# Patient Record
Sex: MALE | Race: BLACK OR AFRICAN AMERICAN | NOT HISPANIC OR LATINO | Employment: UNEMPLOYED | URBAN - METROPOLITAN AREA
[De-identification: names, ages, dates, MRNs, and addresses within clinical notes are randomized per-mention and may not be internally consistent; named-entity substitution may affect disease eponyms.]

---

## 2021-06-04 ENCOUNTER — APPOINTMENT (OUTPATIENT)
Dept: GENERAL RADIOLOGY | Facility: HOSPITAL | Age: 61
End: 2021-06-04

## 2021-06-04 ENCOUNTER — HOSPITAL ENCOUNTER (OUTPATIENT)
Facility: HOSPITAL | Age: 61
Setting detail: OBSERVATION
Discharge: HOME | End: 2021-06-07
Attending: EMERGENCY MEDICINE | Admitting: INTERNAL MEDICINE

## 2021-06-04 DIAGNOSIS — R06.02 SHORTNESS OF BREATH: ICD-10-CM

## 2021-06-04 DIAGNOSIS — I50.9 ACUTE ON CHRONIC CONGESTIVE HEART FAILURE, UNSPECIFIED HEART FAILURE TYPE (HCC): Primary | ICD-10-CM

## 2021-06-04 LAB
ALBUMIN SERPL-MCNC: 4.1 G/DL (ref 3.5–5.2)
ALBUMIN/GLOB SERPL: 1.4 G/DL
ALP SERPL-CCNC: 147 U/L (ref 39–117)
ALT SERPL W P-5'-P-CCNC: 24 U/L (ref 1–41)
ANION GAP SERPL CALCULATED.3IONS-SCNC: 9 MMOL/L (ref 5–15)
APTT PPP: 28 SECONDS (ref 24–31)
AST SERPL-CCNC: 26 U/L (ref 1–40)
BASOPHILS # BLD AUTO: 0 10*3/MM3 (ref 0–0.2)
BASOPHILS NFR BLD AUTO: 0.9 % (ref 0–1.5)
BILIRUB SERPL-MCNC: 2 MG/DL (ref 0–1.2)
BUN SERPL-MCNC: 15 MG/DL (ref 6–20)
BUN/CREAT SERPL: 11.6 (ref 7–25)
CALCIUM SPEC-SCNC: 9.5 MG/DL (ref 8.6–10.5)
CHLORIDE SERPL-SCNC: 105 MMOL/L (ref 98–107)
CO2 SERPL-SCNC: 25 MMOL/L (ref 22–29)
CREAT SERPL-MCNC: 1.29 MG/DL (ref 0.76–1.27)
D-LACTATE SERPL-SCNC: 0.6 MMOL/L (ref 0.5–2)
DEPRECATED RDW RBC AUTO: 52.9 FL (ref 37–54)
EOSINOPHIL # BLD AUTO: 0.1 10*3/MM3 (ref 0–0.4)
EOSINOPHIL NFR BLD AUTO: 1.2 % (ref 0.3–6.2)
ERYTHROCYTE [DISTWIDTH] IN BLOOD BY AUTOMATED COUNT: 16.6 % (ref 12.3–15.4)
GFR SERPL CREATININE-BSD FRML MDRD: 69 ML/MIN/1.73
GLOBULIN UR ELPH-MCNC: 3 GM/DL
GLUCOSE SERPL-MCNC: 86 MG/DL (ref 65–99)
HCT VFR BLD AUTO: 49 % (ref 37.5–51)
HGB BLD-MCNC: 16 G/DL (ref 13–17.7)
HOLD SPECIMEN: NORMAL
INR PPP: 1.15 (ref 0.93–1.1)
LYMPHOCYTES # BLD AUTO: 1.2 10*3/MM3 (ref 0.7–3.1)
LYMPHOCYTES NFR BLD AUTO: 25.1 % (ref 19.6–45.3)
MCH RBC QN AUTO: 30.3 PG (ref 26.6–33)
MCHC RBC AUTO-ENTMCNC: 32.7 G/DL (ref 31.5–35.7)
MCV RBC AUTO: 92.5 FL (ref 79–97)
MONOCYTES # BLD AUTO: 0.6 10*3/MM3 (ref 0.1–0.9)
MONOCYTES NFR BLD AUTO: 13.5 % (ref 5–12)
NEUTROPHILS NFR BLD AUTO: 2.8 10*3/MM3 (ref 1.7–7)
NEUTROPHILS NFR BLD AUTO: 59.3 % (ref 42.7–76)
NRBC BLD AUTO-RTO: 0.1 /100 WBC (ref 0–0.2)
NT-PROBNP SERPL-MCNC: 1722 PG/ML (ref 0–900)
PLATELET # BLD AUTO: 219 10*3/MM3 (ref 140–450)
PMV BLD AUTO: 8.8 FL (ref 6–12)
POTASSIUM SERPL-SCNC: 5.2 MMOL/L (ref 3.5–5.2)
PROT SERPL-MCNC: 7.1 G/DL (ref 6–8.5)
PROTHROMBIN TIME: 12.6 SECONDS (ref 9.6–11.7)
RBC # BLD AUTO: 5.3 10*6/MM3 (ref 4.14–5.8)
SARS-COV-2 RNA PNL SPEC NAA+PROBE: NOT DETECTED
SODIUM SERPL-SCNC: 139 MMOL/L (ref 136–145)
TROPONIN T SERPL-MCNC: <0.01 NG/ML (ref 0–0.03)
WBC # BLD AUTO: 4.7 10*3/MM3 (ref 3.4–10.8)

## 2021-06-04 PROCEDURE — 84484 ASSAY OF TROPONIN QUANT: CPT | Performed by: NURSE PRACTITIONER

## 2021-06-04 PROCEDURE — 83605 ASSAY OF LACTIC ACID: CPT

## 2021-06-04 PROCEDURE — 99284 EMERGENCY DEPT VISIT MOD MDM: CPT

## 2021-06-04 PROCEDURE — G0378 HOSPITAL OBSERVATION PER HR: HCPCS

## 2021-06-04 PROCEDURE — 80053 COMPREHEN METABOLIC PANEL: CPT | Performed by: NURSE PRACTITIONER

## 2021-06-04 PROCEDURE — 71045 X-RAY EXAM CHEST 1 VIEW: CPT

## 2021-06-04 PROCEDURE — 85730 THROMBOPLASTIN TIME PARTIAL: CPT | Performed by: NURSE PRACTITIONER

## 2021-06-04 PROCEDURE — 83880 ASSAY OF NATRIURETIC PEPTIDE: CPT | Performed by: NURSE PRACTITIONER

## 2021-06-04 PROCEDURE — 96376 TX/PRO/DX INJ SAME DRUG ADON: CPT

## 2021-06-04 PROCEDURE — 85025 COMPLETE CBC W/AUTO DIFF WBC: CPT | Performed by: NURSE PRACTITIONER

## 2021-06-04 PROCEDURE — 85610 PROTHROMBIN TIME: CPT | Performed by: NURSE PRACTITIONER

## 2021-06-04 PROCEDURE — 96374 THER/PROPH/DIAG INJ IV PUSH: CPT

## 2021-06-04 PROCEDURE — U0003 INFECTIOUS AGENT DETECTION BY NUCLEIC ACID (DNA OR RNA); SEVERE ACUTE RESPIRATORY SYNDROME CORONAVIRUS 2 (SARS-COV-2) (CORONAVIRUS DISEASE [COVID-19]), AMPLIFIED PROBE TECHNIQUE, MAKING USE OF HIGH THROUGHPUT TECHNOLOGIES AS DESCRIBED BY CMS-2020-01-R: HCPCS | Performed by: EMERGENCY MEDICINE

## 2021-06-04 PROCEDURE — 25010000002 FUROSEMIDE PER 20 MG: Performed by: NURSE PRACTITIONER

## 2021-06-04 RX ORDER — ASPIRIN 81 MG/1
81 TABLET, CHEWABLE ORAL DAILY
Status: DISCONTINUED | OUTPATIENT
Start: 2021-06-05 | End: 2021-06-07 | Stop reason: HOSPADM

## 2021-06-04 RX ORDER — FUROSEMIDE 20 MG/1
20 TABLET ORAL DAILY
Status: ON HOLD | COMMUNITY
End: 2021-06-07 | Stop reason: SDUPTHER

## 2021-06-04 RX ORDER — LOSARTAN POTASSIUM 25 MG/1
25 TABLET ORAL DAILY
COMMUNITY
Start: 2021-05-18 | End: 2021-09-20 | Stop reason: SDUPTHER

## 2021-06-04 RX ORDER — SODIUM CHLORIDE 0.9 % (FLUSH) 0.9 %
10 SYRINGE (ML) INJECTION EVERY 12 HOURS SCHEDULED
Status: DISCONTINUED | OUTPATIENT
Start: 2021-06-04 | End: 2021-06-07 | Stop reason: HOSPADM

## 2021-06-04 RX ORDER — FUROSEMIDE 10 MG/ML
40 INJECTION INTRAMUSCULAR; INTRAVENOUS
Status: DISCONTINUED | OUTPATIENT
Start: 2021-06-04 | End: 2021-06-07 | Stop reason: HOSPADM

## 2021-06-04 RX ORDER — ACETAMINOPHEN 325 MG/1
650 TABLET ORAL EVERY 4 HOURS PRN
Status: DISCONTINUED | OUTPATIENT
Start: 2021-06-04 | End: 2021-06-07 | Stop reason: HOSPADM

## 2021-06-04 RX ORDER — ASPIRIN 81 MG/1
81 TABLET ORAL DAILY
COMMUNITY
End: 2021-09-20

## 2021-06-04 RX ORDER — ONDANSETRON 2 MG/ML
4 INJECTION INTRAMUSCULAR; INTRAVENOUS EVERY 6 HOURS PRN
Status: DISCONTINUED | OUTPATIENT
Start: 2021-06-04 | End: 2021-06-07 | Stop reason: HOSPADM

## 2021-06-04 RX ORDER — CHOLECALCIFEROL (VITAMIN D3) 125 MCG
5 CAPSULE ORAL NIGHTLY PRN
Status: DISCONTINUED | OUTPATIENT
Start: 2021-06-04 | End: 2021-06-07 | Stop reason: HOSPADM

## 2021-06-04 RX ORDER — NITROGLYCERIN 0.4 MG/1
0.4 TABLET SUBLINGUAL
Status: DISCONTINUED | OUTPATIENT
Start: 2021-06-04 | End: 2021-06-07 | Stop reason: HOSPADM

## 2021-06-04 RX ORDER — GABAPENTIN 300 MG/1
300 CAPSULE ORAL 2 TIMES DAILY
Status: ON HOLD | COMMUNITY
Start: 2021-05-25 | End: 2021-06-15

## 2021-06-04 RX ORDER — GABAPENTIN 300 MG/1
300 CAPSULE ORAL 2 TIMES DAILY
Status: DISCONTINUED | OUTPATIENT
Start: 2021-06-04 | End: 2021-06-07 | Stop reason: HOSPADM

## 2021-06-04 RX ORDER — FUROSEMIDE 10 MG/ML
40 INJECTION INTRAMUSCULAR; INTRAVENOUS ONCE
Status: COMPLETED | OUTPATIENT
Start: 2021-06-04 | End: 2021-06-04

## 2021-06-04 RX ORDER — LOSARTAN POTASSIUM 25 MG/1
25 TABLET ORAL DAILY
Status: DISCONTINUED | OUTPATIENT
Start: 2021-06-05 | End: 2021-06-07 | Stop reason: HOSPADM

## 2021-06-04 RX ORDER — SODIUM CHLORIDE 0.9 % (FLUSH) 0.9 %
10 SYRINGE (ML) INJECTION AS NEEDED
Status: DISCONTINUED | OUTPATIENT
Start: 2021-06-04 | End: 2021-06-07 | Stop reason: HOSPADM

## 2021-06-04 RX ADMIN — Medication 5 MG: at 22:22

## 2021-06-04 RX ADMIN — Medication 10 ML: at 20:40

## 2021-06-04 RX ADMIN — GABAPENTIN 300 MG: 300 CAPSULE ORAL at 20:41

## 2021-06-04 RX ADMIN — FUROSEMIDE 40 MG: 10 INJECTION, SOLUTION INTRAMUSCULAR; INTRAVENOUS at 15:46

## 2021-06-04 RX ADMIN — ACETAMINOPHEN 650 MG: 325 TABLET, FILM COATED ORAL at 22:22

## 2021-06-04 RX ADMIN — FUROSEMIDE 40 MG: 10 INJECTION, SOLUTION INTRAMUSCULAR; INTRAVENOUS at 20:41

## 2021-06-04 RX ADMIN — NITROGLYCERIN 1 INCH: 20 OINTMENT TOPICAL at 15:46

## 2021-06-05 LAB
ANION GAP SERPL CALCULATED.3IONS-SCNC: 11 MMOL/L (ref 5–15)
BUN SERPL-MCNC: 16 MG/DL (ref 6–20)
BUN/CREAT SERPL: 12.8 (ref 7–25)
CALCIUM SPEC-SCNC: 8.8 MG/DL (ref 8.6–10.5)
CHLORIDE SERPL-SCNC: 105 MMOL/L (ref 98–107)
CO2 SERPL-SCNC: 25 MMOL/L (ref 22–29)
CREAT SERPL-MCNC: 1.25 MG/DL (ref 0.76–1.27)
DEPRECATED RDW RBC AUTO: 50.8 FL (ref 37–54)
ERYTHROCYTE [DISTWIDTH] IN BLOOD BY AUTOMATED COUNT: 16.1 % (ref 12.3–15.4)
GFR SERPL CREATININE-BSD FRML MDRD: 72 ML/MIN/1.73
GLUCOSE SERPL-MCNC: 113 MG/DL (ref 65–99)
HCT VFR BLD AUTO: 43.9 % (ref 37.5–51)
HGB BLD-MCNC: 14.7 G/DL (ref 13–17.7)
MAGNESIUM SERPL-MCNC: 1.8 MG/DL (ref 1.6–2.6)
MCH RBC QN AUTO: 30.5 PG (ref 26.6–33)
MCHC RBC AUTO-ENTMCNC: 33.3 G/DL (ref 31.5–35.7)
MCV RBC AUTO: 91.3 FL (ref 79–97)
PLATELET # BLD AUTO: 195 10*3/MM3 (ref 140–450)
PMV BLD AUTO: 8.5 FL (ref 6–12)
POTASSIUM SERPL-SCNC: 4.5 MMOL/L (ref 3.5–5.2)
RBC # BLD AUTO: 4.81 10*6/MM3 (ref 4.14–5.8)
SODIUM SERPL-SCNC: 141 MMOL/L (ref 136–145)
TROPONIN T SERPL-MCNC: 0.02 NG/ML (ref 0–0.03)
WBC # BLD AUTO: 5.2 10*3/MM3 (ref 3.4–10.8)

## 2021-06-05 PROCEDURE — 80048 BASIC METABOLIC PNL TOTAL CA: CPT | Performed by: NURSE PRACTITIONER

## 2021-06-05 PROCEDURE — 85027 COMPLETE CBC AUTOMATED: CPT | Performed by: NURSE PRACTITIONER

## 2021-06-05 PROCEDURE — 94618 PULMONARY STRESS TESTING: CPT

## 2021-06-05 PROCEDURE — 84484 ASSAY OF TROPONIN QUANT: CPT | Performed by: PHYSICIAN ASSISTANT

## 2021-06-05 PROCEDURE — 83735 ASSAY OF MAGNESIUM: CPT | Performed by: NURSE PRACTITIONER

## 2021-06-05 PROCEDURE — 93010 ELECTROCARDIOGRAM REPORT: CPT | Performed by: INTERNAL MEDICINE

## 2021-06-05 PROCEDURE — 25010000002 FUROSEMIDE PER 20 MG: Performed by: NURSE PRACTITIONER

## 2021-06-05 PROCEDURE — 96376 TX/PRO/DX INJ SAME DRUG ADON: CPT

## 2021-06-05 PROCEDURE — 93005 ELECTROCARDIOGRAM TRACING: CPT | Performed by: PHYSICIAN ASSISTANT

## 2021-06-05 PROCEDURE — G0378 HOSPITAL OBSERVATION PER HR: HCPCS

## 2021-06-05 PROCEDURE — 99222 1ST HOSP IP/OBS MODERATE 55: CPT | Performed by: INTERNAL MEDICINE

## 2021-06-05 RX ORDER — IPRATROPIUM BROMIDE AND ALBUTEROL SULFATE 2.5; .5 MG/3ML; MG/3ML
3 SOLUTION RESPIRATORY (INHALATION) EVERY 4 HOURS PRN
Status: DISCONTINUED | OUTPATIENT
Start: 2021-06-05 | End: 2021-06-07 | Stop reason: HOSPADM

## 2021-06-05 RX ADMIN — ASPIRIN 81 MG: 81 TABLET, CHEWABLE ORAL at 08:04

## 2021-06-05 RX ADMIN — FUROSEMIDE 40 MG: 10 INJECTION, SOLUTION INTRAMUSCULAR; INTRAVENOUS at 17:28

## 2021-06-05 RX ADMIN — GABAPENTIN 300 MG: 300 CAPSULE ORAL at 08:04

## 2021-06-05 RX ADMIN — Medication 10 ML: at 08:05

## 2021-06-05 RX ADMIN — METOPROLOL TARTRATE 25 MG: 25 TABLET, FILM COATED ORAL at 08:05

## 2021-06-05 RX ADMIN — RIVAROXABAN 20 MG: 20 TABLET, FILM COATED ORAL at 17:28

## 2021-06-05 RX ADMIN — LOSARTAN POTASSIUM 25 MG: 25 TABLET, FILM COATED ORAL at 08:04

## 2021-06-05 RX ADMIN — GABAPENTIN 300 MG: 300 CAPSULE ORAL at 20:51

## 2021-06-05 RX ADMIN — FUROSEMIDE 40 MG: 10 INJECTION, SOLUTION INTRAMUSCULAR; INTRAVENOUS at 08:05

## 2021-06-05 RX ADMIN — Medication 10 ML: at 20:51

## 2021-06-06 ENCOUNTER — APPOINTMENT (OUTPATIENT)
Dept: CARDIOLOGY | Facility: HOSPITAL | Age: 61
End: 2021-06-06

## 2021-06-06 LAB
ALBUMIN SERPL-MCNC: 3.6 G/DL (ref 3.5–5.2)
ALBUMIN/GLOB SERPL: 1.3 G/DL
ALP SERPL-CCNC: 148 U/L (ref 39–117)
ALT SERPL W P-5'-P-CCNC: 22 U/L (ref 1–41)
ANION GAP SERPL CALCULATED.3IONS-SCNC: 8 MMOL/L (ref 5–15)
AST SERPL-CCNC: 21 U/L (ref 1–40)
BASOPHILS # BLD AUTO: 0.1 10*3/MM3 (ref 0–0.2)
BASOPHILS NFR BLD AUTO: 1 % (ref 0–1.5)
BILIRUB SERPL-MCNC: 1.6 MG/DL (ref 0–1.2)
BUN SERPL-MCNC: 17 MG/DL (ref 6–20)
BUN/CREAT SERPL: 12.2 (ref 7–25)
CALCIUM SPEC-SCNC: 9.6 MG/DL (ref 8.6–10.5)
CHLORIDE SERPL-SCNC: 97 MMOL/L (ref 98–107)
CO2 SERPL-SCNC: 32 MMOL/L (ref 22–29)
CREAT SERPL-MCNC: 1.39 MG/DL (ref 0.76–1.27)
DEPRECATED RDW RBC AUTO: 50.8 FL (ref 37–54)
EOSINOPHIL # BLD AUTO: 0.1 10*3/MM3 (ref 0–0.4)
EOSINOPHIL NFR BLD AUTO: 1.9 % (ref 0.3–6.2)
ERYTHROCYTE [DISTWIDTH] IN BLOOD BY AUTOMATED COUNT: 16.1 % (ref 12.3–15.4)
GFR SERPL CREATININE-BSD FRML MDRD: 64 ML/MIN/1.73
GLOBULIN UR ELPH-MCNC: 2.8 GM/DL
GLUCOSE SERPL-MCNC: 88 MG/DL (ref 65–99)
HCT VFR BLD AUTO: 48.2 % (ref 37.5–51)
HGB BLD-MCNC: 15.9 G/DL (ref 13–17.7)
LYMPHOCYTES # BLD AUTO: 1.3 10*3/MM3 (ref 0.7–3.1)
LYMPHOCYTES NFR BLD AUTO: 25.7 % (ref 19.6–45.3)
MCH RBC QN AUTO: 30.3 PG (ref 26.6–33)
MCHC RBC AUTO-ENTMCNC: 32.9 G/DL (ref 31.5–35.7)
MCV RBC AUTO: 92 FL (ref 79–97)
MONOCYTES # BLD AUTO: 0.7 10*3/MM3 (ref 0.1–0.9)
MONOCYTES NFR BLD AUTO: 13.4 % (ref 5–12)
NEUTROPHILS NFR BLD AUTO: 3 10*3/MM3 (ref 1.7–7)
NEUTROPHILS NFR BLD AUTO: 58 % (ref 42.7–76)
NRBC BLD AUTO-RTO: 0.3 /100 WBC (ref 0–0.2)
PLATELET # BLD AUTO: 230 10*3/MM3 (ref 140–450)
PMV BLD AUTO: 8.6 FL (ref 6–12)
POTASSIUM SERPL-SCNC: 4.2 MMOL/L (ref 3.5–5.2)
PROT SERPL-MCNC: 6.4 G/DL (ref 6–8.5)
QT INTERVAL: 409 MS
RBC # BLD AUTO: 5.23 10*6/MM3 (ref 4.14–5.8)
SODIUM SERPL-SCNC: 137 MMOL/L (ref 136–145)
TROPONIN T SERPL-MCNC: <0.01 NG/ML (ref 0–0.03)
WBC # BLD AUTO: 5.2 10*3/MM3 (ref 3.4–10.8)

## 2021-06-06 PROCEDURE — 96376 TX/PRO/DX INJ SAME DRUG ADON: CPT

## 2021-06-06 PROCEDURE — 84484 ASSAY OF TROPONIN QUANT: CPT | Performed by: PHYSICIAN ASSISTANT

## 2021-06-06 PROCEDURE — 93306 TTE W/DOPPLER COMPLETE: CPT

## 2021-06-06 PROCEDURE — 85025 COMPLETE CBC W/AUTO DIFF WBC: CPT | Performed by: PHYSICIAN ASSISTANT

## 2021-06-06 PROCEDURE — G0378 HOSPITAL OBSERVATION PER HR: HCPCS

## 2021-06-06 PROCEDURE — 99233 SBSQ HOSP IP/OBS HIGH 50: CPT | Performed by: INTERNAL MEDICINE

## 2021-06-06 PROCEDURE — 93306 TTE W/DOPPLER COMPLETE: CPT | Performed by: INTERNAL MEDICINE

## 2021-06-06 PROCEDURE — 25010000002 FUROSEMIDE PER 20 MG: Performed by: NURSE PRACTITIONER

## 2021-06-06 PROCEDURE — 80053 COMPREHEN METABOLIC PANEL: CPT | Performed by: PHYSICIAN ASSISTANT

## 2021-06-06 RX ADMIN — Medication 10 ML: at 09:26

## 2021-06-06 RX ADMIN — Medication 10 ML: at 20:41

## 2021-06-06 RX ADMIN — FUROSEMIDE 40 MG: 10 INJECTION, SOLUTION INTRAMUSCULAR; INTRAVENOUS at 09:26

## 2021-06-06 RX ADMIN — FUROSEMIDE 40 MG: 10 INJECTION, SOLUTION INTRAMUSCULAR; INTRAVENOUS at 17:18

## 2021-06-06 RX ADMIN — GABAPENTIN 300 MG: 300 CAPSULE ORAL at 09:26

## 2021-06-06 RX ADMIN — RIVAROXABAN 20 MG: 20 TABLET, FILM COATED ORAL at 17:18

## 2021-06-06 RX ADMIN — METOPROLOL TARTRATE 25 MG: 25 TABLET, FILM COATED ORAL at 09:26

## 2021-06-06 RX ADMIN — GABAPENTIN 300 MG: 300 CAPSULE ORAL at 20:41

## 2021-06-06 RX ADMIN — LOSARTAN POTASSIUM 25 MG: 25 TABLET, FILM COATED ORAL at 09:26

## 2021-06-06 RX ADMIN — ASPIRIN 81 MG: 81 TABLET, CHEWABLE ORAL at 09:26

## 2021-06-07 ENCOUNTER — PREP FOR SURGERY (OUTPATIENT)
Dept: OTHER | Facility: HOSPITAL | Age: 61
End: 2021-06-07

## 2021-06-07 VITALS
BODY MASS INDEX: 25.96 KG/M2 | SYSTOLIC BLOOD PRESSURE: 120 MMHG | OXYGEN SATURATION: 99 % | WEIGHT: 208.78 LBS | DIASTOLIC BLOOD PRESSURE: 80 MMHG | TEMPERATURE: 98 F | HEART RATE: 98 BPM | RESPIRATION RATE: 16 BRPM | HEIGHT: 75 IN

## 2021-06-07 DIAGNOSIS — I42.8 NICM (NONISCHEMIC CARDIOMYOPATHY) (HCC): ICD-10-CM

## 2021-06-07 DIAGNOSIS — R06.02 SHORTNESS OF BREATH: ICD-10-CM

## 2021-06-07 DIAGNOSIS — I50.9 ACUTE ON CHRONIC CONGESTIVE HEART FAILURE, UNSPECIFIED HEART FAILURE TYPE (HCC): Primary | ICD-10-CM

## 2021-06-07 PROBLEM — F17.200 CURRENT SMOKER: Chronic | Status: ACTIVE | Noted: 2021-06-07

## 2021-06-07 PROBLEM — I50.23 ACUTE ON CHRONIC SYSTOLIC CONGESTIVE HEART FAILURE: Chronic | Status: ACTIVE | Noted: 2021-06-04

## 2021-06-07 LAB
ANION GAP SERPL CALCULATED.3IONS-SCNC: 9 MMOL/L (ref 5–15)
BASOPHILS # BLD AUTO: 0.1 10*3/MM3 (ref 0–0.2)
BASOPHILS NFR BLD AUTO: 1.2 % (ref 0–1.5)
BH CV ECHO MEAS - ACS: 2.3 CM
BH CV ECHO MEAS - AO MAX PG (FULL): 0.4 MMHG
BH CV ECHO MEAS - AO MAX PG: 3.4 MMHG
BH CV ECHO MEAS - AO MEAN PG (FULL): 0.96 MMHG
BH CV ECHO MEAS - AO MEAN PG: 2.6 MMHG
BH CV ECHO MEAS - AO ROOT AREA (BSA CORRECTED): 1.6
BH CV ECHO MEAS - AO ROOT AREA: 11.1 CM^2
BH CV ECHO MEAS - AO ROOT DIAM: 3.8 CM
BH CV ECHO MEAS - AO V2 MAX: 92.8 CM/SEC
BH CV ECHO MEAS - AO V2 MEAN: 80.1 CM/SEC
BH CV ECHO MEAS - AO V2 VTI: 18.3 CM
BH CV ECHO MEAS - AORTIC HR: 99.8 BPM
BH CV ECHO MEAS - AORTIC R-R: 0.6 SEC
BH CV ECHO MEAS - ASC AORTA: 3.6 CM
BH CV ECHO MEAS - AVA(I,A): 3.3 CM^2
BH CV ECHO MEAS - AVA(I,D): 3.3 CM^2
BH CV ECHO MEAS - AVA(V,A): 3.6 CM^2
BH CV ECHO MEAS - AVA(V,D): 3.6 CM^2
BH CV ECHO MEAS - BSA(HAYCOCK): 2.3 M^2
BH CV ECHO MEAS - BSA: 2.3 M^2
BH CV ECHO MEAS - BZI_BMI: 27.7 KILOGRAMS/M^2
BH CV ECHO MEAS - BZI_METRIC_HEIGHT: 190.5 CM
BH CV ECHO MEAS - BZI_METRIC_WEIGHT: 100.7 KG
BH CV ECHO MEAS - CI(AO): 8.8 L/MIN/M^2
BH CV ECHO MEAS - CI(LVOT): 2.6 L/MIN/M^2
BH CV ECHO MEAS - CO(AO): 20.2 L/MIN
BH CV ECHO MEAS - CO(LVOT): 6 L/MIN
BH CV ECHO MEAS - EDV(CUBED): 127 ML
BH CV ECHO MEAS - EDV(MOD-SP2): 151.2 ML
BH CV ECHO MEAS - EDV(MOD-SP4): 154.5 ML
BH CV ECHO MEAS - EDV(TEICH): 119.7 ML
BH CV ECHO MEAS - EF(CUBED): 12.9 %
BH CV ECHO MEAS - EF(MOD-BP): 17 %
BH CV ECHO MEAS - EF(MOD-SP2): 16.4 %
BH CV ECHO MEAS - EF(MOD-SP4): 23.2 %
BH CV ECHO MEAS - EF(TEICH): 10.1 %
BH CV ECHO MEAS - ESV(CUBED): 110.6 ML
BH CV ECHO MEAS - ESV(MOD-SP2): 126.4 ML
BH CV ECHO MEAS - ESV(MOD-SP4): 118.6 ML
BH CV ECHO MEAS - ESV(TEICH): 107.6 ML
BH CV ECHO MEAS - FS: 4.5 %
BH CV ECHO MEAS - IVS/LVPW: 1.4
BH CV ECHO MEAS - IVSD: 1.6 CM
BH CV ECHO MEAS - LA DIMENSION(2D): 4.7 CM
BH CV ECHO MEAS - LV DIASTOLIC VOL/BSA (35-75): 67.3 ML/M^2
BH CV ECHO MEAS - LV MASS(C)D: 297.1 GRAMS
BH CV ECHO MEAS - LV MASS(C)DI: 129.5 GRAMS/M^2
BH CV ECHO MEAS - LV MAX PG: 3.1 MMHG
BH CV ECHO MEAS - LV MEAN PG: 1.7 MMHG
BH CV ECHO MEAS - LV SYSTOLIC VOL/BSA (12-30): 51.7 ML/M^2
BH CV ECHO MEAS - LV V1 MAX: 87.3 CM/SEC
BH CV ECHO MEAS - LV V1 MEAN: 61.4 CM/SEC
BH CV ECHO MEAS - LV V1 VTI: 15.9 CM
BH CV ECHO MEAS - LVIDD: 5 CM
BH CV ECHO MEAS - LVIDS: 4.8 CM
BH CV ECHO MEAS - LVOT AREA: 3.8 CM^2
BH CV ECHO MEAS - LVOT DIAM: 2.2 CM
BH CV ECHO MEAS - LVPWD: 1.2 CM
BH CV ECHO MEAS - MR MAX PG: 97 MMHG
BH CV ECHO MEAS - MR MAX VEL: 492.6 CM/SEC
BH CV ECHO MEAS - MR MEAN PG: 72.2 MMHG
BH CV ECHO MEAS - MR MEAN VEL: 412.6 CM/SEC
BH CV ECHO MEAS - MR VTI: 152.3 CM
BH CV ECHO MEAS - MV A MAX VEL: 35.1 CM/SEC
BH CV ECHO MEAS - MV DEC SLOPE: 489.2 CM/SEC^2
BH CV ECHO MEAS - MV DEC TIME: 0.13 SEC
BH CV ECHO MEAS - MV E MAX VEL: 64 CM/SEC
BH CV ECHO MEAS - MV E/A: 1.8
BH CV ECHO MEAS - MV MAX PG: 4 MMHG
BH CV ECHO MEAS - MV MEAN PG: 1.7 MMHG
BH CV ECHO MEAS - MV V2 MAX: 99.7 CM/SEC
BH CV ECHO MEAS - MV V2 MEAN: 61.6 CM/SEC
BH CV ECHO MEAS - MV V2 VTI: 19.9 CM
BH CV ECHO MEAS - MVA(VTI): 3 CM^2
BH CV ECHO MEAS - PA ACC TIME: 0.08 SEC
BH CV ECHO MEAS - PA MAX PG (FULL): 2.2 MMHG
BH CV ECHO MEAS - PA MAX PG: 3.6 MMHG
BH CV ECHO MEAS - PA MEAN PG (FULL): 1.3 MMHG
BH CV ECHO MEAS - PA MEAN PG: 2 MMHG
BH CV ECHO MEAS - PA PR(ACCEL): 43 MMHG
BH CV ECHO MEAS - PA V2 MAX: 95.4 CM/SEC
BH CV ECHO MEAS - PA V2 MEAN: 67.4 CM/SEC
BH CV ECHO MEAS - PA V2 VTI: 16.9 CM
BH CV ECHO MEAS - PI END-D VEL: 80.8 CM/SEC
BH CV ECHO MEAS - PI MAX PG: 8.5 MMHG
BH CV ECHO MEAS - PI MAX VEL: 146 CM/SEC
BH CV ECHO MEAS - PULM A REVS DUR: 0.14 SEC
BH CV ECHO MEAS - PULM A REVS VEL: 31.1 CM/SEC
BH CV ECHO MEAS - PULM DIAS VEL: 55.7 CM/SEC
BH CV ECHO MEAS - PULM S/D: 0.56
BH CV ECHO MEAS - PULM SYS VEL: 31.4 CM/SEC
BH CV ECHO MEAS - PVA(I,A): 4.6 CM^2
BH CV ECHO MEAS - PVA(I,D): 4.6 CM^2
BH CV ECHO MEAS - PVA(V,A): 4.5 CM^2
BH CV ECHO MEAS - PVA(V,D): 4.5 CM^2
BH CV ECHO MEAS - QP/QS: 1.3
BH CV ECHO MEAS - RAP SYSTOLE: 3 MMHG
BH CV ECHO MEAS - RV MAX PG: 1.4 MMHG
BH CV ECHO MEAS - RV MEAN PG: 0.72 MMHG
BH CV ECHO MEAS - RV V1 MAX: 59.2 CM/SEC
BH CV ECHO MEAS - RV V1 MEAN: 39.6 CM/SEC
BH CV ECHO MEAS - RV V1 VTI: 10.7 CM
BH CV ECHO MEAS - RVDD: 4.3 CM
BH CV ECHO MEAS - RVOT AREA: 7.2 CM^2
BH CV ECHO MEAS - RVOT DIAM: 3 CM
BH CV ECHO MEAS - RVSP: 32.5 MMHG
BH CV ECHO MEAS - SI(AO): 88.4 ML/M^2
BH CV ECHO MEAS - SI(CUBED): 7.1 ML/M^2
BH CV ECHO MEAS - SI(LVOT): 26.1 ML/M^2
BH CV ECHO MEAS - SI(MOD-SP2): 10.8 ML/M^2
BH CV ECHO MEAS - SI(MOD-SP4): 15.7 ML/M^2
BH CV ECHO MEAS - SI(TEICH): 5.3 ML/M^2
BH CV ECHO MEAS - SV(AO): 202.8 ML
BH CV ECHO MEAS - SV(CUBED): 16.4 ML
BH CV ECHO MEAS - SV(LVOT): 59.9 ML
BH CV ECHO MEAS - SV(MOD-SP2): 24.8 ML
BH CV ECHO MEAS - SV(MOD-SP4): 35.9 ML
BH CV ECHO MEAS - SV(RVOT): 77.5 ML
BH CV ECHO MEAS - SV(TEICH): 12.1 ML
BH CV ECHO MEAS - TR MAX VEL: 271.2 CM/SEC
BUN SERPL-MCNC: 17 MG/DL (ref 6–20)
BUN/CREAT SERPL: 13.4 (ref 7–25)
CALCIUM SPEC-SCNC: 8.9 MG/DL (ref 8.6–10.5)
CHLORIDE SERPL-SCNC: 100 MMOL/L (ref 98–107)
CO2 SERPL-SCNC: 29 MMOL/L (ref 22–29)
CREAT SERPL-MCNC: 1.27 MG/DL (ref 0.76–1.27)
DEPRECATED RDW RBC AUTO: 49.9 FL (ref 37–54)
EOSINOPHIL # BLD AUTO: 0.2 10*3/MM3 (ref 0–0.4)
EOSINOPHIL NFR BLD AUTO: 4.1 % (ref 0.3–6.2)
ERYTHROCYTE [DISTWIDTH] IN BLOOD BY AUTOMATED COUNT: 16 % (ref 12.3–15.4)
GFR SERPL CREATININE-BSD FRML MDRD: 71 ML/MIN/1.73
GLUCOSE SERPL-MCNC: 95 MG/DL (ref 65–99)
HCT VFR BLD AUTO: 47.5 % (ref 37.5–51)
HGB BLD-MCNC: 15.8 G/DL (ref 13–17.7)
LV EF 2D ECHO EST: 25 %
LYMPHOCYTES # BLD AUTO: 1.7 10*3/MM3 (ref 0.7–3.1)
LYMPHOCYTES NFR BLD AUTO: 32.1 % (ref 19.6–45.3)
MCH RBC QN AUTO: 30.3 PG (ref 26.6–33)
MCHC RBC AUTO-ENTMCNC: 33.3 G/DL (ref 31.5–35.7)
MCV RBC AUTO: 91 FL (ref 79–97)
MONOCYTES # BLD AUTO: 0.8 10*3/MM3 (ref 0.1–0.9)
MONOCYTES NFR BLD AUTO: 14.5 % (ref 5–12)
NEUTROPHILS NFR BLD AUTO: 2.6 10*3/MM3 (ref 1.7–7)
NEUTROPHILS NFR BLD AUTO: 48.1 % (ref 42.7–76)
NRBC BLD AUTO-RTO: 0.2 /100 WBC (ref 0–0.2)
PLATELET # BLD AUTO: 214 10*3/MM3 (ref 140–450)
PMV BLD AUTO: 8.5 FL (ref 6–12)
POTASSIUM SERPL-SCNC: 3.5 MMOL/L (ref 3.5–5.2)
RBC # BLD AUTO: 5.22 10*6/MM3 (ref 4.14–5.8)
SODIUM SERPL-SCNC: 138 MMOL/L (ref 136–145)
WBC # BLD AUTO: 5.3 10*3/MM3 (ref 3.4–10.8)

## 2021-06-07 PROCEDURE — 99222 1ST HOSP IP/OBS MODERATE 55: CPT | Performed by: INTERNAL MEDICINE

## 2021-06-07 PROCEDURE — G0378 HOSPITAL OBSERVATION PER HR: HCPCS

## 2021-06-07 PROCEDURE — 96376 TX/PRO/DX INJ SAME DRUG ADON: CPT

## 2021-06-07 PROCEDURE — 99239 HOSP IP/OBS DSCHRG MGMT >30: CPT | Performed by: INTERNAL MEDICINE

## 2021-06-07 PROCEDURE — 80048 BASIC METABOLIC PNL TOTAL CA: CPT | Performed by: PHYSICIAN ASSISTANT

## 2021-06-07 PROCEDURE — 85025 COMPLETE CBC W/AUTO DIFF WBC: CPT | Performed by: PHYSICIAN ASSISTANT

## 2021-06-07 PROCEDURE — 25010000002 FUROSEMIDE PER 20 MG: Performed by: NURSE PRACTITIONER

## 2021-06-07 RX ORDER — SODIUM CHLORIDE 0.9 % (FLUSH) 0.9 %
3 SYRINGE (ML) INJECTION EVERY 12 HOURS SCHEDULED
Status: CANCELLED | OUTPATIENT
Start: 2021-06-07

## 2021-06-07 RX ORDER — SODIUM CHLORIDE 0.9 % (FLUSH) 0.9 %
3-10 SYRINGE (ML) INJECTION AS NEEDED
Status: CANCELLED | OUTPATIENT
Start: 2021-06-07

## 2021-06-07 RX ORDER — SPIRONOLACTONE 25 MG/1
25 TABLET ORAL DAILY
Qty: 30 TABLET | Refills: 0 | Status: SHIPPED | OUTPATIENT
Start: 2021-06-07 | End: 2021-08-09

## 2021-06-07 RX ORDER — FUROSEMIDE 40 MG/1
40 TABLET ORAL 2 TIMES DAILY
Qty: 60 TABLET | Refills: 0 | Status: ON HOLD | OUTPATIENT
Start: 2021-06-07 | End: 2021-08-12

## 2021-06-07 RX ORDER — SPIRONOLACTONE 25 MG/1
25 TABLET ORAL DAILY
Status: DISCONTINUED | OUTPATIENT
Start: 2021-06-07 | End: 2021-06-07 | Stop reason: HOSPADM

## 2021-06-07 RX ORDER — METOPROLOL SUCCINATE 25 MG/1
25 TABLET, EXTENDED RELEASE ORAL DAILY
Qty: 30 TABLET | Refills: 0 | Status: SHIPPED | OUTPATIENT
Start: 2021-06-07 | End: 2021-08-09

## 2021-06-07 RX ADMIN — GABAPENTIN 300 MG: 300 CAPSULE ORAL at 08:51

## 2021-06-07 RX ADMIN — RIVAROXABAN 20 MG: 20 TABLET, FILM COATED ORAL at 18:16

## 2021-06-07 RX ADMIN — Medication 10 ML: at 08:51

## 2021-06-07 RX ADMIN — FUROSEMIDE 40 MG: 10 INJECTION, SOLUTION INTRAMUSCULAR; INTRAVENOUS at 18:16

## 2021-06-07 RX ADMIN — FUROSEMIDE 40 MG: 10 INJECTION, SOLUTION INTRAMUSCULAR; INTRAVENOUS at 08:51

## 2021-06-07 RX ADMIN — METOPROLOL TARTRATE 25 MG: 25 TABLET, FILM COATED ORAL at 08:51

## 2021-06-07 RX ADMIN — LOSARTAN POTASSIUM 25 MG: 25 TABLET, FILM COATED ORAL at 08:51

## 2021-06-07 RX ADMIN — ASPIRIN 81 MG: 81 TABLET, CHEWABLE ORAL at 08:51

## 2021-06-07 NOTE — H&P (VIEW-ONLY)
Cardiology Consult-EP      REQUESTING PROVIDER  Nabil Landin MD    PATIENT IDENTIFICATION  Name: Thony Dumont  Age: 60 y.o.  Sex: male  :  1960  MRN: [id number]             REASON  FOR  CONSULTATION  60-year-old -American gentleman with known history of coronary occlusive disease and prior RCA stenting.  Patient has additional history of severe dilated cardiomyopathy, paroxysmal atrial fibrillation, coagulopathy on Xarelto, congestive heart failure with reduced ejection fraction, dyslipidemia, hypertension.    CC:  Shortness of breath  Dyspnea with exertion  Activity intolerance      HPI:  Patient presented to [facility] 2021 with complaints as described above.  He reports 8 pound weight gain over the past 3 weeks.  He reports significant activity intolerance and orthopnea with lower extremity edema extending proximally up to his thighs.  Patient was initially diagnosed with dilated cardiomyopathy in March of this year in [place].  Work-up here included echocardiogram with EF 20-25% and grade 2 DD, no evidence of LV apical thrombus but there was spontaneous contrast contrast noted suggesting sluggish flow in the LV apex.  Moderate biatrial enlargement with mild TR.  RVSP 50 mmHg.  Review of patient's home medications include beta-blocker, ARB, LD aspirin, Lasix and rivaroxaban.  Patient was started on diuretic here and reports he has diuresed well with significant improvement in his symptoms.  His edema has decreased significantly as well.  He denies any complaints of chest discomfort.  He has been up in bolanos with minimal shortness of breath.  Patient has not been evaluated by cardiology since his hospitalization in March.  EP was consulted for evaluation of LifeVest versus implanted ICD.    Upon my evaluation, he is sitting on side of bed eating breakfast.  Appears in no acute distress.  He is in normal sinus rhythm      REVIEW OF SYSTEMS:  Review of Systems  General:  "Positive for class III  fatigue and tiredness  Eyes: No redness  Cardiovascular: No chest pain,  palpitations  Respiratory:   Positive for class III dyspnea with PND and orthopnea   Gastrointestinal: No nausea or vomiting, bleeding  Genitourinary: no hematuria or dysuria  Musculoskeletal: No arthralgia or myalgia  Skin: No rash  Neurologic: No numbness, tingling, syncope  Hematologic/Lymphatic: No abnormal bleeding      OBJECTIVE       ASSESSMENT  Acute on chronic heart failure with reduced ejection fraction  Severe dilated/nonischemic cardiomyopathy  Paroxysmal atrial fibrillation  Coagulopathy on Xarelto  Essential hypertension  PVCs  Dyslipidemia  LBBB  Coronary artery disease with prior PCI/RCA    RECOMMENDATIONS  Patient is diuresed well, symptoms significantly improved  He is currently on BB, loop diuretic, Aldactone, ARB  We will ask inpatient CHF educator to see patient as well  Further recommendations per Dr. Rollins            Vital Signs  Visit Vitals  /77 (BP Location: Left arm, Patient Position: Sitting)   Pulse 89   Temp 98.3 °F (36.8 °C) (Oral)   Resp 16   Ht 190.5 cm (75\")   Wt 94.7 kg (208 lb 12.4 oz)   SpO2 98%   BMI 26.10 kg/m²     Oxygen Therapy  SpO2: 98 %  Pulse Oximetry Type: Intermittent  Device (Oxygen Therapy): room air  Flow (L/min): 2  Flowsheet Rows      First Filed Value   Admission Height  190.5 cm (75\") Documented at 06/04/2021 1510   Admission Weight  107 kg (235 lb) Documented at 06/04/2021 1510        Intake & Output (last 3 days)       06/04 0701 - 06/05 0700 06/05 0701 - 06/06 0700 06/06 0701 - 06/07 0700 06/07 0701 - 06/08 0700    P.O.      Total Intake(mL/kg) 480 (4.8) 118 (1.2) 1058 (11.2)     Urine (mL/kg/hr) 4200 5750 (2.4) 5850 (2.6) 625 (1.5)    Stool  0      Total Output 4200 5750 5850 625    Net -3720 -5632 -4792 -625            Stool Unmeasured Occurrence  1 x          Lines, Drains & Airways    Active LDAs     Name:   Placement date:   Placement " "time:   Site:   Days:    Peripheral IV 06/04/21 1544 Left Antecubital   06/04/21    1544    Antecubital   2                MEDICAL HISTORY    Past Medical History:   Diagnosis Date   • Afib (CMS/HCC)    • Hyperlipidemia    • Hypertension         SURGICAL HISTORY    History reviewed. No pertinent surgical history.     FAMILY HISTORY    Family History   Problem Relation Age of Onset   • Cancer Mother        SOCIAL HISTORY    Social History     Tobacco Use   • Smoking status: Former Smoker   Substance Use Topics   • Alcohol use: Yes     Comment: 1 beer a day        ALLERGIES    Allergies   Allergen Reactions   • Aspirin Swelling   • Penicillins Swelling              /77 (BP Location: Left arm, Patient Position: Sitting)   Pulse 89   Temp 98.3 °F (36.8 °C) (Oral)   Resp 16   Ht 190.5 cm (75\")   Wt 94.7 kg (208 lb 12.4 oz)   SpO2 98%   BMI 26.10 kg/m²   Intake/Output last 3 shifts:  I/O last 3 completed shifts:  In: 1058 [P.O.:1058]  Out: 7450 [Urine:7450]  Intake/Output this shift:  I/O this shift:  In: -   Out: 625 [Urine:625]    PHYSICAL EXAM:    General: Well-developed, well-nourished 60-year-old male who is alert, cooperative, no distress, appears stated age  Head:  Normocephalic, atraumatic, mucous membranes moist  Eyes:  Conjunctiva/corneas clear, EOM's intact     Neck:  Supple,  no adenopathy;      Lungs: Clear to auscultation bilaterally, no wheezes rhonchi rales are noted  Chest wall: No tenderness  Heart::  Regular rate and rhythm, S1 and S2 normal, no murmur, rub or gallop  Abdomen: Soft, non-tender, nondistended bowel sounds active  Extremities: No cyanosis, clubbing, trace-1+ edema to lower extremities  Pulses: 2+ and symmetric all extremities  Skin:  No rashes or lesions  Neuro/psych: A&O x3. CN II through XII are grossly intact with appropriate affect      Scheduled Meds:      aspirin, 81 mg, Oral, Daily  furosemide, 40 mg, Intravenous, BID  gabapentin, 300 mg, Oral, BID  losartan, 25 mg, " Oral, Daily  metoprolol tartrate, 25 mg, Oral, Daily  rivaroxaban, 20 mg, Oral, Q PM  sodium chloride, 10 mL, Intravenous, Q12H        Continuous Infusions:         PRN Meds:    •  acetaminophen  •  ipratropium-albuterol  •  melatonin  •  nitroglycerin  •  ondansetron  •  [COMPLETED] Insert peripheral IV **AND** sodium chloride  •  sodium chloride     Data Review:     Results Review:     I reviewed the patient's new clinical results.    CBC    Results from last 7 days   Lab Units 06/07/21 0446 06/06/21  1615 06/05/21 0237 06/04/21  1543   WBC 10*3/mm3 5.30 5.20 5.20 4.70   HEMOGLOBIN g/dL 15.8 15.9 14.7 16.0   PLATELETS 10*3/mm3 214 230 195 219     Cr Clearance Estimated Creatinine Clearance: 84.9 mL/min (by C-G formula based on SCr of 1.27 mg/dL).  Coag   Results from last 7 days   Lab Units 06/04/21  1543   INR  1.15*   APTT seconds 28.0     HbA1C No results found for: HGBA1C  Blood Glucose No results found for: POCGLU  Infection     CMP   Results from last 7 days   Lab Units 06/07/21 0446 06/06/21 1615 06/05/21 0237 06/04/21  1543   SODIUM mmol/L 138 137 141 139   POTASSIUM mmol/L 3.5 4.2 4.5 5.2   CHLORIDE mmol/L 100 97* 105 105   CO2 mmol/L 29.0 32.0* 25.0 25.0   BUN mg/dL 17 17 16 15   CREATININE mg/dL 1.27 1.39* 1.25 1.29*   GLUCOSE mg/dL 95 88 113* 86   ALBUMIN g/dL  --  3.60  --  4.10   BILIRUBIN mg/dL  --  1.6*  --  2.0*   ALK PHOS U/L  --  148*  --  147*   AST (SGOT) U/L  --  21  --  26   ALT (SGPT) U/L  --  22  --  24     ABG      UA      EDD  No results found for: POCMETH, POCAMPHET, POCBARBITUR, POCBENZO, POCCOCAINE, POCOPIATES, POCOXYCODO, POCPHENCYC, POCPROPOXY, POCTHC, POCTRICYC  Lysis Labs   Results from last 7 days   Lab Units 06/07/21  0446 06/06/21  1615 06/05/21  0237 06/04/21  1543   INR   --   --   --  1.15*   APTT seconds  --   --   --  28.0   HEMOGLOBIN g/dL 15.8 15.9 14.7 16.0   PLATELETS 10*3/mm3 214 230 195 219   CREATININE mg/dL 1.27 1.39* 1.25 1.29*     Radiology(recent) No  radiology results for the last day      Results from last 7 days   Lab Units 06/06/21  1615   TROPONIN T ng/mL <0.010       Xrays, labs reviewed personally by physician.    ECG/EMG Results (most recent)     Procedure Component Value Units Date/Time    ECG 12 Lead [[id number]] Collected: 06/05/21 1119     Updated: 06/06/21 0613     QT Interval 409 ms     Narrative:      HEART RATE= 85  bpm  RR Interval= 708  ms  MO Interval= 144  ms  P Horizontal Axis= -45  deg  P Front Axis= 73  deg  QRSD Interval= 97  ms  QT Interval= 409  ms  QRS Axis= 15  deg  T Wave Axis= 191  deg  - ABNORMAL ECG -  Sinus rhythm  Left atrial enlargement  Abnrm T, consider ischemia, anterolateral lds  When compared with ECG of  ,  Electronically Signed By: Fran Neal ([facility]) 06-Jun-2021 06:12:47  Date and Time of Study: 2021-06-05 11:19:18    Adult Transthoracic Echo Complete W/ Cont if Necessary Per Protocol [[id number]] Collected: 06/06/21 0710     Updated: 06/07/21 1105     BSA 2.3 m^2      RVIDd 4.3 cm      IVSd 1.6 cm      LVIDd 5.0 cm      LVIDs 4.8 cm      LVPWd 1.2 cm      IVS/LVPW 1.4     FS 4.5 %      EDV(Teich) 119.7 ml      ESV(Teich) 107.6 ml      EF(Teich) 10.1 %      EDV(cubed) 127.0 ml      ESV(cubed) 110.6 ml      EF(cubed) 12.9 %      LV mass(C)d 297.1 grams      LV mass(C)dI 129.5 grams/m^2      SV(Teich) 12.1 ml      SI(Teich) 5.3 ml/m^2      SV(cubed) 16.4 ml      SI(cubed) 7.1 ml/m^2      Ao root diam 3.8 cm      Ao root area 11.1 cm^2      ACS 2.3 cm      asc Aorta Diam 3.6 cm      LVOT diam 2.2 cm      LVOT area 3.8 cm^2      RVOT diam 3.0 cm      RVOT area 7.2 cm^2      EDV(MOD-sp4) 154.5 ml      ESV(MOD-sp4) 118.6 ml      EF(MOD-sp4) 23.2 %      EDV(MOD-sp2) 151.2 ml      ESV(MOD-sp2) 126.4 ml      EF(MOD-sp2) 16.4 %      SV(MOD-sp4) 35.9 ml      SI(MOD-sp4) 15.7 ml/m^2      SV(MOD-sp2) 24.8 ml      SI(MOD-sp2) 10.8 ml/m^2      Ao root area (BSA corrected) 1.6     LV Saldivar Vol (BSA corrected) 67.3 ml/m^2      LV Sys Vol  (BSA corrected) 51.7 ml/m^2      Aortic R-R 0.6 sec      Aortic HR 99.8 BPM      MV E max north 64.0 cm/sec      MV A max north 35.1 cm/sec      MV E/A 1.8     MV V2 max 99.7 cm/sec      MV max PG 4.0 mmHg      MV V2 mean 61.6 cm/sec      MV mean PG 1.7 mmHg      MV V2 VTI 19.9 cm      MVA(VTI) 3.0 cm^2      MV dec slope 489.2 cm/sec^2      MV dec time 0.13 sec      Ao pk north 92.8 cm/sec      Ao max PG 3.4 mmHg      Ao max PG (full) 0.4 mmHg      Ao V2 mean 80.1 cm/sec      Ao mean PG 2.6 mmHg      Ao mean PG (full) 0.96 mmHg      Ao V2 VTI 18.3 cm      VALDO(I,A) 3.3 cm^2      VALDO(I,D) 3.3 cm^2      VALDO(V,A) 3.6 cm^2      VALDO(V,D) 3.6 cm^2      LV V1 max PG 3.1 mmHg      LV V1 mean PG 1.7 mmHg      LV V1 max 87.3 cm/sec      LV V1 mean 61.4 cm/sec      LV V1 VTI 15.9 cm      MR max north 492.6 cm/sec      MR max PG 97.0 mmHg      MR mean north 412.6 cm/sec      MR mean PG 72.2 mmHg      MR .3 cm      CO(Ao) 20.2 l/min      CI(Ao) 8.8 l/min/m^2      SV(Ao) 202.8 ml      SI(Ao) 88.4 ml/m^2      CO(LVOT) 6.0 l/min      CI(LVOT) 2.6 l/min/m^2      SV(LVOT) 59.9 ml      SV(RVOT) 77.5 ml      SI(LVOT) 26.1 ml/m^2      PA V2 max 95.4 cm/sec      PA max PG 3.6 mmHg      PA max PG (full) 2.2 mmHg      PA V2 mean 67.4 cm/sec      PA mean PG 2.0 mmHg      PA mean PG (full) 1.3 mmHg      PA V2 VTI 16.9 cm      PVA(I,A) 4.6 cm^2      BH CV ECHO HIMA - PVA(I,D) 4.6 cm^2      BH CV ECHO HIMA - PVA(V,A) 4.5 cm^2      BH CV ECHO HIMA - PVA(V,D) 4.5 cm^2      PA acc time 0.08 sec      PI end-d north 80.8 cm/sec      PI max north 146.0 cm/sec      PI max PG 8.5 mmHg      RV V1 max PG 1.4 mmHg      RV V1 mean PG 0.72 mmHg      RV V1 max 59.2 cm/sec      RV V1 mean 39.6 cm/sec      RV V1 VTI 10.7 cm      TR max north 271.2 cm/sec      RVSP(TR) 32.5 mmHg      RAP systole 3.0 mmHg      PA pr(Accel) 43.0 mmHg      Pulm Sys North 31.4 cm/sec      Pulm Saldivar North 55.7 cm/sec      Pulm S/D 0.56     Qp/Qs 1.3     Pulm A Revs Dur 0.14 sec      Pulm A Revs  North 31.1 cm/sec       CV ECHO HIMA - BZI_BMI 27.7 kilograms/m^2       CV ECHO HIMA - BSA(HAYCOCK) 2.3 m^2       CV ECHO HIMA - BZI_METRIC_WEIGHT 100.7 kg       CV ECHO HIMA - BZI_METRIC_HEIGHT 190.5 cm      EF(MOD-bp) 17.0 %      LA dimension(2D) 4.7 cm      Echo EF Estimated 25 %     Narrative:      · Left atrial volume is severely increased.  · The left ventricular cavity is moderately dilated.  · Left ventricular wall thickness is consistent with mild to moderate   concentric hypertrophy.  · Mild dilation of the aortic root is present. Mild dilation of the   sinuses of Valsalva is present.  · Moderate tricuspid valve regurgitation is present.  · Estimated right ventricular systolic pressure from tricuspid   regurgitation is mildly elevated (35-45 mmHg).  · The right atrial cavity is severely dilated.  · The right ventricular cavity is moderately dilated.  · Moderately reduced right ventricular systolic function noted.  · Left ventricular diastolic function is consistent with (grade III w/high   LAP) reversible restrictive pattern.  · Estimated left ventricular EF = 25% Estimated left ventricular EF was in   disagreement with the calculated left ventricular EF. Left ventricular   ejection fraction appears to be 21 - 25%. Left ventricular systolic   function is moderately decreased.  · Abnormal mitral valve structure consistent with dilated annulus.  · Moderate mitral valve regurgitation is present with a centrally-directed   jet noted.  · Mild to moderate pulmonary hypertension is present.               Medication Review:   I have reviewed the patient's current medication list  Scheduled Meds:aspirin, 81 mg, Oral, Daily  furosemide, 40 mg, Intravenous, BID  gabapentin, 300 mg, Oral, BID  losartan, 25 mg, Oral, Daily  metoprolol tartrate, 25 mg, Oral, Daily  rivaroxaban, 20 mg, Oral, Q PM  sodium chloride, 10 mL, Intravenous, Q12H      Continuous Infusions:   PRN Meds:.•  acetaminophen  •   "ipratropium-albuterol  •  melatonin  •  nitroglycerin  •  ondansetron  •  [COMPLETED] Insert peripheral IV **AND** sodium chloride  •  sodium chloride    Imaging:  Imaging Results (Last 72 Hours)     Procedure Component Value Units Date/Time    XR Chest 1 View [[id number]] Collected: 06/04/21 1621     Updated: 06/04/21 1624    Narrative:      DATE OF EXAM:  6/4/2021 4:19 PM     PROCEDURE:  XR CHEST 1 VW-     INDICATIONS:  soa/peripheral edema       COMPARISON:  None     TECHNIQUE:   Single radiographic view of the chest was obtained.     FINDINGS:  Mild cardiaomegaly. No acute airspace disease. No pleural effusion or  pneumothorax. Pulmonary vascular distribution is normal. No acute  osseous abnormality.          Impression:      Mild coronary megaly. No acute chest findings.     Electronically Signed By-Teena Tovar MD On:6/4/2021 4:22 PM  This report was finalized on [id number] by  Teena Tovar MD.            CORTEZ Rodriguez  06/07/21  11:31 EDT      EMR Dragon/Transcription:   \"Dictated utilizing Dragon dictation\".              Electronically signed by CORTEZ Rodriguez, 06/07/21, 11:31 AM EDT.      60-year-old male patient with known diagnosis of severe nonischemic cardiomyopathy presented with class III to class IV acute on chronic systolic heart failure.  Patient was adequately treated with diuresis with significant improvement of symptoms  Patient's cardiac problems and data from 2008  Risk factors include tobacco abuse  No family history of cardiomyopathy  [facility] and prior to that patient was on prolonged intake of beta-blockers which include atenolol  More than 3 months ago patient has been started on diuretics and ARB and beta-blockers and presented with again recurrent heart failure  Had 2 episodes of stroke last year and was started on anticoagulation with Xarelto  He has history of paroxysmal atrial " fibrillation  Denies any alcohol abuse  On physical examination lungs are clear to auscultation bilateral 1+ edema noted around the ankles which is significantly reduced from admission according to the patient and cardiac exam showed S3 gallop with sinus rhythm no focal abnormalities on neurological exam  Labs, echo and x-rays reviewed  Clinical problems include severe nonischemic cardiomyopathy  Recurrent class III to class IV acute on chronic systolic heart failure  Patient educated about complete smoking cessation and low-salt diet  Patient will benefit from ICD implantation for primary prevention based on guidelines and this document also serves as a shared decision making document for ICD implantation for primary prevention  Risks and benefits educated and orders placed  Patient has a big social event plan tomorrow and wants to get it done next week which is reasonable  History of atrial fibrillation with prior history of stroke on anticoagulation  Orders discussed with the patient and the nurse  ICD implantation for next week planned and orders placed      Electronically signed by Michael Rollins MD, 06/07/21, 6:50 PM EDT.

## 2021-06-08 ENCOUNTER — READMISSION MANAGEMENT (OUTPATIENT)
Dept: CALL CENTER | Facility: HOSPITAL | Age: 61
End: 2021-06-08

## 2021-06-08 NOTE — OUTREACH NOTE
Prep Survey      Responses   [facility] discharged from?  [facility]   Is LACE score < 7 ?  Yes   Emergency Room discharge w/ pulse ox?  No   Eligibility  Readm Mgmt   Discharge diagnosis  Acute on chronic systolic congestive heart failure    Does the patient have one of the following disease processes/diagnoses(primary or secondary)?  CHF   Does the patient have Home health ordered?  No   Is there a DME ordered?  No   Prep survey completed?  Yes          Katty Garnett RN

## 2021-06-10 ENCOUNTER — READMISSION MANAGEMENT (OUTPATIENT)
Dept: CALL CENTER | Facility: HOSPITAL | Age: 61
End: 2021-06-10

## 2021-06-10 PROBLEM — I42.8 NICM (NONISCHEMIC CARDIOMYOPATHY) (HCC): Status: ACTIVE | Noted: 2021-06-10

## 2021-06-10 PROBLEM — R06.02 SHORTNESS OF BREATH: Status: ACTIVE | Noted: 2021-06-10

## 2021-06-10 NOTE — OUTREACH NOTE
CHF Week 1 Survey      Responses   [facility] patient discharged from?  [facility]   Does the patient have one of the following disease processes/diagnoses(primary or secondary)?  CHF   CHF Week 1 attempt successful?  No   Unsuccessful attempts  Attempt 1          Loni Fischer RN

## 2021-06-13 ENCOUNTER — LAB (OUTPATIENT)
Dept: LAB | Facility: HOSPITAL | Age: 61
End: 2021-06-13

## 2021-06-13 DIAGNOSIS — I42.8 NICM (NONISCHEMIC CARDIOMYOPATHY) (HCC): ICD-10-CM

## 2021-06-13 DIAGNOSIS — R06.02 SHORTNESS OF BREATH: ICD-10-CM

## 2021-06-13 DIAGNOSIS — I50.9 ACUTE ON CHRONIC CONGESTIVE HEART FAILURE, UNSPECIFIED HEART FAILURE TYPE (HCC): ICD-10-CM

## 2021-06-13 LAB
BASOPHILS # BLD AUTO: 0 10*3/MM3 (ref 0–0.2)
BASOPHILS NFR BLD AUTO: 0.2 % (ref 0–1.5)
DEPRECATED RDW RBC AUTO: 50.3 FL (ref 37–54)
EOSINOPHIL # BLD AUTO: 0 10*3/MM3 (ref 0–0.4)
EOSINOPHIL NFR BLD AUTO: 0.8 % (ref 0.3–6.2)
ERYTHROCYTE [DISTWIDTH] IN BLOOD BY AUTOMATED COUNT: 16 % (ref 12.3–15.4)
HCT VFR BLD AUTO: 44.9 % (ref 37.5–51)
HGB BLD-MCNC: 14.9 G/DL (ref 13–17.7)
LYMPHOCYTES # BLD AUTO: 1.1 10*3/MM3 (ref 0.7–3.1)
LYMPHOCYTES NFR BLD AUTO: 24.7 % (ref 19.6–45.3)
MCH RBC QN AUTO: 30.3 PG (ref 26.6–33)
MCHC RBC AUTO-ENTMCNC: 33.2 G/DL (ref 31.5–35.7)
MCV RBC AUTO: 91.3 FL (ref 79–97)
MONOCYTES # BLD AUTO: 0.6 10*3/MM3 (ref 0.1–0.9)
MONOCYTES NFR BLD AUTO: 13.3 % (ref 5–12)
NEUTROPHILS NFR BLD AUTO: 2.6 10*3/MM3 (ref 1.7–7)
NEUTROPHILS NFR BLD AUTO: 61 % (ref 42.7–76)
NRBC BLD AUTO-RTO: 0.2 /100 WBC (ref 0–0.2)
PLATELET # BLD AUTO: 207 10*3/MM3 (ref 140–450)
PMV BLD AUTO: 8.3 FL (ref 6–12)
RBC # BLD AUTO: 4.92 10*6/MM3 (ref 4.14–5.8)
WBC # BLD AUTO: 4.3 10*3/MM3 (ref 3.4–10.8)

## 2021-06-13 PROCEDURE — 83735 ASSAY OF MAGNESIUM: CPT

## 2021-06-13 PROCEDURE — 36415 COLL VENOUS BLD VENIPUNCTURE: CPT

## 2021-06-13 PROCEDURE — 80053 COMPREHEN METABOLIC PANEL: CPT

## 2021-06-13 PROCEDURE — 85025 COMPLETE CBC W/AUTO DIFF WBC: CPT

## 2021-06-14 LAB
ALBUMIN SERPL-MCNC: 4.1 G/DL (ref 3.5–5.2)
ALBUMIN/GLOB SERPL: 1.5 G/DL
ALP SERPL-CCNC: 107 U/L (ref 39–117)
ALT SERPL W P-5'-P-CCNC: 27 U/L (ref 1–41)
ANION GAP SERPL CALCULATED.3IONS-SCNC: 10.8 MMOL/L (ref 5–15)
AST SERPL-CCNC: 28 U/L (ref 1–40)
BILIRUB SERPL-MCNC: 1.9 MG/DL (ref 0–1.2)
BUN SERPL-MCNC: 21 MG/DL (ref 6–20)
BUN/CREAT SERPL: 13.9 (ref 7–25)
CALCIUM SPEC-SCNC: 9.3 MG/DL (ref 8.6–10.5)
CHLORIDE SERPL-SCNC: 105 MMOL/L (ref 98–107)
CO2 SERPL-SCNC: 24.2 MMOL/L (ref 22–29)
CREAT SERPL-MCNC: 1.51 MG/DL (ref 0.76–1.27)
GFR SERPL CREATININE-BSD FRML MDRD: 58 ML/MIN/1.73
GLOBULIN UR ELPH-MCNC: 2.8 GM/DL
GLUCOSE SERPL-MCNC: 95 MG/DL (ref 65–99)
MAGNESIUM SERPL-MCNC: 1.9 MG/DL (ref 1.6–2.6)
POTASSIUM SERPL-SCNC: 4.4 MMOL/L (ref 3.5–5.2)
PROT SERPL-MCNC: 6.9 G/DL (ref 6–8.5)
SODIUM SERPL-SCNC: 140 MMOL/L (ref 136–145)

## 2021-06-15 ENCOUNTER — READMISSION MANAGEMENT (OUTPATIENT)
Dept: CALL CENTER | Facility: HOSPITAL | Age: 61
End: 2021-06-15

## 2021-06-15 ENCOUNTER — HOSPITAL ENCOUNTER (OUTPATIENT)
Facility: HOSPITAL | Age: 61
Setting detail: HOSPITAL OUTPATIENT SURGERY
Discharge: HOME | End: 2021-06-15
Attending: INTERNAL MEDICINE | Admitting: INTERNAL MEDICINE

## 2021-06-15 ENCOUNTER — ANESTHESIA (OUTPATIENT)
Dept: CARDIOLOGY | Facility: HOSPITAL | Age: 61
End: 2021-06-15

## 2021-06-15 ENCOUNTER — ANESTHESIA EVENT (OUTPATIENT)
Dept: CARDIOLOGY | Facility: HOSPITAL | Age: 61
End: 2021-06-15

## 2021-06-15 VITALS
HEART RATE: 104 BPM | DIASTOLIC BLOOD PRESSURE: 103 MMHG | OXYGEN SATURATION: 100 % | TEMPERATURE: 97.3 F | SYSTOLIC BLOOD PRESSURE: 136 MMHG | HEIGHT: 73 IN | BODY MASS INDEX: 26.85 KG/M2 | WEIGHT: 202.6 LBS | RESPIRATION RATE: 25 BRPM

## 2021-06-15 VITALS — OXYGEN SATURATION: 93 % | DIASTOLIC BLOOD PRESSURE: 60 MMHG | HEART RATE: 83 BPM | SYSTOLIC BLOOD PRESSURE: 98 MMHG

## 2021-06-15 DIAGNOSIS — I50.22 CHRONIC SYSTOLIC CONGESTIVE HEART FAILURE (HCC): ICD-10-CM

## 2021-06-15 DIAGNOSIS — I50.9 ACUTE ON CHRONIC CONGESTIVE HEART FAILURE, UNSPECIFIED HEART FAILURE TYPE (HCC): ICD-10-CM

## 2021-06-15 DIAGNOSIS — I42.8 NICM (NONISCHEMIC CARDIOMYOPATHY) (HCC): ICD-10-CM

## 2021-06-15 DIAGNOSIS — R06.02 SHORTNESS OF BREATH: ICD-10-CM

## 2021-06-15 DIAGNOSIS — Z95.810 ICD (IMPLANTABLE CARDIOVERTER-DEFIBRILLATOR) IN PLACE: ICD-10-CM

## 2021-06-15 DIAGNOSIS — I48.0 PAROXYSMAL ATRIAL FIBRILLATION (HCC): Primary | ICD-10-CM

## 2021-06-15 LAB
MRSA DNA SPEC QL NAA+PROBE: NORMAL
SARS-COV-2 RNA PNL SPEC NAA+PROBE: NOT DETECTED

## 2021-06-15 PROCEDURE — 25010000002 MIDAZOLAM PER 1 MG: Performed by: NURSE ANESTHETIST, CERTIFIED REGISTERED

## 2021-06-15 PROCEDURE — 93641 EP EVL 1/2CHMB PAC CVDFB TST: CPT | Performed by: INTERNAL MEDICINE

## 2021-06-15 PROCEDURE — 0 IOPAMIDOL PER 1 ML: Performed by: INTERNAL MEDICINE

## 2021-06-15 PROCEDURE — C1894 INTRO/SHEATH, NON-LASER: HCPCS | Performed by: INTERNAL MEDICINE

## 2021-06-15 PROCEDURE — 25010000003 LIDOCAINE 1 % SOLUTION: Performed by: INTERNAL MEDICINE

## 2021-06-15 PROCEDURE — 33249 INSJ/RPLCMT DEFIB W/LEAD(S): CPT | Performed by: INTERNAL MEDICINE

## 2021-06-15 PROCEDURE — 25010000002 PROPOFOL 10 MG/ML EMULSION: Performed by: NURSE ANESTHETIST, CERTIFIED REGISTERED

## 2021-06-15 PROCEDURE — C1892 INTRO/SHEATH,FIXED,PEEL-AWAY: HCPCS | Performed by: INTERNAL MEDICINE

## 2021-06-15 PROCEDURE — 25010000002 VANCOMYCIN 1 G RECONSTITUTED SOLUTION 1 EACH VIAL: Performed by: INTERNAL MEDICINE

## 2021-06-15 PROCEDURE — C9803 HOPD COVID-19 SPEC COLLECT: HCPCS

## 2021-06-15 PROCEDURE — 25010000002 FENTANYL CITRATE (PF) 100 MCG/2ML SOLUTION: Performed by: NURSE ANESTHETIST, CERTIFIED REGISTERED

## 2021-06-15 PROCEDURE — U0003 INFECTIOUS AGENT DETECTION BY NUCLEIC ACID (DNA OR RNA); SEVERE ACUTE RESPIRATORY SYNDROME CORONAVIRUS 2 (SARS-COV-2) (CORONAVIRUS DISEASE [COVID-19]), AMPLIFIED PROBE TECHNIQUE, MAKING USE OF HIGH THROUGHPUT TECHNOLOGIES AS DESCRIBED BY CMS-2020-01-R: HCPCS | Performed by: INTERNAL MEDICINE

## 2021-06-15 PROCEDURE — C1777 LEAD, AICD, ENDO SINGLE COIL: HCPCS | Performed by: INTERNAL MEDICINE

## 2021-06-15 PROCEDURE — C1722 AICD, SINGLE CHAMBER: HCPCS | Performed by: INTERNAL MEDICINE

## 2021-06-15 PROCEDURE — 87641 MR-STAPH DNA AMP PROBE: CPT | Performed by: INTERNAL MEDICINE

## 2021-06-15 DEVICE — LD ICD PLEXA PROMRI SGL COIL S DX 65/15: Type: IMPLANTABLE DEVICE | Site: CHEST | Status: FUNCTIONAL

## 2021-06-15 DEVICE — IMPLANTABLE DEVICE
Type: IMPLANTABLE DEVICE | Site: CHEST | Status: FUNCTIONAL
Brand: ACTICOR 7 VR-T DX

## 2021-06-15 RX ORDER — ACETAMINOPHEN 650 MG/1
650 SUPPOSITORY RECTAL EVERY 4 HOURS PRN
Status: CANCELLED | OUTPATIENT
Start: 2021-06-15

## 2021-06-15 RX ORDER — SODIUM CHLORIDE 0.9 % (FLUSH) 0.9 %
3 SYRINGE (ML) INJECTION EVERY 12 HOURS SCHEDULED
Status: DISCONTINUED | OUTPATIENT
Start: 2021-06-15 | End: 2021-06-15 | Stop reason: HOSPADM

## 2021-06-15 RX ORDER — FENTANYL CITRATE 50 UG/ML
INJECTION, SOLUTION INTRAMUSCULAR; INTRAVENOUS AS NEEDED
Status: DISCONTINUED | OUTPATIENT
Start: 2021-06-15 | End: 2021-06-15 | Stop reason: SURG

## 2021-06-15 RX ORDER — HYDROCODONE BITARTRATE AND ACETAMINOPHEN 5; 325 MG/1; MG/1
1 TABLET ORAL EVERY 4 HOURS PRN
Status: CANCELLED | OUTPATIENT
Start: 2021-06-15 | End: 2021-06-22

## 2021-06-15 RX ORDER — SODIUM CHLORIDE 0.9 % (FLUSH) 0.9 %
3-10 SYRINGE (ML) INJECTION AS NEEDED
Status: DISCONTINUED | OUTPATIENT
Start: 2021-06-15 | End: 2021-06-15 | Stop reason: HOSPADM

## 2021-06-15 RX ORDER — MORPHINE SULFATE 4 MG/ML
1 INJECTION, SOLUTION INTRAMUSCULAR; INTRAVENOUS EVERY 4 HOURS PRN
Status: CANCELLED | OUTPATIENT
Start: 2021-06-15

## 2021-06-15 RX ORDER — ACETAMINOPHEN 325 MG/1
650 TABLET ORAL EVERY 4 HOURS PRN
Status: CANCELLED | OUTPATIENT
Start: 2021-06-15

## 2021-06-15 RX ORDER — SODIUM CHLORIDE 9 MG/ML
30 INJECTION, SOLUTION INTRAVENOUS CONTINUOUS
Status: DISCONTINUED | OUTPATIENT
Start: 2021-06-15 | End: 2021-06-15 | Stop reason: HOSPADM

## 2021-06-15 RX ORDER — MIDAZOLAM HYDROCHLORIDE 1 MG/ML
INJECTION INTRAMUSCULAR; INTRAVENOUS AS NEEDED
Status: DISCONTINUED | OUTPATIENT
Start: 2021-06-15 | End: 2021-06-15 | Stop reason: SURG

## 2021-06-15 RX ORDER — KETAMINE HYDROCHLORIDE 50 MG/ML
INJECTION, SOLUTION, CONCENTRATE INTRAMUSCULAR; INTRAVENOUS AS NEEDED
Status: DISCONTINUED | OUTPATIENT
Start: 2021-06-15 | End: 2021-06-15 | Stop reason: SURG

## 2021-06-15 RX ORDER — LIDOCAINE HYDROCHLORIDE 10 MG/ML
INJECTION, SOLUTION INFILTRATION; PERINEURAL AS NEEDED
Status: DISCONTINUED | OUTPATIENT
Start: 2021-06-15 | End: 2021-06-15 | Stop reason: HOSPADM

## 2021-06-15 RX ORDER — ONDANSETRON 2 MG/ML
4 INJECTION INTRAMUSCULAR; INTRAVENOUS EVERY 6 HOURS PRN
Status: CANCELLED | OUTPATIENT
Start: 2021-06-15

## 2021-06-15 RX ORDER — NALOXONE HCL 0.4 MG/ML
0.4 VIAL (ML) INJECTION
Status: CANCELLED | OUTPATIENT
Start: 2021-06-15

## 2021-06-15 RX ORDER — HYDROCODONE BITARTRATE AND ACETAMINOPHEN 7.5; 325 MG/1; MG/1
1 TABLET ORAL EVERY 8 HOURS PRN
Qty: 15 TABLET | Refills: 0 | OUTPATIENT
Start: 2021-06-15 | End: 2021-07-04

## 2021-06-15 RX ORDER — LEVOFLOXACIN 500 MG/1
500 TABLET, FILM COATED ORAL DAILY
Qty: 3 TABLET | Refills: 0 | Status: SHIPPED | OUTPATIENT
Start: 2021-06-16 | End: 2021-08-09

## 2021-06-15 RX ADMIN — MIDAZOLAM 2 MG: 1 INJECTION INTRAMUSCULAR; INTRAVENOUS at 09:21

## 2021-06-15 RX ADMIN — KETAMINE HYDROCHLORIDE 25 MG: 50 INJECTION, SOLUTION INTRAMUSCULAR; INTRAVENOUS at 09:11

## 2021-06-15 RX ADMIN — SODIUM CHLORIDE 30 ML/HR: 9 INJECTION, SOLUTION INTRAVENOUS at 08:02

## 2021-06-15 RX ADMIN — MIDAZOLAM 2 MG: 1 INJECTION INTRAMUSCULAR; INTRAVENOUS at 09:07

## 2021-06-15 RX ADMIN — PROPOFOL 125 MCG/KG/MIN: 10 INJECTION, EMULSION INTRAVENOUS at 09:10

## 2021-06-15 RX ADMIN — VANCOMYCIN HYDROCHLORIDE 1000 MG: 1 INJECTION, POWDER, LYOPHILIZED, FOR SOLUTION INTRAVENOUS at 09:01

## 2021-06-15 RX ADMIN — FENTANYL CITRATE 25 MCG: 50 INJECTION, SOLUTION INTRAMUSCULAR; INTRAVENOUS at 09:21

## 2021-06-15 RX ADMIN — FENTANYL CITRATE 25 MCG: 50 INJECTION, SOLUTION INTRAMUSCULAR; INTRAVENOUS at 09:07

## 2021-06-15 NOTE — ANESTHESIA POSTPROCEDURE EVALUATION
Patient: Thony Dumont    Procedure Summary     Date: 06/15/21 Room / Location: [place] CATH LAB 3 /  [facility] CATH INVASIVE LOCATION    Anesthesia Start: 0904 Anesthesia Stop: 1035    Procedure: Device Implant (Left ) Diagnosis:       Shortness of breath      NICM (nonischemic cardiomyopathy) (CMS/HCC)      Chronic systolic heart failure (CMS/HCC)      Paroxysmal atrial fibrillation (CMS/HCC)      (Post ICD implantation without complications)    Providers: Michael Rollins MD Provider: Neal Flynn MD    Anesthesia Type: MAC ASA Status: 4          Anesthesia Type: MAC    Vitals  Vitals Value Taken Time   /79 06/15/21 1101   Temp     Pulse 88 06/15/21 1109   Resp 25 06/15/21 1030   SpO2 96 % 06/15/21 1105   Vitals shown include unvalidated device data.        Post Anesthesia Care and Evaluation    Patient location during evaluation: PACU  Patient participation: complete - patient participated  Level of consciousness: awake  Pain score: 0  Pain management: adequate  Airway patency: patent  Anesthetic complications: No anesthetic complications  PONV Status: none  Cardiovascular status: acceptable  Respiratory status: acceptable  Hydration status: acceptable    Comments: Patient seen and examined postoperatively; vital signs stable; SpO2 greater than or equal to 90%; cardiopulmonary status stable; nausea/vomiting adequately controlled; pain adequately controlled; no apparent anesthesia complications; patient discharged from anesthesia care when discharge criteria were met

## 2021-06-15 NOTE — DISCHARGE INSTRUCTIONS
Use ice pack on the site for 2 days   Keep the incision dry and leave dressing on until F/U Friday  Do not lift the left arm above the head for 4 weeks   Do not lift more than 10 to 20 pounds on the left arm for 4 weeks

## 2021-06-15 NOTE — ANESTHESIA PREPROCEDURE EVALUATION
Anesthesia Evaluation     Patient summary reviewed and Nursing notes reviewed   no history of anesthetic complications:  NPO Solid Status: > 8 hours  NPO Liquid Status: > 2 hours           Airway   Mallampati: II  TM distance: >3 FB  Neck ROM: full  No difficulty expected  Dental - normal exam         Pulmonary - normal exam   (+) a smoker, shortness of breath,   Cardiovascular     ECG reviewed  Rhythm: irregular    (+) hypertension, past MI , CAD, dysrhythmias Atrial Fib, CHF Diastolic >=55% and Systolic <55%, hyperlipidemia,     ROS comment: Presented with acute CHF exacerbation and EF 20-25% Diuresed and now or AICD placement    Neuro/Psych- negative ROS  GI/Hepatic/Renal/Endo - negative ROS     Musculoskeletal     (+) arthralgias,   Abdominal  - normal exam   Substance History      OB/GYN negative ob/gyn ROS         Other   arthritis,      ROS/Med Hx Other: Smoked crack cocaine 3 days ago. Marijuana since.        Phys Exam Other: Severe decay #9/chipped              Anesthesia Plan    ASA 4     MAC   total IV anesthesia  intravenous induction     Anesthetic plan, all risks, benefits, and alternatives have been provided, discussed and informed consent has been obtained with: patient.  Use of blood products discussed with patient  Consented to blood products.   Plan discussed with CRNA.

## 2021-06-15 NOTE — OUTREACH NOTE
CHF Week 1 Survey      Responses   [facility] patient discharged from?  [facility]   Does the patient have one of the following disease processes/diagnoses(primary or secondary)?  CHF   CHF Week 1 attempt successful?  No   Revoke  Readmitted          Digna Fischer RN

## 2021-06-18 ENCOUNTER — CLINICAL SUPPORT NO REQUIREMENTS (OUTPATIENT)
Dept: CARDIOLOGY | Facility: CLINIC | Age: 61
End: 2021-06-18

## 2021-06-18 DIAGNOSIS — I42.8 NON-ISCHEMIC CARDIOMYOPATHY (HCC): Primary | ICD-10-CM

## 2021-06-18 DIAGNOSIS — Z95.810 ICD (IMPLANTABLE CARDIOVERTER-DEFIBRILLATOR), DUAL, IN SITU: ICD-10-CM

## 2021-06-18 DIAGNOSIS — Z51.89 VISIT FOR WOUND CHECK: ICD-10-CM

## 2021-06-18 PROCEDURE — 99024 POSTOP FOLLOW-UP VISIT: CPT | Performed by: INTERNAL MEDICINE

## 2021-06-18 PROCEDURE — 93289 INTERROG DEVICE EVAL HEART: CPT | Performed by: INTERNAL MEDICINE

## 2021-06-18 NOTE — PROGRESS NOTES
Pt here for  device and  Incision check.  Incision clean, dry and intact. Incision approximated.  No redness or swelling noted.Steri strips in place.  Instructed to continue with 5lb weight restriction for one month on surgical side. No excessive movement or lift surgical side arm above head for a month after the device was implanted.      Device interrogated. Battery ok. See media tab for report.  Pt instructed to follow up with nurse in next week for incision and wound check.

## 2021-06-24 ENCOUNTER — TELEPHONE (OUTPATIENT)
Dept: CARDIOLOGY | Facility: CLINIC | Age: 61
End: 2021-06-24

## 2021-06-24 NOTE — TELEPHONE ENCOUNTER
Pt called regarding not showing up for appointment.  Pt stated he forgot.  Will be here tomorrow at 1pm for incision and device check.

## 2021-06-29 ENCOUNTER — CLINICAL SUPPORT NO REQUIREMENTS (OUTPATIENT)
Dept: CARDIOLOGY | Facility: CLINIC | Age: 61
End: 2021-06-29

## 2021-06-29 DIAGNOSIS — I42.0 DILATED CARDIOMYOPATHY (HCC): Primary | ICD-10-CM

## 2021-06-29 DIAGNOSIS — Z51.89 VISIT FOR WOUND CHECK: ICD-10-CM

## 2021-06-29 DIAGNOSIS — Z95.810 ICD (IMPLANTABLE CARDIOVERTER-DEFIBRILLATOR), DUAL, IN SITU: ICD-10-CM

## 2021-06-29 PROCEDURE — 99024 POSTOP FOLLOW-UP VISIT: CPT | Performed by: INTERNAL MEDICINE

## 2021-06-29 PROCEDURE — 93289 INTERROG DEVICE EVAL HEART: CPT | Performed by: INTERNAL MEDICINE

## 2021-06-29 NOTE — PROGRESS NOTES
Pt here for one week device and  Incision check.  Incision clean, dry and intact. Incision approximated.  No redness or swelling noted.Steri strips in place.  Instructed to continue with 5lb weight restriction for one month on surgical side. No excessive movement or lift surgical side arm above head for a month after the device was implanted.      Device interrogated. Battery ok. See media tab for report.  Pt instructed to follow up with nurse in 3 weeks for incision and wound check.

## 2021-07-03 ENCOUNTER — HOSPITAL ENCOUNTER (EMERGENCY)
Facility: HOSPITAL | Age: 61
Discharge: HOME | End: 2021-07-04
Attending: EMERGENCY MEDICINE | Admitting: EMERGENCY MEDICINE

## 2021-07-03 DIAGNOSIS — R07.9 CHEST PAIN, UNSPECIFIED TYPE: Primary | ICD-10-CM

## 2021-07-03 PROCEDURE — 93005 ELECTROCARDIOGRAM TRACING: CPT | Performed by: EMERGENCY MEDICINE

## 2021-07-03 PROCEDURE — 99283 EMERGENCY DEPT VISIT LOW MDM: CPT

## 2021-07-04 ENCOUNTER — APPOINTMENT (OUTPATIENT)
Dept: GENERAL RADIOLOGY | Facility: HOSPITAL | Age: 61
End: 2021-07-04

## 2021-07-04 VITALS
SYSTOLIC BLOOD PRESSURE: 142 MMHG | BODY MASS INDEX: 27.17 KG/M2 | HEIGHT: 73 IN | TEMPERATURE: 97.3 F | HEART RATE: 88 BPM | RESPIRATION RATE: 20 BRPM | DIASTOLIC BLOOD PRESSURE: 119 MMHG | OXYGEN SATURATION: 96 % | WEIGHT: 205 LBS

## 2021-07-04 LAB
ALBUMIN SERPL-MCNC: 4 G/DL (ref 3.5–5.2)
ALBUMIN/GLOB SERPL: 1.5 G/DL
ALP SERPL-CCNC: 131 U/L (ref 39–117)
ALT SERPL W P-5'-P-CCNC: 32 U/L (ref 1–41)
ANION GAP SERPL CALCULATED.3IONS-SCNC: 14 MMOL/L (ref 5–15)
AST SERPL-CCNC: 34 U/L (ref 1–40)
BASOPHILS # BLD AUTO: 0.1 10*3/MM3 (ref 0–0.2)
BASOPHILS NFR BLD AUTO: 1.3 % (ref 0–1.5)
BILIRUB SERPL-MCNC: 3.2 MG/DL (ref 0–1.2)
BUN SERPL-MCNC: 25 MG/DL (ref 6–20)
BUN/CREAT SERPL: 16.6 (ref 7–25)
CALCIUM SPEC-SCNC: 9 MG/DL (ref 8.6–10.5)
CHLORIDE SERPL-SCNC: 104 MMOL/L (ref 98–107)
CO2 SERPL-SCNC: 20 MMOL/L (ref 22–29)
CREAT SERPL-MCNC: 1.51 MG/DL (ref 0.76–1.27)
DEPRECATED RDW RBC AUTO: 51.6 FL (ref 37–54)
EOSINOPHIL # BLD AUTO: 0 10*3/MM3 (ref 0–0.4)
EOSINOPHIL NFR BLD AUTO: 0.6 % (ref 0.3–6.2)
ERYTHROCYTE [DISTWIDTH] IN BLOOD BY AUTOMATED COUNT: 16.4 % (ref 12.3–15.4)
GFR SERPL CREATININE-BSD FRML MDRD: 58 ML/MIN/1.73
GLOBULIN UR ELPH-MCNC: 2.7 GM/DL
GLUCOSE SERPL-MCNC: 87 MG/DL (ref 65–99)
HCT VFR BLD AUTO: 45.1 % (ref 37.5–51)
HGB BLD-MCNC: 15.2 G/DL (ref 13–17.7)
HOLD SPECIMEN: NORMAL
LYMPHOCYTES # BLD AUTO: 1.4 10*3/MM3 (ref 0.7–3.1)
LYMPHOCYTES NFR BLD AUTO: 25.7 % (ref 19.6–45.3)
MCH RBC QN AUTO: 30.3 PG (ref 26.6–33)
MCHC RBC AUTO-ENTMCNC: 33.7 G/DL (ref 31.5–35.7)
MCV RBC AUTO: 89.9 FL (ref 79–97)
MONOCYTES # BLD AUTO: 0.4 10*3/MM3 (ref 0.1–0.9)
MONOCYTES NFR BLD AUTO: 7.6 % (ref 5–12)
NEUTROPHILS NFR BLD AUTO: 3.4 10*3/MM3 (ref 1.7–7)
NEUTROPHILS NFR BLD AUTO: 64.8 % (ref 42.7–76)
NRBC BLD AUTO-RTO: 0.3 /100 WBC (ref 0–0.2)
PLATELET # BLD AUTO: 198 10*3/MM3 (ref 140–450)
PMV BLD AUTO: 8 FL (ref 6–12)
POTASSIUM SERPL-SCNC: 4.2 MMOL/L (ref 3.5–5.2)
PROT SERPL-MCNC: 6.7 G/DL (ref 6–8.5)
QT INTERVAL: 385 MS
RBC # BLD AUTO: 5.02 10*6/MM3 (ref 4.14–5.8)
SODIUM SERPL-SCNC: 138 MMOL/L (ref 136–145)
TROPONIN T SERPL-MCNC: <0.01 NG/ML (ref 0–0.03)
WBC # BLD AUTO: 5.3 10*3/MM3 (ref 3.4–10.8)

## 2021-07-04 PROCEDURE — 71045 X-RAY EXAM CHEST 1 VIEW: CPT

## 2021-07-04 PROCEDURE — 84484 ASSAY OF TROPONIN QUANT: CPT | Performed by: EMERGENCY MEDICINE

## 2021-07-04 PROCEDURE — 80053 COMPREHEN METABOLIC PANEL: CPT | Performed by: EMERGENCY MEDICINE

## 2021-07-04 PROCEDURE — 85025 COMPLETE CBC W/AUTO DIFF WBC: CPT | Performed by: EMERGENCY MEDICINE

## 2021-07-04 RX ORDER — HYDROCODONE BITARTRATE AND ACETAMINOPHEN 7.5; 325 MG/1; MG/1
1 TABLET ORAL EVERY 6 HOURS PRN
Qty: 10 TABLET | Refills: 0 | Status: SHIPPED | OUTPATIENT
Start: 2021-07-04 | End: 2021-08-12 | Stop reason: HOSPADM

## 2021-07-04 RX ORDER — SODIUM CHLORIDE 0.9 % (FLUSH) 0.9 %
10 SYRINGE (ML) INJECTION AS NEEDED
Status: DISCONTINUED | OUTPATIENT
Start: 2021-07-04 | End: 2021-07-04 | Stop reason: HOSPADM

## 2021-07-04 RX ORDER — HYDROCODONE BITARTRATE AND ACETAMINOPHEN 7.5; 325 MG/1; MG/1
1 TABLET ORAL ONCE
Status: COMPLETED | OUTPATIENT
Start: 2021-07-04 | End: 2021-07-04

## 2021-07-04 RX ADMIN — HYDROCODONE BITARTRATE AND ACETAMINOPHEN 1 TABLET: 7.5; 325 TABLET ORAL at 00:26

## 2021-07-04 NOTE — DISCHARGE INSTRUCTIONS
Continue current medications.  Follow-up with your cardiologist on Monday.  Return for increasing pain neck arm jaw pain shortness of breath  stain or any other new or worse problems or concerns.  Norco sent to your pharmacy

## 2021-07-04 NOTE — ED PROVIDER NOTES
Subjective   Chief complaint tightness in the chest    History of present is a 61-year-old male with history of severe heart issues with congestive heart failure and a recent defibrillator placed states he got in a argument with his fiancée and he developed some pressure in his chest nausea but no neck arm or jaw pain or shortness of breath.  Patient states he feels much better currently and he states this just occurred within the last hour has now improved and has no pain currently.  No recent long car ride plane or immobilization no swelling out of the ordinary in his legs symptoms are moderate in degree now improved nonradiating with no other associated symptoms.          Review of Systems   Constitutional: Negative for chills and fever.   HENT: Negative for congestion and sinus pressure.    Respiratory: Positive for chest tightness and shortness of breath.    Cardiovascular: Positive for chest pain and leg swelling. Negative for palpitations.   Gastrointestinal: Negative for abdominal pain and vomiting.   Musculoskeletal: Negative for arthralgias and back pain.   Skin: Negative for pallor and rash.   Neurological: Negative for dizziness and light-headedness.   Psychiatric/Behavioral: Positive for agitation. Negative for behavioral problems.       Past Medical History:   Diagnosis Date   • Afib (CMS/HCC)    • CHF (congestive heart failure) (CMS/HCC)    • Hypertension        Allergies   Allergen Reactions   • Aspirin Swelling   • Penicillins Swelling       Past Surgical History:   Procedure Laterality Date   • CARDIAC ELECTROPHYSIOLOGY PROCEDURE Left 6/15/2021    Procedure: Device Implant;  Surgeon: Michael Rollins MD;  Location: [facility] INVASIVE LOCATION;  Service: Cardiovascular;  Laterality: Left;   • HERNIA REPAIR         Family History   Problem Relation Age of Onset   • Cancer Mother        Social History     Socioeconomic History   • Marital status: Significant Other     Spouse name: Not on file   •  "Number of children: Not on file   • Years of education: Not on file   • Highest education level: Not on file   Tobacco Use   • Smoking status: Former Smoker     Packs/day: 0.50     Types: Cigarettes     Quit date: 2021     Years since quittin.0   Substance and Sexual Activity   • Alcohol use: Yes     Alcohol/week: 7.0 standard drinks     Types: 7 Cans of beer per week     Comment: 1 beer a day   • Drug use: Yes     Types: Marijuana, \"Crack\" cocaine     Comment: 6/15/21 report cocaine in 2018, marijuana 2021 @0050       Prior to Admission medications    Medication Sig Start Date End Date Taking? Authorizing Provider   aspirin (aspirin) 81 MG EC tablet Take 81 mg by mouth Daily.    Provider, MD Shane   furosemide (LASIX) 40 MG tablet Take 1 tablet by mouth 2 (Two) Times a Day for 30 days. 21  Nabil Landin MD   HYDROcodone-acetaminophen (NORCO) 7.5-325 MG per tablet Take 1 tablet by mouth Every 6 (Six) Hours As Needed for Severe Pain . 21   Nando Vanessa MD   levoFLOXacin (Levaquin) 500 MG tablet Take 1 tablet by mouth Daily. 21   Michael Rollins MD   losartan (COZAAR) 25 MG tablet Take 25 mg by mouth Daily. 21   Provider, MD Shane   metoprolol succinate XL (Toprol XL) 25 MG 24 hr tablet Take 1 tablet by mouth Daily for 30 days. 21  Nabil Landin MD   rivaroxaban (Xarelto) 20 MG tablet Take 1 tablet by mouth Daily. 21   Michael Rollins MD   spironolactone (ALDACTONE) 25 MG tablet Take 1 tablet by mouth Daily for 30 days. 21  Nabil Landin MD   HYDROcodone-acetaminophen (NORCO) 7.5-325 MG per tablet Take 1 tablet by mouth Every 8 (Eight) Hours As Needed for Moderate Pain . 6/15/21 7/4/21  Michael Rollins MD         Objective   Physical Exam  61-year-old awake alert no acute distress resting comfortably.  HEENT extraocular muscles are intact pupils equal and reactive sclera clear  Neck supple without adenopathy no meningeal signs no " JVD  Lungs clear no retraction  Heart regular without murmur  Abdomen soft without tenderness good bowel sounds no peritoneal findings or pulsatile mass bruits  Extremities pulses equal upper lower extremities patient has edema in his legs but bilateral no calf cords or Homans' sign or evidence of DVT.  Skin warm dry without rashes.  Neurologic awake alert follows commands no facial asymmetry normal speech without focal weakness  Procedures           ED Course      Results for orders placed or performed during the hospital encounter of 07/03/21   Comprehensive Metabolic Panel    Specimen: Blood   Result Value Ref Range    Glucose 87 65 - 99 mg/dL    BUN 25 (H) 6 - 20 mg/dL    Creatinine 1.51 (H) 0.76 - 1.27 mg/dL    Sodium 138 136 - 145 mmol/L    Potassium 4.2 3.5 - 5.2 mmol/L    Chloride 104 98 - 107 mmol/L    CO2 20.0 (L) 22.0 - 29.0 mmol/L    Calcium 9.0 8.6 - 10.5 mg/dL    Total Protein 6.7 6.0 - 8.5 g/dL    Albumin 4.00 3.50 - 5.20 g/dL    ALT (SGPT) 32 1 - 41 U/L    AST (SGOT) 34 1 - 40 U/L    Alkaline Phosphatase 131 (H) 39 - 117 U/L    Total Bilirubin 3.2 (H) 0.0 - 1.2 mg/dL    eGFR  African Amer 58 (L) >60 mL/min/1.73    Globulin 2.7 gm/dL    A/G Ratio 1.5 g/dL    BUN/Creatinine Ratio 16.6 7.0 - 25.0    Anion Gap 14.0 5.0 - 15.0 mmol/L   Troponin    Specimen: Blood   Result Value Ref Range    Troponin T <0.010 0.000 - 0.030 ng/mL   CBC Auto Differential    Specimen: Blood   Result Value Ref Range    WBC 5.30 3.40 - 10.80 10*3/mm3    RBC 5.02 4.14 - 5.80 10*6/mm3    Hemoglobin 15.2 13.0 - 17.7 g/dL    Hematocrit 45.1 37.5 - 51.0 %    MCV 89.9 79.0 - 97.0 fL    MCH 30.3 26.6 - 33.0 pg    MCHC 33.7 31.5 - 35.7 g/dL    RDW 16.4 (H) 12.3 - 15.4 %    RDW-SD 51.6 37.0 - 54.0 fl    MPV 8.0 6.0 - 12.0 fL    Platelets 198 140 - 450 10*3/mm3    Neutrophil % 64.8 42.7 - 76.0 %    Lymphocyte % 25.7 19.6 - 45.3 %    Monocyte % 7.6 5.0 - 12.0 %    Eosinophil % 0.6 0.3 - 6.2 %    Basophil % 1.3 0.0 - 1.5 %    Neutrophils,  Absolute 3.40 1.70 - 7.00 10*3/mm3    Lymphocytes, Absolute 1.40 0.70 - 3.10 10*3/mm3    Monocytes, Absolute 0.40 0.10 - 0.90 10*3/mm3    Eosinophils, Absolute 0.00 0.00 - 0.40 10*3/mm3    Basophils, Absolute 0.10 0.00 - 0.20 10*3/mm3    nRBC 0.3 (H) 0.0 - 0.2 /100 WBC   ECG 12 Lead   Result Value Ref Range    QT Interval 385 ms     No radiology results for the last day  Medications   sodium chloride 0.9 % flush 10 mL (has no administration in time range)   HYDROcodone-acetaminophen (NORCO) 7.5-325 MG per tablet 1 tablet (1 tablet Oral Given 7/4/21 0026)       EKG my interpretation normal sinus rhythm rate 100 nonspecific T wave changes noted lateral leads QTC of 497 this is unchanged from previous EKG abnormal                                     MDM  Number of Diagnoses or Management Options  Chest pain, unspecified type: established and worsening  Diagnosis management comments: Medical decision making.  Patient had IV established placed on the monitor was put in sinus rhythm.  An EKG obtained sinus rhythm without acute ST elevation.  Patient was given 1 hydrocodone p.o.  Is he has been taking at home since his defibrillator was placed just recently.  Chest x-ray without active disease CBC electrolytes unremarkable creatinine 1.5 troponin was negative.  Patient repeat exam is resting comfortably I see no evidence of myocardial infarction DVT or dissection pulmonary embolism.  This started after an argument with his fiancée.  He is now much better.  We talked about overnight admission to the hospital for serial troponins and further work-up with his cardiologist patient refused this we talked about a second troponin and rule out but he refused he states he just wants to go home.  We talked about the risk and potential complications of this and he voices understanding and the patient will be discharged home with appropriate follow-up with his cardiologist stable unremarkable ER course.       Amount and/or Complexity  of Data Reviewed  Clinical lab tests: reviewed  Tests in the radiology section of CPT®: reviewed  Tests in the medicine section of CPT®: reviewed  Independent visualization of images, tracings, or specimens: yes    Risk of Complications, Morbidity, and/or Mortality  Presenting problems: high  Diagnostic procedures: high  Management options: high    Patient Progress  Patient progress: stable      Final diagnoses:   Chest pain, unspecified type       ED Disposition  ED Disposition     ED Disposition Condition Comment    Discharge Stable           Maira Rose Hilda, APRN  [address] [facility] [place] [id number] [phone]    In 2 days           Medication List      Changed    HYDROcodone-acetaminophen 7.5-325 MG per tablet  Commonly known as: NORCO  Take 1 tablet by mouth Every 6 (Six) Hours As Needed for Severe Pain .  What changed:   · when to take this  · reasons to take this           Where to Get Your Medications      These medications were sent to [facility]/pharmacy #[id number] - [place], [place] - [address] [place] [address] - [phone]  - [phone] [address] [place] [address]    Hours: 24-hours Phone: [phone]   · HYDROcodone-acetaminophen 7.5-325 MG per tablet          Nando Vanessa MD  07/04/21 0652

## 2021-07-09 ENCOUNTER — HOSPITAL ENCOUNTER (EMERGENCY)
Facility: HOSPITAL | Age: 61
Discharge: HOME | End: 2021-07-09
Admitting: EMERGENCY MEDICINE

## 2021-07-09 ENCOUNTER — APPOINTMENT (OUTPATIENT)
Dept: CT IMAGING | Facility: HOSPITAL | Age: 61
End: 2021-07-09

## 2021-07-09 ENCOUNTER — APPOINTMENT (OUTPATIENT)
Dept: GENERAL RADIOLOGY | Facility: HOSPITAL | Age: 61
End: 2021-07-09

## 2021-07-09 VITALS
BODY MASS INDEX: 30.1 KG/M2 | HEIGHT: 72 IN | HEART RATE: 96 BPM | RESPIRATION RATE: 24 BRPM | TEMPERATURE: 97.4 F | WEIGHT: 222.22 LBS | DIASTOLIC BLOOD PRESSURE: 92 MMHG | SYSTOLIC BLOOD PRESSURE: 132 MMHG | OXYGEN SATURATION: 96 %

## 2021-07-09 DIAGNOSIS — J90 RECURRENT RIGHT PLEURAL EFFUSION: ICD-10-CM

## 2021-07-09 DIAGNOSIS — R06.00 DYSPNEA, UNSPECIFIED TYPE: Primary | ICD-10-CM

## 2021-07-09 LAB
ALBUMIN SERPL-MCNC: 4 G/DL (ref 3.5–5.2)
ALBUMIN/GLOB SERPL: 1.5 G/DL
ALP SERPL-CCNC: 168 U/L (ref 39–117)
ALT SERPL W P-5'-P-CCNC: 30 U/L (ref 1–41)
ANION GAP SERPL CALCULATED.3IONS-SCNC: 12 MMOL/L (ref 5–15)
AST SERPL-CCNC: 30 U/L (ref 1–40)
BASOPHILS # BLD AUTO: 0 10*3/MM3 (ref 0–0.2)
BASOPHILS NFR BLD AUTO: 1.2 % (ref 0–1.5)
BILIRUB SERPL-MCNC: 2 MG/DL (ref 0–1.2)
BUN SERPL-MCNC: 14 MG/DL (ref 6–20)
BUN/CREAT SERPL: 11.2 (ref 7–25)
CALCIUM SPEC-SCNC: 8.4 MG/DL (ref 8.6–10.5)
CHLORIDE SERPL-SCNC: 103 MMOL/L (ref 98–107)
CO2 SERPL-SCNC: 24 MMOL/L (ref 22–29)
CREAT SERPL-MCNC: 1.25 MG/DL (ref 0.76–1.27)
D DIMER PPP FEU-MCNC: 1.2 MG/L (FEU) (ref 0–0.59)
DEPRECATED RDW RBC AUTO: 54.7 FL (ref 37–54)
EOSINOPHIL # BLD AUTO: 0.1 10*3/MM3 (ref 0–0.4)
EOSINOPHIL NFR BLD AUTO: 3.1 % (ref 0.3–6.2)
ERYTHROCYTE [DISTWIDTH] IN BLOOD BY AUTOMATED COUNT: 17.1 % (ref 12.3–15.4)
GFR SERPL CREATININE-BSD FRML MDRD: 72 ML/MIN/1.73
GLOBULIN UR ELPH-MCNC: 2.7 GM/DL
GLUCOSE SERPL-MCNC: 67 MG/DL (ref 65–99)
HCT VFR BLD AUTO: 46.8 % (ref 37.5–51)
HGB BLD-MCNC: 15.3 G/DL (ref 13–17.7)
HOLD SPECIMEN: NORMAL
LYMPHOCYTES # BLD AUTO: 1.2 10*3/MM3 (ref 0.7–3.1)
LYMPHOCYTES NFR BLD AUTO: 29.4 % (ref 19.6–45.3)
MCH RBC QN AUTO: 30.5 PG (ref 26.6–33)
MCHC RBC AUTO-ENTMCNC: 32.8 G/DL (ref 31.5–35.7)
MCV RBC AUTO: 93.1 FL (ref 79–97)
MONOCYTES # BLD AUTO: 0.6 10*3/MM3 (ref 0.1–0.9)
MONOCYTES NFR BLD AUTO: 13.5 % (ref 5–12)
NEUTROPHILS NFR BLD AUTO: 2.2 10*3/MM3 (ref 1.7–7)
NEUTROPHILS NFR BLD AUTO: 52.8 % (ref 42.7–76)
NRBC BLD AUTO-RTO: 0.2 /100 WBC (ref 0–0.2)
NT-PROBNP SERPL-MCNC: 1168 PG/ML (ref 0–900)
PLATELET # BLD AUTO: 172 10*3/MM3 (ref 140–450)
PMV BLD AUTO: 8.6 FL (ref 6–12)
POTASSIUM SERPL-SCNC: 4 MMOL/L (ref 3.5–5.2)
PROT SERPL-MCNC: 6.7 G/DL (ref 6–8.5)
RBC # BLD AUTO: 5.02 10*6/MM3 (ref 4.14–5.8)
SODIUM SERPL-SCNC: 139 MMOL/L (ref 136–145)
TROPONIN T SERPL-MCNC: <0.01 NG/ML (ref 0–0.03)
WBC # BLD AUTO: 4.2 10*3/MM3 (ref 3.4–10.8)
WHOLE BLOOD HOLD SPECIMEN: NORMAL

## 2021-07-09 PROCEDURE — 84484 ASSAY OF TROPONIN QUANT: CPT | Performed by: NURSE PRACTITIONER

## 2021-07-09 PROCEDURE — 25010000002 FUROSEMIDE PER 20 MG: Performed by: NURSE PRACTITIONER

## 2021-07-09 PROCEDURE — 83880 ASSAY OF NATRIURETIC PEPTIDE: CPT | Performed by: NURSE PRACTITIONER

## 2021-07-09 PROCEDURE — 80053 COMPREHEN METABOLIC PANEL: CPT | Performed by: NURSE PRACTITIONER

## 2021-07-09 PROCEDURE — 0 IOPAMIDOL PER 1 ML: Performed by: NURSE PRACTITIONER

## 2021-07-09 PROCEDURE — 96374 THER/PROPH/DIAG INJ IV PUSH: CPT

## 2021-07-09 PROCEDURE — 93005 ELECTROCARDIOGRAM TRACING: CPT | Performed by: NURSE PRACTITIONER

## 2021-07-09 PROCEDURE — 85025 COMPLETE CBC W/AUTO DIFF WBC: CPT | Performed by: NURSE PRACTITIONER

## 2021-07-09 PROCEDURE — 71275 CT ANGIOGRAPHY CHEST: CPT

## 2021-07-09 PROCEDURE — 85379 FIBRIN DEGRADATION QUANT: CPT | Performed by: NURSE PRACTITIONER

## 2021-07-09 PROCEDURE — 71045 X-RAY EXAM CHEST 1 VIEW: CPT

## 2021-07-09 PROCEDURE — 93005 ELECTROCARDIOGRAM TRACING: CPT

## 2021-07-09 PROCEDURE — 99283 EMERGENCY DEPT VISIT LOW MDM: CPT

## 2021-07-09 RX ORDER — FUROSEMIDE 10 MG/ML
40 INJECTION INTRAMUSCULAR; INTRAVENOUS ONCE
Status: COMPLETED | OUTPATIENT
Start: 2021-07-09 | End: 2021-07-09

## 2021-07-09 RX ORDER — SODIUM CHLORIDE 0.9 % (FLUSH) 0.9 %
10 SYRINGE (ML) INJECTION AS NEEDED
Status: DISCONTINUED | OUTPATIENT
Start: 2021-07-09 | End: 2021-07-09 | Stop reason: HOSPADM

## 2021-07-09 RX ADMIN — FUROSEMIDE 40 MG: 10 INJECTION, SOLUTION INTRAMUSCULAR; INTRAVENOUS at 11:14

## 2021-07-09 RX ADMIN — IOPAMIDOL 100 ML: 755 INJECTION, SOLUTION INTRAVENOUS at 12:45

## 2021-07-12 LAB — QT INTERVAL: 407 MS

## 2021-07-20 ENCOUNTER — CLINICAL SUPPORT NO REQUIREMENTS (OUTPATIENT)
Dept: CARDIOLOGY | Facility: CLINIC | Age: 61
End: 2021-07-20

## 2021-07-20 DIAGNOSIS — Z51.89 VISIT FOR WOUND CHECK: Primary | ICD-10-CM

## 2021-07-20 PROCEDURE — 99024 POSTOP FOLLOW-UP VISIT: CPT | Performed by: INTERNAL MEDICINE

## 2021-07-20 NOTE — PROGRESS NOTES
Steri strips have fallen off, and incision is clear with no drainage, no redness or swelling. Pt informed he is clear to return to work, and to make a 2 month f/u with Dr. Rollins.  Pt verbalized understanding

## 2021-08-09 ENCOUNTER — HOSPITAL ENCOUNTER (INPATIENT)
Facility: HOSPITAL | Age: 61
LOS: 3 days | Discharge: HOME | End: 2021-08-12
Attending: INTERNAL MEDICINE | Admitting: INTERNAL MEDICINE

## 2021-08-09 ENCOUNTER — APPOINTMENT (OUTPATIENT)
Dept: GENERAL RADIOLOGY | Facility: HOSPITAL | Age: 61
End: 2021-08-09

## 2021-08-09 ENCOUNTER — APPOINTMENT (OUTPATIENT)
Dept: CARDIOLOGY | Facility: HOSPITAL | Age: 61
End: 2021-08-09

## 2021-08-09 ENCOUNTER — APPOINTMENT (OUTPATIENT)
Dept: CT IMAGING | Facility: HOSPITAL | Age: 61
End: 2021-08-09

## 2021-08-09 DIAGNOSIS — R06.00 DYSPNEA, UNSPECIFIED TYPE: ICD-10-CM

## 2021-08-09 DIAGNOSIS — R60.0 LOWER EXTREMITY EDEMA: ICD-10-CM

## 2021-08-09 DIAGNOSIS — J90 PLEURAL EFFUSION: Primary | ICD-10-CM

## 2021-08-09 PROBLEM — I25.10 CAD (CORONARY ARTERY DISEASE): Chronic | Status: ACTIVE | Noted: 2021-08-09

## 2021-08-09 PROBLEM — N18.2 CKD (CHRONIC KIDNEY DISEASE) STAGE 2, GFR 60-89 ML/MIN: Chronic | Status: ACTIVE | Noted: 2021-08-09

## 2021-08-09 LAB
ALBUMIN SERPL-MCNC: 3.5 G/DL (ref 3.5–5.2)
ALBUMIN/GLOB SERPL: 1.3 G/DL
ALP SERPL-CCNC: 166 U/L (ref 39–117)
ALT SERPL W P-5'-P-CCNC: 21 U/L (ref 1–41)
ANION GAP SERPL CALCULATED.3IONS-SCNC: 10 MMOL/L (ref 5–15)
AST SERPL-CCNC: 21 U/L (ref 1–40)
BASOPHILS # BLD AUTO: 0.1 10*3/MM3 (ref 0–0.2)
BASOPHILS NFR BLD AUTO: 1.3 % (ref 0–1.5)
BH CV LOWER VASCULAR LEFT COMMON FEMORAL AUGMENT: NORMAL
BH CV LOWER VASCULAR LEFT COMMON FEMORAL COMPETENT: NORMAL
BH CV LOWER VASCULAR LEFT COMMON FEMORAL COMPRESS: NORMAL
BH CV LOWER VASCULAR LEFT COMMON FEMORAL PHASIC: NORMAL
BH CV LOWER VASCULAR LEFT COMMON FEMORAL SPONT: NORMAL
BH CV LOWER VASCULAR LEFT DISTAL FEMORAL COMPRESS: NORMAL
BH CV LOWER VASCULAR LEFT GASTRONEMIUS COMPRESS: NORMAL
BH CV LOWER VASCULAR LEFT GREATER SAPH AK COMPRESS: NORMAL
BH CV LOWER VASCULAR LEFT GREATER SAPH BK COMPRESS: NORMAL
BH CV LOWER VASCULAR LEFT LESSER SAPH COMPRESS: NORMAL
BH CV LOWER VASCULAR LEFT MID FEMORAL AUGMENT: NORMAL
BH CV LOWER VASCULAR LEFT MID FEMORAL COMPETENT: NORMAL
BH CV LOWER VASCULAR LEFT MID FEMORAL COMPRESS: NORMAL
BH CV LOWER VASCULAR LEFT MID FEMORAL PHASIC: NORMAL
BH CV LOWER VASCULAR LEFT MID FEMORAL SPONT: NORMAL
BH CV LOWER VASCULAR LEFT PERONEAL COMPRESS: NORMAL
BH CV LOWER VASCULAR LEFT POPLITEAL AUGMENT: NORMAL
BH CV LOWER VASCULAR LEFT POPLITEAL COMPETENT: NORMAL
BH CV LOWER VASCULAR LEFT POPLITEAL COMPRESS: NORMAL
BH CV LOWER VASCULAR LEFT POPLITEAL PHASIC: NORMAL
BH CV LOWER VASCULAR LEFT POPLITEAL SPONT: NORMAL
BH CV LOWER VASCULAR LEFT POSTERIOR TIBIAL COMPRESS: NORMAL
BH CV LOWER VASCULAR LEFT PROXIMAL FEMORAL COMPRESS: NORMAL
BH CV LOWER VASCULAR LEFT SAPHENOFEMORAL JUNCTION COMPRESS: NORMAL
BH CV LOWER VASCULAR LEFT VARICOSITY AK COMPRESS: NORMAL
BH CV LOWER VASCULAR LEFT VARICOSITY BK COMPRESS: NORMAL
BH CV LOWER VASCULAR RIGHT COMMON FEMORAL AUGMENT: NORMAL
BH CV LOWER VASCULAR RIGHT COMMON FEMORAL COMPETENT: NORMAL
BH CV LOWER VASCULAR RIGHT COMMON FEMORAL COMPRESS: NORMAL
BH CV LOWER VASCULAR RIGHT COMMON FEMORAL PHASIC: NORMAL
BH CV LOWER VASCULAR RIGHT COMMON FEMORAL SPONT: NORMAL
BH CV LOWER VASCULAR RIGHT DISTAL FEMORAL AUGMENT: NORMAL
BH CV LOWER VASCULAR RIGHT DISTAL FEMORAL COMPETENT: NORMAL
BH CV LOWER VASCULAR RIGHT DISTAL FEMORAL PHASIC: NORMAL
BH CV LOWER VASCULAR RIGHT DISTAL FEMORAL SPONT: NORMAL
BH CV LOWER VASCULAR RIGHT GASTRONEMIUS COMPRESS: NORMAL
BH CV LOWER VASCULAR RIGHT GREATER SAPH AK COMPRESS: NORMAL
BH CV LOWER VASCULAR RIGHT GREATER SAPH BK COMPRESS: NORMAL
BH CV LOWER VASCULAR RIGHT LESSER SAPH COMPRESS: NORMAL
BH CV LOWER VASCULAR RIGHT MID FEMORAL AUGMENT: NORMAL
BH CV LOWER VASCULAR RIGHT MID FEMORAL COMPETENT: NORMAL
BH CV LOWER VASCULAR RIGHT MID FEMORAL COMPRESS: NORMAL
BH CV LOWER VASCULAR RIGHT MID FEMORAL PHASIC: NORMAL
BH CV LOWER VASCULAR RIGHT MID FEMORAL SPONT: NORMAL
BH CV LOWER VASCULAR RIGHT PERONEAL COMPRESS: NORMAL
BH CV LOWER VASCULAR RIGHT POPLITEAL AUGMENT: NORMAL
BH CV LOWER VASCULAR RIGHT POPLITEAL COMPETENT: NORMAL
BH CV LOWER VASCULAR RIGHT POPLITEAL COMPRESS: NORMAL
BH CV LOWER VASCULAR RIGHT POPLITEAL PHASIC: NORMAL
BH CV LOWER VASCULAR RIGHT POPLITEAL SPONT: NORMAL
BH CV LOWER VASCULAR RIGHT POSTERIOR TIBIAL COMPRESS: NORMAL
BH CV LOWER VASCULAR RIGHT PROXIMAL FEMORAL COMPRESS: NORMAL
BH CV LOWER VASCULAR RIGHT SAPHENOFEMORAL JUNCTION COMPRESS: NORMAL
BH CV LOWER VASCULAR RIGHT VARICOSITY AK COMPRESS: NORMAL
BH CV LOWER VASCULAR RIGHT VARICOSITY BK COMPRESS: NORMAL
BILIRUB SERPL-MCNC: 1.5 MG/DL (ref 0–1.2)
BUN SERPL-MCNC: 19 MG/DL (ref 6–20)
BUN/CREAT SERPL: 14.7 (ref 7–25)
CALCIUM SPEC-SCNC: 8.5 MG/DL (ref 8.6–10.5)
CHLORIDE SERPL-SCNC: 108 MMOL/L (ref 98–107)
CO2 SERPL-SCNC: 21 MMOL/L (ref 22–29)
CREAT SERPL-MCNC: 1.29 MG/DL (ref 0.76–1.27)
D DIMER PPP FEU-MCNC: 2.57 MG/L (FEU) (ref 0–0.59)
DEPRECATED RDW RBC AUTO: 53.8 FL (ref 37–54)
EOSINOPHIL # BLD AUTO: 0.1 10*3/MM3 (ref 0–0.4)
EOSINOPHIL NFR BLD AUTO: 3 % (ref 0.3–6.2)
ERYTHROCYTE [DISTWIDTH] IN BLOOD BY AUTOMATED COUNT: 16.7 % (ref 12.3–15.4)
GFR SERPL CREATININE-BSD FRML MDRD: 69 ML/MIN/1.73
GLOBULIN UR ELPH-MCNC: 2.6 GM/DL
GLUCOSE SERPL-MCNC: 102 MG/DL (ref 65–99)
HCT VFR BLD AUTO: 44.3 % (ref 37.5–51)
HGB BLD-MCNC: 14.7 G/DL (ref 13–17.7)
HOLD SPECIMEN: NORMAL
INR PPP: 1.34 (ref 0.93–1.1)
LYMPHOCYTES # BLD AUTO: 1.2 10*3/MM3 (ref 0.7–3.1)
LYMPHOCYTES NFR BLD AUTO: 26.7 % (ref 19.6–45.3)
MAXIMAL PREDICTED HEART RATE: 161 BPM
MCH RBC QN AUTO: 30.6 PG (ref 26.6–33)
MCHC RBC AUTO-ENTMCNC: 33.3 G/DL (ref 31.5–35.7)
MCV RBC AUTO: 91.9 FL (ref 79–97)
MONOCYTES # BLD AUTO: 0.5 10*3/MM3 (ref 0.1–0.9)
MONOCYTES NFR BLD AUTO: 10.1 % (ref 5–12)
NEUTROPHILS NFR BLD AUTO: 2.7 10*3/MM3 (ref 1.7–7)
NEUTROPHILS NFR BLD AUTO: 58.9 % (ref 42.7–76)
NRBC BLD AUTO-RTO: 0.4 /100 WBC (ref 0–0.2)
NT-PROBNP SERPL-MCNC: 2221 PG/ML (ref 0–900)
PLATELET # BLD AUTO: 165 10*3/MM3 (ref 140–450)
PMV BLD AUTO: 8.3 FL (ref 6–12)
POTASSIUM SERPL-SCNC: 3.9 MMOL/L (ref 3.5–5.2)
PROT SERPL-MCNC: 6.1 G/DL (ref 6–8.5)
PROTHROMBIN TIME: 14.6 SECONDS (ref 9.6–11.7)
RBC # BLD AUTO: 4.82 10*6/MM3 (ref 4.14–5.8)
SARS-COV-2 RNA PNL SPEC NAA+PROBE: NOT DETECTED
SODIUM SERPL-SCNC: 139 MMOL/L (ref 136–145)
STRESS TARGET HR: 137 BPM
TROPONIN T SERPL-MCNC: <0.01 NG/ML (ref 0–0.03)
WBC # BLD AUTO: 4.6 10*3/MM3 (ref 3.4–10.8)

## 2021-08-09 PROCEDURE — 71275 CT ANGIOGRAPHY CHEST: CPT

## 2021-08-09 PROCEDURE — 25010000002 FUROSEMIDE PER 20 MG: Performed by: PHYSICIAN ASSISTANT

## 2021-08-09 PROCEDURE — 99222 1ST HOSP IP/OBS MODERATE 55: CPT | Performed by: NURSE PRACTITIONER

## 2021-08-09 PROCEDURE — 84484 ASSAY OF TROPONIN QUANT: CPT | Performed by: PHYSICIAN ASSISTANT

## 2021-08-09 PROCEDURE — 85379 FIBRIN DEGRADATION QUANT: CPT | Performed by: PHYSICIAN ASSISTANT

## 2021-08-09 PROCEDURE — 71045 X-RAY EXAM CHEST 1 VIEW: CPT

## 2021-08-09 PROCEDURE — G0378 HOSPITAL OBSERVATION PER HR: HCPCS

## 2021-08-09 PROCEDURE — 85610 PROTHROMBIN TIME: CPT | Performed by: PHYSICIAN ASSISTANT

## 2021-08-09 PROCEDURE — 85025 COMPLETE CBC W/AUTO DIFF WBC: CPT | Performed by: PHYSICIAN ASSISTANT

## 2021-08-09 PROCEDURE — 93970 EXTREMITY STUDY: CPT

## 2021-08-09 PROCEDURE — 83880 ASSAY OF NATRIURETIC PEPTIDE: CPT | Performed by: PHYSICIAN ASSISTANT

## 2021-08-09 PROCEDURE — U0003 INFECTIOUS AGENT DETECTION BY NUCLEIC ACID (DNA OR RNA); SEVERE ACUTE RESPIRATORY SYNDROME CORONAVIRUS 2 (SARS-COV-2) (CORONAVIRUS DISEASE [COVID-19]), AMPLIFIED PROBE TECHNIQUE, MAKING USE OF HIGH THROUGHPUT TECHNOLOGIES AS DESCRIBED BY CMS-2020-01-R: HCPCS | Performed by: PHYSICIAN ASSISTANT

## 2021-08-09 PROCEDURE — 25010000002 ONDANSETRON PER 1 MG: Performed by: PHYSICIAN ASSISTANT

## 2021-08-09 PROCEDURE — 99283 EMERGENCY DEPT VISIT LOW MDM: CPT

## 2021-08-09 PROCEDURE — 0 IOPAMIDOL PER 1 ML: Performed by: PHYSICIAN ASSISTANT

## 2021-08-09 PROCEDURE — 80053 COMPREHEN METABOLIC PANEL: CPT | Performed by: PHYSICIAN ASSISTANT

## 2021-08-09 PROCEDURE — 25010000002 MORPHINE PER 10 MG: Performed by: PHYSICIAN ASSISTANT

## 2021-08-09 PROCEDURE — 25010000002 FUROSEMIDE PER 20 MG: Performed by: INTERNAL MEDICINE

## 2021-08-09 RX ORDER — ACETAMINOPHEN 325 MG/1
650 TABLET ORAL EVERY 4 HOURS PRN
Status: DISCONTINUED | OUTPATIENT
Start: 2021-08-09 | End: 2021-08-12 | Stop reason: HOSPADM

## 2021-08-09 RX ORDER — POTASSIUM CHLORIDE 20 MEQ/1
40 TABLET, EXTENDED RELEASE ORAL AS NEEDED
Status: DISCONTINUED | OUTPATIENT
Start: 2021-08-09 | End: 2021-08-12 | Stop reason: HOSPADM

## 2021-08-09 RX ORDER — MAGNESIUM SULFATE HEPTAHYDRATE 40 MG/ML
2 INJECTION, SOLUTION INTRAVENOUS AS NEEDED
Status: DISCONTINUED | OUTPATIENT
Start: 2021-08-09 | End: 2021-08-12 | Stop reason: HOSPADM

## 2021-08-09 RX ORDER — CHOLECALCIFEROL (VITAMIN D3) 125 MCG
5 CAPSULE ORAL NIGHTLY PRN
Status: DISCONTINUED | OUTPATIENT
Start: 2021-08-09 | End: 2021-08-12 | Stop reason: HOSPADM

## 2021-08-09 RX ORDER — POTASSIUM CHLORIDE 1.5 G/1.77G
40 POWDER, FOR SOLUTION ORAL AS NEEDED
Status: DISCONTINUED | OUTPATIENT
Start: 2021-08-09 | End: 2021-08-12 | Stop reason: HOSPADM

## 2021-08-09 RX ORDER — FUROSEMIDE 10 MG/ML
40 INJECTION INTRAMUSCULAR; INTRAVENOUS EVERY 8 HOURS
Status: DISCONTINUED | OUTPATIENT
Start: 2021-08-09 | End: 2021-08-09

## 2021-08-09 RX ORDER — LOSARTAN POTASSIUM 25 MG/1
25 TABLET ORAL DAILY
Status: DISCONTINUED | OUTPATIENT
Start: 2021-08-09 | End: 2021-08-12 | Stop reason: HOSPADM

## 2021-08-09 RX ORDER — FUROSEMIDE 10 MG/ML
40 INJECTION INTRAMUSCULAR; INTRAVENOUS EVERY 8 HOURS
Status: COMPLETED | OUTPATIENT
Start: 2021-08-09 | End: 2021-08-11

## 2021-08-09 RX ORDER — ASPIRIN 81 MG/1
81 TABLET ORAL DAILY
Status: DISCONTINUED | OUTPATIENT
Start: 2021-08-10 | End: 2021-08-12 | Stop reason: HOSPADM

## 2021-08-09 RX ORDER — SODIUM CHLORIDE 0.9 % (FLUSH) 0.9 %
10 SYRINGE (ML) INJECTION AS NEEDED
Status: DISCONTINUED | OUTPATIENT
Start: 2021-08-09 | End: 2021-08-12 | Stop reason: HOSPADM

## 2021-08-09 RX ORDER — CARVEDILOL 6.25 MG/1
6.25 TABLET ORAL 2 TIMES DAILY WITH MEALS
Status: DISCONTINUED | OUTPATIENT
Start: 2021-08-09 | End: 2021-08-12 | Stop reason: HOSPADM

## 2021-08-09 RX ORDER — ACETAMINOPHEN 160 MG/5ML
650 SOLUTION ORAL EVERY 4 HOURS PRN
Status: DISCONTINUED | OUTPATIENT
Start: 2021-08-09 | End: 2021-08-12 | Stop reason: HOSPADM

## 2021-08-09 RX ORDER — ACETAMINOPHEN 650 MG/1
650 SUPPOSITORY RECTAL EVERY 4 HOURS PRN
Status: DISCONTINUED | OUTPATIENT
Start: 2021-08-09 | End: 2021-08-12 | Stop reason: HOSPADM

## 2021-08-09 RX ORDER — IBUPROFEN 200 MG
200 TABLET ORAL EVERY 6 HOURS PRN
COMMUNITY
End: 2021-08-12 | Stop reason: HOSPADM

## 2021-08-09 RX ORDER — ONDANSETRON 4 MG/1
4 TABLET, FILM COATED ORAL EVERY 6 HOURS PRN
Status: DISCONTINUED | OUTPATIENT
Start: 2021-08-09 | End: 2021-08-12 | Stop reason: HOSPADM

## 2021-08-09 RX ORDER — FUROSEMIDE 10 MG/ML
40 INJECTION INTRAMUSCULAR; INTRAVENOUS ONCE
Status: COMPLETED | OUTPATIENT
Start: 2021-08-09 | End: 2021-08-09

## 2021-08-09 RX ORDER — MORPHINE SULFATE 4 MG/ML
4 INJECTION, SOLUTION INTRAMUSCULAR; INTRAVENOUS ONCE
Status: COMPLETED | OUTPATIENT
Start: 2021-08-09 | End: 2021-08-09

## 2021-08-09 RX ORDER — ONDANSETRON 2 MG/ML
4 INJECTION INTRAMUSCULAR; INTRAVENOUS EVERY 6 HOURS PRN
Status: DISCONTINUED | OUTPATIENT
Start: 2021-08-09 | End: 2021-08-12 | Stop reason: HOSPADM

## 2021-08-09 RX ORDER — ONDANSETRON 2 MG/ML
4 INJECTION INTRAMUSCULAR; INTRAVENOUS ONCE
Status: COMPLETED | OUTPATIENT
Start: 2021-08-09 | End: 2021-08-09

## 2021-08-09 RX ORDER — SODIUM CHLORIDE 0.9 % (FLUSH) 0.9 %
10 SYRINGE (ML) INJECTION EVERY 12 HOURS SCHEDULED
Status: DISCONTINUED | OUTPATIENT
Start: 2021-08-09 | End: 2021-08-12 | Stop reason: HOSPADM

## 2021-08-09 RX ORDER — ALUMINA, MAGNESIA, AND SIMETHICONE 2400; 2400; 240 MG/30ML; MG/30ML; MG/30ML
15 SUSPENSION ORAL EVERY 6 HOURS PRN
Status: DISCONTINUED | OUTPATIENT
Start: 2021-08-09 | End: 2021-08-12 | Stop reason: HOSPADM

## 2021-08-09 RX ORDER — MAGNESIUM SULFATE 1 G/100ML
1 INJECTION INTRAVENOUS AS NEEDED
Status: DISCONTINUED | OUTPATIENT
Start: 2021-08-09 | End: 2021-08-12 | Stop reason: HOSPADM

## 2021-08-09 RX ADMIN — CARVEDILOL 6.25 MG: 6.25 TABLET, FILM COATED ORAL at 17:04

## 2021-08-09 RX ADMIN — FUROSEMIDE 40 MG: 10 INJECTION, SOLUTION INTRAMUSCULAR; INTRAVENOUS at 09:54

## 2021-08-09 RX ADMIN — Medication 10 ML: at 21:30

## 2021-08-09 RX ADMIN — MORPHINE SULFATE 4 MG: 4 INJECTION INTRAVENOUS at 07:42

## 2021-08-09 RX ADMIN — IOPAMIDOL 100 ML: 755 INJECTION, SOLUTION INTRAVENOUS at 09:14

## 2021-08-09 RX ADMIN — ONDANSETRON 4 MG: 2 INJECTION INTRAMUSCULAR; INTRAVENOUS at 07:41

## 2021-08-09 RX ADMIN — FUROSEMIDE 40 MG: 10 INJECTION, SOLUTION INTRAMUSCULAR; INTRAVENOUS at 17:04

## 2021-08-09 RX ADMIN — ACETAMINOPHEN 650 MG: 325 TABLET, FILM COATED ORAL at 19:50

## 2021-08-09 NOTE — ED NOTES
Pt reports his legs are swelling and SOB. Reports his SOB is worse with exertion.      Shannan Luna RN  08/09/21 5543

## 2021-08-09 NOTE — ED PROVIDER NOTES
Subjective   Patient is a 61-year-old male who presents with complaints of increased dyspnea and lower extremity edema over the past several days.  Patient dates his swelling is worse in his right leg than his left.  He reports severe pain in his calfs is worse with ambulation bilaterally.  No significant injury to his legs.  Patient states his shortness of breath is worse with exertion.  He also reports chest pain only with exertion currently denies any chest pain states it is a pressure type pain that is nonradiating from his chest since his recent hospitalization.  Patient is currently on Xarelto.  States he is also taking Lasix twice a day does not seem to be helping with the swelling.  Patient denies any new cough rhinorrhea nasal congestion.  No fever body aches or chills.  Abdominal pain nausea vomiting diarrhea black or bloody stools.  No headaches or lightheadedness he denies any other alleviating or exacerbating factors of his symptoms.  He rates them as moderate in severity.          Review of Systems   Constitutional: Negative.    HENT: Negative.    Eyes: Negative for photophobia and visual disturbance.   Respiratory: Positive for shortness of breath. Negative for apnea, cough, choking, chest tightness, wheezing and stridor.    Cardiovascular: Positive for chest pain and leg swelling. Negative for palpitations.   Gastrointestinal: Negative for abdominal distention, abdominal pain, constipation, diarrhea, nausea and vomiting.   Genitourinary: Negative for decreased urine volume, difficulty urinating, dysuria, flank pain, frequency, hematuria and urgency.   Musculoskeletal: Negative for back pain, neck pain and neck stiffness.   Skin: Negative.    Neurological: Negative.    Hematological: Negative.        Past Medical History:   Diagnosis Date   • Afib (CMS/[facility])    • CHF (congestive heart failure) (CMS/[facility])    • Hypertension        Allergies   Allergen Reactions   • Aspirin Swelling   • Penicillins  "Swelling       Past Surgical History:   Procedure Laterality Date   • CARDIAC DEFIBRILLATOR PLACEMENT     • CARDIAC ELECTROPHYSIOLOGY PROCEDURE Left 6/15/2021    Procedure: Device Implant;  Surgeon: Michael Rollins MD;  Location: [facility] INVASIVE LOCATION;  Service: Cardiovascular;  Laterality: Left;   • HERNIA REPAIR         Family History   Problem Relation Age of Onset   • Cancer Mother        Social History     Socioeconomic History   • Marital status: Significant Other     Spouse name: Not on file   • Number of children: Not on file   • Years of education: Not on file   • Highest education level: Not on file   Tobacco Use   • Smoking status: Former Smoker     Packs/day: 0.50     Types: Cigarettes     Quit date: 2021     Years since quittin.1   • Smokeless tobacco: Never Used   Substance and Sexual Activity   • Alcohol use: Yes     Alcohol/week: 7.0 standard drinks     Types: 7 Cans of beer per week     Comment: 1 beer a day   • Drug use: Yes     Types: Marijuana, \"Crack\" cocaine     Comment: 6/15/21 report cocaine in 2018, marijuana 2021 @0050           Objective   Physical Exam  Vitals and nursing note reviewed.   Constitutional:       General: He is not in acute distress.     Appearance: Normal appearance. He is well-developed. He is not ill-appearing, toxic-appearing or diaphoretic.   HENT:      Head: Normocephalic and atraumatic.      Nose: Nose normal.      Mouth/Throat:      Mouth: Mucous membranes are moist.      Pharynx: Oropharynx is clear.   Eyes:      General: No scleral icterus.     Extraocular Movements: Extraocular movements intact.      Pupils: Pupils are equal, round, and reactive to light.   Cardiovascular:      Rate and Rhythm: Normal rate and regular rhythm.      Pulses: Normal pulses.      Heart sounds: No murmur heard.   No friction rub. No gallop.       Comments: Bilateral lower extremity edema worse on right leg than left.  Pulmonary:      Effort: Pulmonary effort is " "normal. No tachypnea or accessory muscle usage.      Breath sounds: Normal breath sounds. No decreased breath sounds, wheezing, rhonchi or rales.   Chest:      Chest wall: No mass, deformity, tenderness or crepitus.   Abdominal:      General: Bowel sounds are normal. There is no distension.      Palpations: Abdomen is soft. There is no hepatomegaly, splenomegaly or mass.      Tenderness: There is no abdominal tenderness. There is no right CVA tenderness, left CVA tenderness, guarding or rebound.   Musculoskeletal:      Cervical back: Normal range of motion and neck supple. No rigidity.      Comments: Peripheral pulses are intact compartments are soft of bilateral lower extremities.  Tenderness to palpation along the calf there is some swelling noted of the legs bilaterally worse on right than left.     Skin:     General: Skin is warm.      Capillary Refill: Capillary refill takes less than 2 seconds.      Findings: No rash.   Neurological:      Mental Status: He is alert and oriented to person, place, and time.      GCS: GCS eye subscore is 4. GCS verbal subscore is 5. GCS motor subscore is 6.   Psychiatric:         Mood and Affect: Mood normal.         Behavior: Behavior normal.         Procedures           ED Course    /94   Pulse 96   Temp 97.7 °F (36.5 °C) (Oral)   Resp 24   Ht 190.5 cm (75\")   Wt 106 kg (233 lb 0.4 oz)   SpO2 97%   BMI 29.13 kg/m²   Medications   sodium chloride 0.9 % flush 10 mL (has no administration in time range)   Morphine sulfate (PF) injection 4 mg (4 mg Intravenous Given 8/9/21 0742)   ondansetron (ZOFRAN) injection 4 mg (4 mg Intravenous Given 8/9/21 0741)   iopamidol (ISOVUE-370) 76 % injection 100 mL (100 mL Intravenous Given 8/9/21 0914)   furosemide (LASIX) injection 40 mg (40 mg Intravenous Given 8/9/21 0954)     Labs Reviewed   COMPREHENSIVE METABOLIC PANEL - Abnormal; Notable for the following components:       Result Value    Glucose 102 (*)     Creatinine 1.29 (*) "     Chloride 108 (*)     CO2 21.0 (*)     Calcium 8.5 (*)     Alkaline Phosphatase 166 (*)     Total Bilirubin 1.5 (*)     All other components within normal limits    Narrative:     GFR Normal >60  Chronic Kidney Disease <60  Kidney Failure <15     BNP (IN-HOUSE) - Abnormal; Notable for the following components:    proBNP 2,221.0 (*)     All other components within normal limits    Narrative:     Among patients with dyspnea, NT-proBNP is highly sensitive for the detection of acute congestive heart failure. In addition NT-proBNP of <300 pg/ml effectively rules out acute congestive heart failure with 99% negative predictive value.    Results may be falsely decreased if patient taking Biotin.     PROTIME-INR - Abnormal; Notable for the following components:    Protime 14.6 (*)     INR 1.34 (*)     All other components within normal limits   CBC WITH AUTO DIFFERENTIAL - Abnormal; Notable for the following components:    RDW 16.7 (*)     nRBC 0.4 (*)     All other components within normal limits   D-DIMER, QUANTITATIVE - Abnormal; Notable for the following components:    D-Dimer, Quantitative 2.57 (*)     All other components within normal limits    Narrative:     Reference Range  --------------------------------------------------------------------     < 0.50   Negative Predictive Value  0.50-0.59   Indeterminate    >= 0.60   Probable VTE             A very low percentage of patients with DVT may yield D-Dimer results   below the cut-off of 0.50 mg/L FEU.  This is known to be more   prevalent in patients with distal DVT.             Results of this test should always be interpreted in conjunction with   the patient's medical history, clinical presentation and other   findings.  Clinical diagnosis should not be based on the result of   IN[place]OVANCE D-Dimer alone.   COVID-19,CEPHEID/DIEGO/BDMAX,COR/MANOJ/PAD/CHELLY IN-HOUSE,NP SWAB IN TRANSPORT MEDIA 3-4 HR TAT, RT-PCR - Normal    Narrative:     Fact sheet for providers:  https://www.fda.gov/media/012837/download     Fact sheet for patients: https://www.fda.gov/media/180720/download  Fact sheet for providers: https://www.fda.gov/media/256907/download     Fact sheet for patients: https://www.fda.gov/media/915020/download   TROPONIN (IN-HOUSE) - Normal    Narrative:     Troponin T Reference Range:  <= 0.03 ng/mL-   Negative for AMI  >0.03 ng/mL-     Abnormal for myocardial necrosis.  Clinicians would have to utilize clinical acumen, EKG, Troponin and serial changes to determine if it is an Acute Myocardial Infarction or myocardial injury due to an underlying chronic condition.       Results may be falsely decreased if patient taking Biotin.     CBC AND DIFFERENTIAL    Narrative:     The following orders were created for panel order CBC & Differential.  Procedure                               Abnormality         Status                     ---------                               -----------         ------                     CBC Auto Differential[[id number]]        Abnormal            Final result                 Please view results for these tests on the individual orders.   EXTRA TUBES    Narrative:     The following orders were created for panel order Extra Tubes.  Procedure                               Abnormality         Status                     ---------                               -----------         ------                     Gold Top - SST[[id number]]                                   Final result                 Please view results for these tests on the individual orders.   GOLD TOP - SST     XR Chest 1 View    Result Date: 8/9/2021    1.  Stable cardiomegaly. 2.  Hazy bibasilar airspace disease improved from prior study. 3.  Small right pleural effusion.   Electronically Signed By-Luis Park MD On:8/9/2021 8:01 AM This report was finalized on [id number] by  Luis Park MD.    CT Chest Pulmonary Embolism    Result Date: 8/9/2021   1.  No evidence pulmonary  embolus. 2.  Large right pleural effusion with compressive atelectasis of the right lower lobe and minimal right upper lobe atelectasis. 3.  Small amount of ascites  Electronically Signed By-Luis Park MD On:8/9/2021 9:23 AM This report was finalized on [id number] by  Luis Park MD.                                           MDM  Number of Diagnoses or Management Options  Dyspnea, unspecified type  Lower extremity edema  Pleural effusion  Diagnosis management comments: Chart Review:  Comorbidity: As per past medical history  ECG: Interpreted by myself and Dr. Green shows sinus rhythm rate 97 nonspecific T wave abnormalities in anterior lateral leads previous EKG reviewed from 7/9/2021  Labs: Troponin within normal limits proBNP 2221.  Pro time 14.6 INR 1.34.  Dimer 2.57 CBC fairly unremarkable CMP shows glucose 102 BUN 19 creatinine 1.29 sodium 139 potassium 3.9.  COVID-19 negative  Imaging: Was interpreted by physician and reviewed by myself:  XR Chest 1 View  Result Date: 8/9/2021    1.  Stable cardiomegaly. 2.  Hazy bibasilar airspace disease improved from prior study. 3.  Small right pleural effusion.   Electronically Signed By-Luis Park MD On:8/9/2021 8:01 AM This report was finalized on [id number] by  Luis Park MD.    CT Chest Pulmonary Embolism  Result Date: 8/9/2021   1.  No evidence pulmonary embolus. 2.  Large right pleural effusion with compressive atelectasis of the right lower lobe and minimal right upper lobe atelectasis. 3.  Small amount of ascites  Electronically Signed By-Luis Park MD On:8/9/2021 9:23 AM This report was finalized on [id number] by  Luis Park MD.    Disposition/Treatment:  Appropriate PPE was worn during exam and throughout all encounters with the patient.  When the ED IV was placed and labs were obtained patient was afebrile and appeared nontoxic was slightly tachypneic on initial exam with improvement after reassessment.  EKG showed no signs  of acute STEMI troponin was within normal limits.  proBNP was found to be elevated as above patient does have some lower extremity edema and shortness of breath therefore patient was given Lasix while in the ED.  Patient's D-dimer was also found to be elevated therefore CT PE protocol was ordered which showed no acute PE but did show a large right pleural effusion that has increased from prior CT last month.  No signs of pneumonia. patient was given morphine for his leg pain with improvement.  Doppler ultrasound of bilateral lower extremities was negative for acute DVT.  There are no cellulitic changes noted of the lower extremities bilaterally.  Due to patient's increased pleural effusion continued dyspnea and lower extremity edema patient will be admitted for further evaluation and management.  Lab results and findings were discussed with the patient who voiced understanding of admission was in agreement with plan.  Patient will be admitted to hospitalist group.  Spoke to Donna SPARKS who agreed for admission through Dr. Hudson.        Amount and/or Complexity of Data Reviewed  Clinical lab tests: reviewed  Tests in the radiology section of CPT®: reviewed        Final diagnoses:   Pleural effusion   Dyspnea, unspecified type   Lower extremity edema       ED Disposition  ED Disposition     ED Disposition Condition Comment    Decision to Admit  Level of Care: Telemetry [5]   Admitting Physician: DARLING HUDSON [[id number]]            No follow-up provider specified.       Medication List      No changes were made to your prescriptions during this visit.          Cielo Guillen PA  08/09/21 1016

## 2021-08-09 NOTE — CONSULTS
CARDIOLOGY CONSULT NOTE      Referring Provider: Dr. Roberts     Reason for Consultation: SOA/Edema/HF    Attending: Del Roberts DO    Chief complaint    C/o GOMEZ, orthopnea, LE edema    Subjective .     History of present illness:  Thony Dumont is a 61 y.o. male who presents with SOA.     Has a history of nonischemic cardiomyopathy diagnosed in March 2021 in [facility].  He had a cardiac catheterization at that time that did not show occlusive coronary artery disease. Ejection fraction by echocardiogram done in [facility] showed his EF to be 20 to 25%.     Patient was started on medical therapy. In  June 2021 he had a single chamber ICD placed for primary prevention.     The patient is also reported to have a history of paroxysmal atrial fibrillation and is on Xarelto he is currently in sinus rhythm.     He comes into the emergency room for evaluation because of recent shortness of breath and leg swelling.     He reports that he believes he may have been shocked by his ICD    CXR and CT show heart falure and large right pleural effusion.   He has been started on IV diuresis.    It appears the patient has quit taking beta blockers, he reports his Rx ran out and he did not have refills.     He complains of orthopnea, edema, GOMEZ and palpitaitons.  He reports dyspnea with conversation    Review of Systems   Constitutional: Positive for malaise/fatigue. Negative for decreased appetite and diaphoresis.   HENT: Negative for congestion, hearing loss and nosebleeds.    Cardiovascular: Positive for dyspnea on exertion, irregular heartbeat, leg swelling, orthopnea and palpitations. Negative for chest pain, claudication, near-syncope, paroxysmal nocturnal dyspnea and syncope.   Respiratory: Negative for cough, shortness of breath and sleep disturbances due to breathing.    Endocrine: Negative for polyuria.   Hematologic/Lymphatic: Does not bruise/bleed easily.   Skin: Negative for itching and rash.  "  Musculoskeletal: Negative for back pain, muscle weakness and myalgias.   Gastrointestinal: Negative for abdominal pain, change in bowel habit and nausea.   Genitourinary: Negative for dysuria, flank pain, frequency and hesitancy.   Neurological: Positive for weakness. Negative for dizziness and tremors.   Psychiatric/Behavioral: Negative for altered mental status. The patient does not have insomnia.        History  Past Medical History:   Diagnosis Date   • Afib (CMS/HCC)    • CHF (congestive heart failure) (CMS/HCC)    • Hypertension        Past Surgical History:   Procedure Laterality Date   • CARDIAC DEFIBRILLATOR PLACEMENT     • CARDIAC ELECTROPHYSIOLOGY PROCEDURE Left 6/15/2021    Procedure: Device Implant;  Surgeon: Michael Rollins MD;  Location: [facility] INVASIVE LOCATION;  Service: Cardiovascular;  Laterality: Left;   • HERNIA REPAIR         Family History   Problem Relation Age of Onset   • Cancer Mother        Social History     Tobacco Use   • Smoking status: Former Smoker     Packs/day: 0.50     Types: Cigarettes     Quit date: 2021     Years since quittin.1   • Smokeless tobacco: Never Used   Substance Use Topics   • Alcohol use: Yes     Alcohol/week: 7.0 standard drinks     Types: 7 Cans of beer per week     Comment: 1 beer a day   • Drug use: Yes     Types: Marijuana, \"Crack\" cocaine     Comment: 6/15/21 report cocaine in 2018, marijuana 2021 @0050        Medications Prior to Admission   Medication Sig Dispense Refill Last Dose   • aspirin (aspirin) 81 MG EC tablet Take 81 mg by mouth Daily.   2021 at 0800   • furosemide (LASIX) 40 MG tablet Take 1 tablet by mouth 2 (Two) Times a Day for 30 days. 60 tablet 0 2021 at Unknown time   • ibuprofen (ADVIL,MOTRIN) 200 MG tablet Take 200 mg by mouth Every 6 (Six) Hours As Needed for Mild Pain . Pt takes 4-5 tablets in one dose prn   2021 at Unknown time   • losartan (COZAAR) 25 MG tablet Take 25 mg by mouth Daily.   2021 " "at Unknown time   • rivaroxaban (Xarelto) 20 MG tablet Take 1 tablet by mouth Daily. 30 tablet 0 8/9/2021 at 0800   • HYDROcodone-acetaminophen (NORCO) 7.5-325 MG per tablet Take 1 tablet by mouth Every 6 (Six) Hours As Needed for Severe Pain . 10 tablet 0          Aspirin and Penicillins    Scheduled Meds:[START ON 8/10/2021] aspirin, 81 mg, Oral, Daily  carvedilol, 6.25 mg, Oral, BID With Meals  furosemide, 40 mg, Intravenous, Q8H  losartan, 25 mg, Oral, Daily  [START ON 8/10/2021] rivaroxaban, 20 mg, Oral, Daily  sodium chloride, 10 mL, Intravenous, Q12H      Continuous Infusions:   PRN Meds:.•  acetaminophen **OR** acetaminophen **OR** acetaminophen  •  aluminum-magnesium hydroxide-simethicone  •  magnesium sulfate **OR** magnesium sulfate in D5W 1g/100mL (PREMIX)  •  melatonin  •  ondansetron **OR** ondansetron  •  potassium chloride  •  potassium chloride  •  sodium chloride  •  sodium chloride    Objective     VITAL SIGNS  Vitals:    08/09/21 0707 08/09/21 0711 08/09/21 1200 08/09/21 1450   BP:  143/94 119/93 117/83   BP Location:   Left arm    Patient Position:   Lying    Pulse: 96  88 84   Resp: 24  18    Temp: 97.7 °F (36.5 °C)  97.6 °F (36.4 °C)    TempSrc: Oral  Oral    SpO2: 97%  97% 98%   Weight: 106 kg (233 lb 0.4 oz)      Height: 190.5 cm (75\")          Flowsheet Rows      First Filed Value   Admission Height  190.5 cm (75\") Documented at 08/09/2021 0707   Admission Weight  106 kg (233 lb 0.4 oz) Documented at 08/09/2021 0707          Body mass index is 29.13 kg/m².     TELEMETRY: SR 90s    Physical Exam:  Vitals reviewed.   Constitutional:       General: Not in acute distress.     Appearance: Normal appearance. Well-developed.   Eyes:      Pupils: Pupils are equal, round, and reactive to light.   HENT:      Head: Normocephalic and atraumatic.   Neck:      Vascular: JVD present.   Pulmonary:      Effort: Pulmonary effort is normal.      Breath sounds: Normal breath sounds.   Cardiovascular:      " Normal rate. Regular rhythm.   Pulses:     Intact distal pulses.   Edema:     Peripheral edema present.     Thigh: bilateral edema of the thigh.     Ankle: bilateral edema of the ankle.     Feet: bilateral edema of the feet.  Abdominal:      General: There is no distension.      Palpations: Abdomen is soft.      Tenderness: There is no abdominal tenderness.   Musculoskeletal: Normal range of motion.      Cervical back: Normal range of motion and neck supple. Skin:     General: Skin is warm and dry.   Neurological:      Mental Status: Alert and oriented to person, place, and time.          Results Review:   I reviewed the patient's new clinical results.    CBC    Results from last 7 days   Lab Units 08/09/21  0737   WBC 10*3/mm3 4.60   HEMOGLOBIN g/dL 14.7   PLATELETS 10*3/mm3 165     BMP   Results from last 7 days   Lab Units 08/09/21  0737   SODIUM mmol/L 139   POTASSIUM mmol/L 3.9   CHLORIDE mmol/L 108*   CO2 mmol/L 21.0*   BUN mg/dL 19   CREATININE mg/dL 1.29*   GLUCOSE mg/dL 102*     Cr Clearance Estimated Creatinine Clearance: 81.2 mL/min (A) (by C-G formula based on SCr of 1.29 mg/dL (H)).  Coag   Results from last 7 days   Lab Units 08/09/21  0737   INR  1.34*     HbA1C No results found for: HGBA1C  Blood Glucose No results found for: POCGLU  Infection     CMP   Results from last 7 days   Lab Units 08/09/21  0737   SODIUM mmol/L 139   POTASSIUM mmol/L 3.9   CHLORIDE mmol/L 108*   CO2 mmol/L 21.0*   BUN mg/dL 19   CREATININE mg/dL 1.29*   GLUCOSE mg/dL 102*   ALBUMIN g/dL 3.50   BILIRUBIN mg/dL 1.5*   ALK PHOS U/L 166*   AST (SGOT) U/L 21   ALT (SGPT) U/L 21     ABG      UA      EDD  No results found for: POCMETH, POCAMPHET, POCBARBITUR, POCBENZO, POCCOCAINE, POCOPIATES, POCOXYCODO, POCPHENCYC, POCPROPOXY, POCTHC, POCTRICYC  Lysis Labs   Results from last 7 days   Lab Units 08/09/21  0737   INR  1.34*   HEMOGLOBIN g/dL 14.7   PLATELETS 10*3/mm3 165   CREATININE mg/dL 1.29*     Radiology(recent) XR Chest 1  View    Result Date: 8/9/2021    1.  Stable cardiomegaly. 2.  Hazy bibasilar airspace disease improved from prior study. 3.  Small right pleural effusion.   Electronically Signed By-Luis Park MD On:8/9/2021 8:01 AM This report was finalized on [id number] by  Luis Park MD.    CT Chest Pulmonary Embolism    Result Date: 8/9/2021   1.  No evidence pulmonary embolus. 2.  Large right pleural effusion with compressive atelectasis of the right lower lobe and minimal right upper lobe atelectasis. 3.  Small amount of ascites  Electronically Signed By-Luis Park MD On:8/9/2021 9:23 AM This report was finalized on [id number] by  Luis Park MD.      Results from last 7 days   Lab Units 08/09/21  0737   TROPONIN T ng/mL <0.010       Imaging Results (Last 24 Hours)     Procedure Component Value Units Date/Time    CT Chest Pulmonary Embolism [[id number]] Collected: 08/09/21 0920     Updated: 08/09/21 0926    Narrative:      CT CHEST PULMONARY EMBOLISM-     Date of Exam: 8/9/2021 9:10 AM     Indication: PE suspected, low/intermediate prob, positive D-dimer.     Comparison Exams: 7/9/2021     Technique: Multiple axial images were obtained from the thoracic inlet  through the adrenal glands after the administration of 100cc of Isovue  370 IV contrast. The axial data was used to generate reformatted images  in the coronal and sagittal planes.   Automated exposure control and iterative reconstruction methods were  used.        FINDINGS:  Pulmonary arteries are well-opacified.  There is no evidence of  pulmonary embolus.  Thoracic aorta is of normal caliber.  There is a  large right pleural effusion.  No left pleural effusion.  There is  airspace consolidation within the right lower lobe, likely due to  compressive atelectasis.  There is minimal atelectasis within the right  upper lobe.  Bands of linear atelectasis are seen within the left lung  base.  Left lung is otherwise clear.  There is diffuse anasarca.   There  is reflux of contrast into the inferior vena cava and hepatic veins  suggesting elevated right heart pressures.  Small amount of ascites is  seen within the upper abdomen.  Bilateral adrenal glands appear within  normal limits.  There are no lytic or sclerotic bony lesions identified.   There is levoconvex scoliosis of the upper thoracic spine.       Impression:         1.  No evidence pulmonary embolus.  2.  Large right pleural effusion with compressive atelectasis of the  right lower lobe and minimal right upper lobe atelectasis.  3.  Small amount of ascites     Electronically Signed By-Luis Park MD On:8/9/2021 9:23 AM  This report was finalized on [id number] by  Luis Park MD.    XR Chest 1 View [[id number]] Collected: 08/09/21 0800     Updated: 08/09/21 0803    Narrative:      XR CHEST 1 VW-     Date of Exam: 8/9/2021 7:51 AM     Indication: CHF/COPD Protocol.     Comparison: 7/9/2021     Technique: A single view of the chest was obtained.     FINDINGS:      Left subclavian transvenous defibrillator is unchanged.   Cardiomegaly is stable.  Pulmonary vessels appear within normal limits.   There is hazy bibasilar airspace disease which is improved from prior  exam.  There is a small right pleural effusion, somewhat increased in  size from prior study.  No left pleural effusion.  Bony structures are  unremarkable.             Impression:            1.  Stable cardiomegaly.  2.  Hazy bibasilar airspace disease improved from prior study.  3.  Small right pleural effusion.        Electronically Signed By-Luis Park MD On:8/9/2021 8:01 AM  This report was finalized on [id number] by  Luis Park MD.          EKG          I personally viewed and interpreted the patient's EKG/Telemetry data:    ECHOCARDIOGRAM:  6/6/2021  Interpretation Summary    · Left atrial volume is severely increased.  · The left ventricular cavity is moderately dilated.  · Left ventricular wall thickness is consistent  with mild to moderate concentric hypertrophy.  · Mild dilation of the aortic root is present. Mild dilation of the sinuses of Valsalva is present.  · Moderate tricuspid valve regurgitation is present.  · Estimated right ventricular systolic pressure from tricuspid regurgitation is mildly elevated (35-45 mmHg).  · The right atrial cavity is severely dilated.  · The right ventricular cavity is moderately dilated.  · Moderately reduced right ventricular systolic function noted.  · Left ventricular diastolic function is consistent with (grade III w/high LAP) reversible restrictive pattern.  · Estimated left ventricular EF = 25% Estimated left ventricular EF was in disagreement with the calculated left ventricular EF. Left ventricular ejection fraction appears to be 21 - 25%. Left ventricular systolic function is moderately decreased.  · Abnormal mitral valve structure consistent with dilated annulus.  · Moderate mitral valve regurgitation is present with a centrally-directed jet noted.  · Mild to moderate pulmonary hypertension is present.         STRESS MYOVIEW:    CARDIAC CATHETERIZATION:    OTHER:         Assessment/Plan       Pleural effusion    Acute on chronic systolic congestive heart failure (CMS/HCC)    Essential hypertension    Mixed hyperlipidemia    Paroxysmal atrial fibrillation (CMS/HCC)    Chronic pain    NICM (nonischemic cardiomyopathy) (CMS/HCC)    CAD (coronary artery disease)    CKD (chronic kidney disease) stage 2, GFR 60-89 ml/min    Assessment:    HFrEF:  NYHA Class IV currently  NICM EF 20-25%  Diagnosed 3/5/2021  Biotronik single chamber ICD implanted 6/2021  Large right pleural effusion  HTN  PAF: currently in SR  Question of compliance with medical RX  Question of ICD discharged    Plan:    Continue aggressive diuresis  Add Coreg 6.25 mg BID  Continue Losartan  Consider changing to entresto  Will have biotronik rep interrogate ICD  Follow pleural effusion after aggressive diuresis      Ayo to follow patient tomorrow       I discussed the patients findings and my recommendations with patient and RN    Maggie Padilla, CORTEZ  08/09/21  15:43 EDT

## 2021-08-09 NOTE — H&P
"    [facility] Medicine Services      Patient Name: Thony Dumont  : 1960  MRN: [id number]  Primary Care Physician:  Rose Rao APRN  Date of admission: 2021      Subjective      Chief Complaint: lower extremity swelling, shortness of breath    History of Present Illness: Thony Dumont is a 61 y.o.  male with PMH of HTN, CAD s/p PCI, CHF w EF 20-25%, severe dilated/nonischemic cardiomyopathy s/p ICD 2021, and afib on xarelto who presented to [facility] ED on 2021 complaining of bilateral lower extremity swelling, chest pain, and shortness of breath for the last week but worse in the last 3 days. He stated he gets short of breath just talking. He cannot lay flat and has not been able to sleep for the last 3 nights because he has to sit up to breathe. He has anterior chest pain when walking up stairs. He thinks he has gained 27 lbs in the last week. He has been taking all his home medications and feels like his \"lasix all of a sudden stopped working.\"  He denied any cough or fever.     In the ED the patient had proBNP 2,221 (compared to 1168 2021), normal troponin. CT PE protocol showed no PE, large right pleural effusion with compressive atelectasis of the right lower lobe and minimal right upper lobe atelectasis, small amount of ascites.  Bilateral venous duplex was normal.  Creatinine 1.29 compared to 1.2021.  COVID-19 swab negative.  He was given 40 mg IV Lasix, 4 mg of IV morphine, 4 mg IV Zofran.  He was admitted for further evaluation of shortness of breath and large right pleural effusion       Review of Systems   Constitutional: Positive for weight gain.   HENT: Negative.    Eyes: Negative.    Cardiovascular: Positive for chest pain, dyspnea on exertion, leg swelling and orthopnea.   Respiratory: Positive for shortness of breath.    Endocrine: Negative.    Hematologic/Lymphatic: Negative.    Skin: Negative.  " "  Musculoskeletal: Negative.    Gastrointestinal: Negative.    Genitourinary: Negative.    Neurological: Negative.    Psychiatric/Behavioral: Negative.    Allergic/Immunologic: Negative.    All other systems reviewed and are negative.      Personal History     Past Medical History:   Diagnosis Date   • Afib (CMS/HCC)    • CHF (congestive heart failure) (CMS/HCC)    • Hypertension        Past Surgical History:   Procedure Laterality Date   • CARDIAC DEFIBRILLATOR PLACEMENT     • CARDIAC ELECTROPHYSIOLOGY PROCEDURE Left 6/15/2021    Procedure: Device Implant;  Surgeon: Michael Rollins MD;  Location: [facility] CATH INVASIVE LOCATION;  Service: Cardiovascular;  Laterality: Left;   • HERNIA REPAIR         Family History: family history includes Cancer in his mother. Otherwise pertinent FHx was reviewed and not pertinent to current issue.    Social History:  reports that he quit smoking about 2 months ago. His smoking use included cigarettes. He smoked 0.50 packs per day. He has never used smokeless tobacco. He reports current alcohol use of about 7.0 standard drinks of alcohol per week. He reports current drug use. Drugs: Marijuana and \"Crack\" cocaine.    Home Medications:  Prior to Admission Medications     Prescriptions Last Dose Informant Patient Reported? Taking?    aspirin (aspirin) 81 MG EC tablet  Self Yes No    Take 81 mg by mouth Daily.    furosemide (LASIX) 40 MG tablet   No No    Take 1 tablet by mouth 2 (Two) Times a Day for 30 days.    HYDROcodone-acetaminophen (NORCO) 7.5-325 MG per tablet   No No    Take 1 tablet by mouth Every 6 (Six) Hours As Needed for Severe Pain .    levoFLOXacin (Levaquin) 500 MG tablet   No No    Take 1 tablet by mouth Daily.    losartan (COZAAR) 25 MG tablet  Self Yes No    Take 25 mg by mouth Daily.    metoprolol succinate XL (Toprol XL) 25 MG 24 hr tablet   No No    Take 1 tablet by mouth Daily for 30 days.    rivaroxaban (Xarelto) 20 MG tablet   No No    Take 1 tablet by mouth " Daily.    spironolactone (ALDACTONE) 25 MG tablet   No No    Take 1 tablet by mouth Daily for 30 days.            Allergies:  Allergies   Allergen Reactions   • Aspirin Swelling   • Penicillins Swelling       Objective      Vitals:   Temp:  [97.7 °F (36.5 °C)] 97.7 °F (36.5 °C)  Heart Rate:  [96] 96  Resp:  [24] 24  BP: (143)/(94) 143/94    Physical Exam  Vitals and nursing note reviewed.   HENT:      Head: Normocephalic and atraumatic.   Eyes:      Extraocular Movements: Extraocular movements intact.      Pupils: Pupils are equal, round, and reactive to light.   Cardiovascular:      Rate and Rhythm: Normal rate and regular rhythm.      Pulses: Normal pulses.      Heart sounds: Normal heart sounds.   Pulmonary:      Effort: Pulmonary effort is normal.      Breath sounds: Examination of the right-lower field reveals decreased breath sounds. Examination of the left-lower field reveals decreased breath sounds. Decreased breath sounds present.   Abdominal:      General: Bowel sounds are normal.      Palpations: Abdomen is soft.      Tenderness: There is no abdominal tenderness.   Musculoskeletal:         General: Normal range of motion.      Cervical back: Normal range of motion.      Right lower leg: Edema present.      Left lower leg: Edema present.   Skin:     General: Skin is warm and dry.   Neurological:      Mental Status: He is alert and oriented to person, place, and time.   Psychiatric:         Mood and Affect: Mood normal.         Behavior: Behavior normal.       Result Review    Result Review:  I have personally reviewed the results from the time of this admission to 8/9/2021 11:05 EDT and agree with these findings:  [x]  Laboratory  []  Microbiology  [x]  Radiology  [x]  EKG/Telemetry   [x]  Cardiology/Vascular   []  Pathology  [x]  Old records  []  Other:      Assessment/Plan        Active Hospital Problems:  Active Hospital Problems    Diagnosis    • **Pleural effusion    • Acute on chronic systolic  congestive heart failure (CMS/HCC)    • CAD (coronary artery disease)    • CKD (chronic kidney disease) stage 2, GFR 60-89 ml/min    • NICM (nonischemic cardiomyopathy) (CMS/HCC)      Added automatically from request for surgery [id number]     • Essential hypertension    • Mixed hyperlipidemia    • Paroxysmal atrial fibrillation (CMS/HCC)    • Chronic pain      Plan:     Dyspnea multifactorial from Large R pleural effusion and Acute HFrEF/Dilated nonischemic cardiomyopathy  -s/p ICD 6/2021  -2D echo 6/8/2021: Mildly dilated LV with severe global LV dysfunction, EF 20 to 25% per visual estimation.  Doppler evidence of grade 2 diastolic dysfunction.   -CT PE protocol 8/9/21: no PE, large right pleural effusion with compressive atelectasis of the right lower lobe and minimal right upper lobe atelectasis, small amount of ascites.  -bilateral lower extremity duplex: normal  -reported 27lb weight gain in 1 week  -proBNP 2221  -given 40mg IV lasix in ED and will continue 40mg IV lasix q8 hours  -cont home ARB, not currently on beta-blocker   -Strict Is & Os  -consult cardiology for further recommendations. Pt may also benefit from thoracentesis     Chronic kidney disease  -creatinine 1.29 compared to 1.25 in June 2021  -monitor BMP especially while receiving IV diuresis     CAD s/p PCI  -cont home asa, xarelto      Atrial fibrillation  -Continue cardiac monitoring, metoprolol and Xarelto     Hypertension  -controlled, /94  -Continue losartan      Chronic pain  -not on any current home meds       DVT prophylaxis:  No DVT prophylaxis order currently exists.    CODE STATUS:    Level Of Support Discussed With: Patient  Code Status: CPR  Medical Interventions (Level of Support Prior to Arrest): Full    Admission Status:  I believe this patient meets observation status.    I discussed the patient's findings and my recommendations with patient.    This patient has been examined wearing appropriate Personal Protective Equipment  . 08/09/21      Signature: Electronically signed by CORTEZ Crews, 08/09/21, 11:07 AM EDT.

## 2021-08-10 LAB
ANION GAP SERPL CALCULATED.3IONS-SCNC: 7 MMOL/L (ref 5–15)
BASOPHILS # BLD AUTO: 0 10*3/MM3 (ref 0–0.2)
BASOPHILS NFR BLD AUTO: 0.9 % (ref 0–1.5)
BUN SERPL-MCNC: 17 MG/DL (ref 6–20)
BUN/CREAT SERPL: 11.2 (ref 7–25)
CALCIUM SPEC-SCNC: 8.8 MG/DL (ref 8.6–10.5)
CHLORIDE SERPL-SCNC: 102 MMOL/L (ref 98–107)
CO2 SERPL-SCNC: 30 MMOL/L (ref 22–29)
CREAT SERPL-MCNC: 1.52 MG/DL (ref 0.76–1.27)
DEPRECATED RDW RBC AUTO: 54.7 FL (ref 37–54)
EOSINOPHIL # BLD AUTO: 0.2 10*3/MM3 (ref 0–0.4)
EOSINOPHIL NFR BLD AUTO: 3.8 % (ref 0.3–6.2)
ERYTHROCYTE [DISTWIDTH] IN BLOOD BY AUTOMATED COUNT: 17 % (ref 12.3–15.4)
GFR SERPL CREATININE-BSD FRML MDRD: 57 ML/MIN/1.73
GLUCOSE SERPL-MCNC: 60 MG/DL (ref 65–99)
HCT VFR BLD AUTO: 44.8 % (ref 37.5–51)
HGB BLD-MCNC: 15.1 G/DL (ref 13–17.7)
LYMPHOCYTES # BLD AUTO: 1 10*3/MM3 (ref 0.7–3.1)
LYMPHOCYTES NFR BLD AUTO: 22.5 % (ref 19.6–45.3)
MAGNESIUM SERPL-MCNC: 1.9 MG/DL (ref 1.6–2.6)
MCH RBC QN AUTO: 31.4 PG (ref 26.6–33)
MCHC RBC AUTO-ENTMCNC: 33.8 G/DL (ref 31.5–35.7)
MCV RBC AUTO: 93.1 FL (ref 79–97)
MONOCYTES # BLD AUTO: 0.6 10*3/MM3 (ref 0.1–0.9)
MONOCYTES NFR BLD AUTO: 14.1 % (ref 5–12)
NEUTROPHILS NFR BLD AUTO: 2.5 10*3/MM3 (ref 1.7–7)
NEUTROPHILS NFR BLD AUTO: 58.7 % (ref 42.7–76)
NRBC BLD AUTO-RTO: 0.2 /100 WBC (ref 0–0.2)
PLATELET # BLD AUTO: 160 10*3/MM3 (ref 140–450)
PMV BLD AUTO: 8.9 FL (ref 6–12)
POTASSIUM SERPL-SCNC: 3.7 MMOL/L (ref 3.5–5.2)
QT INTERVAL: 359 MS
RBC # BLD AUTO: 4.81 10*6/MM3 (ref 4.14–5.8)
SODIUM SERPL-SCNC: 139 MMOL/L (ref 136–145)
TSH SERPL DL<=0.05 MIU/L-ACNC: 1.27 UIU/ML (ref 0.27–4.2)
WBC # BLD AUTO: 4.3 10*3/MM3 (ref 3.4–10.8)

## 2021-08-10 PROCEDURE — 80048 BASIC METABOLIC PNL TOTAL CA: CPT | Performed by: NURSE PRACTITIONER

## 2021-08-10 PROCEDURE — 99232 SBSQ HOSP IP/OBS MODERATE 35: CPT | Performed by: INTERNAL MEDICINE

## 2021-08-10 PROCEDURE — 84443 ASSAY THYROID STIM HORMONE: CPT | Performed by: INTERNAL MEDICINE

## 2021-08-10 PROCEDURE — 83735 ASSAY OF MAGNESIUM: CPT | Performed by: NURSE PRACTITIONER

## 2021-08-10 PROCEDURE — 36415 COLL VENOUS BLD VENIPUNCTURE: CPT | Performed by: NURSE PRACTITIONER

## 2021-08-10 PROCEDURE — G0378 HOSPITAL OBSERVATION PER HR: HCPCS

## 2021-08-10 PROCEDURE — 85025 COMPLETE CBC W/AUTO DIFF WBC: CPT | Performed by: NURSE PRACTITIONER

## 2021-08-10 PROCEDURE — 25010000002 FUROSEMIDE PER 20 MG: Performed by: INTERNAL MEDICINE

## 2021-08-10 RX ADMIN — ASPIRIN 81 MG: 81 TABLET, COATED ORAL at 09:05

## 2021-08-10 RX ADMIN — ACETAMINOPHEN 650 MG: 325 TABLET, FILM COATED ORAL at 01:55

## 2021-08-10 RX ADMIN — ACETAMINOPHEN 650 MG: 325 TABLET, FILM COATED ORAL at 23:06

## 2021-08-10 RX ADMIN — FUROSEMIDE 40 MG: 10 INJECTION, SOLUTION INTRAMUSCULAR; INTRAVENOUS at 09:05

## 2021-08-10 RX ADMIN — FUROSEMIDE 40 MG: 10 INJECTION, SOLUTION INTRAMUSCULAR; INTRAVENOUS at 17:47

## 2021-08-10 RX ADMIN — CARVEDILOL 6.25 MG: 6.25 TABLET, FILM COATED ORAL at 17:47

## 2021-08-10 RX ADMIN — Medication 10 ML: at 09:05

## 2021-08-10 RX ADMIN — FUROSEMIDE 40 MG: 10 INJECTION, SOLUTION INTRAMUSCULAR; INTRAVENOUS at 01:55

## 2021-08-10 RX ADMIN — LOSARTAN POTASSIUM 25 MG: 25 TABLET, FILM COATED ORAL at 09:04

## 2021-08-10 RX ADMIN — Medication 10 ML: at 21:13

## 2021-08-10 RX ADMIN — RIVAROXABAN 20 MG: 20 TABLET, FILM COATED ORAL at 17:47

## 2021-08-10 RX ADMIN — CARVEDILOL 6.25 MG: 6.25 TABLET, FILM COATED ORAL at 09:05

## 2021-08-10 NOTE — PLAN OF CARE
Goal Outcome Evaluation:  Plan of Care Reviewed With: patient        Progress: no change  Outcome Summary: pt c/o pain, gave tylenol, rested off and on during the night

## 2021-08-10 NOTE — PROGRESS NOTES
"Heart Failure Program  Nurse Navigator  Discharge Planning    Patient Name:Thony Dumont  :1960  Cardiologist: Ángel   Current Admission Date: 2021   Previous Admission:  6/15/2021  Admission frequency: 2  admissions in 6 months    Heart Failure history per record:    Symptoms on admission: SOA, GOMEZ, LE edema       Admission Weight:  Flowsheet Rows      First Filed Value   Admission Height  190.5 cm (75\") Documented at 2021 0707   Admission Weight  106 kg (233 lb 0.4 oz) Documented at 2021 0707            Current Home Medications:  Prior to Admission medications    Medication Sig Start Date End Date Taking? Authorizing Provider   aspirin (aspirin) 81 MG EC tablet Take 81 mg by mouth Daily.   Yes ProviderShane MD   furosemide (LASIX) 40 MG tablet Take 1 tablet by mouth 2 (Two) Times a Day for 30 days. 21 Yes Nabil Landin MD   ibuprofen (ADVIL,MOTRIN) 200 MG tablet Take 200 mg by mouth Every 6 (Six) Hours As Needed for Mild Pain . Pt takes 4-5 tablets in one dose prn   Yes ProviderShane MD   losartan (COZAAR) 25 MG tablet Take 25 mg by mouth Daily. 21  Yes Shane Garcia MD   rivaroxaban (Xarelto) 20 MG tablet Take 1 tablet by mouth Daily. 21  Yes Michael Rollins MD   HYDROcodone-acetaminophen (NORCO) 7.5-325 MG per tablet Take 1 tablet by mouth Every 6 (Six) Hours As Needed for Severe Pain . 21   Nando Vanessa MD       Social history:   Patient is currently living in his van as his SO kicked him out, he reports that he struggles with eating healthy and taking his medications when living in his van, I spent lots of time with him talking to him about his current home life and the stress he is dealing with related to his SO,     Smoking status: Former     Diagnostics Testing:  proBNP level:  2221    Echocardiogram:Results for orders placed during the hospital encounter of 21    Adult Transthoracic Echo Complete W/ Cont if Necessary Per " Protocol    Interpretation Summary  · Left atrial volume is severely increased.  · The left ventricular cavity is moderately dilated.  · Left ventricular wall thickness is consistent with mild to moderate concentric hypertrophy.  · Mild dilation of the aortic root is present. Mild dilation of the sinuses of Valsalva is present.  · Moderate tricuspid valve regurgitation is present.  · Estimated right ventricular systolic pressure from tricuspid regurgitation is mildly elevated (35-45 mmHg).  · The right atrial cavity is severely dilated.  · The right ventricular cavity is moderately dilated.  · Moderately reduced right ventricular systolic function noted.  · Left ventricular diastolic function is consistent with (grade III w/high LAP) reversible restrictive pattern.  · Estimated left ventricular EF = 25% Estimated left ventricular EF was in disagreement with the calculated left ventricular EF. Left ventricular ejection fraction appears to be 21 - 25%. Left ventricular systolic function is moderately decreased.  · Abnormal mitral valve structure consistent with dilated annulus.  · Moderate mitral valve regurgitation is present with a centrally-directed jet noted.  · Mild to moderate pulmonary hypertension is present.        Patient Assessment:   Patient was very upset when I went in the room having just spoke with his SO, it was hard to get him to focus on HF education due to his current situation with SO, NO distress, LE edema 2 +     Current O2: RA  Home O2: RA     Education provided to patient:  yes- Heart Failure disease education  yes -Symptom identification/management  yes -Daily Weights  yes- Diet education  yes- Fluid restriction (if ordered)  yes- Activity education  yes- Medication education  yes- Smoking cessation  yes- Follow-up Appointments  yes-Provided information on how to access [facility] My HF Guide/Heart Failure Interactive workbook    Acceptance of learning: Patient was difficult to focus     Heart  Failure education interactive teaching session time: 75  minutes    Identified needs/barriers:   Fluid intake   Living issues       Intervention:   Asked SW to see patient

## 2021-08-10 NOTE — PROGRESS NOTES
"CC--- decompensated acute on chronic class III to class IV CHF      Sub--- continues to have significant leg edema and denies any chest pain and complains of significant fatigue        Past Medical History:   Diagnosis Date   • Afib (CMS/HCC)    • CHF (congestive heart failure) (CMS/HCC)    • Hypertension      Past Surgical History:   Procedure Laterality Date   • CARDIAC DEFIBRILLATOR PLACEMENT     • CARDIAC ELECTROPHYSIOLOGY PROCEDURE Left 6/15/2021    Procedure: Device Implant;  Surgeon: Michael Rollins MD;  Location: [facility] INVASIVE LOCATION;  Service: Cardiovascular;  Laterality: Left;   • HERNIA REPAIR       Family History   Problem Relation Age of Onset   • Cancer Mother      Social History     Tobacco Use   • Smoking status: Former Smoker     Packs/day: 0.50     Types: Cigarettes     Quit date: 2021     Years since quittin.1   • Smokeless tobacco: Never Used   Substance Use Topics   • Alcohol use: Yes     Alcohol/week: 7.0 standard drinks     Types: 7 Cans of beer per week     Comment: 1 beer a day   • Drug use: Yes     Types: Marijuana, \"Crack\" cocaine     Comment: 6/15/21 report cocaine in 2018, marijuana 2021 @0050     Allergies:  Aspirin and Penicillins    Review of Systems   General:  positive for fatigue and tiredness  Eyes: No redness  Cardiovascular: No chest pain, no palpitations  Respiratory:   positive for class 3 shortness of breath  Gastrointestinal: No nausea or vomiting, bleeding  Genitourinary: no hematuria or dysuria  Musculoskeletal: No arthralgia or myalgia  Skin: No rash  Neurologic: No numbness, tingling, syncope  Hematologic/Lymphatic: No abnormal bleeding      Physical Exam  VITALS REVIEWED  128/74 pulse rate is 70 patient is afebrile respiration 12 times a minute  General:      well developed, well nourished, in no acute distress.    Head:      normocephalic and atraumatic.    Eyes:      PERRL/EOM intact, conjunctiva and sclera clear with out nystagmus.  "   Neck:      no masses, thyromegaly,  trachea central with normal respiratory effort and PMI displaced laterally  Lungs:     Reduced breath sounds on right base  Heart:       Sinus rhythm without any rub  Msk:      no deformity or scoliosis noted of thoracic or lumbar spine.    Pulses:      pulses normal in all 4 extremities.    Extremities:       no cyanosis or clubbing--severe bilateral 2+ edema noted  Neurologic:      no focal deficits.   alert oriented x3  Skin:      intact without lesions or rashes.    Psych:      alert and cooperative; normal mood and affect; normal attention span and concentration.          CBC    Results from last 7 days   Lab Units 08/10/21  0519 08/09/21  0737   WBC 10*3/mm3 4.30 4.60   HEMOGLOBIN g/dL 15.1 14.7   PLATELETS 10*3/mm3 160 165     BMP   Results from last 7 days   Lab Units 08/10/21  0519 08/09/21  0737   SODIUM mmol/L 139 139   POTASSIUM mmol/L 3.7 3.9   CHLORIDE mmol/L 102 108*   CO2 mmol/L 30.0* 21.0*   BUN mg/dL 17 19   CREATININE mg/dL 1.52* 1.29*   GLUCOSE mg/dL 60* 102*   MAGNESIUM mg/dL 1.9  --      CMP   Results from last 7 days   Lab Units 08/10/21  0519 08/09/21  0737   SODIUM mmol/L 139 139   POTASSIUM mmol/L 3.7 3.9   CHLORIDE mmol/L 102 108*   CO2 mmol/L 30.0* 21.0*   BUN mg/dL 17 19   CREATININE mg/dL 1.52* 1.29*   GLUCOSE mg/dL 60* 102*   ALBUMIN g/dL  --  3.50   BILIRUBIN mg/dL  --  1.5*   ALK PHOS U/L  --  166*   AST (SGOT) U/L  --  21   ALT (SGPT) U/L  --  21     Radiology(recent) XR Chest 1 View    Result Date: 8/9/2021    1.  Stable cardiomegaly. 2.  Hazy bibasilar airspace disease improved from prior study. 3.  Small right pleural effusion.   Electronically Signed By-Luis Park MD On:8/9/2021 8:01 AM This report was finalized on [id number] by  Luis Park MD.    CT Chest Pulmonary Embolism    Result Date: 8/9/2021   1.  No evidence pulmonary embolus. 2.  Large right pleural effusion with compressive atelectasis of the right lower lobe and  minimal right upper lobe atelectasis. 3.  Small amount of ascites  Electronically Signed By-Luis Park MD On:8/9/2021 9:23 AM This report was finalized on [id number] by  Luis Park MD.        Assessment and plan      Nonischemic dilated cardiomyopathy  Decompensated acute on chronic class III to class IV systolic heart failure  ICD in situ  Paroxysmal atrial fibrillation currently in sinus rhythm      Continue Coreg, losartan and aggressive diuresis  Continue monitoring renal panel    Electronically signed by Michael Rollins MD, 08/10/21, 7:26 PM EDT.

## 2021-08-10 NOTE — PLAN OF CARE
Goal Outcome Evaluation:              Outcome Summary: Patient cooperative. Very agitated with fiance. Currently resting.

## 2021-08-10 NOTE — PROGRESS NOTES
[facility] Medicine Services Daily Progress Note    Patient Name: Thony Dumont  : 1960  MRN: [id number]  Primary Care Physician:  Rose Rao, CORTEZ  Date of admission: 2021      Subjective      Chief Complaint: f/u GOMEZ, orthopnea and Le edema       Patient Reports   8/10- Complaining of continued lower extremity edema and foot pain.       Review of Systems   Cardiovascular: Positive for dyspnea on exertion, leg swelling and orthopnea.   Respiratory: Negative for cough and wheezing.    Gastrointestinal: Negative for abdominal pain, nausea and vomiting.         Objective      Vitals:   Temp:  [97.6 °F (36.4 °C)-98.6 °F (37 °C)] 98.6 °F (37 °C)  Heart Rate:  [80-91] 91  Resp:  [17-18] 17  BP: (117-126)/(81-87) 124/85    Physical Exam  Vitals reviewed.   Constitutional:       General: He is not in acute distress.     Appearance: He is overweight.   HENT:      Head: Normocephalic and atraumatic.      Mouth/Throat:      Mouth: Mucous membranes are moist.   Cardiovascular:      Rate and Rhythm: Normal rate.   Pulmonary:      Breath sounds: Rales present.   Abdominal:      General: Bowel sounds are normal. There is no distension.      Palpations: Abdomen is soft.      Tenderness: There is no abdominal tenderness.   Musculoskeletal:      Right lower leg: Edema present.      Left lower leg: Edema present.   Skin:     General: Skin is warm and dry.      Findings: No rash.   Neurological:      Mental Status: He is alert. Mental status is at baseline.      Cranial Nerves: No cranial nerve deficit.   Psychiatric:         Mood and Affect: Mood normal.         Behavior: Behavior normal.            Result Review    Result Review:  I have personally reviewed the results from the time of this admission to 8/10/2021 13:49 EDT and agree with these findings:  [x]  Laboratory  []  Microbiology  [x]  Radiology  []  EKG/Telemetry   [x]  Cardiology/Vascular   []  Pathology  []  Old records  []   "Other:  Most notable findings include:     aspirin, 81 mg, Oral, Daily  carvedilol, 6.25 mg, Oral, BID With Meals  furosemide, 40 mg, Intravenous, Q8H  losartan, 25 mg, Oral, Daily  rivaroxaban, 20 mg, Oral, Daily  sodium chloride, 10 mL, Intravenous, Q12H            Assessment/Plan      Brief Patient Summary:  Thony Dumont is a 61 y.o.  male who with a past medical history of HTN, CAD s/p PCI, CHF w EF 20-25%, severe dilated/nonischemic cardiomyopathy s/p ICD 6/2021, and afib on xarelto who presented to [facility] ED on 8/9/2021 complaining of bilateral lower extremity swelling, chest pain, and shortness of breath for the last week but worse in the last 3 days. He stated he gets short of breath just talking. He cannot lay flat and has not been able to sleep for the last 3 nights because he has to sit up to breathe. He has anterior chest pain when walking up stairs. He thinks he has gained 27 lbs in the last week. He has been taking all his home medications and feels like his \"lasix all of a sudden stopped working.\"  He denied any cough or fever.      In the ED the patient had proBNP 2,221 (compared to 1168 July 2021), normal troponin. CT PE protocol showed no PE, large right pleural effusion with compressive atelectasis of the right lower lobe and minimal right upper lobe atelectasis, small amount of ascites.  Bilateral venous duplex was normal.  Creatinine 1.29 compared to 1.25 July 2021.  COVID-19 swab negative.  He was given 40 mg IV Lasix, 4 mg of IV morphine, 4 mg IV Zofran.  He was admitted for further evaluation of shortness of breath and large right pleural effusion         Active Hospital Problems:  Active Hospital Problems    Diagnosis    • **Pleural effusion    • CAD (coronary artery disease)    • CKD (chronic kidney disease) stage 2, GFR 60-89 ml/min    • NICM (nonischemic cardiomyopathy) (CMS/HCC)      Added automatically from request for surgery [id number]     • Essential " hypertension    • Mixed hyperlipidemia    • Paroxysmal atrial fibrillation (CMS/HCC)    • Chronic pain    • Acute on chronic systolic congestive heart failure (CMS/HCC)      Plan:     Dyspnea multifactorial d/t Large R pleural effusion and Acute on Chronic HFrEF/Dilated nonischemic cardiomyopathy  - s/p ICD 6/2021  - 2D echo 6/8/2021: Mildly dilated LV with severe global LV dysfunction, EF 20 to 25% per visual estimation.  Doppler evidence of grade 2 diastolic dysfunction.   - CT PE protocol 8/9/21: no PE, large right pleural effusion with compressive atelectasis of the right lower lobe and minimal right upper lobe atelectasis, small amount of ascites.  -bilateral lower extremity duplex: normal  - reported 27lb weight gain in 1 week  - continue IV lasix 40 mg q8h   - Strict Is & Os  - daily weights  - cardiology following  - AICD interrogated with no issues     Chronic kidney disease II   - Base line Cr 1.25  - monitor with routine labs especially while receiving IV diuretics      CAD s/p PCI  - cont home asa, xarelto, bbl and acei  - not on statin      Paroxysmal Atrial fibrillation  -Continue cardiac monitoring, metoprolol and Xarelto     Hypertension  - Continue losartan and Coreg  - monitor with routine vital signs and make adjustments as needed      Chronic pain  - INSPECT reviewed and no current prescriptions for narcotic pain medications  - continue tylenol prn      DVT Prophylaxis  - continue Xarelto       DVT prophylaxis:  Medical DVT prophylaxis orders are present.    CODE STATUS:    Level Of Support Discussed With: Patient  Code Status: CPR  Medical Interventions (Level of Support Prior to Arrest): Full      Disposition:  I expect patient to be discharged in 1-2 days pending progress.    This patient has been examined wearing appropriate Personal Protective Equipment. 08/10/21      Electronically signed by Del Roberts DO, 08/10/21, 13:49 EDT.  [facility] Hospitalist Team

## 2021-08-10 NOTE — CASE MANAGEMENT/SOCIAL WORK
Social Work Assessment  [facility]     Patient Name: Thony Dumont  MRN: [id number]  Today's Date: 8/10/2021    Admit Date: 8/9/2021    Discharge Plan     Row Name 08/10/21 1446       Plan    Plan  Return to van at d/c vs. [facility].  screen completed 8/10 due to housing. Will transport self at d/c.    Plan Comments  Met with patient at bedside due to consult for living in his vehicle and limited income. Patient reported to  that he was living with his significant other and they are no longer together after 6 1/2 years. Patient reports that he has been living in his van over the last week due to this. SW discussed considering staying at above shelter at d/c. Patient to think about this, reporting he's not sure he is interested if unable to have his own room, but willing to think about it. Agreeable to M2B through [facility]. Able to report that he has a SSD application pending with an upcoming court date 8/27th regarding it. Patient confirmed he drove himself to [facility] and will drive himself at d/c.     Met with patient in room wearing PPE: mask, face shield/goggles, gloves, gown.      Maintained distance greater than six feet and spent less than 15 minutes in the room.    CORNELL Encarnacion    Phone: [phone]  Cell: [phone]  Fax: [phone]  [email or web address]

## 2021-08-10 NOTE — CASE MANAGEMENT/SOCIAL WORK
Discharge Planning Assessment  [facility]     Patient Name: Thony Dumont  MRN: [id number]  Today's Date: 8/10/2021    Admit Date: 8/9/2021    Discharge Needs Assessment     Row Name 08/10/21 1319       Living Environment    Lives With  alone    Current Living Arrangements  other (see comments) statesw he is currently lving in his van    Potentially Unsafe Housing Conditions  no electricity;no indoor plumbing;other (see comments) living in his van    Primary Care Provided by  self    Provides Primary Care For  no one    Family Caregiver if Needed  none    Able to Return to Prior Arrangements  yes       Resource/Environmental Concerns    Resource/Environmental Concerns  none    Transportation Concerns  car, none       Transition Planning    Patient/Family Anticipates Transition to  home    Patient/Family Anticipated Services at Transition  none    Transportation Anticipated  car, drives self       Discharge Needs Assessment    Readmission Within the Last 30 Days  no previous admission in last 30 days    Equipment Currently Used at Home  none    Concerns to be Addressed  discharge planning;homelessness    Anticipated Changes Related to Illness  none    Equipment Needed After Discharge  none    Provided Post Acute Provider List?  N/A    Provided Post Acute Provider Quality & Resource List?  N/A    Patient's Choice of Community Agency(s)  MSW to consult on patient for hoemlessness and living situation        Discharge Plan     Row Name 08/10/21 1321       Plan    Plan  Patient denies dc needs. MSW to see him for being unhoused and living in care    Plan Comments  see assessment notes              Zeenat Philippe RN   Met with patient in room wearing PPE: mask, face shield/goggles, gloves, gown.      Maintained distance greater than six feet and spent less than 15 minutes in the room.      .

## 2021-08-11 LAB
ANION GAP SERPL CALCULATED.3IONS-SCNC: 10 MMOL/L (ref 5–15)
BUN SERPL-MCNC: 19 MG/DL (ref 6–20)
BUN/CREAT SERPL: 13.8 (ref 7–25)
CALCIUM SPEC-SCNC: 8.8 MG/DL (ref 8.6–10.5)
CHLORIDE SERPL-SCNC: 101 MMOL/L (ref 98–107)
CO2 SERPL-SCNC: 28 MMOL/L (ref 22–29)
CREAT SERPL-MCNC: 1.38 MG/DL (ref 0.76–1.27)
GFR SERPL CREATININE-BSD FRML MDRD: 64 ML/MIN/1.73
GLUCOSE SERPL-MCNC: 81 MG/DL (ref 65–99)
POTASSIUM SERPL-SCNC: 3.6 MMOL/L (ref 3.5–5.2)
SODIUM SERPL-SCNC: 139 MMOL/L (ref 136–145)
WHOLE BLOOD HOLD SPECIMEN: NORMAL

## 2021-08-11 PROCEDURE — 80048 BASIC METABOLIC PNL TOTAL CA: CPT | Performed by: INTERNAL MEDICINE

## 2021-08-11 PROCEDURE — 25010000002 FUROSEMIDE PER 20 MG: Performed by: INTERNAL MEDICINE

## 2021-08-11 PROCEDURE — 99232 SBSQ HOSP IP/OBS MODERATE 35: CPT | Performed by: INTERNAL MEDICINE

## 2021-08-11 RX ADMIN — CARVEDILOL 6.25 MG: 6.25 TABLET, FILM COATED ORAL at 07:37

## 2021-08-11 RX ADMIN — RIVAROXABAN 20 MG: 20 TABLET, FILM COATED ORAL at 17:57

## 2021-08-11 RX ADMIN — FUROSEMIDE 40 MG: 10 INJECTION, SOLUTION INTRAMUSCULAR; INTRAVENOUS at 01:44

## 2021-08-11 RX ADMIN — FUROSEMIDE 40 MG: 10 INJECTION, SOLUTION INTRAMUSCULAR; INTRAVENOUS at 09:30

## 2021-08-11 RX ADMIN — Medication 10 ML: at 21:34

## 2021-08-11 RX ADMIN — Medication 10 ML: at 07:52

## 2021-08-11 RX ADMIN — ASPIRIN 81 MG: 81 TABLET, COATED ORAL at 07:51

## 2021-08-11 RX ADMIN — CARVEDILOL 6.25 MG: 6.25 TABLET, FILM COATED ORAL at 17:57

## 2021-08-11 RX ADMIN — LOSARTAN POTASSIUM 25 MG: 25 TABLET, FILM COATED ORAL at 07:52

## 2021-08-11 NOTE — PROGRESS NOTES
[facility] Medicine Services Daily Progress Note    Patient Name: Thony Dumont  : 1960  MRN: [id number]  Primary Care Physician:  Rose Rao APRN  Date of admission: 2021      Subjective      Chief Complaint: f/u GOMEZ, orthopnea and Le edema       Patient Reports   8/10- Complaining of continued lower extremity edema and foot pain.     - Patient reports his lower extremity swelling is improving, still having some discomfort in his feet. Nursing reports no acute overnight events.         Review of Systems   Constitutional: Negative for chills and fever.   Cardiovascular: Positive for dyspnea on exertion, leg swelling and orthopnea.   Respiratory: Negative for cough and wheezing.    Musculoskeletal: Positive for myalgias.   Gastrointestinal: Negative for abdominal pain, nausea and vomiting.         Objective      Vitals:   Temp:  [97.5 °F (36.4 °C)-98.8 °F (37.1 °C)] 97.5 °F (36.4 °C)  Heart Rate:  [79-89] 85  Resp:  [18-20] 18  BP: (128-145)/(84-92) 128/92    Physical Exam  Vitals reviewed.   Constitutional:       General: He is not in acute distress.     Appearance: He is overweight.   HENT:      Head: Normocephalic and atraumatic.      Mouth/Throat:      Mouth: Mucous membranes are moist.   Cardiovascular:      Rate and Rhythm: Normal rate.   Pulmonary:      Breath sounds: Rales present.   Abdominal:      General: Bowel sounds are normal. There is no distension.      Palpations: Abdomen is soft.      Tenderness: There is no abdominal tenderness.   Musculoskeletal:      Right lower leg: Edema present.      Left lower leg: Edema present.      Comments: Improving b/l lower extremity edema    Skin:     General: Skin is warm and dry.      Findings: No rash.   Neurological:      Mental Status: He is alert. Mental status is at baseline.      Cranial Nerves: No cranial nerve deficit.   Psychiatric:         Mood and Affect: Mood normal.         Behavior: Behavior normal.  "           Result Review    Result Review:  I have personally reviewed the results from the time of this admission to 8/11/2021 14:44 EDT and agree with these findings:  [x]  Laboratory  []  Microbiology  []  Radiology  []  EKG/Telemetry   []  Cardiology/Vascular   []  Pathology  []  Old records  []  Other:  Most notable findings include:     aspirin, 81 mg, Oral, Daily  carvedilol, 6.25 mg, Oral, BID With Meals  furosemide, 40 mg, Intravenous, Q8H  losartan, 25 mg, Oral, Daily  rivaroxaban, 20 mg, Oral, Daily  sodium chloride, 10 mL, Intravenous, Q12H            Assessment/Plan      Brief Patient Summary:  Thony Dumont is a 61 y.o.  male who with a past medical history of HTN, CAD s/p PCI, CHF w EF 20-25%, severe dilated/nonischemic cardiomyopathy s/p ICD 6/2021, and afib on xarelto who presented to [facility] ED on 8/9/2021 complaining of bilateral lower extremity swelling, chest pain, and shortness of breath for the last week but worse in the last 3 days. He stated he gets short of breath just talking. He cannot lay flat and has not been able to sleep for the last 3 nights because he has to sit up to breathe. He has anterior chest pain when walking up stairs. He thinks he has gained 27 lbs in the last week. He has been taking all his home medications and feels like his \"lasix all of a sudden stopped working.\"  He denied any cough or fever.      In the ED the patient had proBNP 2,221 (compared to 1168 July 2021), normal troponin. CT PE protocol showed no PE, large right pleural effusion with compressive atelectasis of the right lower lobe and minimal right upper lobe atelectasis, small amount of ascites.  Bilateral venous duplex was normal.  Creatinine 1.29 compared to 1.25 July 2021.  COVID-19 swab negative.  He was given 40 mg IV Lasix, 4 mg of IV morphine, 4 mg IV Zofran.  He was admitted for further evaluation of shortness of breath and large right pleural effusion         Active " Hospital Problems:  Active Hospital Problems    Diagnosis    • **Pleural effusion    • CAD (coronary artery disease)    • CKD (chronic kidney disease) stage 2, GFR 60-89 ml/min    • NICM (nonischemic cardiomyopathy) (CMS/HCC)      Added automatically from request for surgery [id number]     • Essential hypertension    • Mixed hyperlipidemia    • Paroxysmal atrial fibrillation (CMS/HCC)    • Chronic pain    • Acute on chronic systolic congestive heart failure (CMS/HCC)      Plan:     Dyspnea multifactorial d/t Large R pleural effusion and Acute on Chronic HFrEF/Dilated nonischemic cardiomyopathy  - s/p ICD 6/2021  - 2D echo 6/8/2021: Mildly dilated LV with severe global LV dysfunction, EF 20 to 25% per visual estimation.  Doppler evidence of grade 2 diastolic dysfunction.   - CT PE protocol 8/9/21: no PE, large right pleural effusion with compressive atelectasis of the right lower lobe and minimal right upper lobe atelectasis, small amount of ascites.  - bilateral lower extremity duplex: normal  - reported 27lb weight gain in 1 week  - continue IV lasix 40 mg q8h   - Strict I & Os  - daily weights  - cardiology following  - AICD interrogated with no issues     Chronic kidney disease II   - Base line Cr 1.25  - monitor with routine labs especially while receiving IV diuretics      CAD s/p PCI  - cont home asa, xarelto, bbl and acei  - not on statin      Paroxysmal Atrial fibrillation  -Continue cardiac monitoring, metoprolol and Xarelto     Hypertension  - Continue losartan and Coreg  - monitor with routine vital signs and make adjustments as needed      Chronic pain  - INSPECT reviewed and no current prescriptions for narcotic pain medications  - continue tylenol prn      DVT Prophylaxis  - continue Xarelto       DVT prophylaxis:  Medical DVT prophylaxis orders are present.    CODE STATUS:    Level Of Support Discussed With: Patient  Code Status: CPR  Medical Interventions (Level of Support Prior to Arrest):  Full      Disposition:  I expect patient to be discharged in 1-2 days pending progress.      This patient has been examined wearing appropriate Personal Protective Equipment. 08/11/21      Electronically signed by Del Roberts DO, 08/11/21, 14:44 EDT.  [facility] Hospitalist Team

## 2021-08-11 NOTE — PLAN OF CARE
Goal Outcome Evaluation:              Outcome Summary: Patient has no complaints this shift. D/C is pending due to the patient being homeless living in his van vs a mission when he leaves the hospital. Will continue to monitor.

## 2021-08-11 NOTE — PROGRESS NOTES
Heart Failure Program  Nurse Navigator  Discharge Planning: Follow-up Note    Patient Name:Thony Dumont  :1960  Current Admission Date: 2021       Last 3 Weights:  Wt Readings from Last 3 Encounters:   21 96.2 kg (212 lb)   21 101 kg (222 lb 3.6 oz)   21 93 kg (205 lb)       Intake and Output totals: I/O last 3 completed shifts:  In: 1560 [P.O.:1560]  Out: -   I/O this shift:  In: 480 [P.O.:480]  Out: -           Patient Assessment:    Patient was sitting at the bedside, his feet a slightly less edematous       Patient Education:   Reviewed with patient, he reports he read over the information and was glad to now about the s/s to watch for     Review HF Education provided to patient:  yes-Symptoms worsening  yes-Prescribed medications  yes-HF self-care  yes-Follow-up Appointments       Acceptance of learning: Patient accepting     Heart Failure education interactive teaching session time: 20 minutes      Identified needs/barriers:   living situation     Intervention follow-up:

## 2021-08-11 NOTE — PLAN OF CARE
Goal Outcome Evaluation:  Plan of Care Reviewed With: patient        Progress: no change  Outcome Summary: pt c/o pain, gave prn med, pt rested most of the night

## 2021-08-12 VITALS
RESPIRATION RATE: 16 BRPM | WEIGHT: 213.8 LBS | BODY MASS INDEX: 26.58 KG/M2 | DIASTOLIC BLOOD PRESSURE: 84 MMHG | SYSTOLIC BLOOD PRESSURE: 128 MMHG | TEMPERATURE: 97.7 F | HEART RATE: 83 BPM | OXYGEN SATURATION: 97 % | HEIGHT: 75 IN

## 2021-08-12 LAB
ANION GAP SERPL CALCULATED.3IONS-SCNC: 8 MMOL/L (ref 5–15)
BUN SERPL-MCNC: 19 MG/DL (ref 6–20)
BUN/CREAT SERPL: 13.4 (ref 7–25)
CALCIUM SPEC-SCNC: 8.6 MG/DL (ref 8.6–10.5)
CHLORIDE SERPL-SCNC: 101 MMOL/L (ref 98–107)
CHOLEST SERPL-MCNC: 124 MG/DL (ref 0–200)
CO2 SERPL-SCNC: 29 MMOL/L (ref 22–29)
CREAT SERPL-MCNC: 1.42 MG/DL (ref 0.76–1.27)
GFR SERPL CREATININE-BSD FRML MDRD: 62 ML/MIN/1.73
GLUCOSE SERPL-MCNC: 94 MG/DL (ref 65–99)
HDLC SERPL-MCNC: 61 MG/DL (ref 40–60)
LDLC SERPL CALC-MCNC: 53 MG/DL (ref 0–100)
LDLC/HDLC SERPL: 0.9 {RATIO}
POTASSIUM SERPL-SCNC: 3.9 MMOL/L (ref 3.5–5.2)
SODIUM SERPL-SCNC: 138 MMOL/L (ref 136–145)
TRIGL SERPL-MCNC: 40 MG/DL (ref 0–150)
VLDLC SERPL-MCNC: 10 MG/DL (ref 5–40)

## 2021-08-12 PROCEDURE — 99239 HOSP IP/OBS DSCHRG MGMT >30: CPT | Performed by: INTERNAL MEDICINE

## 2021-08-12 PROCEDURE — 99232 SBSQ HOSP IP/OBS MODERATE 35: CPT | Performed by: INTERNAL MEDICINE

## 2021-08-12 PROCEDURE — 80061 LIPID PANEL: CPT | Performed by: INTERNAL MEDICINE

## 2021-08-12 PROCEDURE — 25010000002 FUROSEMIDE PER 20 MG: Performed by: INTERNAL MEDICINE

## 2021-08-12 PROCEDURE — 80048 BASIC METABOLIC PNL TOTAL CA: CPT | Performed by: INTERNAL MEDICINE

## 2021-08-12 RX ORDER — SPIRONOLACTONE 25 MG/1
12.5 TABLET ORAL DAILY
Qty: 15 TABLET | Refills: 0 | Status: SHIPPED | OUTPATIENT
Start: 2021-08-12 | End: 2021-09-20

## 2021-08-12 RX ORDER — ACETAMINOPHEN 500 MG
1000 TABLET ORAL EVERY 6 HOURS PRN
Qty: 40 TABLET | Refills: 0 | Status: SHIPPED | OUTPATIENT
Start: 2021-08-12 | End: 2023-04-02

## 2021-08-12 RX ORDER — CARVEDILOL 6.25 MG/1
6.25 TABLET ORAL 2 TIMES DAILY WITH MEALS
Qty: 60 TABLET | Refills: 0 | Status: SHIPPED | OUTPATIENT
Start: 2021-08-12 | End: 2021-09-20

## 2021-08-12 RX ORDER — FUROSEMIDE 10 MG/ML
40 INJECTION INTRAMUSCULAR; INTRAVENOUS ONCE
Status: COMPLETED | OUTPATIENT
Start: 2021-08-12 | End: 2021-08-12

## 2021-08-12 RX ORDER — ATORVASTATIN CALCIUM 10 MG/1
10 TABLET, FILM COATED ORAL DAILY
Qty: 30 TABLET | Refills: 0 | Status: SHIPPED | OUTPATIENT
Start: 2021-08-12 | End: 2021-09-20 | Stop reason: SDUPTHER

## 2021-08-12 RX ORDER — FUROSEMIDE 40 MG/1
60 TABLET ORAL 2 TIMES DAILY
Qty: 90 TABLET | Refills: 0 | Status: ON HOLD | OUTPATIENT
Start: 2021-08-12 | End: 2021-08-26 | Stop reason: SDUPTHER

## 2021-08-12 RX ADMIN — LOSARTAN POTASSIUM 25 MG: 25 TABLET, FILM COATED ORAL at 08:39

## 2021-08-12 RX ADMIN — ASPIRIN 81 MG: 81 TABLET, COATED ORAL at 08:39

## 2021-08-12 RX ADMIN — CARVEDILOL 6.25 MG: 6.25 TABLET, FILM COATED ORAL at 08:39

## 2021-08-12 RX ADMIN — FUROSEMIDE 40 MG: 10 INJECTION, SOLUTION INTRAMUSCULAR; INTRAVENOUS at 12:37

## 2021-08-12 RX ADMIN — Medication 10 ML: at 08:39

## 2021-08-12 NOTE — PROGRESS NOTES
"CC--- decompensated acute on chronic class III to class IV CHF      Sub--- feels better and leg edema is reduced and denies chest pain       Past Medical History:   Diagnosis Date   • Afib (CMS/HCC)    • CHF (congestive heart failure) (CMS/HCC)    • Hypertension      Past Surgical History:   Procedure Laterality Date   • CARDIAC DEFIBRILLATOR PLACEMENT     • CARDIAC ELECTROPHYSIOLOGY PROCEDURE Left 6/15/2021    Procedure: Device Implant;  Surgeon: Michael Rollins MD;  Location: [facility] INVASIVE LOCATION;  Service: Cardiovascular;  Laterality: Left;   • HERNIA REPAIR       Family History   Problem Relation Age of Onset   • Cancer Mother      Social History     Tobacco Use   • Smoking status: Former Smoker     Packs/day: 0.50     Types: Cigarettes     Quit date: 2021     Years since quittin.1   • Smokeless tobacco: Never Used   Substance Use Topics   • Alcohol use: Yes     Alcohol/week: 7.0 standard drinks     Types: 7 Cans of beer per week     Comment: 1 beer a day   • Drug use: Yes     Types: Marijuana, \"Crack\" cocaine     Comment: 6/15/21 report cocaine in 2018, marijuana 2021 @0050     Allergies:  Aspirin and Penicillins    Review of Systems   General:  positive for fatigue and tiredness  Eyes: No redness  Cardiovascular: No chest pain, no palpitations  Respiratory:   positive for class 3 shortness of breath  Gastrointestinal: No nausea or vomiting, bleeding  Genitourinary: no hematuria or dysuria  Musculoskeletal: No arthralgia or myalgia  Skin: No rash  Neurologic: No numbness, tingling, syncope  Hematologic/Lymphatic: No abnormal bleeding      Physical Exam  VITALS REVIEWED  128/84 pulse rate is 70 patient is afebrile respiration 12 times a minute  General:      well developed, well nourished, in no acute distress.    Head:      normocephalic and atraumatic.    Eyes:      PERRL/EOM intact, conjunctiva and sclera clear with out nystagmus.    Neck:      no masses, thyromegaly,  trachea " central with normal respiratory effort and PMI displaced laterally  Lungs:     Reduced breath sounds on right base  Heart:       Sinus rhythm without any rub  Msk:      no deformity or scoliosis noted of thoracic or lumbar spine.    Pulses:      pulses normal in all 4 extremities.    Extremities:       no cyanosis or clubbing--induced edema with varicosities noted   Neurologic:      no focal deficits.   alert oriented x3  Skin:      intact without lesions or rashes.    Psych:      alert and cooperative; normal mood and affect; normal attention span and concentration.          CBC    Results from last 7 days   Lab Units 08/10/21  0519 08/09/21  0737   WBC 10*3/mm3 4.30 4.60   HEMOGLOBIN g/dL 15.1 14.7   PLATELETS 10*3/mm3 160 165     BMP   Results from last 7 days   Lab Units 08/12/21  0333 08/11/21  0452 08/10/21  0519 08/09/21  0737   SODIUM mmol/L 138 139 139 139   POTASSIUM mmol/L 3.9 3.6 3.7 3.9   CHLORIDE mmol/L 101 101 102 108*   CO2 mmol/L 29.0 28.0 30.0* 21.0*   BUN mg/dL 19 19 17 19   CREATININE mg/dL 1.42* 1.38* 1.52* 1.29*   GLUCOSE mg/dL 94 81 60* 102*   MAGNESIUM mg/dL  --   --  1.9  --      CMP   Results from last 7 days   Lab Units 08/12/21  0333 08/11/21  0452 08/10/21  0519 08/09/21  0737   SODIUM mmol/L 138 139 139 139   POTASSIUM mmol/L 3.9 3.6 3.7 3.9   CHLORIDE mmol/L 101 101 102 108*   CO2 mmol/L 29.0 28.0 30.0* 21.0*   BUN mg/dL 19 19 17 19   CREATININE mg/dL 1.42* 1.38* 1.52* 1.29*   GLUCOSE mg/dL 94 81 60* 102*   ALBUMIN g/dL  --   --   --  3.50   BILIRUBIN mg/dL  --   --   --  1.5*   ALK PHOS U/L  --   --   --  166*   AST (SGOT) U/L  --   --   --  21   ALT (SGPT) U/L  --   --   --  21         Assessment and plan      Nonischemic dilated cardiomyopathy  Decompensated acute on chronic class III to class IV systolic heart failure  ICD in situ  Paroxysmal atrial fibrillation currently in sinus rhythm  Continue Coreg, losartan and aggressive diuresis  Continue monitoring renal panel  Patient can  be discharged home and followed as an outpatient  Importance of titration of medical treatment and reduction of salt intake educated  Clinical follow-up in the office recommended in 2 to 3 weeks and discussed with the patient and the nurse        Electronically signed by Michael Rollins MD, 08/12/21, 12:33 PM EDT.

## 2021-08-12 NOTE — DISCHARGE SUMMARY
Date of Admission: 8/9/2021    Date of Discharge:  8/12/2021    Length of stay:  LOS: 1 day     Admission Diagnosis:   Lower extremity edema [R60.0]  Pleural effusion [J90]  Dyspnea, unspecified type [R06.00]      Discharge Diagnosis:     Dyspnea multifactorial d/t Large R pleural effusion and Acute on Chronic HFrEF/Dilated nonischemic cardiomyopathy  - s/p ICD 6/2021  - 2D echo 6/8/2021: Mildly dilated LV with severe global LV dysfunction, EF 20 to 25% per visual estimation.  Doppler evidence of grade 2 diastolic dysfunction.   - CT PE protocol 8/9/21: no PE, large right pleural effusion with compressive atelectasis of the right lower lobe and minimal right upper lobe atelectasis, small amount of ascites.  - bilateral lower extremity duplex: normal  - diuresed with IV lasix  - increase Lasix to 60 mg bid  - resume spironolactone at 12.5 mg daily  - low sodium diet  - continue losartan and Coreg  - AICD interrogated with no issues   - follow up with Dr. Rollins in 2 weeks      Chronic kidney disease II   - Cr stable   - repeat bmp in 1 week     CAD s/p PCI  - cont home asa, xarelto, bbl and acei  - start Lipitor 10 mg   - HDL 61, LDL 53, Triglycerides 40     Paroxysmal Atrial fibrillation  - continue Coreg and Xarelto      Hypertension  - Continue losartan and Coreg     Chronic pain  - INSPECT reviewed and no current prescriptions for narcotic pain medications  - follow up with PCP   - continue tylenol prn     Active Hospital Problems    Diagnosis  POA   • **Pleural effusion [J90]  Yes   • CAD (coronary artery disease) [I25.10]  Yes   • CKD (chronic kidney disease) stage 2, GFR 60-89 ml/min [N18.2]  Yes   • NICM (nonischemic cardiomyopathy) (CMS/HCC) [I42.8]  Yes   • Essential hypertension [I10]  Yes   • Mixed hyperlipidemia [E78.2]  Yes   • Paroxysmal atrial fibrillation (CMS/HCC) [I48.0]  Yes   • Chronic pain [G89.29]  Yes   • Acute on chronic systolic congestive heart failure (CMS/HCC) [I50.23]  Yes     "  Resolved Hospital Problems   No resolved problems to display.       Hospital Course:    Thony Dumont is a 61 y.o.  male who with a past medical history of HTN, CAD s/p PCI, CHF w EF 20-25%, severe dilated/nonischemic cardiomyopathy s/p ICD 6/2021, and afib on xarelto who presented to [facility] ED on 8/9/2021 complaining of bilateral lower extremity swelling, chest pain, and shortness of breath for the last week but worse in the last 3 days. He stated he gets short of breath just talking. He cannot lay flat and has not been able to sleep for the last 3 nights because he has to sit up to breathe. He has anterior chest pain when walking up stairs. He thinks he has gained 27 lbs in the last week. He has been taking all his home medications and feels like his \"lasix all of a sudden stopped working.\"  He denied any cough or fever.      In the ED the patient had proBNP 2,221 (compared to 1168 July 2021), normal troponin. CT PE protocol showed no PE, large right pleural effusion with compressive atelectasis of the right lower lobe and minimal right upper lobe atelectasis, small amount of ascites.  Bilateral venous duplex was normal.  Creatinine 1.29 compared to 1.25 July 2021.  COVID-19 swab negative.  He was given 40 mg IV Lasix, 4 mg of IV morphine, 4 mg IV Zofran.  He was admitted for further evaluation of shortness of breath and large right pleural effusion     Patient was diuresed with IV lasix and volume status has greatly improved. Patient's AICD was interrogated without issue.        Procedures Performed:  none         Consults:   Consults     Date and Time Order Name Status Description    8/9/2021 10:58 AM Inpatient Cardiology Consult Completed     8/9/2021  9:53 AM Hospitalist (on-call MD unless specified) Completed           Vital Signs:  Temp:  [97.7 °F (36.5 °C)-98.3 °F (36.8 °C)] 97.7 °F (36.5 °C)  Heart Rate:  [82-93] 83  Resp:  [16-20] 16  BP: (116-136)/(73-95) " 128/84        Physical Exam:  Physical Exam  Vitals reviewed.   Constitutional:       General: He is not in acute distress.     Appearance: He is overweight.   HENT:      Head: Normocephalic and atraumatic.   Cardiovascular:      Rate and Rhythm: Normal rate and regular rhythm.      Heart sounds: No murmur heard.     Pulmonary:      Effort: Pulmonary effort is normal. No respiratory distress.      Breath sounds: No wheezing or rales.   Abdominal:      General: Bowel sounds are normal.      Palpations: Abdomen is soft.   Musculoskeletal:      Right lower leg: Edema present.      Left lower leg: Edema present.      Comments: Improving b/l lower extremity edema    Skin:     General: Skin is warm and dry.      Findings: No rash.   Neurological:      Mental Status: He is alert.   Psychiatric:         Mood and Affect: Mood normal.         Behavior: Behavior normal.                 Pertinent Test Results:   Lab Results (last 72 hours)     Procedure Component Value Units Date/Time    Lipid Panel [[id number]] Collected: 08/12/21 0333    Specimen: Blood Updated: 08/12/21 1330    Basic Metabolic Panel [[id number]]  (Abnormal) Collected: 08/12/21 0333    Specimen: Blood Updated: 08/12/21 0430     Glucose 94 mg/dL      BUN 19 mg/dL      Creatinine 1.42 mg/dL      Sodium 138 mmol/L      Potassium 3.9 mmol/L      Chloride 101 mmol/L      CO2 29.0 mmol/L      Calcium 8.6 mg/dL      eGFR   Amer 62 mL/min/1.73      BUN/Creatinine Ratio 13.4     Anion Gap 8.0 mmol/L     Narrative:      GFR Normal >60  Chronic Kidney Disease <60  Kidney Failure <15      Basic Metabolic Panel [[id number]]  (Abnormal) Collected: 08/11/21 0452    Specimen: Blood Updated: 08/11/21 0625     Glucose 81 mg/dL      BUN 19 mg/dL      Creatinine 1.38 mg/dL      Sodium 139 mmol/L      Potassium 3.6 mmol/L      Chloride 101 mmol/L      CO2 28.0 mmol/L      Calcium 8.8 mg/dL      eGFR  African Amer 64 mL/min/1.73      BUN/Creatinine Ratio 13.8     Anion Gap  10.0 mmol/L     Narrative:      GFR Normal >60  Chronic Kidney Disease <60  Kidney Failure <15      Extra Tubes [[id number]] Collected: 08/11/21 0452    Specimen: Blood, Venous Line Updated: 08/11/21 0601    Narrative:      The following orders were created for panel order Extra Tubes.  Procedure                               Abnormality         Status                     ---------                               -----------         ------                     Lavender Top[[id number]]                                     Final result                 Please view results for these tests on the individual orders.    Lavender Top [[id number]] Collected: 08/11/21 0452    Specimen: Blood Updated: 08/11/21 0601     Extra Tube hold for add-on     Comment: Auto resulted       TSH [[id number]]  (Normal) Collected: 08/10/21 0519    Specimen: Blood Updated: 08/10/21 2039     TSH 1.270 uIU/mL     Basic Metabolic Panel [[id number]]  (Abnormal) Collected: 08/10/21 0519    Specimen: Blood Updated: 08/10/21 0703     Glucose 60 mg/dL      BUN 17 mg/dL      Creatinine 1.52 mg/dL      Sodium 139 mmol/L      Potassium 3.7 mmol/L      Chloride 102 mmol/L      CO2 30.0 mmol/L      Calcium 8.8 mg/dL      eGFR  African Amer 57 mL/min/1.73      BUN/Creatinine Ratio 11.2     Anion Gap 7.0 mmol/L     Narrative:      GFR Normal >60  Chronic Kidney Disease <60  Kidney Failure <15      Magnesium [[id number]]  (Normal) Collected: 08/10/21 0519    Specimen: Blood Updated: 08/10/21 0700     Magnesium 1.9 mg/dL     CBC Auto Differential [[id number]]  (Abnormal) Collected: 08/10/21 0519    Specimen: Blood Updated: 08/10/21 0620     WBC 4.30 10*3/mm3      RBC 4.81 10*6/mm3      Hemoglobin 15.1 g/dL      Hematocrit 44.8 %      MCV 93.1 fL      MCH 31.4 pg      MCHC 33.8 g/dL      RDW 17.0 %      RDW-SD 54.7 fl      MPV 8.9 fL      Platelets 160 10*3/mm3      Neutrophil % 58.7 %      Lymphocyte % 22.5 %      Monocyte % 14.1 %      Eosinophil % 3.8 %      Basophil %  0.9 %      Neutrophils, Absolute 2.50 10*3/mm3      Lymphocytes, Absolute 1.00 10*3/mm3      Monocytes, Absolute 0.60 10*3/mm3      Eosinophils, Absolute 0.20 10*3/mm3      Basophils, Absolute 0.00 10*3/mm3      nRBC 0.2 /100 WBC             Results for orders placed during the hospital encounter of 06/04/21    Adult Transthoracic Echo Complete W/ Cont if Necessary Per Protocol    Interpretation Summary  · Left atrial volume is severely increased.  · The left ventricular cavity is moderately dilated.  · Left ventricular wall thickness is consistent with mild to moderate concentric hypertrophy.  · Mild dilation of the aortic root is present. Mild dilation of the sinuses of Valsalva is present.  · Moderate tricuspid valve regurgitation is present.  · Estimated right ventricular systolic pressure from tricuspid regurgitation is mildly elevated (35-45 mmHg).  · The right atrial cavity is severely dilated.  · The right ventricular cavity is moderately dilated.  · Moderately reduced right ventricular systolic function noted.  · Left ventricular diastolic function is consistent with (grade III w/high LAP) reversible restrictive pattern.  · Estimated left ventricular EF = 25% Estimated left ventricular EF was in disagreement with the calculated left ventricular EF. Left ventricular ejection fraction appears to be 21 - 25%. Left ventricular systolic function is moderately decreased.  · Abnormal mitral valve structure consistent with dilated annulus.  · Moderate mitral valve regurgitation is present with a centrally-directed jet noted.  · Mild to moderate pulmonary hypertension is present.      Imaging Results (All)     Procedure Component Value Units Date/Time    CT Chest Pulmonary Embolism [[id number]] Collected: 08/09/21 0920     Updated: 08/09/21 0926    Narrative:      CT CHEST PULMONARY EMBOLISM-     Date of Exam: 8/9/2021 9:10 AM     Indication: PE suspected, low/intermediate prob, positive D-dimer.     Comparison  Exams: 7/9/2021     Technique: Multiple axial images were obtained from the thoracic inlet  through the adrenal glands after the administration of 100cc of Isovue  370 IV contrast. The axial data was used to generate reformatted images  in the coronal and sagittal planes.   Automated exposure control and iterative reconstruction methods were  used.        FINDINGS:  Pulmonary arteries are well-opacified.  There is no evidence of  pulmonary embolus.  Thoracic aorta is of normal caliber.  There is a  large right pleural effusion.  No left pleural effusion.  There is  airspace consolidation within the right lower lobe, likely due to  compressive atelectasis.  There is minimal atelectasis within the right  upper lobe.  Bands of linear atelectasis are seen within the left lung  base.  Left lung is otherwise clear.  There is diffuse anasarca.  There  is reflux of contrast into the inferior vena cava and hepatic veins  suggesting elevated right heart pressures.  Small amount of ascites is  seen within the upper abdomen.  Bilateral adrenal glands appear within  normal limits.  There are no lytic or sclerotic bony lesions identified.   There is levoconvex scoliosis of the upper thoracic spine.       Impression:         1.  No evidence pulmonary embolus.  2.  Large right pleural effusion with compressive atelectasis of the  right lower lobe and minimal right upper lobe atelectasis.  3.  Small amount of ascites     Electronically Signed By-Luis Park MD On:8/9/2021 9:23 AM  This report was finalized on [id number] by  Luis Park MD.    XR Chest 1 View [[id number]] Collected: 08/09/21 0800     Updated: 08/09/21 0803    Narrative:      XR CHEST 1 VW-     Date of Exam: 8/9/2021 7:51 AM     Indication: CHF/COPD Protocol.     Comparison: 7/9/2021     Technique: A single view of the chest was obtained.     FINDINGS:      Left subclavian transvenous defibrillator is unchanged.   Cardiomegaly is stable.  Pulmonary vessels  appear within normal limits.   There is hazy bibasilar airspace disease which is improved from prior  exam.  There is a small right pleural effusion, somewhat increased in  size from prior study.  No left pleural effusion.  Bony structures are  unremarkable.             Impression:            1.  Stable cardiomegaly.  2.  Hazy bibasilar airspace disease improved from prior study.  3.  Small right pleural effusion.        Electronically Signed By-Luis Park MD On:8/9/2021 8:01 AM  This report was finalized on [id number] by  Luis Park MD.                Discharge Disposition:  Home or Self Care    Discharge Medications:     Discharge Medications      New Medications      Instructions Start Date   acetaminophen 500 MG tablet  Commonly known as: TYLENOL   1,000 mg, Oral, Every 6 Hours PRN      atorvastatin 10 MG tablet  Commonly known as: Lipitor   10 mg, Oral, Daily      carvedilol 6.25 MG tablet  Commonly known as: COREG   6.25 mg, Oral, 2 Times Daily With Meals      spironolactone 25 MG tablet  Commonly known as: Aldactone   Take one-half tablet by mouth Daily for 30 days.         Changes to Medications      Instructions Start Date   furosemide 40 MG tablet  Commonly known as: LASIX  What changed: how much to take   60 mg, Oral, 2 Times Daily         Continue These Medications      Instructions Start Date   aspirin 81 MG EC tablet   81 mg, Oral, Daily      losartan 25 MG tablet  Commonly known as: COZAAR   25 mg, Oral, Daily      Xarelto 20 MG tablet  Generic drug: rivaroxaban   20 mg, Oral, Daily         Stop These Medications    HYDROcodone-acetaminophen 7.5-325 MG per tablet  Commonly known as: NORCO     ibuprofen 200 MG tablet  Commonly known as: ADVIL,MOTRIN            Discharge Diet:   Diet Instructions     Diet: Cardiac, Specialty Diet; Thin Liquids, No Restrictions; Low Sodium      Discharge Diet:  Cardiac  Specialty Diet       Fluid Consistency: Thin Liquids, No Restrictions    Specialty Diets:  Low Sodium          Activity at Discharge:   Activity Instructions     Activity as Tolerated            Follow-up Appointments  Future Appointments   Date Time Provider Department Center   9/20/2021  9:15 AM Michael Rollins MD [facility]       Additional Instructions for the Follow-ups that You Need to Schedule     Discharge Follow-up with PCP   As directed       Currently Documented PCP:    Rose Rao APRN    PCP Phone Number:    [phone]     Follow Up Details: in 3-5 days or sooner if needed         Discharge Follow-up with Specified Provider: Dr. Rollins; 2 Weeks   As directed      To: Dr. Rollins    Follow Up: 2 Weeks               Test Results Pending at Discharge:  none    Condition on Discharge:    stable     Risk for Readmission (LACE): Score: 13 (8/12/2021  6:01 AM)          Del Roberts DO  08/12/21  13:32 EDT    Time: Discharge 33 min with face-to-face history/exam, writing all prescriptions, and documenting discharge data including care coordination

## 2021-08-12 NOTE — DISCHARGE INSTRUCTIONS
Take medication as prescribed.  Low sodium diet.   Follow up with PCP in 3-5 days.  Follow up with Dr. Rollins in 2 weeks.   Repeat BMP in one week.

## 2021-08-12 NOTE — PLAN OF CARE
Goal Outcome Evaluation:            Patient been resting comfortably all night. No complaints. Possible d/c home today or tomorrow.Will continue to monitor

## 2021-08-12 NOTE — CASE MANAGEMENT/SOCIAL WORK
Social Work Assessment  [facility]     Patient Name: Thony Dumont  MRN: [id number]  Today's Date: 8/12/2021    Admit Date: 8/9/2021    Discharge Plan     Row Name 08/12/21 1344       Plan    Plan  Return to van at d/c, will transport self due to vehicle being in parking lot. See  notes 8/10-8/12.    Plan Comments  Met with patient at bedside regarding discharge location. Patient denied shelter placement and plans to return to his vehicle at d/c - then either stay with friends or stay in the vehicle.     Update 1409: Received call from [facility] office and was informed that patient contacted their office and stated we were discharging patient to the street, because he didn't want to go to a shelter. Patient was asking primary care to send him to SNF.  advised that patient was medically stable for d/c, didn't have need for inpt rehab (independent in room and with daily living tasks, confirmed with nursing), and that he declined shelter placement x2 - therefore he was being discharged to his friends/his van at his request.       Met with patient in room wearing PPE: mask, face shield/goggles, gloves, gown.    Maintained distance greater than six feet and spent less than 15 minutes in the room.      CORNELL Encarnacion    Phone: [phone]  Cell: [phone]  Fax: [phone]  [email or web address]

## 2021-08-12 NOTE — PAYOR COMM NOTE
"AUTHORIZATION PENDING:   PLEASE CALL OR FAX DETERMINATION TO CONTACT BELOW. THANK YOU.        Domonique Esteban, RN MSN  /UR  [facility]  [phone] office  [phone] fax  [email or web address]    [facility]  NPI: [id number]  Tax: [phone]        Thony Gong (61 y.o. Male)     Date of Birth Social Security Number Address Home Phone MRN    1960  [facility]  [address]  [facility] [id number] [phone] [id number]    Shinto Marital Status          None Significant Other       Admission Date Admission Type Admitting Provider Attending Provider Department, Room/Bed    8/9/21 Emergency Darling Hudson DO Maddox, [place], DO [facility] MEDICAL INPATIENT, 364/1    Discharge Date Discharge Disposition Discharge Destination                       Attending Provider: Darling Hudson DO    Allergies: Aspirin, Penicillins    Isolation: None   Infection: None   Code Status: CPR    Ht: 190.5 cm (75\")   Wt: 99.6 kg (219 lb 9.3 oz)    Admission Cmt: None   Principal Problem: Pleural effusion [J90]                 Active Insurance as of 8/9/2021     Primary Coverage     Payor Plan Insurance Group Employer/Plan Group    [id number]     Payor Plan Address Payor Plan Phone Number Payor Plan Fax Number Effective Dates    MAIL STOP:   4/1/2021 - None Entered    [address] [address]       [facility] [id number]       Subscriber Name Subscriber Birth Date Member ID       THONY GONG 1960 [id number]                 Emergency Contacts      (Rel.) Home Phone Work Phone Mobile Phone    JOAQUIN MORALEZ (Significant Other) [phone] -- [phone]        08/11/21 1445  Inpatient Admission Once    Completed   Level of Care: Med/Surg    Diagnosis: Pleural effusion [728233]    Admitting Physician: DARLING HUDSON [[id number]]    Certification: I Certify That Inpatient Hospital Services Are " "Medically Necessary For Greater Than 2 Midnights        21 1444   21 1104  Initiate Observation Status Once    Completed   Level of Care: Telemetry    Diagnosis: Pleural effusion [875062]    Admitting Physician: DARLING HUDSON [[id number]]                      History & Physical      AmberjimDonna APRN at 21 1013     Attestation signed by Darling Hudson DO at 08/10/21 1214    I have reviewed the mid-level provider documentation, agree with the documentation, medical decision making and treatment plan as outlined by the mid-level provider.                        [facility] Medicine Services      Patient Name: Thony Dumont  : 1960  MRN: [id number]  Primary Care Physician:  Rose Rao APRN  Date of admission: 2021      Subjective      Chief Complaint: lower extremity swelling, shortness of breath    History of Present Illness: Thony Dumont is a 61 y.o.  male with PMH of HTN, CAD s/p PCI, CHF w EF 20-25%, severe dilated/nonischemic cardiomyopathy s/p ICD 2021, and afib on xarelto who presented to [facility] ED on 2021 complaining of bilateral lower extremity swelling, chest pain, and shortness of breath for the last week but worse in the last 3 days. He stated he gets short of breath just talking. He cannot lay flat and has not been able to sleep for the last 3 nights because he has to sit up to breathe. He has anterior chest pain when walking up stairs. He thinks he has gained 27 lbs in the last week. He has been taking all his home medications and feels like his \"lasix all of a sudden stopped working.\"  He denied any cough or fever.     In the ED the patient had proBNP 2,221 (compared to 1168 2021), normal troponin. CT PE protocol showed no PE, large right pleural effusion with compressive atelectasis of the right lower lobe and minimal right upper lobe atelectasis, small amount of ascites.  Bilateral " "venous duplex was normal.  Creatinine 1.29 compared to 1.25 July 2021.  COVID-19 swab negative.  He was given 40 mg IV Lasix, 4 mg of IV morphine, 4 mg IV Zofran.  He was admitted for further evaluation of shortness of breath and large right pleural effusion       Review of Systems   Constitutional: Positive for weight gain.   HENT: Negative.    Eyes: Negative.    Cardiovascular: Positive for chest pain, dyspnea on exertion, leg swelling and orthopnea.   Respiratory: Positive for shortness of breath.    Endocrine: Negative.    Hematologic/Lymphatic: Negative.    Skin: Negative.    Musculoskeletal: Negative.    Gastrointestinal: Negative.    Genitourinary: Negative.    Neurological: Negative.    Psychiatric/Behavioral: Negative.    Allergic/Immunologic: Negative.    All other systems reviewed and are negative.      Personal History     Past Medical History:   Diagnosis Date   • Afib (CMS/HCC)    • CHF (congestive heart failure) (CMS/HCC)    • Hypertension        Past Surgical History:   Procedure Laterality Date   • CARDIAC DEFIBRILLATOR PLACEMENT     • CARDIAC ELECTROPHYSIOLOGY PROCEDURE Left 6/15/2021    Procedure: Device Implant;  Surgeon: Michael Rollins MD;  Location: [facility] INVASIVE LOC[facility];  Service: Cardiovascular;  Laterality: Left;   • HERNIA REPAIR         Family History: family history includes Cancer in his mother. Otherwise pertinent FHx was reviewed and not pertinent to current issue.    Social History:  reports that he quit smoking about 2 months ago. His smoking use included cigarettes. He smoked 0.50 packs per day. He has never used smokeless tobacco. He reports current alcohol use of about 7.0 standard drinks of alcohol per week. He reports current drug use. Drugs: Marijuana and \"Crack\" cocaine.    Home Medications:  Prior to Admission Medications     Prescriptions Last Dose Informant Patient Reported? Taking?    aspirin (aspirin) 81 MG EC tablet  Self Yes No    Take 81 mg by mouth Daily. "    furosemide (LASIX) 40 MG tablet   No No    Take 1 tablet by mouth 2 (Two) Times a Day for 30 days.    HYDROcodone-acetaminophen (NORCO) 7.5-325 MG per tablet   No No    Take 1 tablet by mouth Every 6 (Six) Hours As Needed for Severe Pain .    levoFLOXacin (Levaquin) 500 MG tablet   No No    Take 1 tablet by mouth Daily.    losartan (COZAAR) 25 MG tablet  Self Yes No    Take 25 mg by mouth Daily.    metoprolol succinate XL (Toprol XL) 25 MG 24 hr tablet   No No    Take 1 tablet by mouth Daily for 30 days.    rivaroxaban (Xarelto) 20 MG tablet   No No    Take 1 tablet by mouth Daily.    spironolactone (ALDACTONE) 25 MG tablet   No No    Take 1 tablet by mouth Daily for 30 days.            Allergies:  Allergies   Allergen Reactions   • Aspirin Swelling   • Penicillins Swelling       Objective      Vitals:   Temp:  [97.7 °F (36.5 °C)] 97.7 °F (36.5 °C)  Heart Rate:  [96] 96  Resp:  [24] 24  BP: (143)/(94) 143/94    Physical Exam  Vitals and nursing note reviewed.   HENT:      Head: Normocephalic and atraumatic.   Eyes:      Extraocular Movements: Extraocular movements intact.      Pupils: Pupils are equal, round, and reactive to light.   Cardiovascular:      Rate and Rhythm: Normal rate and regular rhythm.      Pulses: Normal pulses.      Heart sounds: Normal heart sounds.   Pulmonary:      Effort: Pulmonary effort is normal.      Breath sounds: Examination of the right-lower field reveals decreased breath sounds. Examination of the left-lower field reveals decreased breath sounds. Decreased breath sounds present.   Abdominal:      General: Bowel sounds are normal.      Palpations: Abdomen is soft.      Tenderness: There is no abdominal tenderness.   Musculoskeletal:         General: Normal range of motion.      Cervical back: Normal range of motion.      Right lower leg: Edema present.      Left lower leg: Edema present.   Skin:     General: Skin is warm and dry.   Neurological:      Mental Status: He is alert and  oriented to person, place, and time.   Psychiatric:         Mood and Affect: Mood normal.         Behavior: Behavior normal.       Result Review    Result Review:  I have personally reviewed the results from the time of this admission to 8/9/2021 11:05 EDT and agree with these findings:  [x]  Laboratory  []  Microbiology  [x]  Radiology  [x]  EKG/Telemetry   [x]  Cardiology/Vascular   []  Pathology  [x]  Old records  []  Other:      Assessment/Plan        Active Hospital Problems:  Active Hospital Problems    Diagnosis    • **Pleural effusion    • Acute on chronic systolic congestive heart failure (CMS/HCC)    • CAD (coronary artery disease)    • CKD (chronic kidney disease) stage 2, GFR 60-89 ml/min    • NICM (nonischemic cardiomyopathy) (CMS/HCC)      Added automatically from request for surgery [id number]     • Essential hypertension    • Mixed hyperlipidemia    • Paroxysmal atrial fibrillation (CMS/HCC)    • Chronic pain      Plan:     Dyspnea multifactorial from Large R pleural effusion and Acute HFrEF/Dilated nonischemic cardiomyopathy  -s/p ICD 6/2021  -2D echo 6/8/2021: Mildly dilated LV with severe global LV dysfunction, EF 20 to 25% per visual estimation.  Doppler evidence of grade 2 diastolic dysfunction.   -CT PE protocol 8/9/21: no PE, large right pleural effusion with compressive atelectasis of the right lower lobe and minimal right upper lobe atelectasis, small amount of ascites.  -bilateral lower extremity duplex: normal  -reported 27lb weight gain in 1 week  -proBNP 2221  -given 40mg IV lasix in ED and will continue 40mg IV lasix q8 hours  -cont home ARB, not currently on beta-blocker   -Strict Is & Os  -consult cardiology for further recommendations. Pt may also benefit from thoracentesis     Chronic kidney disease  -creatinine 1.29 compared to 1.25 in June 2021  -monitor BMP especially while receiving IV diuresis     CAD s/p PCI  -cont home asa, xarelto      Atrial fibrillation  -Continue cardiac  monitoring, metoprolol and Xarelto     Hypertension  -controlled, /94  -Continue losartan      Chronic pain  -not on any current home meds       DVT prophylaxis:  No DVT prophylaxis order currently exists.    CODE STATUS:    Level Of Support Discussed With: Patient  Code Status: CPR  Medical Interventions (Level of Support Prior to Arrest): Full    Admission Status:  I believe this patient meets observation status.    I discussed the patient's findings and my recommendations with patient.    This patient has been examined wearing appropriate Personal Protective Equipment . 08/09/21      Signature: Electronically signed by CORTEZ Crews, 08/09/21, 11:07 AM EDT.    Electronically signed by Del Roberts DO at 08/10/21 1214          Emergency Department Notes      Shannan Luna, RN at 08/09/21 0725        Pt reports his legs are swelling and SOB. Reports his SOB is worse with exertion.      Shannan Luna RN  08/09/21 0726      Electronically signed by Shannan Luna RN at 08/09/21 0726     Arely Aguirre at 08/09/21 0754        Tech transport requested to Vascular        Arely Aguirre  08/09/21 0754      Electronically signed by Arely Aguirre at 08/09/21 0754     Arely Aguirre at 08/09/21 0852        Tech transport requested to CT rm 2     Arely Aguirre  08/09/21 0852      Electronically signed by Arely Aguirre at 08/09/21 0852     Cielo Guillen PA at 08/09/21 0943     Attestation signed by Mayito Sierra MD at 08/09/21 1502          For this patient encounter, I reviewed the NP or PA documentation, treatment plan, and medical decision making. Mayito Sierra MD 8/9/2021 15:02 EDT                  Subjective   Patient is a 61-year-old male who presents with complaints of increased dyspnea and lower extremity edema over the past several days.  Patient dates his swelling is worse in his right leg than his left.  He reports severe pain in  his calfs is worse with ambulation bilaterally.  No significant injury to his legs.  Patient states his shortness of breath is worse with exertion.  He also reports chest pain only with exertion currently denies any chest pain states it is a pressure type pain that is nonradiating from his chest since his recent hospitalization.  Patient is currently on Xarelto.  States he is also taking Lasix twice a day does not seem to be helping with the swelling.  Patient denies any new cough rhinorrhea nasal congestion.  No fever body aches or chills.  Abdominal pain nausea vomiting diarrhea black or bloody stools.  No headaches or lightheadedness he denies any other alleviating or exacerbating factors of his symptoms.  He rates them as moderate in severity.          Review of Systems   Constitutional: Negative.    HENT: Negative.    Eyes: Negative for photophobia and visual disturbance.   Respiratory: Positive for shortness of breath. Negative for apnea, cough, choking, chest tightness, wheezing and stridor.    Cardiovascular: Positive for chest pain and leg swelling. Negative for palpitations.   Gastrointestinal: Negative for abdominal distention, abdominal pain, constipation, diarrhea, nausea and vomiting.   Genitourinary: Negative for decreased urine volume, difficulty urinating, dysuria, flank pain, frequency, hematuria and urgency.   Musculoskeletal: Negative for back pain, neck pain and neck stiffness.   Skin: Negative.    Neurological: Negative.    Hematological: Negative.        Past Medical History:   Diagnosis Date   • Afib (CMS/HCC)    • CHF (congestive heart failure) (CMS/HCC)    • Hypertension        Allergies   Allergen Reactions   • Aspirin Swelling   • Penicillins Swelling       Past Surgical History:   Procedure Laterality Date   • CARDIAC DEFIBRILLATOR PLACEMENT     • CARDIAC ELECTROPHYSIOLOGY PROCEDURE Left 6/15/2021    Procedure: Device Implant;  Surgeon: Michael Rollins MD;  Location: [facility]  "INVASIVE LOCATION;  Service: Cardiovascular;  Laterality: Left;   • HERNIA REPAIR         Family History   Problem Relation Age of Onset   • Cancer Mother        Social History     Socioeconomic History   • Marital status: Significant Other     Spouse name: Not on file   • Number of children: Not on file   • Years of education: Not on file   • Highest education level: Not on file   Tobacco Use   • Smoking status: Former Smoker     Packs/day: 0.50     Types: Cigarettes     Quit date: 2021     Years since quittin.1   • Smokeless tobacco: Never Used   Substance and Sexual Activity   • Alcohol use: Yes     Alcohol/week: 7.0 standard drinks     Types: 7 Cans of beer per week     Comment: 1 beer a day   • Drug use: Yes     Types: Marijuana, \"Crack\" cocaine     Comment: 6/15/21 report cocaine in 2018, marijuana 2021 @0050           Objective   Physical Exam  Vitals and nursing note reviewed.   Constitutional:       General: He is not in acute distress.     Appearance: Normal appearance. He is well-developed. He is not ill-appearing, toxic-appearing or diaphoretic.   HENT:      Head: Normocephalic and atraumatic.      Nose: Nose normal.      Mouth/Throat:      Mouth: Mucous membranes are moist.      Pharynx: Oropharynx is clear.   Eyes:      General: No scleral icterus.     Extraocular Movements: Extraocular movements intact.      Pupils: Pupils are equal, round, and reactive to light.   Cardiovascular:      Rate and Rhythm: Normal rate and regular rhythm.      Pulses: Normal pulses.      Heart sounds: No murmur heard.   No friction rub. No gallop.       Comments: Bilateral lower extremity edema worse on right leg than left.  Pulmonary:      Effort: Pulmonary effort is normal. No tachypnea or accessory muscle usage.      Breath sounds: Normal breath sounds. No decreased breath sounds, wheezing, rhonchi or rales.   Chest:      Chest wall: No mass, deformity, tenderness or crepitus.   Abdominal:      General: " "Bowel sounds are normal. There is no distension.      Palpations: Abdomen is soft. There is no hepatomegaly, splenomegaly or mass.      Tenderness: There is no abdominal tenderness. There is no right CVA tenderness, left CVA tenderness, guarding or rebound.   Musculoskeletal:      Cervical back: Normal range of motion and neck supple. No rigidity.      Comments: Peripheral pulses are intact compartments are soft of bilateral lower extremities.  Tenderness to palpation along the calf there is some swelling noted of the legs bilaterally worse on right than left.     Skin:     General: Skin is warm.      Capillary Refill: Capillary refill takes less than 2 seconds.      Findings: No rash.   Neurological:      Mental Status: He is alert and oriented to person, place, and time.      GCS: GCS eye subscore is 4. GCS verbal subscore is 5. GCS motor subscore is 6.   Psychiatric:         Mood and Affect: Mood normal.         Behavior: Behavior normal.         Procedures          ED Course    /94   Pulse 96   Temp 97.7 °F (36.5 °C) (Oral)   Resp 24   Ht 190.5 cm (75\")   Wt 106 kg (233 lb 0.4 oz)   SpO2 97%   BMI 29.13 kg/m²   Medications   sodium chloride 0.9 % flush 10 mL (has no administration in time range)   Morphine sulfate (PF) injection 4 mg (4 mg Intravenous Given 8/9/21 0742)   ondansetron (ZOFRAN) injection 4 mg (4 mg Intravenous Given 8/9/21 0741)   iopamidol (ISOVUE-370) 76 % injection 100 mL (100 mL Intravenous Given 8/9/21 0914)   furosemide (LASIX) injection 40 mg (40 mg Intravenous Given 8/9/21 0954)     Labs Reviewed   COMPREHENSIVE METABOLIC PANEL - Abnormal; Notable for the following components:       Result Value    Glucose 102 (*)     Creatinine 1.29 (*)     Chloride 108 (*)     CO2 21.0 (*)     Calcium 8.5 (*)     Alkaline Phosphatase 166 (*)     Total Bilirubin 1.5 (*)     All other components within normal limits    Narrative:     GFR Normal >60  Chronic Kidney Disease <60  Kidney Failure " <15     BNP (IN-HOUSE) - Abnormal; Notable for the following components:    proBNP 2,221.0 (*)     All other components within normal limits    Narrative:     Among patients with dyspnea, NT-proBNP is highly sensitive for the detection of acute congestive heart failure. In addition NT-proBNP of <300 pg/ml effectively rules out acute congestive heart failure with 99% negative predictive value.    Results may be falsely decreased if patient taking Biotin.     PROTIME-INR - Abnormal; Notable for the following components:    Protime 14.6 (*)     INR 1.34 (*)     All other components within normal limits   CBC WITH AUTO DIFFERENTIAL - Abnormal; Notable for the following components:    RDW 16.7 (*)     nRBC 0.4 (*)     All other components within normal limits   D-DIMER, QUANTITATIVE - Abnormal; Notable for the following components:    D-Dimer, Quantitative 2.57 (*)     All other components within normal limits    Narrative:     Reference Range  --------------------------------------------------------------------     < 0.50   Negative Predictive Value  0.50-0.59   Indeterminate    >= 0.60   Probable VTE             A very low percentage of patients with DVT may yield D-Dimer results   below the cut-off of 0.50 mg/L FEU.  This is known to be more   prevalent in patients with distal DVT.             Results of this test should always be interpreted in conjunction with   the patient's medical history, clinical presentation and other   findings.  Clinical diagnosis should not be based on the result of   INNOVANCE D-Dimer alone.   COVID-19,CEPHEID/DIEGO/BDMAX,COR/MANOJ/PAD/CHELLY IN-HOUSE,NP SWAB IN TRANSPORT MEDIA 3-4 HR TAT, RT-PCR - Normal    Narrative:     Fact sheet for providers: https://www.fda.gov/media/512614/download     Fact sheet for patients: https://www.fda.gov/media/651214/download  Fact sheet for providers: https://www.fda.gov/media/198182/download     Fact sheet for patients:  https://www.fda.gov/media/509593/download   TROPONIN (IN-HOUSE) - Normal    Narrative:     Troponin T Reference Range:  <= 0.03 ng/mL-   Negative for AMI  >0.03 ng/mL-     Abnormal for myocardial necrosis.  Clinicians would have to utilize clinical acumen, EKG, Troponin and serial changes to determine if it is an Acute Myocardial Infarction or myocardial injury due to an underlying chronic condition.       Results may be falsely decreased if patient taking Biotin.     CBC AND DIFFERENTIAL    Narrative:     The following orders were created for panel order CBC & Differential.  Procedure                               Abnormality         Status                     ---------                               -----------         ------                     CBC Auto Differential[[id number]]        Abnormal            Final result                 Please view results for these tests on the individual orders.   EXTRA TUBES    Narrative:     The following orders were created for panel order Extra Tubes.  Procedure                               Abnormality         Status                     ---------                               -----------         ------                     Gold Top - SST[[id number]]                                   Final result                 Please view results for these tests on the individual orders.   GOLD TOP - SST     XR Chest 1 View    Result Date: 8/9/2021    1.  Stable cardiomegaly. 2.  Hazy bibasilar airspace disease improved from prior study. 3.  Small right pleural effusion.   Electronically Signed By-Luis Park MD On:8/9/2021 8:01 AM This report was finalized on [id number] by  Luis Park MD.    CT Chest Pulmonary Embolism    Result Date: 8/9/2021   1.  No evidence pulmonary embolus. 2.  Large right pleural effusion with compressive atelectasis of the right lower lobe and minimal right upper lobe atelectasis. 3.  Small amount of ascites  Electronically Signed By-Luis Park MD  On:8/9/2021 9:23 AM This report was finalized on [id number] by  Luis Park MD.                                           MDM  Number of Diagnoses or Management Options  Dyspnea, unspecified type  Lower extremity edema  Pleural effusion  Diagnosis management comments: Chart Review:  Comorbidity: As per past medical history  ECG: Interpreted by myself and Dr. Green shows sinus rhythm rate 97 nonspecific T wave abnormalities in anterior lateral leads previous EKG reviewed from 7/9/2021  Labs: Troponin within normal limits proBNP 2221.  Pro time 14.6 INR 1.34.  Dimer 2.57 CBC fairly unremarkable CMP shows glucose 102 BUN 19 creatinine 1.29 sodium 139 potassium 3.9.  COVID-19 negative  Imaging: Was interpreted by physician and reviewed by myself:  XR Chest 1 View  Result Date: 8/9/2021    1.  Stable cardiomegaly. 2.  Hazy bibasilar airspace disease improved from prior study. 3.  Small right pleural effusion.   Electronically Signed By-Luis Park MD On:8/9/2021 8:01 AM This report was finalized on [id number] by  Luis Park MD.    CT Chest Pulmonary Embolism  Result Date: 8/9/2021   1.  No evidence pulmonary embolus. 2.  Large right pleural effusion with compressive atelectasis of the right lower lobe and minimal right upper lobe atelectasis. 3.  Small amount of ascites  Electronically Signed By-Luis Park MD On:8/9/2021 9:23 AM This report was finalized on [id number] by  Luis Park MD.    Disposition/Treatment:  Appropriate PPE was worn during exam and throughout all encounters with the patient.  When the ED IV was placed and labs were obtained patient was afebrile and appeared nontoxic was slightly tachypneic on initial exam with improvement after reassessment.  EKG showed no signs of acute STEMI troponin was within normal limits.  proBNP was found to be elevated as above patient does have some lower extremity edema and shortness of breath therefore patient was given Lasix while in the  ED.  Patient's D-dimer was also found to be elevated therefore CT PE protocol was ordered which showed no acute PE but did show a large right pleural effusion that has increased from prior CT last month.  No signs of pneumonia. patient was given morphine for his leg pain with improvement.  Doppler ultrasound of bilateral lower extremities was negative for acute DVT.  There are no cellulitic changes noted of the lower extremities bilaterally.  Due to patient's increased pleural effusion continued dyspnea and lower extremity edema patient will be admitted for further evaluation and management.  Lab results and findings were discussed with the patient who voiced understanding of admission was in agreement with plan.  Patient will be admitted to hospitalist group.  Spoke to Donna NP who agreed for admission through Dr. Hudson.        Amount and/or Complexity of Data Reviewed  Clinical lab tests: reviewed  Tests in the radiology section of CPT®: reviewed        Final diagnoses:   Pleural effusion   Dyspnea, unspecified type   Lower extremity edema       ED Disposition  ED Disposition     ED Disposition Condition Comment    Decision to Admit  Level of Care: Telemetry [5]   Admitting Physician: DARLING HUDSON [[id number]]            No follow-up provider specified.       Medication List      No changes were made to your prescriptions during this visit.          Cielo Guillen PA  21 1016      Electronically signed by Mayito Sierra MD at 21 1502          Physician Progress Notes (all)      Darling Hudson DO at 21 1444              [facility] Medicine Services Daily Progress Note    Patient Name: Thony Dumont  : 1960  MRN: [id number]  Primary Care Physician:  Rose Rao, CORTEZ  Date of admission: 2021      Subjective      Chief Complaint: f/u GOMEZ, orthopnea and Le edema       Patient Reports   8/10- Complaining of continued lower extremity edema  and foot pain.     8/11- Patient reports his lower extremity swelling is improving, still having some discomfort in his feet. Nursing reports no acute overnight events.         Review of Systems   Constitutional: Negative for chills and fever.   Cardiovascular: Positive for dyspnea on exertion, leg swelling and orthopnea.   Respiratory: Negative for cough and wheezing.    Musculoskeletal: Positive for myalgias.   Gastrointestinal: Negative for abdominal pain, nausea and vomiting.         Objective      Vitals:   Temp:  [97.5 °F (36.4 °C)-98.8 °F (37.1 °C)] 97.5 °F (36.4 °C)  Heart Rate:  [79-89] 85  Resp:  [18-20] 18  BP: (128-145)/(84-92) 128/92    Physical Exam  Vitals reviewed.   Constitutional:       General: He is not in acute distress.     Appearance: He is overweight.   HENT:      Head: Normocephalic and atraumatic.      Mouth/Throat:      Mouth: Mucous membranes are moist.   Cardiovascular:      Rate and Rhythm: Normal rate.   Pulmonary:      Breath sounds: Rales present.   Abdominal:      General: Bowel sounds are normal. There is no distension.      Palpations: Abdomen is soft.      Tenderness: There is no abdominal tenderness.   Musculoskeletal:      Right lower leg: Edema present.      Left lower leg: Edema present.      Comments: Improving b/l lower extremity edema    Skin:     General: Skin is warm and dry.      Findings: No rash.   Neurological:      Mental Status: He is alert. Mental status is at baseline.      Cranial Nerves: No cranial nerve deficit.   Psychiatric:         Mood and Affect: Mood normal.         Behavior: Behavior normal.            Result Review    Result Review:  I have personally reviewed the results from the time of this admission to 8/11/2021 14:44 EDT and agree with these findings:  [x]  Laboratory  []  Microbiology  []  Radiology  []  EKG/Telemetry   []  Cardiology/Vascular   []  Pathology  []  Old records  []  Other:  Most notable findings include:     aspirin, 81 mg, Oral,  "Daily  carvedilol, 6.25 mg, Oral, BID With Meals  furosemide, 40 mg, Intravenous, Q8H  losartan, 25 mg, Oral, Daily  rivaroxaban, 20 mg, Oral, Daily  sodium chloride, 10 mL, Intravenous, Q12H            Assessment/Plan      Brief Patient Summary:  Thony Dumont is a 61 y.o.  male who with a past medical history of HTN, CAD s/p PCI, CHF w EF 20-25%, severe dilated/nonischemic cardiomyopathy s/p ICD 6/2021, and afib on xarelto who presented to [facility] ED on 8/9/2021 complaining of bilateral lower extremity swelling, chest pain, and shortness of breath for the last week but worse in the last 3 days. He stated he gets short of breath just talking. He cannot lay flat and has not been able to sleep for the last 3 nights because he has to sit up to breathe. He has anterior chest pain when walking up stairs. He thinks he has gained 27 lbs in the last week. He has been taking all his home medications and feels like his \"lasix all of a sudden stopped working.\"  He denied any cough or fever.      In the ED the patient had proBNP 2,221 (compared to 1168 July 2021), normal troponin. CT PE protocol showed no PE, large right pleural effusion with compressive atelectasis of the right lower lobe and minimal right upper lobe atelectasis, small amount of ascites.  Bilateral venous duplex was normal.  Creatinine 1.29 compared to 1.25 July 2021.  COVID-19 swab negative.  He was given 40 mg IV Lasix, 4 mg of IV morphine, 4 mg IV Zofran.  He was admitted for further evaluation of shortness of breath and large right pleural effusion         Active Hospital Problems:  Active Hospital Problems    Diagnosis    • **Pleural effusion    • CAD (coronary artery disease)    • CKD (chronic kidney disease) stage 2, GFR 60-89 ml/min    • NICM (nonischemic cardiomyopathy) (CMS/[facility])      Added automatically from request for surgery [id number]     • Essential hypertension    • Mixed hyperlipidemia    • Paroxysmal atrial " fibrillation (CMS/HCC)    • Chronic pain    • Acute on chronic systolic congestive heart failure (CMS/HCC)      Plan:     Dyspnea multifactorial d/t Large R pleural effusion and Acute on Chronic HFrEF/Dilated nonischemic cardiomyopathy  - s/p ICD 6/2021  - 2D echo 6/8/2021: Mildly dilated LV with severe global LV dysfunction, EF 20 to 25% per visual estimation.  Doppler evidence of grade 2 diastolic dysfunction.   - CT PE protocol 8/9/21: no PE, large right pleural effusion with compressive atelectasis of the right lower lobe and minimal right upper lobe atelectasis, small amount of ascites.  - bilateral lower extremity duplex: normal  - reported 27lb weight gain in 1 week  - continue IV lasix 40 mg q8h   - Strict I & Os  - daily weights  - cardiology following  - AICD interrogated with no issues     Chronic kidney disease II   - Base line Cr 1.25  - monitor with routine labs especially while receiving IV diuretics      CAD s/p PCI  - cont home asa, xarelto, bbl and acei  - not on statin      Paroxysmal Atrial fibrillation  -Continue cardiac monitoring, metoprolol and Xarelto     Hypertension  - Continue losartan and Coreg  - monitor with routine vital signs and make adjustments as needed      Chronic pain  - INSPECT reviewed and no current prescriptions for narcotic pain medications  - continue tylenol prn      DVT Prophylaxis  - continue Xarelto       DVT prophylaxis:  Medical DVT prophylaxis orders are present.    CODE STATUS:    Level Of Support Discussed With: Patient  Code Status: CPR  Medical Interventions (Level of Support Prior to Arrest): Full      Disposition:  I expect patient to be discharged in 1-2 days pending progress.      This patient has been examined wearing appropriate Personal Protective Equipment. 08/11/21      Electronically signed by Del Roberts DO, 08/11/21, 14:44 EDT.  [facility] Hospitalist Team               Electronically signed by Del Roberts DO at 08/11/21 8027    "  Michael Rollins MD at 08/10/21 1924          CC--- decompensated acute on chronic class III to class IV CHF      Sub--- continues to have significant leg edema and denies any chest pain and complains of significant fatigue        Past Medical History:   Diagnosis Date   • Afib (CMS/HCC)    • CHF (congestive heart failure) (CMS/[facility])    • Hypertension      Past Surgical History:   Procedure Laterality Date   • CARDIAC DEFIBRILLATOR PLACEMENT     • CARDIAC ELECTROPHYSIOLOGY PROCEDURE Left 6/15/2021    Procedure: Device Implant;  Surgeon: Michael Rollins MD;  Location: [facility] INVASIVE LOCATION;  Service: Cardiovascular;  Laterality: Left;   • HERNIA REPAIR       Family History   Problem Relation Age of Onset   • Cancer Mother      Social History     Tobacco Use   • Smoking status: Former Smoker     Packs/day: 0.50     Types: Cigarettes     Quit date: 2021     Years since quittin.1   • Smokeless tobacco: Never Used   Substance Use Topics   • Alcohol use: Yes     Alcohol/week: 7.0 standard drinks     Types: 7 Cans of beer per week     Comment: 1 beer a day   • Drug use: Yes     Types: Marijuana, \"Crack\" cocaine     Comment: 6/15/21 report cocaine in 2018, marijuana 2021 @0050     Allergies:  Aspirin and Penicillins    Review of Systems   General:  positive for fatigue and tiredness  Eyes: No redness  Cardiovascular: No chest pain, no palpitations  Respiratory:   positive for class 3 shortness of breath  Gastrointestinal: No nausea or vomiting, bleeding  Genitourinary: no hematuria or dysuria  Musculoskeletal: No arthralgia or myalgia  Skin: No rash  Neurologic: No numbness, tingling, syncope  Hematologic/Lymphatic: No abnormal bleeding      Physical Exam  VITALS REVIEWED  128/74 pulse rate is 70 patient is afebrile respiration 12 times a minute  General:      well developed, well nourished, in no acute distress.    Head:      normocephalic and atraumatic.    Eyes:      PERRL/EOM intact, " conjunctiva and sclera clear with out nystagmus.    Neck:      no masses, thyromegaly,  trachea central with normal respiratory effort and PMI displaced laterally  Lungs:     Reduced breath sounds on right base  Heart:       Sinus rhythm without any rub  Msk:      no deformity or scoliosis noted of thoracic or lumbar spine.    Pulses:      pulses normal in all 4 extremities.    Extremities:       no cyanosis or clubbing--severe bilateral 2+ edema noted  Neurologic:      no focal deficits.   alert oriented x3  Skin:      intact without lesions or rashes.    Psych:      alert and cooperative; normal mood and affect; normal attention span and concentration.          CBC    Results from last 7 days   Lab Units 08/10/21  0519 08/09/21  0737   WBC 10*3/mm3 4.30 4.60   HEMOGLOBIN g/dL 15.1 14.7   PLATELETS 10*3/mm3 160 165     BMP   Results from last 7 days   Lab Units 08/10/21  0519 08/09/21  0737   SODIUM mmol/L 139 139   POTASSIUM mmol/L 3.7 3.9   CHLORIDE mmol/L 102 108*   CO2 mmol/L 30.0* 21.0*   BUN mg/dL 17 19   CREATININE mg/dL 1.52* 1.29*   GLUCOSE mg/dL 60* 102*   MAGNESIUM mg/dL 1.9  --      CMP   Results from last 7 days   Lab Units 08/10/21  0519 08/09/21  0737   SODIUM mmol/L 139 139   POTASSIUM mmol/L 3.7 3.9   CHLORIDE mmol/L 102 108*   CO2 mmol/L 30.0* 21.0*   BUN mg/dL 17 19   CREATININE mg/dL 1.52* 1.29*   GLUCOSE mg/dL 60* 102*   ALBUMIN g/dL  --  3.50   BILIRUBIN mg/dL  --  1.5*   ALK PHOS U/L  --  166*   AST (SGOT) U/L  --  21   ALT (SGPT) U/L  --  21     Radiology(recent) XR Chest 1 View    Result Date: 8/9/2021    1.  Stable cardiomegaly. 2.  Hazy bibasilar airspace disease improved from prior study. 3.  Small right pleural effusion.   Electronically Signed By-Luis Park MD On:8/9/2021 8:01 AM This report was finalized on [id number] by  Luis Park MD.    CT Chest Pulmonary Embolism    Result Date: 8/9/2021   1.  No evidence pulmonary embolus. 2.  Large right pleural effusion with  compressive atelectasis of the right lower lobe and minimal right upper lobe atelectasis. 3.  Small amount of ascites  Electronically Signed By-Luis Park MD On:2021 9:23 AM This report was finalized on [id number] by  Luis Park MD.        Assessment and plan      Nonischemic dilated cardiomyopathy  Decompensated acute on chronic class III to class IV systolic heart failure  ICD in situ  Paroxysmal atrial fibrillation currently in sinus rhythm      Continue Coreg, losartan and aggressive diuresis  Continue monitoring renal panel    Electronically signed by Michael Rollins MD, 08/10/21, 7:26 PM EDT.    Electronically signed by Michael Rollins MD at 08/10/21 192     Del Roberts DO at 08/10/21 1349              [facility] Medicine Services Daily Progress Note    Patient Name: Thony Dumont  : 1960  MRN: [id number]  Primary Care Physician:  Rose Rao, CORTEZ  Date of admission: 2021      Subjective      Chief Complaint: f/u GOMEZ, orthopnea and Le edema       Patient Reports   8/10- Complaining of continued lower extremity edema and foot pain.       Review of Systems   Cardiovascular: Positive for dyspnea on exertion, leg swelling and orthopnea.   Respiratory: Negative for cough and wheezing.    Gastrointestinal: Negative for abdominal pain, nausea and vomiting.         Objective      Vitals:   Temp:  [97.6 °F (36.4 °C)-98.6 °F (37 °C)] 98.6 °F (37 °C)  Heart Rate:  [80-91] 91  Resp:  [17-18] 17  BP: (117-126)/(81-87) 124/85    Physical Exam  Vitals reviewed.   Constitutional:       General: He is not in acute distress.     Appearance: He is overweight.   HENT:      Head: Normocephalic and atraumatic.      Mouth/Throat:      Mouth: Mucous membranes are moist.   Cardiovascular:      Rate and Rhythm: Normal rate.   Pulmonary:      Breath sounds: Rales present.   Abdominal:      General: Bowel sounds are normal. There is no distension.      Palpations:  "Abdomen is soft.      Tenderness: There is no abdominal tenderness.   Musculoskeletal:      Right lower leg: Edema present.      Left lower leg: Edema present.   Skin:     General: Skin is warm and dry.      Findings: No rash.   Neurological:      Mental Status: He is alert. Mental status is at baseline.      Cranial Nerves: No cranial nerve deficit.   Psychiatric:         Mood and Affect: Mood normal.         Behavior: Behavior normal.            Result Review    Result Review:  I have personally reviewed the results from the time of this admission to 8/10/2021 13:49 EDT and agree with these findings:  [x]  Laboratory  []  Microbiology  [x]  Radiology  []  EKG/Telemetry   [x]  Cardiology/Vascular   []  Pathology  []  Old records  []  Other:  Most notable findings include:     aspirin, 81 mg, Oral, Daily  carvedilol, 6.25 mg, Oral, BID With Meals  furosemide, 40 mg, Intravenous, Q8H  losartan, 25 mg, Oral, Daily  rivaroxaban, 20 mg, Oral, Daily  sodium chloride, 10 mL, Intravenous, Q12H            Assessment/Plan      Brief Patient Summary:  Thony Dumont is a 61 y.o.  male who with a past medical history of HTN, CAD s/p PCI, CHF w EF 20-25%, severe dilated/nonischemic cardiomyopathy s/p ICD 6/2021, and afib on xarelto who presented to [facility] ED on 8/9/2021 complaining of bilateral lower extremity swelling, chest pain, and shortness of breath for the last week but worse in the last 3 days. He stated he gets short of breath just talking. He cannot lay flat and has not been able to sleep for the last 3 nights because he has to sit up to breathe. He has anterior chest pain when walking up stairs. He thinks he has gained 27 lbs in the last week. He has been taking all his home medications and feels like his \"lasix all of a sudden stopped working.\"  He denied any cough or fever.      In the ED the patient had proBNP 2,221 (compared to 1168 July 2021), normal troponin. CT PE protocol showed " no PE, large right pleural effusion with compressive atelectasis of the right lower lobe and minimal right upper lobe atelectasis, small amount of ascites.  Bilateral venous duplex was normal.  Creatinine 1.29 compared to 1.25 July 2021.  COVID-19 swab negative.  He was given 40 mg IV Lasix, 4 mg of IV morphine, 4 mg IV Zofran.  He was admitted for further evaluation of shortness of breath and large right pleural effusion         Active Hospital Problems:  Active Hospital Problems    Diagnosis    • **Pleural effusion    • CAD (coronary artery disease)    • CKD (chronic kidney disease) stage 2, GFR 60-89 ml/min    • NICM (nonischemic cardiomyopathy) (CMS/HCC)      Added automatically from request for surgery [id number]     • Essential hypertension    • Mixed hyperlipidemia    • Paroxysmal atrial fibrillation (CMS/HCC)    • Chronic pain    • Acute on chronic systolic congestive heart failure (CMS/HCC)      Plan:     Dyspnea multifactorial d/t Large R pleural effusion and Acute on Chronic HFrEF/Dilated nonischemic cardiomyopathy  - s/p ICD 6/2021  - 2D echo 6/8/2021: Mildly dilated LV with severe global LV dysfunction, EF 20 to 25% per visual estimation.  Doppler evidence of grade 2 diastolic dysfunction.   - CT PE protocol 8/9/21: no PE, large right pleural effusion with compressive atelectasis of the right lower lobe and minimal right upper lobe atelectasis, small amount of ascites.  -bilateral lower extremity duplex: normal  - reported 27lb weight gain in 1 week  - continue IV lasix 40 mg q8h   - Strict Is & Os  - daily weights  - cardiology following  - AICD interrogated with no issues     Chronic kidney disease II   - Base line Cr 1.25  - monitor with routine labs especially while receiving IV diuretics      CAD s/p PCI  - cont home asa, xarelto, bbl and acei  - not on statin      Paroxysmal Atrial fibrillation  -Continue cardiac monitoring, metoprolol and Xarelto     Hypertension  - Continue losartan and Coreg  -  monitor with routine vital signs and make adjustments as needed      Chronic pain  - INSPECT reviewed and no current prescriptions for narcotic pain medications  - continue tylenol prn      DVT Prophylaxis  - continue Xarelto       DVT prophylaxis:  Medical DVT prophylaxis orders are present.    CODE STATUS:    Level Of Support Discussed With: Patient  Code Status: CPR  Medical Interventions (Level of Support Prior to Arrest): Full      Disposition:  I expect patient to be discharged in 1-2 days pending progress.    This patient has been examined wearing appropriate Personal Protective Equipment. 08/10/21      Electronically signed by Del Roberts DO, 08/10/21, 13:49 EDT.  [facility] Hospitalist Team               Electronically signed by Del Roberts DO at 08/10/21 1359          Consult Notes (all)      Maggie Padilla APRN at 08/09/21 1542      Consult Orders    1. Inpatient Cardiology Consult [[id number]] ordered by Donna Campuzano APRN at 08/09/21 1058               CARDIOLOGY CONSULT NOTE      Referring Provider: Dr. Roberts     Reason for Consultation: SOA/Edema/HF    Attending: Del Roberts DO    Chief complaint    C/o GOMEZ, orthopnea, LE edema    Subjective .     History of present illness:  Thony Dumont is a 61 y.o. male who presents with SOA.     Has a history of nonischemic cardiomyopathy diagnosed in March 2021 in [facility].  He had a cardiac catheterization at that time that did not show occlusive coronary artery disease. Ejection fraction by echocardiogram done in [facility] showed his EF to be 20 to 25%.     Patient was started on medical therapy. In  June 2021 he had a single chamber ICD placed for primary prevention.     The patient is also reported to have a history of paroxysmal atrial fibrillation and is on Xarelto he is currently in sinus rhythm.     He comes into the emergency room for evaluation because of recent shortness of breath and leg swelling.     He  reports that he believes he may have been shocked by his ICD    CXR and CT show heart falure and large right pleural effusion.   He has been started on IV diuresis.    It appears the patient has quit taking beta blockers, he reports his Rx ran out and he did not have refills.     He complains of orthopnea, edema, GOMEZ and palpitaitons.  He reports dyspnea with conversation    Review of Systems   Constitutional: Positive for malaise/fatigue. Negative for decreased appetite and diaphoresis.   HENT: Negative for congestion, hearing loss and nosebleeds.    Cardiovascular: Positive for dyspnea on exertion, irregular heartbeat, leg swelling, orthopnea and palpitations. Negative for chest pain, claudication, near-syncope, paroxysmal nocturnal dyspnea and syncope.   Respiratory: Negative for cough, shortness of breath and sleep disturbances due to breathing.    Endocrine: Negative for polyuria.   Hematologic/Lymphatic: Does not bruise/bleed easily.   Skin: Negative for itching and rash.   Musculoskeletal: Negative for back pain, muscle weakness and myalgias.   Gastrointestinal: Negative for abdominal pain, change in bowel habit and nausea.   Genitourinary: Negative for dysuria, flank pain, frequency and hesitancy.   Neurological: Positive for weakness. Negative for dizziness and tremors.   Psychiatric/Behavioral: Negative for altered mental status. The patient does not have insomnia.        History  Past Medical History:   Diagnosis Date   • Afib (CMS/HCC)    • CHF (congestive heart failure) (CMS/HCC)    • Hypertension        Past Surgical History:   Procedure Laterality Date   • CARDIAC DEFIBRILLATOR PLACEMENT     • CARDIAC ELECTROPHYSIOLOGY PROCEDURE Left 6/15/2021    Procedure: Device Implant;  Surgeon: Michael Rollins MD;  Location: [facility] INVASIVE LOCATION;  Service: Cardiovascular;  Laterality: Left;   • HERNIA REPAIR         Family History   Problem Relation Age of Onset   • Cancer Mother        Social  "History     Tobacco Use   • Smoking status: Former Smoker     Packs/day: 0.50     Types: Cigarettes     Quit date: 2021     Years since quittin.1   • Smokeless tobacco: Never Used   Substance Use Topics   • Alcohol use: Yes     Alcohol/week: 7.0 standard drinks     Types: 7 Cans of beer per week     Comment: 1 beer a day   • Drug use: Yes     Types: Marijuana, \"Crack\" cocaine     Comment: 6/15/21 report cocaine in 2018, marijuana 2021 @0050        Medications Prior to Admission   Medication Sig Dispense Refill Last Dose   • aspirin (aspirin) 81 MG EC tablet Take 81 mg by mouth Daily.   2021 at 0800   • furosemide (LASIX) 40 MG tablet Take 1 tablet by mouth 2 (Two) Times a Day for 30 days. 60 tablet 0 2021 at Unknown time   • ibuprofen (ADVIL,MOTRIN) 200 MG tablet Take 200 mg by mouth Every 6 (Six) Hours As Needed for Mild Pain . Pt takes 4-5 tablets in one dose prn   2021 at Unknown time   • losartan (COZAAR) 25 MG tablet Take 25 mg by mouth Daily.   2021 at Unknown time   • rivaroxaban (Xarelto) 20 MG tablet Take 1 tablet by mouth Daily. 30 tablet 0 2021 at 0800   • HYDROcodone-acetaminophen (NORCO) 7.5-325 MG per tablet Take 1 tablet by mouth Every 6 (Six) Hours As Needed for Severe Pain . 10 tablet 0          Aspirin and Penicillins    Scheduled Meds:[START ON 8/10/2021] aspirin, 81 mg, Oral, Daily  carvedilol, 6.25 mg, Oral, BID With Meals  furosemide, 40 mg, Intravenous, Q8H  losartan, 25 mg, Oral, Daily  [START ON 8/10/2021] rivaroxaban, 20 mg, Oral, Daily  sodium chloride, 10 mL, Intravenous, Q12H      Continuous Infusions:   PRN Meds:.•  acetaminophen **OR** acetaminophen **OR** acetaminophen  •  aluminum-magnesium hydroxide-simethicone  •  magnesium sulfate **OR** magnesium sulfate in D5W 1g/100mL (PREMIX)  •  melatonin  •  ondansetron **OR** ondansetron  •  potassium chloride  •  potassium chloride  •  sodium chloride  •  sodium chloride    Objective     VITAL " "SIGNS  Vitals:    08/09/21 0707 08/09/21 0711 08/09/21 1200 08/09/21 1450   BP:  143/94 119/93 117/83   BP Location:   Left arm    Patient Position:   Lying    Pulse: 96  88 84   Resp: 24  18    Temp: 97.7 °F (36.5 °C)  97.6 °F (36.4 °C)    TempSrc: Oral  Oral    SpO2: 97%  97% 98%   Weight: 106 kg (233 lb 0.4 oz)      Height: 190.5 cm (75\")          Flowsheet Rows      First Filed Value   Admission Height  190.5 cm (75\") Documented at 08/09/2021 0707   Admission Weight  106 kg (233 lb 0.4 oz) Documented at 08/09/2021 0707          Body mass index is 29.13 kg/m².     TELEMETRY: SR 90s    Physical Exam:  Vitals reviewed.   Constitutional:       General: Not in acute distress.     Appearance: Normal appearance. Well-developed.   Eyes:      Pupils: Pupils are equal, round, and reactive to light.   HENT:      Head: Normocephalic and atraumatic.   Neck:      Vascular: JVD present.   Pulmonary:      Effort: Pulmonary effort is normal.      Breath sounds: Normal breath sounds.   Cardiovascular:      Normal rate. Regular rhythm.   Pulses:     Intact distal pulses.   Edema:     Peripheral edema present.     Thigh: bilateral edema of the thigh.     Ankle: bilateral edema of the ankle.     Feet: bilateral edema of the feet.  Abdominal:      General: There is no distension.      Palpations: Abdomen is soft.      Tenderness: There is no abdominal tenderness.   Musculoskeletal: Normal range of motion.      Cervical back: Normal range of motion and neck supple. Skin:     General: Skin is warm and dry.   Neurological:      Mental Status: Alert and oriented to person, place, and time.          Results Review:   I reviewed the patient's new clinical results.    CBC    Results from last 7 days   Lab Units 08/09/21  0737   WBC 10*3/mm3 4.60   HEMOGLOBIN g/dL 14.7   PLATELETS 10*3/mm3 165     BMP   Results from last 7 days   Lab Units 08/09/21  0737   SODIUM mmol/L 139   POTASSIUM mmol/L 3.9   CHLORIDE mmol/L 108*   CO2 mmol/L 21.0* "   BUN mg/dL 19   CREATININE mg/dL 1.29*   GLUCOSE mg/dL 102*     Cr Clearance Estimated Creatinine Clearance: 81.2 mL/min (A) (by C-G formula based on SCr of 1.29 mg/dL (H)).  Coag   Results from last 7 days   Lab Units 08/09/21  0737   INR  1.34*     HbA1C No results found for: HGBA1C  Blood Glucose No results found for: POCGLU  Infection     CMP   Results from last 7 days   Lab Units 08/09/21  0737   SODIUM mmol/L 139   POTASSIUM mmol/L 3.9   CHLORIDE mmol/L 108*   CO2 mmol/L 21.0*   BUN mg/dL 19   CREATININE mg/dL 1.29*   GLUCOSE mg/dL 102*   ALBUMIN g/dL 3.50   BILIRUBIN mg/dL 1.5*   ALK PHOS U/L 166*   AST (SGOT) U/L 21   ALT (SGPT) U/L 21     ABG      UA      EDD  No results found for: POCMETH, POCAMPHET, POCBARBITUR, POCBENZO, POCCOCAINE, POCOPIATES, POCOXYCODO, POCPHENCYC, POCPROPOXY, POCTHC, POCTRICYC  Lysis Labs   Results from last 7 days   Lab Units 08/09/21  0737   INR  1.34*   HEMOGLOBIN g/dL 14.7   PLATELETS 10*3/mm3 165   CREATININE mg/dL 1.29*     Radiology(recent) XR Chest 1 View    Result Date: 8/9/2021    1.  Stable cardiomegaly. 2.  Hazy bibasilar airspace disease improved from prior study. 3.  Small right pleural effusion.   Electronically Signed By-Luis Park MD On:8/9/2021 8:01 AM This report was finalized on [id number] by  Luis Park MD.    CT Chest Pulmonary Embolism    Result Date: 8/9/2021   1.  No evidence pulmonary embolus. 2.  Large right pleural effusion with compressive atelectasis of the right lower lobe and minimal right upper lobe atelectasis. 3.  Small amount of ascites  Electronically Signed By-Luis Park MD On:8/9/2021 9:23 AM This report was finalized on [id number] by  Luis Park MD.      Results from last 7 days   Lab Units 08/09/21  0737   TROPONIN T ng/mL <0.010       Imaging Results (Last 24 Hours)     Procedure Component Value Units Date/Time    CT Chest Pulmonary Embolism [[id number]] Collected: 08/09/21 0920     Updated: 08/09/21 0926    Narrative:       CT CHEST PULMONARY EMBOLISM-     Date of Exam: 8/9/2021 9:10 AM     Indication: PE suspected, low/intermediate prob, positive D-dimer.     Comparison Exams: 7/9/2021     Technique: Multiple axial images were obtained from the thoracic inlet  through the adrenal glands after the administration of 100cc of Isovue  370 IV contrast. The axial data was used to generate reformatted images  in the coronal and sagittal planes.   Automated exposure control and iterative reconstruction methods were  used.        FINDINGS:  Pulmonary arteries are well-opacified.  There is no evidence of  pulmonary embolus.  Thoracic aorta is of normal caliber.  There is a  large right pleural effusion.  No left pleural effusion.  There is  airspace consolidation within the right lower lobe, likely due to  compressive atelectasis.  There is minimal atelectasis within the right  upper lobe.  Bands of linear atelectasis are seen within the left lung  base.  Left lung is otherwise clear.  There is diffuse anasarca.  There  is reflux of contrast into the inferior vena cava and hepatic veins  suggesting elevated right heart pressures.  Small amount of ascites is  seen within the upper abdomen.  Bilateral adrenal glands appear within  normal limits.  There are no lytic or sclerotic bony lesions identified.   There is levoconvex scoliosis of the upper thoracic spine.       Impression:         1.  No evidence pulmonary embolus.  2.  Large right pleural effusion with compressive atelectasis of the  right lower lobe and minimal right upper lobe atelectasis.  3.  Small amount of ascites     Electronically Signed By-Luis Park MD On:8/9/2021 9:23 AM  This report was finalized on [id number] by  Luis Park MD.    XR Chest 1 View [[id number]] Collected: 08/09/21 0800     Updated: 08/09/21 0803    Narrative:      XR CHEST 1 VW-     Date of Exam: 8/9/2021 7:51 AM     Indication: CHF/COPD Protocol.     Comparison: 7/9/2021     Technique: A single  view of the chest was obtained.     FINDINGS:      Left subclavian transvenous defibrillator is unchanged.   Cardiomegaly is stable.  Pulmonary vessels appear within normal limits.   There is hazy bibasilar airspace disease which is improved from prior  exam.  There is a small right pleural effusion, somewhat increased in  size from prior study.  No left pleural effusion.  Bony structures are  unremarkable.             Impression:            1.  Stable cardiomegaly.  2.  Hazy bibasilar airspace disease improved from prior study.  3.  Small right pleural effusion.        Electronically Signed By-Luis Park MD On:8/9/2021 8:01 AM  This report was finalized on [id number] by  Luis Park MD.          EKG          I personally viewed and interpreted the patient's EKG/Telemetry data:    ECHOCARDIOGRAM:  6/6/2021  Interpretation Summary    · Left atrial volume is severely increased.  · The left ventricular cavity is moderately dilated.  · Left ventricular wall thickness is consistent with mild to moderate concentric hypertrophy.  · Mild dilation of the aortic root is present. Mild dilation of the sinuses of Valsalva is present.  · Moderate tricuspid valve regurgitation is present.  · Estimated right ventricular systolic pressure from tricuspid regurgitation is mildly elevated (35-45 mmHg).  · The right atrial cavity is severely dilated.  · The right ventricular cavity is moderately dilated.  · Moderately reduced right ventricular systolic function noted.  · Left ventricular diastolic function is consistent with (grade III w/high LAP) reversible restrictive pattern.  · Estimated left ventricular EF = 25% Estimated left ventricular EF was in disagreement with the calculated left ventricular EF. Left ventricular ejection fraction appears to be 21 - 25%. Left ventricular systolic function is moderately decreased.  · Abnormal mitral valve structure consistent with dilated annulus.  · Moderate mitral valve  regurgitation is present with a centrally-directed jet noted.  · Mild to moderate pulmonary hypertension is present.         STRESS MYOVIEW:    CARDIAC CATHETERIZATION:    OTHER:         Assessment/Plan       Pleural effusion    Acute on chronic systolic congestive heart failure (CMS/HCC)    Essential hypertension    Mixed hyperlipidemia    Paroxysmal atrial fibrillation (CMS/HCC)    Chronic pain    NICM (nonischemic cardiomyopathy) (CMS/HCC)    CAD (coronary artery disease)    CKD (chronic kidney disease) stage 2, GFR 60-89 ml/min    Assessment:    HFrEF:  NYHA Class IV currently  NICM EF 20-25%  Diagnosed 3/5/2021  Biotronik single chamber ICD implanted 6/2021  Large right pleural effusion  HTN  PAF: currently in SR  Question of compliance with medical RX  Question of ICD discharged    Plan:    Continue aggressive diuresis  Add Coreg 6.25 mg BID  Continue Losartan  Consider changing to entresto  Will have biotronik rep interrogate ICD  Follow pleural effusion after aggressive diuresis    Dr. Rollins to follow patient tomorrow       I discussed the patients findings and my recommendations with patient and RN    CORTEZ Cid  08/09/21  15:43 EDT            Electronically signed by Maggie Padilla APRN at 08/09/21 1601

## 2021-08-13 ENCOUNTER — READMISSION MANAGEMENT (OUTPATIENT)
Dept: CALL CENTER | Facility: HOSPITAL | Age: 61
End: 2021-08-13

## 2021-08-13 NOTE — OUTREACH NOTE
Prep Survey      Responses   [facility] discharged from?  [facility]   Is LACE score < 7 ?  No   Emergency Room discharge w/ pulse ox?  No   Eligibility  Readm Mgmt   Discharge diagnosis  Dyspnea multifactorial d/t Large R pleural effusion and Acute on Chronic HFrEF/Dilated nonischemic cardiomyopathy   Does the patient have one of the following disease processes/diagnoses(primary or secondary)?  COPD/Pneumonia   Does the patient have Home health ordered?  No   Is there a DME ordered?  No   Prep survey completed?  Yes          Katty Garnett RN

## 2021-08-17 ENCOUNTER — READMISSION MANAGEMENT (OUTPATIENT)
Dept: CALL CENTER | Facility: HOSPITAL | Age: 61
End: 2021-08-17

## 2021-08-17 NOTE — OUTREACH NOTE
COPD/PN Week 1 Survey      Responses   [facility] patient discharged from?  [facility]   Does the patient have one of the following disease processes/diagnoses(primary or secondary)?  COPD/Pneumonia   Was the primary reason for admission:  Pneumonia   Week 1 attempt successful?  No   Unsuccessful attempts  Attempt 1          Digna Fischer RN

## 2021-08-20 ENCOUNTER — READMISSION MANAGEMENT (OUTPATIENT)
Dept: CALL CENTER | Facility: HOSPITAL | Age: 61
End: 2021-08-20

## 2021-08-20 NOTE — OUTREACH NOTE
COPD/PN Week 2 Survey      Responses   [facility] patient discharged from?  [facility]   Does the patient have one of the following disease processes/diagnoses(primary or secondary)?  COPD/Pneumonia   Was the primary reason for admission:  Pneumonia   Week 2 attempt successful?  Yes   Call start time  1649   Call end time  1656   Discharge diagnosis  Dyspnea multifactorial d/t Large R pleural effusion and Acute on Chronic HFrEF/Dilated nonischemic cardiomyopathy   Meds reviewed with patient/caregiver?  Yes   Is the patient having any side effects they believe may be caused by any medication additions or changes?  No   Is the patient taking all medications as directed (includes completed medication regime)?  Yes   Does the patient have a primary care provider?   Yes   Does the patient have an appointment with their PCP or specialist within 7 days of discharge?  Yes   Has the patient kept scheduled appointments due by today?  Yes   Pulse Ox monitoring  None   Psychosocial issues?  Yes   Psychosocial comments  Pt says he is basically homeless as he stays wherever he can with friends etc. He lost his room he was renting bc he has no income. He has a disability hearing 8-27. He is currently at a friends house    Comments  Pt reports LE are swollen as soon as he stands up but resolves after elevating. SOA is present, his PCP is aware as he saw her today.    What is the patient's perception of their health status since discharge?  Worsening   Nursing Interventions  Nurse provided patient education   If the patient is a current smoker, are they able to teach back resources for cessation?  -- [quit smoking, 2 weeks ago.]   Is the patient/caregiver able to teach back the hierarchy of who to call/visit for symptoms/problems? PCP, Specialist, Home health nurse, Urgent Care, ED, 911  Yes   Additional teach back comments  Pt checks daily wt and BP   Patient reports what zone on this call?  Yellow Zone   Yellow Zone  Increased  shortness of air, Unable to complete daily activities, Increased or thicker phlegm/mucus, Using quick relief inhaler/nebulizer more often, Poor sleep and symptoms work patient up, Poor Appetite   Yellow interventions  Continue to use daily medications, Use quick relief inhaler as ordered, Do not smoke, Get plenty of rest   Week 2 call completed?  Yes          Digna Fischer, RN

## 2021-08-25 ENCOUNTER — READMISSION MANAGEMENT (OUTPATIENT)
Dept: CALL CENTER | Facility: HOSPITAL | Age: 61
End: 2021-08-25

## 2021-08-25 ENCOUNTER — HOSPITAL ENCOUNTER (OUTPATIENT)
Facility: HOSPITAL | Age: 61
Setting detail: OBSERVATION
Discharge: HOME | End: 2021-08-26
Attending: EMERGENCY MEDICINE | Admitting: INTERNAL MEDICINE

## 2021-08-25 ENCOUNTER — APPOINTMENT (OUTPATIENT)
Dept: GENERAL RADIOLOGY | Facility: HOSPITAL | Age: 61
End: 2021-08-25

## 2021-08-25 DIAGNOSIS — I50.9 CONGESTIVE HEART FAILURE, UNSPECIFIED HF CHRONICITY, UNSPECIFIED HEART FAILURE TYPE (HCC): Primary | ICD-10-CM

## 2021-08-25 PROBLEM — I50.43 ACUTE ON CHRONIC COMBINED SYSTOLIC AND DIASTOLIC CONGESTIVE HEART FAILURE: Status: ACTIVE | Noted: 2021-08-25

## 2021-08-25 PROBLEM — I50.42 CHRONIC COMBINED SYSTOLIC AND DIASTOLIC CONGESTIVE HEART FAILURE: Chronic | Status: ACTIVE | Noted: 2021-06-15

## 2021-08-25 PROBLEM — J90 PLEURAL EFFUSION: Status: RESOLVED | Noted: 2021-08-09 | Resolved: 2021-08-25

## 2021-08-25 PROBLEM — F17.200 CURRENT SMOKER: Status: ACTIVE | Noted: 2021-06-07

## 2021-08-25 PROBLEM — I50.23 ACUTE ON CHRONIC SYSTOLIC CONGESTIVE HEART FAILURE: Status: ACTIVE | Noted: 2021-08-25

## 2021-08-25 PROBLEM — Z95.810 ICD (IMPLANTABLE CARDIOVERTER-DEFIBRILLATOR) IN PLACE: Chronic | Status: ACTIVE | Noted: 2021-08-25

## 2021-08-25 PROBLEM — I50.23 ACUTE ON CHRONIC SYSTOLIC CONGESTIVE HEART FAILURE (HCC): Status: ACTIVE | Noted: 2021-06-04

## 2021-08-25 PROBLEM — F17.200 CURRENT SMOKER: Status: RESOLVED | Noted: 2021-06-07 | Resolved: 2021-08-25

## 2021-08-25 PROBLEM — I42.8 NICM (NONISCHEMIC CARDIOMYOPATHY): Chronic | Status: ACTIVE | Noted: 2021-06-10

## 2021-08-25 PROBLEM — N52.9 ERECTILE DYSFUNCTION: Status: ACTIVE | Noted: 2021-08-25

## 2021-08-25 PROBLEM — I50.22 CHRONIC SYSTOLIC CONGESTIVE HEART FAILURE: Chronic | Status: ACTIVE | Noted: 2021-06-15

## 2021-08-25 PROBLEM — K21.9 GASTROESOPHAGEAL REFLUX DISEASE: Status: ACTIVE | Noted: 2021-08-25

## 2021-08-25 PROBLEM — R60.0 BILATERAL LOWER EXTREMITY EDEMA: Status: ACTIVE | Noted: 2021-08-25

## 2021-08-25 PROBLEM — M19.90 DEGENERATIVE JOINT DISEASE: Status: ACTIVE | Noted: 2021-08-25

## 2021-08-25 PROBLEM — R06.02 SHORTNESS OF BREATH: Status: ACTIVE | Noted: 2021-08-25

## 2021-08-25 LAB
ALBUMIN SERPL-MCNC: 3.8 G/DL (ref 3.5–5.2)
ALBUMIN/GLOB SERPL: 1.2 G/DL
ALP SERPL-CCNC: 166 U/L (ref 39–117)
ALT SERPL W P-5'-P-CCNC: 27 U/L (ref 1–41)
AMPHET+METHAMPHET UR QL: NEGATIVE
ANION GAP SERPL CALCULATED.3IONS-SCNC: 10 MMOL/L (ref 5–15)
APTT PPP: 29.3 SECONDS (ref 24–31)
AST SERPL-CCNC: 29 U/L (ref 1–40)
BARBITURATES UR QL SCN: NEGATIVE
BASOPHILS # BLD AUTO: 0.1 10*3/MM3 (ref 0–0.2)
BASOPHILS NFR BLD AUTO: 1.2 % (ref 0–1.5)
BENZODIAZ UR QL SCN: NEGATIVE
BILIRUB SERPL-MCNC: 2 MG/DL (ref 0–1.2)
BUN SERPL-MCNC: 19 MG/DL (ref 6–20)
BUN/CREAT SERPL: 13.5 (ref 7–25)
CALCIUM SPEC-SCNC: 9.1 MG/DL (ref 8.6–10.5)
CANNABINOIDS SERPL QL: NEGATIVE
CHLORIDE SERPL-SCNC: 107 MMOL/L (ref 98–107)
CO2 SERPL-SCNC: 25 MMOL/L (ref 22–29)
COCAINE UR QL: NEGATIVE
CREAT SERPL-MCNC: 1.41 MG/DL (ref 0.76–1.27)
DEPRECATED RDW RBC AUTO: 53.8 FL (ref 37–54)
EOSINOPHIL # BLD AUTO: 0.2 10*3/MM3 (ref 0–0.4)
EOSINOPHIL NFR BLD AUTO: 3.7 % (ref 0.3–6.2)
ERYTHROCYTE [DISTWIDTH] IN BLOOD BY AUTOMATED COUNT: 16.7 % (ref 12.3–15.4)
GFR SERPL CREATININE-BSD FRML MDRD: 62 ML/MIN/1.73
GLOBULIN UR ELPH-MCNC: 3.2 GM/DL
GLUCOSE SERPL-MCNC: 75 MG/DL (ref 65–99)
HCT VFR BLD AUTO: 43.7 % (ref 37.5–51)
HGB BLD-MCNC: 14.8 G/DL (ref 13–17.7)
INR PPP: 1.32 (ref 0.93–1.1)
LYMPHOCYTES # BLD AUTO: 1.4 10*3/MM3 (ref 0.7–3.1)
LYMPHOCYTES NFR BLD AUTO: 29.8 % (ref 19.6–45.3)
MAGNESIUM SERPL-MCNC: 1.8 MG/DL (ref 1.6–2.6)
MCH RBC QN AUTO: 31.8 PG (ref 26.6–33)
MCHC RBC AUTO-ENTMCNC: 34 G/DL (ref 31.5–35.7)
MCV RBC AUTO: 93.7 FL (ref 79–97)
METHADONE UR QL SCN: NEGATIVE
MONOCYTES # BLD AUTO: 0.6 10*3/MM3 (ref 0.1–0.9)
MONOCYTES NFR BLD AUTO: 13.6 % (ref 5–12)
NEUTROPHILS NFR BLD AUTO: 2.4 10*3/MM3 (ref 1.7–7)
NEUTROPHILS NFR BLD AUTO: 51.7 % (ref 42.7–76)
NRBC BLD AUTO-RTO: 0.4 /100 WBC (ref 0–0.2)
NT-PROBNP SERPL-MCNC: 1671 PG/ML (ref 0–900)
OPIATES UR QL: NEGATIVE
OXYCODONE UR QL SCN: NEGATIVE
PLATELET # BLD AUTO: 195 10*3/MM3 (ref 140–450)
PMV BLD AUTO: 8.9 FL (ref 6–12)
POTASSIUM SERPL-SCNC: 4.3 MMOL/L (ref 3.5–5.2)
PROT SERPL-MCNC: 7 G/DL (ref 6–8.5)
PROTHROMBIN TIME: 14.4 SECONDS (ref 9.6–11.7)
RBC # BLD AUTO: 4.67 10*6/MM3 (ref 4.14–5.8)
SARS-COV-2 RNA PNL SPEC NAA+PROBE: NOT DETECTED
SODIUM SERPL-SCNC: 142 MMOL/L (ref 136–145)
TROPONIN T SERPL-MCNC: <0.01 NG/ML (ref 0–0.03)
TROPONIN T SERPL-MCNC: <0.01 NG/ML (ref 0–0.03)
WBC # BLD AUTO: 4.7 10*3/MM3 (ref 3.4–10.8)

## 2021-08-25 PROCEDURE — 25010000002 MAGNESIUM SULFATE IN D5W 1G/100ML (PREMIX) 1-5 GM/100ML-% SOLUTION: Performed by: NURSE PRACTITIONER

## 2021-08-25 PROCEDURE — G0378 HOSPITAL OBSERVATION PER HR: HCPCS

## 2021-08-25 PROCEDURE — 84484 ASSAY OF TROPONIN QUANT: CPT | Performed by: EMERGENCY MEDICINE

## 2021-08-25 PROCEDURE — 80307 DRUG TEST PRSMV CHEM ANLYZR: CPT | Performed by: NURSE PRACTITIONER

## 2021-08-25 PROCEDURE — 85730 THROMBOPLASTIN TIME PARTIAL: CPT | Performed by: EMERGENCY MEDICINE

## 2021-08-25 PROCEDURE — 25010000002 FUROSEMIDE PER 20 MG: Performed by: NURSE PRACTITIONER

## 2021-08-25 PROCEDURE — 25010000002 FUROSEMIDE PER 20 MG: Performed by: EMERGENCY MEDICINE

## 2021-08-25 PROCEDURE — 84484 ASSAY OF TROPONIN QUANT: CPT | Performed by: NURSE PRACTITIONER

## 2021-08-25 PROCEDURE — 93005 ELECTROCARDIOGRAM TRACING: CPT | Performed by: EMERGENCY MEDICINE

## 2021-08-25 PROCEDURE — 96376 TX/PRO/DX INJ SAME DRUG ADON: CPT

## 2021-08-25 PROCEDURE — C9803 HOPD COVID-19 SPEC COLLECT: HCPCS

## 2021-08-25 PROCEDURE — 87635 SARS-COV-2 COVID-19 AMP PRB: CPT | Performed by: EMERGENCY MEDICINE

## 2021-08-25 PROCEDURE — 85610 PROTHROMBIN TIME: CPT | Performed by: EMERGENCY MEDICINE

## 2021-08-25 PROCEDURE — 93005 ELECTROCARDIOGRAM TRACING: CPT

## 2021-08-25 PROCEDURE — 80053 COMPREHEN METABOLIC PANEL: CPT | Performed by: EMERGENCY MEDICINE

## 2021-08-25 PROCEDURE — 99285 EMERGENCY DEPT VISIT HI MDM: CPT

## 2021-08-25 PROCEDURE — 71045 X-RAY EXAM CHEST 1 VIEW: CPT

## 2021-08-25 PROCEDURE — 83880 ASSAY OF NATRIURETIC PEPTIDE: CPT | Performed by: EMERGENCY MEDICINE

## 2021-08-25 PROCEDURE — 99220 PR INITIAL OBSERVATION CARE/DAY 70 MINUTES: CPT | Performed by: NURSE PRACTITIONER

## 2021-08-25 PROCEDURE — 96365 THER/PROPH/DIAG IV INF INIT: CPT

## 2021-08-25 PROCEDURE — 85025 COMPLETE CBC W/AUTO DIFF WBC: CPT | Performed by: EMERGENCY MEDICINE

## 2021-08-25 PROCEDURE — 83735 ASSAY OF MAGNESIUM: CPT | Performed by: EMERGENCY MEDICINE

## 2021-08-25 PROCEDURE — 96375 TX/PRO/DX INJ NEW DRUG ADDON: CPT

## 2021-08-25 RX ORDER — ASPIRIN 81 MG/1
81 TABLET ORAL DAILY
Status: DISCONTINUED | OUTPATIENT
Start: 2021-08-25 | End: 2021-08-26 | Stop reason: HOSPADM

## 2021-08-25 RX ORDER — ALUMINA, MAGNESIA, AND SIMETHICONE 2400; 2400; 240 MG/30ML; MG/30ML; MG/30ML
15 SUSPENSION ORAL EVERY 6 HOURS PRN
Status: DISCONTINUED | OUTPATIENT
Start: 2021-08-25 | End: 2021-08-26 | Stop reason: HOSPADM

## 2021-08-25 RX ORDER — LOSARTAN POTASSIUM 25 MG/1
25 TABLET ORAL DAILY
Status: DISCONTINUED | OUTPATIENT
Start: 2021-08-25 | End: 2021-08-26 | Stop reason: HOSPADM

## 2021-08-25 RX ORDER — ONDANSETRON 4 MG/1
4 TABLET, FILM COATED ORAL EVERY 6 HOURS PRN
Status: DISCONTINUED | OUTPATIENT
Start: 2021-08-25 | End: 2021-08-26 | Stop reason: HOSPADM

## 2021-08-25 RX ORDER — SPIRONOLACTONE 25 MG/1
12.5 TABLET ORAL DAILY
Status: DISCONTINUED | OUTPATIENT
Start: 2021-08-25 | End: 2021-08-26 | Stop reason: HOSPADM

## 2021-08-25 RX ORDER — SODIUM CHLORIDE 0.9 % (FLUSH) 0.9 %
10 SYRINGE (ML) INJECTION EVERY 12 HOURS SCHEDULED
Status: DISCONTINUED | OUTPATIENT
Start: 2021-08-25 | End: 2021-08-26 | Stop reason: HOSPADM

## 2021-08-25 RX ORDER — SODIUM CHLORIDE 0.9 % (FLUSH) 0.9 %
10 SYRINGE (ML) INJECTION AS NEEDED
Status: DISCONTINUED | OUTPATIENT
Start: 2021-08-25 | End: 2021-08-26 | Stop reason: HOSPADM

## 2021-08-25 RX ORDER — CHOLECALCIFEROL (VITAMIN D3) 125 MCG
5 CAPSULE ORAL NIGHTLY PRN
Status: DISCONTINUED | OUTPATIENT
Start: 2021-08-25 | End: 2021-08-26 | Stop reason: HOSPADM

## 2021-08-25 RX ORDER — MAGNESIUM SULFATE HEPTAHYDRATE 40 MG/ML
2 INJECTION, SOLUTION INTRAVENOUS AS NEEDED
Status: DISCONTINUED | OUTPATIENT
Start: 2021-08-25 | End: 2021-08-26 | Stop reason: HOSPADM

## 2021-08-25 RX ORDER — ACETAMINOPHEN 650 MG/1
650 SUPPOSITORY RECTAL EVERY 4 HOURS PRN
Status: DISCONTINUED | OUTPATIENT
Start: 2021-08-25 | End: 2021-08-26 | Stop reason: HOSPADM

## 2021-08-25 RX ORDER — MAGNESIUM SULFATE 1 G/100ML
1 INJECTION INTRAVENOUS AS NEEDED
Status: DISCONTINUED | OUTPATIENT
Start: 2021-08-25 | End: 2021-08-26 | Stop reason: HOSPADM

## 2021-08-25 RX ORDER — ONDANSETRON 2 MG/ML
4 INJECTION INTRAMUSCULAR; INTRAVENOUS EVERY 6 HOURS PRN
Status: DISCONTINUED | OUTPATIENT
Start: 2021-08-25 | End: 2021-08-26 | Stop reason: HOSPADM

## 2021-08-25 RX ORDER — NITROGLYCERIN 0.4 MG/1
0.4 TABLET SUBLINGUAL
Status: DISCONTINUED | OUTPATIENT
Start: 2021-08-25 | End: 2021-08-26 | Stop reason: HOSPADM

## 2021-08-25 RX ORDER — FUROSEMIDE 10 MG/ML
40 INJECTION INTRAMUSCULAR; INTRAVENOUS ONCE
Status: COMPLETED | OUTPATIENT
Start: 2021-08-25 | End: 2021-08-25

## 2021-08-25 RX ORDER — ACETAMINOPHEN 160 MG/5ML
650 SOLUTION ORAL EVERY 4 HOURS PRN
Status: DISCONTINUED | OUTPATIENT
Start: 2021-08-25 | End: 2021-08-26 | Stop reason: HOSPADM

## 2021-08-25 RX ORDER — FUROSEMIDE 10 MG/ML
40 INJECTION INTRAMUSCULAR; INTRAVENOUS EVERY 8 HOURS
Status: DISCONTINUED | OUTPATIENT
Start: 2021-08-25 | End: 2021-08-26 | Stop reason: HOSPADM

## 2021-08-25 RX ORDER — CARVEDILOL 6.25 MG/1
6.25 TABLET ORAL 2 TIMES DAILY WITH MEALS
Status: DISCONTINUED | OUTPATIENT
Start: 2021-08-25 | End: 2021-08-26 | Stop reason: HOSPADM

## 2021-08-25 RX ORDER — POTASSIUM CHLORIDE 20 MEQ/1
40 TABLET, EXTENDED RELEASE ORAL AS NEEDED
Status: DISCONTINUED | OUTPATIENT
Start: 2021-08-25 | End: 2021-08-26 | Stop reason: HOSPADM

## 2021-08-25 RX ORDER — ATORVASTATIN CALCIUM 10 MG/1
10 TABLET, FILM COATED ORAL NIGHTLY
Status: DISCONTINUED | OUTPATIENT
Start: 2021-08-25 | End: 2021-08-26 | Stop reason: HOSPADM

## 2021-08-25 RX ORDER — ACETAMINOPHEN 325 MG/1
650 TABLET ORAL EVERY 4 HOURS PRN
Status: DISCONTINUED | OUTPATIENT
Start: 2021-08-25 | End: 2021-08-26 | Stop reason: HOSPADM

## 2021-08-25 RX ADMIN — ASPIRIN 81 MG: 81 TABLET, COATED ORAL at 08:42

## 2021-08-25 RX ADMIN — FUROSEMIDE 40 MG: 10 INJECTION, SOLUTION INTRAMUSCULAR; INTRAVENOUS at 06:01

## 2021-08-25 RX ADMIN — Medication 10 ML: at 21:04

## 2021-08-25 RX ADMIN — ATORVASTATIN CALCIUM 10 MG: 10 TABLET, FILM COATED ORAL at 21:04

## 2021-08-25 RX ADMIN — RIVAROXABAN 20 MG: 20 TABLET, FILM COATED ORAL at 17:17

## 2021-08-25 RX ADMIN — MAGNESIUM SULFATE HEPTAHYDRATE 1 G: 1 INJECTION, SOLUTION INTRAVENOUS at 11:33

## 2021-08-25 RX ADMIN — SPIRONOLACTONE 12.5 MG: 25 TABLET ORAL at 08:42

## 2021-08-25 RX ADMIN — CARVEDILOL 6.25 MG: 6.25 TABLET, FILM COATED ORAL at 17:17

## 2021-08-25 RX ADMIN — LOSARTAN POTASSIUM 25 MG: 25 TABLET, FILM COATED ORAL at 08:42

## 2021-08-25 RX ADMIN — FUROSEMIDE 40 MG: 10 INJECTION, SOLUTION INTRAMUSCULAR; INTRAVENOUS at 14:12

## 2021-08-25 RX ADMIN — Medication 10 ML: at 14:12

## 2021-08-25 RX ADMIN — NITROGLYCERIN 0.4 MG: 0.4 TABLET SUBLINGUAL at 06:50

## 2021-08-25 NOTE — OUTREACH NOTE
COPD/PN Week 3 Survey      Responses   [facility] facility patient discharged from?  [facility]   Does the patient have one of the following disease processes/diagnoses(primary or secondary)?  COPD/Pneumonia   Was the primary reason for admission:  Pneumonia   Week 3 attempt successful?  No   Revoke  Readmitted          Sherlyn Iyer RN

## 2021-08-25 NOTE — H&P
[facility] Medicine Services      Patient Name: Thony Dumont  : 1960  MRN: [id number]  Primary Care Physician:  Rose Rao APRN  Date of admission: 2021      Subjective      Chief Complaint:  Shortness of breath    History of Present Illness: Thony Dumont is a 61 y.o. male with a history of CHF, NICM, ICD placement, pulmonary hypertension, valvular heart disease, paroxysmal a-fib, CKD stage 2, HTN, and HLD who presented to [facility] ED on 2021 complaining of shortness of breath. The patient reports increased swelling of his legs. He states he is compliant with his home medications, but he is lethargic and keeps falling asleep during my interview. He reports some left-sided chest pain. He denies any exacerbating or alleviating factors. He denies any other complaints.     Of note, the patient is reportedly homeless and living in his car.     Workup in the ER revealed acute CHF exacerbation. The patient was given Lasix 40 mg IV and admitted to the Hospitalist team for further treatment.       Review of Systems   Cardiovascular: Positive for chest pain and leg swelling.   Respiratory: Positive for shortness of breath.    All other systems reviewed and are negative.       Personal History     Past Medical History:   Diagnosis Date   • Acute on chronic congestive heart failure (CMS/HCC) 2021   • Afib (CMS/HCC)    • Chronic pain    • CKD (chronic kidney disease) stage 2, GFR 60-89 ml/min 2021   • Combined systolic and diastolic congestive heart failure (CMS/HCC)    • Degenerative joint disease    • Erectile dysfunction    • Essential hypertension    • Former smoker    • Gastroesophageal reflux disease    • Homeless 2021    living in his car   • ICD (implantable cardioverter-defibrillator) in place 06/15/2021   • Mixed hyperlipidemia    • Moderate mitral regurgitation    • Moderate tricuspid regurgitation    • NICM (nonischemic  "cardiomyopathy) (CMS/[facility]) 06/10/2021   • Paroxysmal atrial fibrillation (CMS/[facility])    • Pleural effusion 8/9/2021   • Pulmonary hypertension (CMS/[facility])    • Sprain of rotator cuff capsule 3/14/2014   • Substance abuse (CMS/[facility])    • Valvular heart disease        Past Surgical History:   Procedure Laterality Date   • CARDIAC DEFIBRILLATOR PLACEMENT     • CARDIAC ELECTROPHYSIOLOGY PROCEDURE Left 6/15/2021    Procedure: Device Implant;  Surgeon: Michael Rollins MD;  Location: [facility] INVASIVE LOCATION;  Service: Cardiovascular;  Laterality: Left;   • HERNIA REPAIR         Family History: family history includes Cancer in his mother. Otherwise pertinent FHx was reviewed and not pertinent to current issue.    Social History:  reports that he quit smoking about 2 months ago. His smoking use included cigarettes. He smoked 0.50 packs per day. He has never used smokeless tobacco. He reports current alcohol use of about 7.0 standard drinks of alcohol per week. He reports previous drug use. Drugs: Marijuana and \"Crack\" cocaine.    Home Medications:  Prior to Admission Medications     Prescriptions Last Dose Informant Patient Reported? Taking?    acetaminophen (TYLENOL) 500 MG tablet   No Yes    Take 2 tablets by mouth Every 6 (Six) Hours As Needed for Mild Pain  or Moderate Pain .    aspirin (aspirin) 81 MG EC tablet  Self Yes Yes    Take 81 mg by mouth Daily.    atorvastatin (Lipitor) 10 MG tablet   No Yes    Take 1 tablet by mouth Daily.    carvedilol (COREG) 6.25 MG tablet   No Yes    Take 1 tablet by mouth 2 (Two) Times a Day With Meals for 30 days.    furosemide (LASIX) 40 MG tablet   No Yes    Take 1.5 tablets by mouth 2 (Two) Times a Day for 30 days.    losartan (COZAAR) 25 MG tablet  Self Yes Yes    Take 25 mg by mouth Daily.    metoprolol tartrate (LOPRESSOR) 25 MG tablet   Yes Yes    Take  by mouth.    rivaroxaban (Xarelto) 20 MG tablet  Self No Yes    Take 1 tablet by mouth Daily.    spironolactone (Aldactone) " 25 MG tablet   No Yes    Take one-half tablet by mouth Daily for 30 days.            Allergies:  Allergies   Allergen Reactions   • Aspirin Swelling   • Penicillins Swelling   • Pork Allergy Unknown - High Severity       Objective      Vitals:   Temp:  [98.5 °F (36.9 °C)] 98.5 °F (36.9 °C)  Heart Rate:  [77-98] 92  Resp:  [15-18] 16  BP: (109-141)/() 109/66  Flow (L/min):  [3] 3    Physical Exam  Vitals reviewed.   HENT:      Head: Normocephalic.   Eyes:      Extraocular Movements: Extraocular movements intact.      Conjunctiva/sclera: Conjunctivae normal.      Pupils: Pupils are equal, round, and reactive to light.   Cardiovascular:      Rate and Rhythm: Normal rate and regular rhythm.      Pulses:           Dorsalis pedis pulses are 1+ on the right side and 1+ on the left side.        Posterior tibial pulses are 1+ on the right side and 1+ on the left side.      Heart sounds: Murmur heard.     Pulmonary:      Effort: Pulmonary effort is normal.      Breath sounds: Rales present.      Comments: O2 @ 3 L per NC  Abdominal:      General: Bowel sounds are normal. There is no distension.      Palpations: Abdomen is soft.      Tenderness: There is no abdominal tenderness.   Musculoskeletal:         General: Normal range of motion.      Cervical back: Normal range of motion.      Right lower le+ Pitting Edema present.      Left lower le+ Pitting Edema present.   Neurological:      Mental Status: He is lethargic.      Comments: Oriented to person and place          Result Review    Result Review:  I have personally reviewed the results from the time of this admission to 2021 11:12 EDT and agree with these findings:  [x]  Laboratory  []  Microbiology  [x]  Radiology  [x]  EKG/Telemetry   []  Cardiology/Vascular   []  Pathology  [x]  Old records  []  Other:    Most notable findings include:   Cr 1.41, alk phos 166, bilirubin 2.0, proBNP 1,671, troponin <0.010, INR 1.32    EKG:  SR, abnormal T waves in  lateral leads    CXR:    1. Right greater than left basilar airspace disease may represent atelectasis or developing pneumonia. Right basilar airspace disease appears increased compared to 8/9/2021.  2. Small  right pleural effusion.  3. Stable cardiac megaly.      Assessment/Plan        Active Hospital Problems:  Active Hospital Problems    Diagnosis    • **Acute on chronic combined systolic and diastolic congestive heart failure (CMS/HCC)    • Shortness of breath    • Bilateral lower extremity edema    • ICD (implantable cardioverter-defibrillator) in place    • Moderate mitral regurgitation    • Moderate tricuspid regurgitation    • Pulmonary hypertension (CMS/HCC)    • CKD (chronic kidney disease) stage 2, GFR 60-89 ml/min    • NICM (nonischemic cardiomyopathy) (CMS/HCC)    • Essential hypertension    • Mixed hyperlipidemia    • Paroxysmal atrial fibrillation (CMS/HCC)      Plan:     Acute on chronic combined systolic and diastolic congestive heart failure secondary to NICM (nonischemic cardiomyopathy), ICD (implantable cardioverter-defibrillator) in place  Bilateral lower extremity edema  Shortness of breath  -EF 25%, grade III diastolic dysfunction per 2D echo (06/06/21)  -patient reports compliance with home medication  -given Lasix 40 mg IV in ER; continue q8h and hold PO Lasix  -continue BB, ARB, and spironolactone  -2 g Na diet  -consult HF Nurse Navigator and cardiac rehab  -monitor I&Os and daily weight   -check serial troponin  -continuous cardiac monitoring  -supplemental O2 PRN to keep sat >92%    Valvular heart disease  -moderate MR and moderate TR per 2D echo (06/06/21)    Pulmonary hypertension  -mild to moderate per 2D echo (06/06/21)    Essential hypertension, chronic  -continue carvedilol, Lasix, losartan, and spironolactone   -monitor BP    Mixed hyperlipidemia  -continue statin     Paroxysmal atrial fibrillation  -currently SR  -continue BB and Xarelto      CKD (chronic kidney disease) stage  2, GFR 60-89 ml/min  -stable  -monitor BMP    History of substance abuse  History of tobacco abuse        DVT prophylaxis:  Medical DVT prophylaxis orders are present.    CODE STATUS:    Code Status: CPR  Medical Interventions (Level of Support Prior to Arrest): Full    Admission Status:  I believe this patient meets observation status.    I discussed the patient's findings and my recommendations with patient.    This patient has been examined wearing appropriate Personal Protective Equipment. 08/25/21      Signature: Electronically signed by CORTEZ Nair, 08/25/21, 3:06 PM EDT.

## 2021-08-25 NOTE — CONSULTS
Pt seen and given Cardiac Rehab brochure and informed of program information, also given handouts on HTN, HHD, Stress, Triglycerides, Cholesterol, and Anti platelets.

## 2021-08-25 NOTE — CASE MANAGEMENT/SOCIAL WORK
Discharge Planning Assessment  [facility]     Patient Name: Thony Dumont  MRN: [id number]  Today's Date: 8/25/2021    Admit Date: 8/25/2021    Discharge Needs Assessment     Row Name 08/25/21 1308       Living Environment    Lives With  alone    Current Living Arrangements  homeless    Potentially Unsafe Housing Conditions  no air conditioning;no indoor plumbing    Primary Care Provided by  self    Provides Primary Care For  no one    Family Caregiver if Needed  none       Resource/Environmental Concerns    Resource/Environmental Concerns  other (see comments) housing       Discharge Needs Assessment    Equipment Currently Used at Home  none    Concerns to be Addressed  homelessness;financial/insurance    Equipment Needed After Discharge  none    Current Discharge Risk  chronically ill;financial support inadequate;homeless;lack of support system/caregiver        Discharge Plan     Row Name 08/25/21 1301       Plan    Plan  Return to vehicle    Patient/Family in Agreement with Plan  yes    Plan Comments  Pt reports he lives in his van.Reports Denisa Hollins allows him to park van in her yard & use her bathroom when she is home.He reports van currently doesn't have air conditioning. He states he has a fridge,hotplate and convection oven he uses to fix food.He obtains food at food pantries.He confirms his PCP is Rose Rao.He reports he doesn't have money to get his prescriptions,but gets them filled through [facility] prior to dc.Reports he has a telephone call for a disability hearing on 8/27/21.He is hoping he will then have money for rent.He does not want to go to a shelter at discharge.He denies he has anyone who can  help him.Pt provided [place] [facility] Directory & referral made to  [facility].        Continued Care and Services - Admitted Since 8/25/2021    Coordination has not been started for this encounter.         Demographic Summary     Row Name 08/25/21 1300        General Information    Admission Type  observation    Arrived From  other (see comments) homeless    Referral Source  admission list;high risk screening;hospital clinician/department    Reason for Consult  community resources;discharge planning;housing concerns/homeless    Preferred Language  English     Used During This Interaction  no    General Information Comments  I met with patient in room wearing PPE: mask and goggles. Maintained distance greater than six feet and spent 15 minutes in the room       Contact Information    Permission Granted to Share Info With  ;other (see comments)     Contact Information Obtained for          Functional Status     Row Name 08/25/21 3922       Functional Status    Usual Activity Tolerance  good    Current Activity Tolerance  good       Functional Status, IADL    IADL Comments  Patient reports independence with ADLs        Letty Valdez RN, [facility]  Office: [phone]  Fax: [phone]  [email or web address]        Letty Valdez RN

## 2021-08-25 NOTE — ED PROVIDER NOTES
Subjective   Chief complaint: Patient has had progressive worsening shortness of breath over the last few days.  Does have a history of congestive heart failure.  He feels like his legs have been swelling more at this point time.  He denies chest pain.    Context: Patient with a history of congestive heart failure.  He feels like he is having same worsening symptoms.    Duration: 2 days    Timing: Progressive    Severity: States his symptoms are severe    Associated Symptoms: Negative except as noted above.  Appropriate PPE was used.        PCP:            Review of Systems   Constitutional: Negative for fever.   HENT: Negative.    Respiratory: Positive for shortness of breath.    Cardiovascular: Positive for leg swelling. Negative for chest pain.   Gastrointestinal: Negative for abdominal pain.   All other systems reviewed and are negative.      Past Medical History:   Diagnosis Date   • Afib (CMS/HCC)    • CHF (congestive heart failure) (CMS/HCC)    • Hypertension        Allergies   Allergen Reactions   • Aspirin Swelling   • Penicillins Swelling       Past Surgical History:   Procedure Laterality Date   • CARDIAC DEFIBRILLATOR PLACEMENT     • CARDIAC ELECTROPHYSIOLOGY PROCEDURE Left 6/15/2021    Procedure: Device Implant;  Surgeon: Michael Rollins MD;  Location: [facility] INVASIVE LOCATION;  Service: Cardiovascular;  Laterality: Left;   • HERNIA REPAIR         Family History   Problem Relation Age of Onset   • Cancer Mother        Social History     Socioeconomic History   • Marital status: Single     Spouse name: Not on file   • Number of children: Not on file   • Years of education: Not on file   • Highest education level: Not on file   Tobacco Use   • Smoking status: Former Smoker     Packs/day: 0.50     Types: Cigarettes     Quit date: 2021     Years since quittin.2   • Smokeless tobacco: Never Used   Substance and Sexual Activity   • Alcohol use: Yes     Alcohol/week: 7.0 standard drinks      "Types: 7 Cans of beer per week     Comment: 1 beer a day   • Drug use: Yes     Types: Marijuana, \"Crack\" cocaine     Comment: 6/15/21 report cocaine in 2018, marijuana 6/14/2021 @0050           Objective   Physical Exam  Vitals and nursing note reviewed.   Constitutional:       Appearance: He is well-developed.   HENT:      Head: Normocephalic and atraumatic.   Eyes:      Extraocular Movements: Extraocular movements intact.      Pupils: Pupils are equal, round, and reactive to light.   Cardiovascular:      Rate and Rhythm: Normal rate and regular rhythm.   Pulmonary:      Effort: Pulmonary effort is normal.      Breath sounds: Examination of the right-lower field reveals rales. Examination of the left-lower field reveals rales. Decreased breath sounds and rales present.   Abdominal:      Palpations: Abdomen is soft.      Tenderness: There is no abdominal tenderness.   Musculoskeletal:         General: Normal range of motion.      Cervical back: Neck supple.   Skin:     General: Skin is warm and dry.      Capillary Refill: Capillary refill takes less than 2 seconds.   Neurological:      General: No focal deficit present.      Mental Status: He is alert and oriented to person, place, and time.   Psychiatric:         Mood and Affect: Mood normal.         Behavior: Behavior normal.         Procedures           ED Course            Results for orders placed or performed during the hospital encounter of 08/25/21   COVID-19,CEPHEID/DIEGO/BDMAX,COR/MANOJ/PAD/CHELLY IN-HOUSE(OR EMERGENT/ADD-ON),NP SWAB IN TRANSPORT MEDIA 3-4 HR TAT, RT-PCR - Swab, Nasopharynx    Specimen: Nasopharynx; Swab   Result Value Ref Range    COVID19 Not Detected Not Detected - Ref. Range   Comprehensive Metabolic Panel    Specimen: Blood   Result Value Ref Range    Glucose 75 65 - 99 mg/dL    BUN 19 6 - 20 mg/dL    Creatinine 1.41 (H) 0.76 - 1.27 mg/dL    Sodium 142 136 - 145 mmol/L    Potassium 4.3 3.5 - 5.2 mmol/L    Chloride 107 98 - 107 mmol/L    CO2 " 25.0 22.0 - 29.0 mmol/L    Calcium 9.1 8.6 - 10.5 mg/dL    Total Protein 7.0 6.0 - 8.5 g/dL    Albumin 3.80 3.50 - 5.20 g/dL    ALT (SGPT) 27 1 - 41 U/L    AST (SGOT) 29 1 - 40 U/L    Alkaline Phosphatase 166 (H) 39 - 117 U/L    Total Bilirubin 2.0 (H) 0.0 - 1.2 mg/dL    eGFR  African Amer 62 >60 mL/min/1.73    Globulin 3.2 gm/dL    A/G Ratio 1.2 g/dL    BUN/Creatinine Ratio 13.5 7.0 - 25.0    Anion Gap 10.0 5.0 - 15.0 mmol/L   Protime-INR    Specimen: Blood   Result Value Ref Range    Protime 14.4 (H) 9.6 - 11.7 Seconds    INR 1.32 (H) 0.93 - 1.10   aPTT    Specimen: Blood   Result Value Ref Range    PTT 29.3 24.0 - 31.0 seconds   Troponin    Specimen: Blood   Result Value Ref Range    Troponin T <0.010 0.000 - 0.030 ng/mL   BNP    Specimen: Blood   Result Value Ref Range    proBNP 1,671.0 (H) 0.0 - 900.0 pg/mL   Magnesium    Specimen: Blood   Result Value Ref Range    Magnesium 1.8 1.6 - 2.6 mg/dL   CBC Auto Differential    Specimen: Blood   Result Value Ref Range    WBC 4.70 3.40 - 10.80 10*3/mm3    RBC 4.67 4.14 - 5.80 10*6/mm3    Hemoglobin 14.8 13.0 - 17.7 g/dL    Hematocrit 43.7 37.5 - 51.0 %    MCV 93.7 79.0 - 97.0 fL    MCH 31.8 26.6 - 33.0 pg    MCHC 34.0 31.5 - 35.7 g/dL    RDW 16.7 (H) 12.3 - 15.4 %    RDW-SD 53.8 37.0 - 54.0 fl    MPV 8.9 6.0 - 12.0 fL    Platelets 195 140 - 450 10*3/mm3    Neutrophil % 51.7 42.7 - 76.0 %    Lymphocyte % 29.8 19.6 - 45.3 %    Monocyte % 13.6 (H) 5.0 - 12.0 %    Eosinophil % 3.7 0.3 - 6.2 %    Basophil % 1.2 0.0 - 1.5 %    Neutrophils, Absolute 2.40 1.70 - 7.00 10*3/mm3    Lymphocytes, Absolute 1.40 0.70 - 3.10 10*3/mm3    Monocytes, Absolute 0.60 0.10 - 0.90 10*3/mm3    Eosinophils, Absolute 0.20 0.00 - 0.40 10*3/mm3    Basophils, Absolute 0.10 0.00 - 0.20 10*3/mm3    nRBC 0.4 (H) 0.0 - 0.2 /100 WBC   ECG 12 Lead   Result Value Ref Range    QT Interval 383 ms     No radiology results for the last day       EKG shows sinus rhythm with left atrial enlargement T wave  changes precordially.  Similar to prior                             MDM  Number of Diagnoses or Management Options  Diagnosis management comments: Patient does appear to have increased fluid in the lower area of the lungs.  He has no fever.  No cough.  This feels like his chronic CHF.  He also has bilateral lower edema.  I will admit as his BNP is elevated for decompensated congestive heart failure.  Patient is okay with staying.       Amount and/or Complexity of Data Reviewed  Clinical lab tests: reviewed  Tests in the medicine section of CPT®: reviewed  Discussion of test results with the performing providers: yes  Discuss the patient with other providers: yes  Independent visualization of images, tracings, or specimens: yes    Risk of Complications, Morbidity, and/or Mortality  Presenting problems: high    Patient Progress  Patient progress: stable      Final diagnoses:   None     CHF  ED Disposition  ED Disposition     None          No follow-up provider specified.       Medication List      No changes were made to your prescriptions during this visit.          Hayden Robertson,   08/25/21 0625       Hayden Robertson DO  09/03/21 0816

## 2021-08-25 NOTE — ED NOTES
Patient reports SOA x3 hours, states he has COPD and CHF and has had increased swelling in his legs over several days. States he is homeless and has a hard time following up with cardiology and PCP. Vitals WDL but placed patient on 3L nasal cannula to promote comfort.     Reina Miller, RN  08/25/21 0252

## 2021-08-26 VITALS
DIASTOLIC BLOOD PRESSURE: 80 MMHG | TEMPERATURE: 97.3 F | RESPIRATION RATE: 18 BRPM | OXYGEN SATURATION: 96 % | WEIGHT: 216.05 LBS | HEIGHT: 75 IN | BODY MASS INDEX: 26.86 KG/M2 | HEART RATE: 79 BPM | SYSTOLIC BLOOD PRESSURE: 113 MMHG

## 2021-08-26 PROBLEM — R06.02 SHORTNESS OF BREATH: Status: RESOLVED | Noted: 2021-08-25 | Resolved: 2021-08-26

## 2021-08-26 PROBLEM — R60.0 BILATERAL LOWER EXTREMITY EDEMA: Status: RESOLVED | Noted: 2021-08-25 | Resolved: 2021-08-26

## 2021-08-26 PROBLEM — I50.43 ACUTE ON CHRONIC COMBINED SYSTOLIC AND DIASTOLIC CONGESTIVE HEART FAILURE: Status: RESOLVED | Noted: 2021-08-25 | Resolved: 2021-08-26

## 2021-08-26 LAB
ANION GAP SERPL CALCULATED.3IONS-SCNC: 8 MMOL/L (ref 5–15)
BUN SERPL-MCNC: 20 MG/DL (ref 6–20)
BUN/CREAT SERPL: 13.1 (ref 7–25)
CALCIUM SPEC-SCNC: 8.9 MG/DL (ref 8.6–10.5)
CHLORIDE SERPL-SCNC: 102 MMOL/L (ref 98–107)
CO2 SERPL-SCNC: 30 MMOL/L (ref 22–29)
CREAT SERPL-MCNC: 1.53 MG/DL (ref 0.76–1.27)
GFR SERPL CREATININE-BSD FRML MDRD: 57 ML/MIN/1.73
GLUCOSE SERPL-MCNC: 120 MG/DL (ref 65–99)
MAGNESIUM SERPL-MCNC: 1.7 MG/DL (ref 1.6–2.6)
POTASSIUM SERPL-SCNC: 4.3 MMOL/L (ref 3.5–5.2)
QT INTERVAL: 383 MS
SODIUM SERPL-SCNC: 140 MMOL/L (ref 136–145)

## 2021-08-26 PROCEDURE — 36415 COLL VENOUS BLD VENIPUNCTURE: CPT | Performed by: NURSE PRACTITIONER

## 2021-08-26 PROCEDURE — 96376 TX/PRO/DX INJ SAME DRUG ADON: CPT

## 2021-08-26 PROCEDURE — 80048 BASIC METABOLIC PNL TOTAL CA: CPT | Performed by: NURSE PRACTITIONER

## 2021-08-26 PROCEDURE — 96366 THER/PROPH/DIAG IV INF ADDON: CPT

## 2021-08-26 PROCEDURE — G0378 HOSPITAL OBSERVATION PER HR: HCPCS

## 2021-08-26 PROCEDURE — 25010000002 MAGNESIUM SULFATE IN D5W 1G/100ML (PREMIX) 1-5 GM/100ML-% SOLUTION: Performed by: NURSE PRACTITIONER

## 2021-08-26 PROCEDURE — 25010000002 FUROSEMIDE PER 20 MG: Performed by: NURSE PRACTITIONER

## 2021-08-26 PROCEDURE — 83735 ASSAY OF MAGNESIUM: CPT | Performed by: NURSE PRACTITIONER

## 2021-08-26 PROCEDURE — 99217 PR OBSERVATION CARE DISCHARGE MANAGEMENT: CPT | Performed by: INTERNAL MEDICINE

## 2021-08-26 RX ORDER — FUROSEMIDE 40 MG/1
60 TABLET ORAL 2 TIMES DAILY
Qty: 90 TABLET | Refills: 0 | Status: SHIPPED | OUTPATIENT
Start: 2021-08-26 | End: 2021-09-20 | Stop reason: SDUPTHER

## 2021-08-26 RX ADMIN — SPIRONOLACTONE 12.5 MG: 25 TABLET ORAL at 08:46

## 2021-08-26 RX ADMIN — ASPIRIN 81 MG: 81 TABLET, COATED ORAL at 08:46

## 2021-08-26 RX ADMIN — MAGNESIUM SULFATE HEPTAHYDRATE 1 G: 1 INJECTION, SOLUTION INTRAVENOUS at 06:29

## 2021-08-26 RX ADMIN — LOSARTAN POTASSIUM 25 MG: 25 TABLET, FILM COATED ORAL at 08:46

## 2021-08-26 RX ADMIN — FUROSEMIDE 40 MG: 10 INJECTION, SOLUTION INTRAMUSCULAR; INTRAVENOUS at 05:00

## 2021-08-26 RX ADMIN — Medication 10 ML: at 08:46

## 2021-08-26 RX ADMIN — CARVEDILOL 6.25 MG: 6.25 TABLET, FILM COATED ORAL at 08:46

## 2021-08-26 NOTE — PLAN OF CARE
Goal Outcome Evaluation:              Outcome Summary: Pt continues resting in bed with no issues noted.  Refused night lasix dose  Will continue to monitor

## 2021-08-26 NOTE — PROGRESS NOTES
"Heart Failure Program  Nurse Navigator  Discharge Planning    Patient Name:Thony Dumont  :1960  Cardiologist: None  Current Admission Date: 2021   Previous Admission:  2021  Admission frequency: 2  admissions in 6 months    Heart Failure history per record:    Symptoms on admission: SOA and increase leg edema, he was recently here for the same reason and was discharged,        Admission Weight:  Flowsheet Rows      First Filed Value   Admission Height  190.5 cm (75\") Documented at 2021 0015   Admission Weight  98 kg (216 lb 0.8 oz) Documented at 2021 0015            Current Home Medications:  Prior to Admission medications    Medication Sig Start Date End Date Taking? Authorizing Provider   acetaminophen (TYLENOL) 500 MG tablet Take 2 tablets by mouth Every 6 (Six) Hours As Needed for Mild Pain  or Moderate Pain . 21  Yes Del Roberts DO   aspirin (aspirin) 81 MG EC tablet Take 81 mg by mouth Daily.   Yes Shane Garcia MD   atorvastatin (Lipitor) 10 MG tablet Take 1 tablet by mouth Daily. 21  Yes Del Roberts DO   carvedilol (COREG) 6.25 MG tablet Take 1 tablet by mouth 2 (Two) Times a Day With Meals for 30 days. 21 Yes Del Roberts DO   losartan (COZAAR) 25 MG tablet Take 25 mg by mouth Daily. 21  Yes Shane Garcia MD   rivaroxaban (Xarelto) 20 MG tablet Take 1 tablet by mouth Daily. 21  Yes Michael Rollins MD   spironolactone (Aldactone) 25 MG tablet Take one-half tablet by mouth Daily for 30 days. 21 Yes Del Roberts DO   furosemide (LASIX) 40 MG tablet Take 1.5 tablets by mouth 2 (Two) Times a Day for 30 days. 21 Yes Del Roberts DO   furosemide (LASIX) 40 MG tablet Take 1.5 tablets by mouth 2 (Two) Times a Day for 30 days. 21  Bright Green DO       Social history:   Patient currently is living in his van and parking it in a friend's driveway, he was provided with " medications from meds to bed after his last admission, he reports compliance but was refusing some medications here,     Smoking status: NA     Diagnostics Testing:  proBNP level:      Echocardiogram:Results for orders placed during the hospital encounter of 06/04/21    Adult Transthoracic Echo Complete W/ Cont if Necessary Per Protocol    Interpretation Summary  · Left atrial volume is severely increased.  · The left ventricular cavity is moderately dilated.  · Left ventricular wall thickness is consistent with mild to moderate concentric hypertrophy.  · Mild dilation of the aortic root is present. Mild dilation of the sinuses of Valsalva is present.  · Moderate tricuspid valve regurgitation is present.  · Estimated right ventricular systolic pressure from tricuspid regurgitation is mildly elevated (35-45 mmHg).  · The right atrial cavity is severely dilated.  · The right ventricular cavity is moderately dilated.  · Moderately reduced right ventricular systolic function noted.  · Left ventricular diastolic function is consistent with (grade III w/high LAP) reversible restrictive pattern.  · Estimated left ventricular EF = 25% Estimated left ventricular EF was in disagreement with the calculated left ventricular EF. Left ventricular ejection fraction appears to be 21 - 25%. Left ventricular systolic function is moderately decreased.  · Abnormal mitral valve structure consistent with dilated annulus.  · Moderate mitral valve regurgitation is present with a centrally-directed jet noted.  · Mild to moderate pulmonary hypertension is present.        Patient Assessment:   Patient was resting in bed reports he is feeling very sore and tired, no distress, edema to feet and legs 2+     Current O2:  RA  Home O2:     Education provided to patient:  yes- Heart Failure disease education  yes -Symptom identification/management  yes -Daily Weights  yes- Diet education  yes- Fluid restriction (if ordered)  yes- Activity  education  yes- Medication education  NA- Smoking cessation  yes- Follow-up Appointments  yes-Provided information on how to access [facility] My HF Guide/Heart Failure Interactive workbook    Acceptance of learning: Patient was accepting     Heart Failure education interactive teaching session time: 25 minutes    Identified needs/barriers:   Compliance         Intervention:

## 2021-08-26 NOTE — DISCHARGE SUMMARY
Date of Admission: 8/25/2021    Date of Discharge:  8/26/2021    Length of stay:  LOS: 0 days     Presenting Problem:   Congestive heart failure, unspecified HF chronicity, unspecified heart failure type (CMS/HCC) [I50.9]      Active Diagnosis During Hospital Stay/Discharge Diagnoses/Course by Diagnoses:     Acute on chronic combined systolic and diastolic congestive heart failure secondary to NICM (nonischemic cardiomyopathy), ICD (implantable cardioverter-defibrillator) in place  Bilateral lower extremity edema  Shortness of breath  -EF 25%, grade III diastolic dysfunction per 2D echo (06/06/21)  -likely related to non-compliance at home  -CXR showed right atelectasis and small right effusion  -resume home lasix  -continue BB, ARB, and spironolactone     Valvular heart disease  -moderate MR and moderate TR per 2D echo (06/06/21)     Pulmonary hypertension  -mild to moderate per 2D echo (06/06/21)     Essential hypertension, chronic  -continue carvedilol, Lasix, losartan, and spironolactone   -monitor BP     Mixed hyperlipidemia  -continue statin      Paroxysmal atrial fibrillation  -currently SR  -continue BB and Xarelto      CKD (chronic kidney disease) stage 3a, GFR 60-89 ml/min  -stable and at baseline  -monitor BMP     History of substance abuse  History of tobacco abuse    Active Hospital Problems    Diagnosis  POA   • ICD (implantable cardioverter-defibrillator) in place [Z95.810]  Yes   • Moderate mitral regurgitation [I34.0]  Yes   • Moderate tricuspid regurgitation [I07.1]  Yes   • Pulmonary hypertension (CMS/HCC) [I27.20]  Yes   • CKD (chronic kidney disease) stage 2, GFR 60-89 ml/min [N18.2]  Yes   • NICM (nonischemic cardiomyopathy) (CMS/HCC) [I42.8]  Yes   • Essential hypertension [I10]  Yes   • Mixed hyperlipidemia [E78.2]  Yes   • Paroxysmal atrial fibrillation (CMS/HCC) [I48.0]  Yes      Resolved Hospital Problems    Diagnosis Date Resolved POA   • **Acute on chronic combined systolic and  diastolic congestive heart failure (CMS/HCC) [I50.43] 08/26/2021 Yes   • Shortness of breath [R06.02] 08/26/2021 Yes   • Bilateral lower extremity edema [R60.0] 08/26/2021 Yes         Hospital Course  Patient is a 61 y.o. male presented with with leg edema and shortness of breath. He is known to service. He has a history of not taking his diuretics as prescribed. He was admitted and with IV diuresis improved, but he did refuse some doses of the lasix while here. He was on room air and felt stable to d/c home however  If he continued to be non-adherent to diuretic regimen he is high risk of re-admission as noted. Per  note he did not want to be placed in shelter at d/c.      Procedures Performed:as ntoed          Consults:   Consults     Date and Time Order Name Status Description    8/25/2021  6:25 AM Hospitalist (on-call MD unless specified) Completed     8/9/2021 10:58 AM Inpatient Cardiology Consult Completed     8/9/2021  9:53 AM Hospitalist (on-call MD unless specified) Completed           Pertinent Test Results:     Results from last 7 days   Lab Units 08/25/21  0320   WBC 10*3/mm3 4.70   HEMOGLOBIN g/dL 14.8   HEMATOCRIT % 43.7   PLATELETS 10*3/mm3 195     Results from last 7 days   Lab Units 08/26/21  0426 08/25/21  0320   SODIUM mmol/L 140 142   POTASSIUM mmol/L 4.3 4.3   CHLORIDE mmol/L 102 107   CO2 mmol/L 30.0* 25.0   BUN mg/dL 20 19   CREATININE mg/dL 1.53* 1.41*   CALCIUM mg/dL 8.9 9.1   BILIRUBIN mg/dL  --  2.0*   ALK PHOS U/L  --  166*   ALT (SGPT) U/L  --  27   AST (SGOT) U/L  --  29   GLUCOSE mg/dL 120* 75       Microbiology Results (last 10 days)     Procedure Component Value - Date/Time    COVID-19,CEPHEID/DIEGO/BDMAX,COR/MANOJ/PAD/CHELLY IN-HOUSE(OR EMERGENT/ADD-ON),NP SWAB IN TRANSPORT MEDIA 3-4 HR TAT, RT-PCR - Swab, Nasopharynx [[id number]]  (Normal) Collected: 08/25/21 0320    Lab Status: Final result Specimen: Swab from Nasopharynx Updated: 08/25/21 0352     COVID19 Not Detected     Narrative:      Fact sheet for providers: https://www.fda.gov/media/400477/download     Fact sheet for patients: https://www.fda.gov/media/449447/download  Fact sheet for providers: https://www.fda.gov/media/131424/download    Fact sheet for patients: https://www.fda.gov/media/735365/download    Test performed by PCR.          Results for orders placed during the hospital encounter of 06/04/21    Adult Transthoracic Echo Complete W/ Cont if Necessary Per Protocol    Interpretation Summary  · Left atrial volume is severely increased.  · The left ventricular cavity is moderately dilated.  · Left ventricular wall thickness is consistent with mild to moderate concentric hypertrophy.  · Mild dilation of the aortic root is present. Mild dilation of the sinuses of Valsalva is present.  · Moderate tricuspid valve regurgitation is present.  · Estimated right ventricular systolic pressure from tricuspid regurgitation is mildly elevated (35-45 mmHg).  · The right atrial cavity is severely dilated.  · The right ventricular cavity is moderately dilated.  · Moderately reduced right ventricular systolic function noted.  · Left ventricular diastolic function is consistent with (grade III w/high LAP) reversible restrictive pattern.  · Estimated left ventricular EF = 25% Estimated left ventricular EF was in disagreement with the calculated left ventricular EF. Left ventricular ejection fraction appears to be 21 - 25%. Left ventricular systolic function is moderately decreased.  · Abnormal mitral valve structure consistent with dilated annulus.  · Moderate mitral valve regurgitation is present with a centrally-directed jet noted.  · Mild to moderate pulmonary hypertension is present.      Imaging Results (All)     Procedure Component Value Units Date/Time    XR Chest 1 View [[id number]] Collected: 08/25/21 0826     Updated: 08/25/21 0830    Narrative:      DATE OF EXAM:  8/25/2021 2:55 AM     PROCEDURE:  XR CHEST 1 VW-      INDICATIONS:  soa       COMPARISON:  AP portable chest 8/9/2021     TECHNIQUE:   Single radiographic view of the chest was obtained.     FINDINGS:  Bibasilar airspace disease is present, right greater than left, slightly  increased compared to 8/9/2021. There may be a small right pleural  effusion, as well. Heart size is enlarged but stable. Pacemaker device  appears unchanged. There is no pneumothorax or acute osseous  abnormalities.       Impression:         1. Right greater than left basilar airspace disease may represent  atelectasis or developing pneumonia. Right basilar airspace disease  appears increased compared to 8/9/2021.  2. Small  right pleural effusion.  3. Stable cardiac megaly.     Electronically Signed By-Teena Tovar MD On:8/25/2021 8:27 AM  This report was finalized on [id number] by  Teena Tovar MD.            Condition on Discharge:  Stable     Vital Signs  Temp:  [97.5 °F (36.4 °C)-98.2 °F (36.8 °C)] 98.2 °F (36.8 °C)  Heart Rate:  [88-96] 96  Resp:  [16-18] 18  BP: (109-132)/(66-96) 117/82    Physical Exam:  Physical Exam  Vitals reviewed.   Cardiovascular:      Rate and Rhythm: Normal rate.   Pulmonary:      Effort: Pulmonary effort is normal.   Abdominal:      Palpations: Abdomen is soft.   Musculoskeletal:      Comments: Mild LE edema bilaterally    Neurological:      Mental Status: He is alert.         Discharge Disposition  Home or Self Care    Discharge Medications     Discharge Medications      Continue These Medications      Instructions Start Date   Acetaminophen Extra Strength 500 MG tablet  Generic drug: acetaminophen   1,000 mg, Oral, Every 6 Hours PRN      aspirin 81 MG EC tablet   81 mg, Oral, Daily      atorvastatin 10 MG tablet  Commonly known as: Lipitor   10 mg, Oral, Daily      carvedilol 6.25 MG tablet  Commonly known as: COREG   6.25 mg, Oral, 2 Times Daily With Meals      furosemide 40 MG tablet  Commonly known as: LASIX   60 mg, Oral, 2 Times Daily       losartan 25 MG tablet  Commonly known as: COZAAR   25 mg, Oral, Daily      spironolactone 25 MG tablet  Commonly known as: Aldactone   Take one-half tablet by mouth Daily for 30 days.      Xarelto 20 MG tablet  Generic drug: rivaroxaban   20 mg, Oral, Daily             Discharge Diet:   Diet Instructions     Diet: Cardiac      Discharge Diet: Cardiac          Activity at Discharge:   Activity Instructions     Gradually Increase Activity Until at Pre-Hospitalization Level            Follow-up Appointments  Future Appointments   Date Time Provider Department Center   9/20/2021  9:15 AM Michael Rollins MD [facility]     Additional Instructions for the Follow-ups that You Need to Schedule     Discharge Follow-up with PCP   As directed       Currently Documented PCP:    Rose Rao APRN    PCP Phone Number:    [phone]     Follow Up Details: one week               Test Results Pending at Discharge       Risk for Readmission (LACE) Score: 11 (8/26/2021  6:01 AM)      This patient has been examined wearing appropriate Personal Protective Equipment . 08/26/21    Electronically signed by Bright Green DO, 08/26/21, 10:48 EDT.

## 2021-08-26 NOTE — CASE MANAGEMENT/SOCIAL WORK
Social Work Assessment  [facility]     Patient Name: Thony Dumont  MRN: [id number]  Today's Date: 8/26/2021    Admit Date: 8/25/2021    Discharge Plan     Row Name 08/26/21 1450       Plan    Plan Comments  Met with patient at bedside to discuss discharge planning. Patient confirmed that he received the [facility] resource from [facility] on 8/25. Patient informed SW he plans to return to his vehicle at discharge. Discussed making a [employer] referral to assist with securing assistance for process of applying to permanent housing, patient agreeable to  making referral and provided verbal consent.     Met with patient in room wearing PPE: mask, face shield/goggles, gloves, gown.      Maintained distance greater than six feet and spent less than 15 minutes in the room.      CORNELL Encarnacion    Phone: [phone]  Cell: [phone]  Fax: [phone]  [email or web address]

## 2021-08-27 ENCOUNTER — READMISSION MANAGEMENT (OUTPATIENT)
Dept: CALL CENTER | Facility: HOSPITAL | Age: 61
End: 2021-08-27

## 2021-08-27 NOTE — OUTREACH NOTE
Prep Survey      Responses   [facility] patient discharged from?  [facility]   Is LACE score < 7 ?  No   Emergency Room discharge w/ pulse ox?  No   Eligibility  Readm Mgmt   Discharge diagnosis  Acute on chronic combined systolic and diastolic congestive heart failure   Does the patient have one of the following disease processes/diagnoses(primary or secondary)?  CHF   Does the patient have Home health ordered?  No   Is there a DME ordered?  No   Comments regarding appointments  Appt needed   General alerts for this patient  A-fib, CKD, ICD, mitral regurg and pulmonary HTN   Prep survey completed?  Yes          Effie Elkins RN

## 2021-08-30 NOTE — CASE MANAGEMENT/SOCIAL WORK
Case Management Discharge Note      Final Note: vehicle         Selected Continued Care - Discharged on 8/26/2021 Admission date: 8/25/2021 - Discharge disposition: Home or Self Care              Final Discharge Disposition Code: 01 - home or self-care

## 2021-09-01 ENCOUNTER — READMISSION MANAGEMENT (OUTPATIENT)
Dept: CALL CENTER | Facility: HOSPITAL | Age: 61
End: 2021-09-01

## 2021-09-01 NOTE — OUTREACH NOTE
CHF Week 1 Survey      Responses   [facility] patient discharged from?  [facility]   Does the patient have one of the following disease processes/diagnoses(primary or secondary)?  CHF   CHF Week 1 attempt successful?  Yes   Call start time  1126   Call end time  1145   Discharge diagnosis  Acute on chronic combined systolic and diastolic congestive heart failure   Is patient permission given to speak with other caregiver?  No   Meds reviewed with patient/caregiver?  Yes   Does the patient have all medications ordered at discharge?  Yes   Is the patient taking all medications as directed (includes completed medication regime)?  Yes   Medication comments  Patient states that he has 7 days worth of his Xarelto. He states that the pharmacist said he would get his med at discharge, but he didn't. Message sent case management.    Does the patient have a primary care provider?   Yes   Does the patient have an appointment with their PCP within 7 days of discharge?  No   Comments regarding PCP  Follow up with CORTEZ Morales, one week   Nursing Interventions  Educated patient on importance of making appointment, Advised patient to make appointment   Has the patient kept scheduled appointments due by today?  N/A   Comments  Follow Up with Michael Rollins MD Monday Sep 20, 2021 9:15 AM   Has home health visited the patient within 72 hours of discharge?  N/A   Pulse Ox monitoring  None   Psychosocial issues?  Yes   Psychosocial comments  Patient lives in his van. Patient remains homeless.    Did the patient receive a copy of their discharge instructions?  Yes   Nursing interventions  Reviewed instructions with patient   What is the patient's perception of their health status since discharge?  Improving   Nursing interventions  Nurse provided patient education   Is the patient weighing daily?  No [Patient states that he can weigh at [employer] or can usually weigh occasionally. Advised to try and at least weigh weekly  and notify cardiology office if gains 5# in a week. ]   Does the patient have scales?  -- [Patient living in his van currently. ]   Daily weight interventions  Education provided on importance of daily weight   Is the patient able to teach back Heart Failure diet management?  Yes [low salt]   Is the patient able to teach back signs and symptoms of worsening condition? (i.e. weight gain, shortness of air, etc.)  Yes   If the patient is a current smoker, are they able to teach back resources for cessation?  Not a smoker   Is the patient/caregiver able to teach back the hierarchy of who to call/visit for symptoms/problems? PCP, Specialist, Home health nurse, Urgent Care, ED, 911  Yes    CHF Week 1 call completed?  Yes          Didi Jasso RN

## 2021-09-08 ENCOUNTER — READMISSION MANAGEMENT (OUTPATIENT)
Dept: CALL CENTER | Facility: HOSPITAL | Age: 61
End: 2021-09-08

## 2021-09-08 NOTE — OUTREACH NOTE
CHF Week 2 Survey      Responses   [facility] patient discharged from?  [facility]   Does the patient have one of the following disease processes/diagnoses(primary or secondary)?  CHF   Week 2 attempt successful?  Yes   Call start time  1208   Call end time  1211   General alerts for this patient  A-fib, CKD, ICD, mitral regurg and pulmonary HTN   Discharge diagnosis  Acute on chronic combined systolic and diastolic congestive heart failure   Meds reviewed with patient/caregiver?  Yes   Is the patient taking all medications as directed (includes completed medication regime)?  Yes   Has the patient kept scheduled appointments due by today?  N/A   Comments  Follow Up with Michael Rollins MD Monday Sep 20, 2021 9:15 AM   Pulse Ox monitoring  None   Psychosocial comments  Patient lives in his van. Patient remains homeless.    Comments  Pt reports LE are swollen as soon as he stands up but resolves after elevating. Denies SOA at present.    What is the patient's perception of their health status since discharge?  Same   Is the patient weighing daily?  No   Does the patient have scales?  No   Is the patient able to teach back Heart Failure Zones?  Yes   Additional teach back comments  Pt reports his disability was approved so he is hoping to have an apt soon, he reports.   CHF Week 2 call completed?  Yes          Digna Fischer RN

## 2021-09-15 ENCOUNTER — READMISSION MANAGEMENT (OUTPATIENT)
Dept: CALL CENTER | Facility: HOSPITAL | Age: 61
End: 2021-09-15

## 2021-09-15 NOTE — OUTREACH NOTE
CHF Week 3 Survey      Responses   [facility] patient discharged from?  [facility]   Does the patient have one of the following disease processes/diagnoses(primary or secondary)?  CHF   Week 3 attempt successful?  Yes   Call start time  1205   Call end time  1212   General alerts for this patient  A-fib, CKD, ICD, mitral regurg and pulmonary HTN   Discharge diagnosis  Acute on chronic combined systolic and diastolic congestive heart failure   Is the patient taking all medications as directed (includes completed medication regime)?  Yes   Does the patient have a primary care provider?   Yes   Comments regarding PCP  states he is still trying to get an appt with PCP   Has the patient kept scheduled appointments due by today?  N/A   Comments  Follow Up with Michael Rollins MD Monday Sep 20, 2021 9:15 AM   Has home health visited the patient within 72 hours of discharge?  N/A   Pulse Ox monitoring  None [does not have a pulse ox]   Psychosocial issues?  No   What is the patient's perception of their health status since discharge?  Improving   Is the patient weighing daily?  No   Does the patient have scales?  No   Is the patient able to teach back signs and symptoms of worsening condition? (i.e. weight gain, shortness of air, etc.)  Yes   Is the patient/caregiver able to teach back the hierarchy of who to call/visit for symptoms/problems? PCP, Specialist, Home health nurse, Urgent Care, ED, 911  Yes   CHF Week 3 call completed?  Yes   Wrap up additional comments  Says he is doing well, disability was approved and he will be getting his back pay, states he is trying to get in to see PCP, advised him to come back to ER if any symptoms worsen.          Leda Ferrara RN

## 2021-09-20 ENCOUNTER — OFFICE VISIT (OUTPATIENT)
Dept: CARDIOLOGY | Facility: CLINIC | Age: 61
End: 2021-09-20

## 2021-09-20 VITALS
HEART RATE: 103 BPM | WEIGHT: 224.8 LBS | BODY MASS INDEX: 28.1 KG/M2 | OXYGEN SATURATION: 97 % | DIASTOLIC BLOOD PRESSURE: 92 MMHG | SYSTOLIC BLOOD PRESSURE: 130 MMHG

## 2021-09-20 DIAGNOSIS — Z95.810 ICD (IMPLANTABLE CARDIOVERTER-DEFIBRILLATOR), DUAL, IN SITU: ICD-10-CM

## 2021-09-20 DIAGNOSIS — I42.8 NON-ISCHEMIC CARDIOMYOPATHY (HCC): ICD-10-CM

## 2021-09-20 DIAGNOSIS — I42.0 DILATED CARDIOMYOPATHY (HCC): Primary | ICD-10-CM

## 2021-09-20 DIAGNOSIS — I50.22 CHRONIC SYSTOLIC CONGESTIVE HEART FAILURE (HCC): ICD-10-CM

## 2021-09-20 DIAGNOSIS — I48.0 PAROXYSMAL ATRIAL FIBRILLATION (HCC): ICD-10-CM

## 2021-09-20 PROCEDURE — 93282 PRGRMG EVAL IMPLANTABLE DFB: CPT | Performed by: INTERNAL MEDICINE

## 2021-09-20 PROCEDURE — 99214 OFFICE O/P EST MOD 30 MIN: CPT | Performed by: INTERNAL MEDICINE

## 2021-09-20 RX ORDER — ATORVASTATIN CALCIUM 10 MG/1
10 TABLET, FILM COATED ORAL DAILY
Qty: 30 TABLET | Refills: 0 | Status: ON HOLD | OUTPATIENT
Start: 2021-09-20 | End: 2021-11-12 | Stop reason: SDUPTHER

## 2021-09-20 RX ORDER — FUROSEMIDE 40 MG/1
60 TABLET ORAL 2 TIMES DAILY
Qty: 270 TABLET | Refills: 1 | Status: ON HOLD | OUTPATIENT
Start: 2021-09-20 | End: 2021-11-12

## 2021-09-20 RX ORDER — SPIRONOLACTONE 25 MG/1
25 TABLET ORAL DAILY
COMMUNITY
End: 2021-09-20 | Stop reason: SDUPTHER

## 2021-09-20 RX ORDER — SPIRONOLACTONE 25 MG/1
25 TABLET ORAL DAILY
Qty: 90 TABLET | Refills: 1 | Status: SHIPPED | OUTPATIENT
Start: 2021-09-20 | End: 2021-09-22 | Stop reason: SDUPTHER

## 2021-09-20 RX ORDER — METOLAZONE 2.5 MG/1
2.5 TABLET ORAL DAILY PRN
Qty: 30 TABLET | Refills: 3 | Status: ON HOLD | OUTPATIENT
Start: 2021-09-20 | End: 2021-11-12 | Stop reason: SDUPTHER

## 2021-09-20 RX ORDER — LOSARTAN POTASSIUM 25 MG/1
25 TABLET ORAL DAILY
Qty: 90 TABLET | Refills: 3 | Status: ON HOLD | OUTPATIENT
Start: 2021-09-20 | End: 2021-11-12 | Stop reason: SDUPTHER

## 2021-09-20 RX ORDER — CARVEDILOL 6.25 MG/1
6.25 TABLET ORAL 2 TIMES DAILY WITH MEALS
Qty: 180 TABLET | Refills: 3 | Status: ON HOLD | OUTPATIENT
Start: 2021-09-20 | End: 2021-11-12 | Stop reason: SDUPTHER

## 2021-09-20 RX ORDER — CARVEDILOL 6.25 MG/1
6.25 TABLET ORAL 2 TIMES DAILY WITH MEALS
COMMUNITY
End: 2021-09-20 | Stop reason: SDUPTHER

## 2021-09-20 RX ORDER — FUROSEMIDE 40 MG/1
60 TABLET ORAL 2 TIMES DAILY
Qty: 270 TABLET | Refills: 1 | Status: SHIPPED | OUTPATIENT
Start: 2021-09-20 | End: 2021-09-20 | Stop reason: SDUPTHER

## 2021-09-20 NOTE — PROGRESS NOTES
CC--- decompensated acute on chronic class III to class IV CHF        Sub--- Patient presented to [facility] 6/4/2021 with complaints of CHF.    He reports significant activity intolerance and orthopnea with lower extremity edema extending proximally up to his thighs.  Patient was initially diagnosed with dilated cardiomyopathy in March of this year in [place].  Work-up here included echocardiogram with EF 20-25% and grade 2 DD, no evidence of LV apical thrombus but there was spontaneous contrast contrast noted suggesting sluggish flow in the LV apex.  Moderate biatrial enlargement with mild TR.  RVSP 50 mmHg.  Review of patient's home medications include beta-blocker, ARB, LD aspirin, Lasix and rivaroxaban.  Patient was started on diuretic here and reports he has diuresed well with significant improvement in his symptoms.  His edema has decreased significantly as well.  He denies any complaints of chest discomfort.  He has been up in [place] with minimal shortness of breath.  Patient has not been evaluated by cardiology since his hospitalization in March. Post ICD implant in 6/21  Continues to have class 3 CHF sx.          Past Medical History:   Diagnosis Date   • Afib (CMS/HCC)     • CHF (congestive heart failure) (CMS/HCC)     • Hypertension              Past Surgical History:   Procedure Laterality Date   • CARDIAC DEFIBRILLATOR PLACEMENT       • CARDIAC ELECTROPHYSIOLOGY PROCEDURE Left 6/15/2021     Procedure: Device Implant;  Surgeon: Michael Rollins MD;  Location: [facility] INVASIVE LOCATION;  Service: Cardiovascular;  Laterality: Left;   • HERNIA REPAIR            Review of Systems   General:  positive for fatigue and tiredness  Eyes: No redness  Cardiovascular: No chest pain, no palpitations  Respiratory:   positive for class 3 shortness of breath        Physical Exam  VITALS REVIEWED  128/84 pulse rate is 70 patient is afebrile respiration 12 times a minute  General:      well developed, well  nourished, in no acute distress.    Head:      normocephalic and atraumatic.    Eyes:      PERRL/EOM intact, conjunctiva and sclera clear with out nystagmus.    Neck:      no masses, thyromegaly,  trachea central with normal respiratory effort and PMI displaced laterally  Lungs:     Reduced breath sounds on right base  Heart:       Sinus rhythm without any rub  Msk:      no deformity or scoliosis noted of thoracic or lumbar spine.    Pulses:      pulses normal in all 4 extremities.    Extremities:       no cyanosis or clubbing--lymphedema with  edema with varicosities noted               Assessment and plan        Nonischemic dilated cardiomyopathy  Decompensated acute on chronic class III to class IV systolic heart failure--currently in class 3 chronic systolic CHF  ICD in situ  Paroxysmal atrial fibrillation currently in sinus rhythm  Continue Coreg, losartan and diuresis  Continue monitoring renal panel  Metolazone prn prescription given  Low salt diet educated     ICD with normal function  Follow up in 4 weeks  meds refilled  6 minute walk test done to evaluate oxygen needs  Labs ordered      Electronically signed by Michael Rollins MD, 09/20/21, 10:17 AM EDT.

## 2021-09-22 RX ORDER — SPIRONOLACTONE 25 MG/1
25 TABLET ORAL DAILY
Qty: 90 TABLET | Refills: 1 | Status: ON HOLD | OUTPATIENT
Start: 2021-09-22 | End: 2021-11-12 | Stop reason: SDUPTHER

## 2021-09-23 ENCOUNTER — TELEPHONE (OUTPATIENT)
Dept: CARDIOLOGY | Facility: CLINIC | Age: 61
End: 2021-09-23

## 2021-09-27 ENCOUNTER — READMISSION MANAGEMENT (OUTPATIENT)
Dept: CALL CENTER | Facility: HOSPITAL | Age: 61
End: 2021-09-27

## 2021-09-27 NOTE — OUTREACH NOTE
CHF Week 4 Survey      Responses   [facility] patient discharged from?  [facility]   Does the patient have one of the following disease processes/diagnoses(primary or secondary)?  CHF   Week 4 attempt successful?  No          Digna Fischer RN

## 2021-09-28 ENCOUNTER — TELEPHONE (OUTPATIENT)
Dept: CARDIOLOGY | Facility: CLINIC | Age: 61
End: 2021-09-28

## 2021-10-21 ENCOUNTER — APPOINTMENT (OUTPATIENT)
Dept: GENERAL RADIOLOGY | Facility: HOSPITAL | Age: 61
End: 2021-10-21

## 2021-10-21 ENCOUNTER — HOSPITAL ENCOUNTER (OUTPATIENT)
Facility: HOSPITAL | Age: 61
Setting detail: OBSERVATION
Discharge: HOME | End: 2021-10-23
Attending: EMERGENCY MEDICINE | Admitting: HOSPITALIST

## 2021-10-21 DIAGNOSIS — I50.9 ACUTE ON CHRONIC CONGESTIVE HEART FAILURE, UNSPECIFIED HEART FAILURE TYPE (HCC): ICD-10-CM

## 2021-10-21 DIAGNOSIS — R07.9 CHEST PAIN, UNSPECIFIED TYPE: Primary | ICD-10-CM

## 2021-10-21 DIAGNOSIS — F15.10 METHAMPHETAMINE ABUSE (HCC): ICD-10-CM

## 2021-10-21 PROBLEM — I50.43 ACUTE ON CHRONIC COMBINED SYSTOLIC (CONGESTIVE) AND DIASTOLIC (CONGESTIVE) HEART FAILURE: Status: ACTIVE | Noted: 2021-06-15

## 2021-10-21 PROBLEM — Z72.0 TOBACCO ABUSE: Chronic | Status: ACTIVE | Noted: 2021-06-07

## 2021-10-21 PROBLEM — Z72.0 TOBACCO ABUSE: Status: ACTIVE | Noted: 2021-06-07

## 2021-10-21 PROBLEM — F19.90 SUBSTANCE USE: Chronic | Status: ACTIVE | Noted: 2021-10-21

## 2021-10-21 PROBLEM — R17 SERUM TOTAL BILIRUBIN ELEVATED: Status: ACTIVE | Noted: 2021-10-21

## 2021-10-21 LAB
ALBUMIN SERPL-MCNC: 4 G/DL (ref 3.5–5.2)
ALBUMIN/GLOB SERPL: 1.5 G/DL
ALP SERPL-CCNC: 200 U/L (ref 39–117)
ALT SERPL W P-5'-P-CCNC: 20 U/L (ref 1–41)
AMPHET+METHAMPHET UR QL: POSITIVE
ANION GAP SERPL CALCULATED.3IONS-SCNC: 12 MMOL/L (ref 5–15)
APTT PPP: 27.3 SECONDS (ref 24–31)
AST SERPL-CCNC: 22 U/L (ref 1–40)
BARBITURATES UR QL SCN: NEGATIVE
BASOPHILS # BLD AUTO: 0.1 10*3/MM3 (ref 0–0.2)
BASOPHILS NFR BLD AUTO: 1.1 % (ref 0–1.5)
BENZODIAZ UR QL SCN: NEGATIVE
BILIRUB SERPL-MCNC: 2.2 MG/DL (ref 0–1.2)
BUN SERPL-MCNC: 15 MG/DL (ref 6–20)
BUN/CREAT SERPL: 12.9 (ref 7–25)
CALCIUM SPEC-SCNC: 8.3 MG/DL (ref 8.6–10.5)
CANNABINOIDS SERPL QL: POSITIVE
CHLORIDE SERPL-SCNC: 106 MMOL/L (ref 98–107)
CO2 SERPL-SCNC: 25 MMOL/L (ref 22–29)
COCAINE UR QL: NEGATIVE
CREAT SERPL-MCNC: 1.16 MG/DL (ref 0.76–1.27)
DEPRECATED RDW RBC AUTO: 53.8 FL (ref 37–54)
EOSINOPHIL # BLD AUTO: 0 10*3/MM3 (ref 0–0.4)
EOSINOPHIL NFR BLD AUTO: 0.6 % (ref 0.3–6.2)
ERYTHROCYTE [DISTWIDTH] IN BLOOD BY AUTOMATED COUNT: 16.3 % (ref 12.3–15.4)
GFR SERPL CREATININE-BSD FRML MDRD: 78 ML/MIN/1.73
GLOBULIN UR ELPH-MCNC: 2.6 GM/DL
GLUCOSE SERPL-MCNC: 110 MG/DL (ref 65–99)
HCT VFR BLD AUTO: 40.1 % (ref 37.5–51)
HGB BLD-MCNC: 13.5 G/DL (ref 13–17.7)
INR PPP: 1.17 (ref 0.93–1.1)
LYMPHOCYTES # BLD AUTO: 0.6 10*3/MM3 (ref 0.7–3.1)
LYMPHOCYTES NFR BLD AUTO: 12.3 % (ref 19.6–45.3)
MAGNESIUM SERPL-MCNC: 1.7 MG/DL (ref 1.6–2.6)
MCH RBC QN AUTO: 31.8 PG (ref 26.6–33)
MCHC RBC AUTO-ENTMCNC: 33.6 G/DL (ref 31.5–35.7)
MCV RBC AUTO: 94.6 FL (ref 79–97)
METHADONE UR QL SCN: NEGATIVE
MONOCYTES # BLD AUTO: 0.7 10*3/MM3 (ref 0.1–0.9)
MONOCYTES NFR BLD AUTO: 14.6 % (ref 5–12)
NEUTROPHILS NFR BLD AUTO: 3.4 10*3/MM3 (ref 1.7–7)
NEUTROPHILS NFR BLD AUTO: 71.4 % (ref 42.7–76)
NRBC BLD AUTO-RTO: 0.3 /100 WBC (ref 0–0.2)
NT-PROBNP SERPL-MCNC: 1747 PG/ML (ref 0–900)
OPIATES UR QL: NEGATIVE
OXYCODONE UR QL SCN: NEGATIVE
PLATELET # BLD AUTO: 168 10*3/MM3 (ref 140–450)
PMV BLD AUTO: 7.9 FL (ref 6–12)
POTASSIUM SERPL-SCNC: 4.1 MMOL/L (ref 3.5–5.2)
PROT SERPL-MCNC: 6.6 G/DL (ref 6–8.5)
PROTHROMBIN TIME: 12.8 SECONDS (ref 9.6–11.7)
RBC # BLD AUTO: 4.23 10*6/MM3 (ref 4.14–5.8)
SARS-COV-2 RNA PNL SPEC NAA+PROBE: NOT DETECTED
SODIUM SERPL-SCNC: 143 MMOL/L (ref 136–145)
TROPONIN T SERPL-MCNC: <0.01 NG/ML (ref 0–0.03)
TROPONIN T SERPL-MCNC: <0.01 NG/ML (ref 0–0.03)
WBC # BLD AUTO: 4.8 10*3/MM3 (ref 3.4–10.8)

## 2021-10-21 PROCEDURE — 80053 COMPREHEN METABOLIC PANEL: CPT | Performed by: EMERGENCY MEDICINE

## 2021-10-21 PROCEDURE — 85025 COMPLETE CBC W/AUTO DIFF WBC: CPT | Performed by: EMERGENCY MEDICINE

## 2021-10-21 PROCEDURE — 85610 PROTHROMBIN TIME: CPT | Performed by: EMERGENCY MEDICINE

## 2021-10-21 PROCEDURE — G0378 HOSPITAL OBSERVATION PER HR: HCPCS

## 2021-10-21 PROCEDURE — 73560 X-RAY EXAM OF KNEE 1 OR 2: CPT

## 2021-10-21 PROCEDURE — C9803 HOPD COVID-19 SPEC COLLECT: HCPCS

## 2021-10-21 PROCEDURE — 96376 TX/PRO/DX INJ SAME DRUG ADON: CPT

## 2021-10-21 PROCEDURE — 80307 DRUG TEST PRSMV CHEM ANLYZR: CPT | Performed by: EMERGENCY MEDICINE

## 2021-10-21 PROCEDURE — 99285 EMERGENCY DEPT VISIT HI MDM: CPT

## 2021-10-21 PROCEDURE — 99220 PR INITIAL OBSERVATION CARE/DAY 70 MINUTES: CPT | Performed by: HOSPITALIST

## 2021-10-21 PROCEDURE — 96374 THER/PROPH/DIAG INJ IV PUSH: CPT

## 2021-10-21 PROCEDURE — 83880 ASSAY OF NATRIURETIC PEPTIDE: CPT | Performed by: EMERGENCY MEDICINE

## 2021-10-21 PROCEDURE — 36415 COLL VENOUS BLD VENIPUNCTURE: CPT

## 2021-10-21 PROCEDURE — 25010000002 FUROSEMIDE PER 20 MG: Performed by: NURSE PRACTITIONER

## 2021-10-21 PROCEDURE — 84484 ASSAY OF TROPONIN QUANT: CPT | Performed by: EMERGENCY MEDICINE

## 2021-10-21 PROCEDURE — 83735 ASSAY OF MAGNESIUM: CPT | Performed by: NURSE PRACTITIONER

## 2021-10-21 PROCEDURE — 87635 SARS-COV-2 COVID-19 AMP PRB: CPT | Performed by: EMERGENCY MEDICINE

## 2021-10-21 PROCEDURE — 85730 THROMBOPLASTIN TIME PARTIAL: CPT | Performed by: EMERGENCY MEDICINE

## 2021-10-21 PROCEDURE — 71045 X-RAY EXAM CHEST 1 VIEW: CPT

## 2021-10-21 PROCEDURE — 25010000002 FUROSEMIDE PER 20 MG: Performed by: EMERGENCY MEDICINE

## 2021-10-21 PROCEDURE — 93005 ELECTROCARDIOGRAM TRACING: CPT | Performed by: EMERGENCY MEDICINE

## 2021-10-21 PROCEDURE — 84484 ASSAY OF TROPONIN QUANT: CPT | Performed by: NURSE PRACTITIONER

## 2021-10-21 RX ORDER — LOSARTAN POTASSIUM 25 MG/1
25 TABLET ORAL DAILY
Status: DISCONTINUED | OUTPATIENT
Start: 2021-10-21 | End: 2021-10-23 | Stop reason: HOSPADM

## 2021-10-21 RX ORDER — MAGNESIUM SULFATE HEPTAHYDRATE 40 MG/ML
2 INJECTION, SOLUTION INTRAVENOUS AS NEEDED
Status: DISCONTINUED | OUTPATIENT
Start: 2021-10-21 | End: 2021-10-23 | Stop reason: HOSPADM

## 2021-10-21 RX ORDER — POTASSIUM CHLORIDE 20 MEQ/1
40 TABLET, EXTENDED RELEASE ORAL AS NEEDED
Status: DISCONTINUED | OUTPATIENT
Start: 2021-10-21 | End: 2021-10-23 | Stop reason: HOSPADM

## 2021-10-21 RX ORDER — ATORVASTATIN CALCIUM 10 MG/1
10 TABLET, FILM COATED ORAL NIGHTLY
Status: DISCONTINUED | OUTPATIENT
Start: 2021-10-21 | End: 2021-10-23 | Stop reason: HOSPADM

## 2021-10-21 RX ORDER — CARVEDILOL 6.25 MG/1
6.25 TABLET ORAL 2 TIMES DAILY WITH MEALS
Status: DISCONTINUED | OUTPATIENT
Start: 2021-10-21 | End: 2021-10-23

## 2021-10-21 RX ORDER — ACETAMINOPHEN 325 MG/1
650 TABLET ORAL EVERY 4 HOURS PRN
Status: DISCONTINUED | OUTPATIENT
Start: 2021-10-21 | End: 2021-10-23 | Stop reason: HOSPADM

## 2021-10-21 RX ORDER — ACETAMINOPHEN 650 MG/1
650 SUPPOSITORY RECTAL EVERY 4 HOURS PRN
Status: DISCONTINUED | OUTPATIENT
Start: 2021-10-21 | End: 2021-10-23 | Stop reason: HOSPADM

## 2021-10-21 RX ORDER — ACETAMINOPHEN 160 MG/5ML
650 SOLUTION ORAL EVERY 4 HOURS PRN
Status: DISCONTINUED | OUTPATIENT
Start: 2021-10-21 | End: 2021-10-23 | Stop reason: HOSPADM

## 2021-10-21 RX ORDER — SODIUM CHLORIDE 0.9 % (FLUSH) 0.9 %
10 SYRINGE (ML) INJECTION AS NEEDED
Status: DISCONTINUED | OUTPATIENT
Start: 2021-10-21 | End: 2021-10-23 | Stop reason: HOSPADM

## 2021-10-21 RX ORDER — POTASSIUM CHLORIDE 1.5 G/1.77G
40 POWDER, FOR SOLUTION ORAL AS NEEDED
Status: DISCONTINUED | OUTPATIENT
Start: 2021-10-21 | End: 2021-10-23 | Stop reason: HOSPADM

## 2021-10-21 RX ORDER — NITROGLYCERIN 0.4 MG/1
0.4 TABLET SUBLINGUAL
Status: DISCONTINUED | OUTPATIENT
Start: 2021-10-21 | End: 2021-10-23 | Stop reason: HOSPADM

## 2021-10-21 RX ORDER — ALUMINA, MAGNESIA, AND SIMETHICONE 2400; 2400; 240 MG/30ML; MG/30ML; MG/30ML
15 SUSPENSION ORAL EVERY 6 HOURS PRN
Status: DISCONTINUED | OUTPATIENT
Start: 2021-10-21 | End: 2021-10-23 | Stop reason: HOSPADM

## 2021-10-21 RX ORDER — SODIUM CHLORIDE 0.9 % (FLUSH) 0.9 %
10 SYRINGE (ML) INJECTION EVERY 12 HOURS SCHEDULED
Status: DISCONTINUED | OUTPATIENT
Start: 2021-10-21 | End: 2021-10-23 | Stop reason: HOSPADM

## 2021-10-21 RX ORDER — ONDANSETRON 2 MG/ML
4 INJECTION INTRAMUSCULAR; INTRAVENOUS EVERY 6 HOURS PRN
Status: DISCONTINUED | OUTPATIENT
Start: 2021-10-21 | End: 2021-10-23 | Stop reason: HOSPADM

## 2021-10-21 RX ORDER — CHOLECALCIFEROL (VITAMIN D3) 125 MCG
5 CAPSULE ORAL NIGHTLY PRN
Status: DISCONTINUED | OUTPATIENT
Start: 2021-10-21 | End: 2021-10-23 | Stop reason: HOSPADM

## 2021-10-21 RX ORDER — FUROSEMIDE 10 MG/ML
40 INJECTION INTRAMUSCULAR; INTRAVENOUS ONCE
Status: COMPLETED | OUTPATIENT
Start: 2021-10-21 | End: 2021-10-21

## 2021-10-21 RX ORDER — ONDANSETRON 4 MG/1
4 TABLET, FILM COATED ORAL EVERY 6 HOURS PRN
Status: DISCONTINUED | OUTPATIENT
Start: 2021-10-21 | End: 2021-10-23 | Stop reason: HOSPADM

## 2021-10-21 RX ORDER — FUROSEMIDE 10 MG/ML
40 INJECTION INTRAMUSCULAR; INTRAVENOUS EVERY 12 HOURS
Status: DISPENSED | OUTPATIENT
Start: 2021-10-21 | End: 2021-10-22

## 2021-10-21 RX ORDER — METOLAZONE 2.5 MG/1
2.5 TABLET ORAL DAILY PRN
Status: DISCONTINUED | OUTPATIENT
Start: 2021-10-21 | End: 2021-10-23 | Stop reason: HOSPADM

## 2021-10-21 RX ORDER — MAGNESIUM SULFATE HEPTAHYDRATE 40 MG/ML
4 INJECTION, SOLUTION INTRAVENOUS AS NEEDED
Status: DISCONTINUED | OUTPATIENT
Start: 2021-10-21 | End: 2021-10-23 | Stop reason: HOSPADM

## 2021-10-21 RX ORDER — SPIRONOLACTONE 25 MG/1
25 TABLET ORAL DAILY
Status: DISCONTINUED | OUTPATIENT
Start: 2021-10-21 | End: 2021-10-23 | Stop reason: HOSPADM

## 2021-10-21 RX ADMIN — CARVEDILOL 6.25 MG: 6.25 TABLET, FILM COATED ORAL at 20:37

## 2021-10-21 RX ADMIN — ATORVASTATIN CALCIUM 10 MG: 10 TABLET, FILM COATED ORAL at 20:38

## 2021-10-21 RX ADMIN — RIVAROXABAN 20 MG: 20 TABLET, FILM COATED ORAL at 20:38

## 2021-10-21 RX ADMIN — Medication 10 ML: at 20:37

## 2021-10-21 RX ADMIN — FUROSEMIDE 40 MG: 10 INJECTION, SOLUTION INTRAMUSCULAR; INTRAVENOUS at 07:31

## 2021-10-21 RX ADMIN — LOSARTAN POTASSIUM 25 MG: 25 TABLET, FILM COATED ORAL at 20:38

## 2021-10-21 RX ADMIN — FUROSEMIDE 40 MG: 10 INJECTION, SOLUTION INTRAMUSCULAR; INTRAVENOUS at 20:38

## 2021-10-21 RX ADMIN — SPIRONOLACTONE 25 MG: 25 TABLET ORAL at 20:38

## 2021-10-21 RX ADMIN — NITROGLYCERIN 1 INCH: 20 OINTMENT TOPICAL at 07:32

## 2021-10-21 NOTE — ED NOTES
Pt c/o of SOA with exertion and chest pain with deep breaths. Pt is sleeping during lab draw and assessment.          Merlyn Coyne RN  10/21/21 0374

## 2021-10-21 NOTE — H&P
[facility]      Patient Name: Thony Dumont  : 1960  MRN: [id number]  Primary Care Physician:  Rose Rao APRN  Date of admission: 10/21/2021      Subjective      Chief Complaint: Chest pain    History of Present Illness: Thony Dumont is a 61 y.o. male with a past medical history of hypertension, AICD, hyperlipidemia, CKD stage II, nonischemic cardiomyopathy, paroxysmal atrial fibrillation, pulmonary hypertension, substance use, and tobacco abuse who presented to [facility] on 10/21/2021 complaining of chest pain.  Patient reports his chest has been bothering him for the last 2 days.  He reports this is happened before.  He is not sure when the last time he had a stress test was.  His current chest pain is a 5 out of 10.  Breathing makes the pain worse.  He explains his chest pain radiates if he exerts himself.  He also has been having shortness of breath with exertion and better at rest.  He has had a dry cough.  He has gained 20 pounds in the last 3 weeks.  He denies any heart palpitations, nausea, vomiting, diarrhea, fever, or chills.  He reports he is compliant on his medications.        In the ED, CXR showed Cardiomegaly with central vascular congestion.   Probable layering small right-sided pleural effusion. EKG showed Sinus rhythm, Abnormal T, consider ischemia, lateral leads.  All labs unremarkable upon admission except proBNP 1747, calcium 8.3, total bili 2.2.  All vital signs stable upon admission.  Patient received Lasix and Nitrostat in the ED.    Review of Systems   Constitutional: Positive for weight gain.   Eyes: Negative.    Cardiovascular: Positive for chest pain and dyspnea on exertion.   Respiratory: Positive for cough and shortness of breath.    Skin: Negative.    Musculoskeletal: Positive for joint swelling.   Gastrointestinal: Negative.    Genitourinary: Negative.    Neurological: Negative.    Psychiatric/Behavioral:  "Negative.        Personal History     Past Medical History:   Diagnosis Date   • Acute on chronic congestive heart failure (CMS/HCC) 06/07/2021   • Afib (CMS/[facility])    • Chronic pain    • CKD (chronic kidney disease) stage 2, GFR 60-89 ml/min 8/9/2021   • Combined systolic and diastolic congestive heart failure (CMS/[facility])    • Degenerative joint disease    • Erectile dysfunction    • Essential hypertension    • Former smoker    • Gastroesophageal reflux disease    • Homeless 08/25/2021    living in his car   • ICD (implantable cardioverter-defibrillator) in place 06/15/2021   • Mixed hyperlipidemia    • Moderate mitral regurgitation    • Moderate tricuspid regurgitation    • NICM (nonischemic cardiomyopathy) (CMS/[facility]) 06/10/2021   • Paroxysmal atrial fibrillation (CMS/[facility])    • Pleural effusion 8/9/2021   • Pulmonary hypertension (CMS/[facility])    • Sprain of rotator cuff capsule 3/14/2014   • Substance abuse (CMS/[facility])    • Valvular heart disease        Past Surgical History:   Procedure Laterality Date   • CARDIAC DEFIBRILLATOR PLACEMENT     • CARDIAC ELECTROPHYSIOLOGY PROCEDURE Left 6/15/2021    Procedure: Device Implant;  Surgeon: Michael Rollins MD;  Location: [facility] INVASIVE LOCATION;  Service: Cardiovascular;  Laterality: Left;   • HERNIA REPAIR         Family History: family history includes Cancer in his mother. Otherwise pertinent FHx was reviewed and not pertinent to current issue.    Social History:  reports that he has been smoking cigarettes. He has never used smokeless tobacco. He reports current alcohol use of about 7.0 standard drinks of alcohol per week. He reports previous drug use. Drugs: Marijuana and \"Crack\" cocaine.    Home Medications:  Prior to Admission Medications     Prescriptions Last Dose Informant Patient Reported? Taking?    acetaminophen (TYLENOL) 500 MG tablet   No No    Take 2 tablets by mouth Every 6 (Six) Hours As Needed for Mild Pain  or Moderate Pain .    atorvastatin (Lipitor) " 10 MG tablet   No No    Take 1 tablet by mouth Daily.    carvedilol (COREG) 6.25 MG tablet   No No    Take 1 tablet by mouth 2 (Two) Times a Day With Meals.    furosemide (LASIX) 40 MG tablet   No No    Take 1.5 tablets by mouth 2 (Two) Times a Day for 30 days.    losartan (COZAAR) 25 MG tablet   No No    Take 1 tablet by mouth Daily.    metOLazone (ZAROXOLYN) 2.5 MG tablet   No No    Take 1 tablet by mouth Daily As Needed (edema).    rivaroxaban (Xarelto) 20 MG tablet   No No    Take 1 tablet by mouth Daily.    spironolactone (ALDACTONE) 25 MG tablet   No No    Take 1 tablet by mouth Daily.            Allergies:  Allergies   Allergen Reactions   • Aspirin Swelling   • Penicillins Swelling   • Pork Allergy Unknown - High Severity       Objective      Vitals:   Temp:  [97.9 °F (36.6 °C)] 97.9 °F (36.6 °C)  Heart Rate:  [102-109] 102  Resp:  [14-20] 20  BP: (134-138)/(92-95) 134/94    Physical Exam  Vitals reviewed.   Constitutional:       Appearance: Normal appearance. He is normal weight.   HENT:      Head: Normocephalic and atraumatic.      Nose: Nose normal.      Mouth/Throat:      Mouth: Mucous membranes are moist.      Pharynx: Oropharynx is clear.   Eyes:      Extraocular Movements: Extraocular movements intact.      Conjunctiva/sclera: Conjunctivae normal.      Pupils: Pupils are equal, round, and reactive to light.   Cardiovascular:      Rate and Rhythm: Normal rate and regular rhythm.      Pulses: Normal pulses.      Heart sounds: Normal heart sounds.      Comments: S1, S2 audible  Pulmonary:      Effort: Pulmonary effort is normal.      Breath sounds: Normal breath sounds.      Comments: On room air   Abdominal:      General: Abdomen is flat. Bowel sounds are normal.      Palpations: Abdomen is soft.   Musculoskeletal:         General: Swelling present. Normal range of motion.      Cervical back: Normal range of motion.      Comments: Right knee swelling   Skin:     General: Skin is warm and dry.    Neurological:      General: No focal deficit present.      Mental Status: He is alert and oriented to person, place, and time. Mental status is at baseline.   Psychiatric:         Mood and Affect: Mood normal.         Behavior: Behavior normal.         Thought Content: Thought content normal.         Judgment: Judgment normal.         Result Review    Result Review:  I have personally reviewed the results from the time of this admission to 10/21/2021 08:37 EDT and agree with these findings:  [x]  Laboratory  []  Microbiology  [x]  Radiology  [x]  EKG/Telemetry   []  Cardiology/Vascular   []  Pathology  []  Old records  []  Other:  Most notable findings include: CXR showed Cardiomegaly with central vascular congestion. Probable layering small right-sided pleural effusion. EKG showed Sinus rhythm, Abnormal T, consider ischemia, lateral leads.  All labs unremarkable upon admission except proBNP 1747, calcium 8.3, total bili 2.2.     Assessment/Plan        Active Hospital Problems:  Active Hospital Problems    Diagnosis    • **Chest pain    • Serum total bilirubin elevated    • Acute on chronic combined systolic (congestive) and diastolic (congestive) heart failure (HCC)    • Substance use    • ICD (implantable cardioverter-defibrillator) in place    • Pulmonary hypertension (HCC)    • CKD (chronic kidney disease) stage 2, GFR 60-89 ml/min    • NICM (nonischemic cardiomyopathy) ([facility])    • Tobacco abuse    • Essential hypertension    • Paroxysmal atrial fibrillation (HCC)    • Mixed hyperlipidemia      Plan:      Acute chest pain  - CXR reviewed  - EKG reviewed   - Troponin X1 normal, trend   - Continuous cardiac monitoring   - Nitrostat ordered, Patient has allergic reaction to aspirin   - Consider myoview tomorrow if chest pain persists     Acute on chronic systolic and diastolic congestive heart failure/nonischemic cardiomyopathy   - Last 2D echo on 6/6/21 showed Estimated left ventricular EF = 25%  - proBNP 1747,  monitor   - Received lasix in the ED   - Daily weights  - Strict I and O   -Continue spironolactone, also noted patient receives metolazone as needed at home  -IV Lasix ordered x2 doses    Acute right knee swelling  - Not hot to touch or any erythema   - Duplex bilateral on 8/9/21 was normal  - Xray right knee ordered     Elevated total bilirubin  - Bilirubin 2.2, monitor     Essential HTN  - Controlled  - Monitor blood pressure  - Continue carvedilol, losartan, Lasix, spironolactone  - IV hydralazine ordered PRN     Pulmonary HTN  - 2D echo on 6/6/21 showed mild to moderate     Paroxysmal atrial fibrillation with ICD in place   - Currently in sinus rhythm   - Continue xarelto and carvedilol     HLD  - Continue statin     CKD Stage III  - CR 1.1, monitor  - Baseline CR 1.1    History of tobacco abuse/substance use  -Patient reports he puffs a cigarette occasionally and last used methamphetamine 4 to 5 days ago  - Check urine drug screen       DVT prophylaxis: Xarelto  CODE STATUS:    Level Of Support Discussed With: Patient  Code Status: CPR  Medical Interventions (Level of Support Prior to Arrest): Full    Admission Status:  I believe this patient meets inpatient status.    I discussed the patient's findings and my recommendations with patient and nursing staff.    This patient has been examined wearing appropriate Personal Protective Equipment. 10/21/21      Signature: Electronically signed by CORTEZ Robles, 10/21/21, 8:36 AM EDT.    Hospitalist/attending note  Patient seen and examined, chart reviewed.  Agree with above.  61-year-old male coming in with shortness of breath and chest pain.  Patient recognized to have mild exacerbation of acute on chronic systolic heart failure.    Vitals reviewed  Chest, bilateral entry, normal vesicular breathing  Cardiovascular, S1-S2 there is no murmur  Abdomen soft nontender good sounds audible    Impression  Acute on chronic systolic heart failure  Chest  pain  Pulmonary hypertension  Atrial fibrillation  Substance abuse    Plan  Agree with above  IV diuretics  Repeat 2D echo  Will need stress test once euvolemic  Cardiology consult    Mary Luevano MD.

## 2021-10-22 LAB
ANION GAP SERPL CALCULATED.3IONS-SCNC: 11 MMOL/L (ref 5–15)
BASOPHILS # BLD AUTO: 0 10*3/MM3 (ref 0–0.2)
BASOPHILS NFR BLD AUTO: 0.9 % (ref 0–1.5)
BUN SERPL-MCNC: 20 MG/DL (ref 6–20)
BUN/CREAT SERPL: 14.2 (ref 7–25)
CALCIUM SPEC-SCNC: 8.8 MG/DL (ref 8.6–10.5)
CHLORIDE SERPL-SCNC: 104 MMOL/L (ref 98–107)
CO2 SERPL-SCNC: 25 MMOL/L (ref 22–29)
CREAT SERPL-MCNC: 1.41 MG/DL (ref 0.76–1.27)
DEPRECATED RDW RBC AUTO: 52.9 FL (ref 37–54)
EOSINOPHIL # BLD AUTO: 0.1 10*3/MM3 (ref 0–0.4)
EOSINOPHIL NFR BLD AUTO: 1.8 % (ref 0.3–6.2)
ERYTHROCYTE [DISTWIDTH] IN BLOOD BY AUTOMATED COUNT: 16.1 % (ref 12.3–15.4)
GFR SERPL CREATININE-BSD FRML MDRD: 62 ML/MIN/1.73
GLUCOSE SERPL-MCNC: 116 MG/DL (ref 65–99)
HCT VFR BLD AUTO: 39.7 % (ref 37.5–51)
HGB BLD-MCNC: 13.3 G/DL (ref 13–17.7)
LYMPHOCYTES # BLD AUTO: 0.9 10*3/MM3 (ref 0.7–3.1)
LYMPHOCYTES NFR BLD AUTO: 20.2 % (ref 19.6–45.3)
MAGNESIUM SERPL-MCNC: 1.8 MG/DL (ref 1.6–2.6)
MCH RBC QN AUTO: 31.3 PG (ref 26.6–33)
MCHC RBC AUTO-ENTMCNC: 33.6 G/DL (ref 31.5–35.7)
MCV RBC AUTO: 93.3 FL (ref 79–97)
MONOCYTES # BLD AUTO: 0.7 10*3/MM3 (ref 0.1–0.9)
MONOCYTES NFR BLD AUTO: 16 % (ref 5–12)
NEUTROPHILS NFR BLD AUTO: 2.8 10*3/MM3 (ref 1.7–7)
NEUTROPHILS NFR BLD AUTO: 61.1 % (ref 42.7–76)
NRBC BLD AUTO-RTO: 0.2 /100 WBC (ref 0–0.2)
PLATELET # BLD AUTO: 162 10*3/MM3 (ref 140–450)
PMV BLD AUTO: 8.2 FL (ref 6–12)
POTASSIUM SERPL-SCNC: 4.1 MMOL/L (ref 3.5–5.2)
RBC # BLD AUTO: 4.26 10*6/MM3 (ref 4.14–5.8)
SODIUM SERPL-SCNC: 140 MMOL/L (ref 136–145)
WBC # BLD AUTO: 4.6 10*3/MM3 (ref 3.4–10.8)

## 2021-10-22 PROCEDURE — 80048 BASIC METABOLIC PNL TOTAL CA: CPT | Performed by: NURSE PRACTITIONER

## 2021-10-22 PROCEDURE — G0378 HOSPITAL OBSERVATION PER HR: HCPCS

## 2021-10-22 PROCEDURE — 99225 PR SBSQ OBSERVATION CARE/DAY 25 MINUTES: CPT | Performed by: HOSPITALIST

## 2021-10-22 PROCEDURE — 85025 COMPLETE CBC W/AUTO DIFF WBC: CPT | Performed by: NURSE PRACTITIONER

## 2021-10-22 PROCEDURE — 99214 OFFICE O/P EST MOD 30 MIN: CPT | Performed by: INTERNAL MEDICINE

## 2021-10-22 PROCEDURE — 36415 COLL VENOUS BLD VENIPUNCTURE: CPT | Performed by: NURSE PRACTITIONER

## 2021-10-22 PROCEDURE — 83735 ASSAY OF MAGNESIUM: CPT | Performed by: NURSE PRACTITIONER

## 2021-10-22 RX ADMIN — FUROSEMIDE 60 MG: 40 TABLET ORAL at 17:12

## 2021-10-22 RX ADMIN — ATORVASTATIN CALCIUM 10 MG: 10 TABLET, FILM COATED ORAL at 22:03

## 2021-10-22 RX ADMIN — CARVEDILOL 6.25 MG: 6.25 TABLET, FILM COATED ORAL at 17:13

## 2021-10-22 RX ADMIN — FUROSEMIDE 60 MG: 40 TABLET ORAL at 09:23

## 2021-10-22 RX ADMIN — Medication 10 ML: at 22:03

## 2021-10-22 RX ADMIN — Medication 10 ML: at 09:23

## 2021-10-22 RX ADMIN — SPIRONOLACTONE 25 MG: 25 TABLET ORAL at 09:23

## 2021-10-22 RX ADMIN — RIVAROXABAN 20 MG: 20 TABLET, FILM COATED ORAL at 17:12

## 2021-10-22 RX ADMIN — LOSARTAN POTASSIUM 25 MG: 25 TABLET, FILM COATED ORAL at 09:23

## 2021-10-22 NOTE — PLAN OF CARE
Problem: Adult Inpatient Plan of Care  Goal: Plan of Care Review  Outcome: Ongoing, Progressing  Flowsheets (Taken 10/22/2021 0354)  Progress: improving  Plan of Care Reviewed With: patient  Outcome Summary: Patient slept well through the night. No complaints of chest pain. Cardiology consult for today.   Goal Outcome Evaluation:  Plan of Care Reviewed With: patient        Progress: improving  Outcome Summary: Patient slept well through the night. No complaints of chest pain. Cardiology consult for today.

## 2021-10-22 NOTE — CASE MANAGEMENT/SOCIAL WORK
Discharge Planning Assessment   [facility]     Patient Name: Thony Dumont  MRN: [id number]  Today's Date: 10/22/2021    Admit Date: 10/21/2021     Discharge Needs Assessment     Row Name 10/22/21 1429       Living Environment    Lives With alone    Current Living Arrangements homeless    Primary Care Provided by self    Provides Primary Care For no one, unable/limited ability to care for self    Able to Return to Prior Arrangements yes       Resource/Environmental Concerns    Resource/Environmental Concerns none    Transportation Concerns car, none       Transition Planning    Patient/Family Anticipates Transition to home    Patient/Family Anticipated Services at Transition     Transportation Anticipated family or friend will provide       Discharge Needs Assessment    Readmission Within the Last 30 Days no previous admission in last 30 days    Concerns to be Addressed discharge planning; substance/tobacco abuse/use; homelessness    Anticipated Changes Related to Illness none    Equipment Needed After Discharge none    Current Discharge Risk substance use/abuse               Discharge Plan     Row Name 10/22/21 1359       Plan    Plan JANE consulted for homeless and substance abuse.    Plan Comments Upon chart review patient is homeless. Informed Emiliano LARA and also informed of substance abuse. PCP and pharmacy confirmed in [facility]. Per JANE patients friend Denisa will transport home on d/c.                Expected Discharge Date and Time     Expected Discharge Date Expected Discharge Time    Oct 22, 2021          Demographic Summary     Row Name 10/22/21 1428       General Information    Admission Type inpatient    Arrived From emergency department    Referral Source admission list    Reason for Consult discharge planning; housing concerns/homeless; substance use concerns    Preferred Language English               Functional Status     Row Name 10/22/21 1429       Functional Status    Usual Activity Tolerance  good    Current Activity Tolerance moderate       Functional Status, IADL    Medications independent              Phone communication or documentation only - no physical contact with patient or family.      Genoveva Perez RN
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
## 2021-10-22 NOTE — PLAN OF CARE
Pt has been pleasant & cooperative. No complaints. Will continue with oral diuretics. Will continue to monitor.

## 2021-10-22 NOTE — PROGRESS NOTES
[facility] Medicine Services Daily Progress Note    Patient Name: Thony Dumont  : 1960  MRN: [id number]  Primary Care Physician:  Rose Rao, CORTEZ  Date of admission: 10/21/2021      Subjective      Chief Complaint: Chest pain    Subjective  Patient denies any chest pain, feels better with the breathing    Review of Systems   All other systems reviewed and are negative.        Objective      Vitals:   Temp:  [97.3 °F (36.3 °C)-99.1 °F (37.3 °C)] 97.3 °F (36.3 °C)  Heart Rate:  [] 92  Resp:  [16-18] 17  BP: (113-136)/(74-97) 120/82    Physical Exam  Vitals and nursing note reviewed.   Constitutional:       General: He is not in acute distress.     Appearance: Normal appearance. He is well-developed. He is not ill-appearing, toxic-appearing or diaphoretic.   HENT:      Head: Normocephalic and atraumatic.      Right Ear: Ear canal and external ear normal.      Left Ear: Ear canal and external ear normal.      Nose: Nose normal. No congestion or rhinorrhea.      Mouth/Throat:      Mouth: Mucous membranes are moist.      Pharynx: No oropharyngeal exudate.   Eyes:      General: No scleral icterus.        Right eye: No discharge.         Left eye: No discharge.      Extraocular Movements: Extraocular movements intact.      Conjunctiva/sclera: Conjunctivae normal.      Pupils: Pupils are equal, round, and reactive to light.   Neck:      Thyroid: No thyromegaly.      Vascular: No carotid bruit or JVD.      Trachea: No tracheal deviation.   Cardiovascular:      Rate and Rhythm: Normal rate and regular rhythm.      Pulses: Normal pulses.      Heart sounds: Normal heart sounds. No murmur heard.  No friction rub. No gallop.    Pulmonary:      Effort: Pulmonary effort is normal. No respiratory distress.      Breath sounds: Normal breath sounds. No stridor. No wheezing, rhonchi or rales.   Chest:      Chest wall: No tenderness.   Abdominal:      General: Bowel sounds are normal.  There is no distension.      Palpations: Abdomen is soft. There is no mass.      Tenderness: There is no abdominal tenderness. There is no guarding or rebound.      Hernia: No hernia is present.   Musculoskeletal:         General: No swelling, tenderness, deformity or signs of injury. Normal range of motion.      Cervical back: Normal range of motion and neck supple. No rigidity. No muscular tenderness.      Right lower leg: No edema.      Left lower leg: No edema.   Lymphadenopathy:      Cervical: No cervical adenopathy.   Skin:     General: Skin is warm and dry.      Coloration: Skin is not jaundiced or pale.      Findings: No bruising, erythema or rash.   Neurological:      General: No focal deficit present.      Mental Status: He is alert and oriented to person, place, and time. Mental status is at baseline.      Cranial Nerves: No cranial nerve deficit.      Sensory: No sensory deficit.      Motor: No weakness or abnormal muscle tone.      Coordination: Coordination normal.   Psychiatric:         Mood and Affect: Mood normal.         Behavior: Behavior normal.         Thought Content: Thought content normal.         Judgment: Judgment normal.             Result Review    Result Review:  I have personally reviewed the results from the time of this admission to 10/22/2021 12:53 EDT and agree with these findings:  [x]  Laboratory  [x]  Microbiology  []  Radiology  []  EKG/Telemetry   []  Cardiology/Vascular   []  Pathology  []  Old records  []  Other:  Most notable findings include:         Assessment/Plan      Brief Patient Summary:  Thony Dumont is a 61 y.o. male who       atorvastatin, 10 mg, Oral, Nightly  carvedilol, 6.25 mg, Oral, BID With Meals  furosemide, 60 mg, Oral, BID  losartan, 25 mg, Oral, Daily  rivaroxaban, 20 mg, Oral, Daily With Dinner  sodium chloride, 10 mL, Intravenous, Q12H  spironolactone, 25 mg, Oral, Daily             Active Hospital Problems:  Active Hospital Problems    Diagnosis     • **Chest pain    • Serum total bilirubin elevated    • Substance use    • ICD (implantable cardioverter-defibrillator) in place    • Pulmonary hypertension (HCC)    • CKD (chronic kidney disease) stage 2, GFR 60-89 ml/min    • Acute on chronic combined systolic (congestive) and diastolic (congestive) heart failure (HCC)    • NICM (nonischemic cardiomyopathy) ([facility])    • Tobacco abuse    • Essential hypertension    • Paroxysmal atrial fibrillation (HCC)    • Mixed hyperlipidemia      Plan:     Acute chest pain  - CXR reviewed  - EKG reviewed   - Troponin X1 normal, trend   - Continuous cardiac monitoring   - Nitrostat ordered, Patient has allergic reaction to aspirin   - Cardiology consulted, will follow the recommendation     Acute on chronic systolic and diastolic congestive heart failure/nonischemic cardiomyopathy improving  - Last 2D echo on 6/6/21 showed Estimated left ventricular EF = 25%  - proBNP 1747, monitor   - Received lasix in the ED   - Daily weights  - Strict I and O   -Continue spironolactone, also noted patient receives metolazone as needed at home  -IV Lasix ordered x2 doses     Acute right knee swelling  - Not hot to touch or any erythema   - Duplex bilateral on 8/9/21 was normal  - Xray right knee ordered      Elevated total bilirubin  - Bilirubin 2.2, monitor      Essential HTN  - Controlled  - Monitor blood pressure  - Continue carvedilol, losartan, Lasix, spironolactone  - IV hydralazine ordered PRN      Pulmonary HTN  - 2D echo on 6/6/21 showed mild to moderate      Paroxysmal atrial fibrillation with ICD in place   - Currently in sinus rhythm   - Continue xarelto and carvedilol      HLD  - Continue statin      CKD Stage III  - CR 1.1, monitor  - Baseline CR 1.1     History of tobacco abuse/substance use  -Patient reports he puffs a cigarette occasionally and last used methamphetamine 4 to 5 days ago  - Check urine drug screen         DVT prophylaxis: Xarelto  CODE STATUS:    Level Of  Support Discussed With: Patient  Code Status: CPR  Medical Interventions (Level of Support Prior to Arrest): Full     Admission Status:  I believe this patient meets inpatient status.  DVT prophylaxis:  Medical DVT prophylaxis orders are present.    CODE STATUS:    Level Of Support Discussed With: Patient  Code Status: CPR  Medical Interventions (Level of Support Prior to Arrest): Full      Disposition:  I expect patient to be discharged in the next day or 2.    This patient has been examined wearing appropriate Personal Protective Equipment and discussed with hospital infection control department. 10/22/21      Electronically signed by Mary Luevano MD, 10/22/21, 12:53 EDT.  [facility], [place] Hospitalist Team

## 2021-10-23 ENCOUNTER — READMISSION MANAGEMENT (OUTPATIENT)
Dept: CALL CENTER | Facility: HOSPITAL | Age: 61
End: 2021-10-23

## 2021-10-23 VITALS
TEMPERATURE: 98.2 F | SYSTOLIC BLOOD PRESSURE: 113 MMHG | HEART RATE: 95 BPM | OXYGEN SATURATION: 97 % | BODY MASS INDEX: 29.74 KG/M2 | DIASTOLIC BLOOD PRESSURE: 77 MMHG | HEIGHT: 75 IN | WEIGHT: 239.2 LBS | RESPIRATION RATE: 16 BRPM

## 2021-10-23 LAB
MAGNESIUM SERPL-MCNC: 1.9 MG/DL (ref 1.6–2.6)
WHOLE BLOOD HOLD SPECIMEN: NORMAL

## 2021-10-23 PROCEDURE — G0378 HOSPITAL OBSERVATION PER HR: HCPCS

## 2021-10-23 PROCEDURE — 36415 COLL VENOUS BLD VENIPUNCTURE: CPT | Performed by: NURSE PRACTITIONER

## 2021-10-23 PROCEDURE — 83735 ASSAY OF MAGNESIUM: CPT | Performed by: NURSE PRACTITIONER

## 2021-10-23 PROCEDURE — 99214 OFFICE O/P EST MOD 30 MIN: CPT | Performed by: INTERNAL MEDICINE

## 2021-10-23 PROCEDURE — 99217 PR OBSERVATION CARE DISCHARGE MANAGEMENT: CPT | Performed by: HOSPITALIST

## 2021-10-23 RX ORDER — CARVEDILOL 6.25 MG/1
12.5 TABLET ORAL 2 TIMES DAILY WITH MEALS
Status: DISCONTINUED | OUTPATIENT
Start: 2021-10-23 | End: 2021-10-23 | Stop reason: HOSPADM

## 2021-10-23 RX ADMIN — SPIRONOLACTONE 25 MG: 25 TABLET ORAL at 09:34

## 2021-10-23 RX ADMIN — FUROSEMIDE 60 MG: 40 TABLET ORAL at 09:34

## 2021-10-23 RX ADMIN — ACETAMINOPHEN 650 MG: 325 TABLET, FILM COATED ORAL at 10:52

## 2021-10-23 RX ADMIN — Medication 10 ML: at 09:34

## 2021-10-23 NOTE — PROGRESS NOTES
LOS: 1 day   Admiting Physician- Mary Luevano MD    Reason For Followup:    Congestive heart failure acute on chronic systolic  Dilated cardiomyopathy nonischemic  CKD  History of's stroke  Hypertension    Subjective     Patient is feeling better    Objective     Blood pressure and heart rate is desirable    Review of Systems:   Review of Systems   Constitutional: Negative for chills and fever.   HENT: Negative for ear discharge and nosebleeds.    Eyes: Negative for discharge and redness.   Cardiovascular: Positive for leg swelling. Negative for chest pain, orthopnea, palpitations, paroxysmal nocturnal dyspnea and syncope.   Respiratory: Positive for shortness of breath. Negative for cough and wheezing.    Endocrine: Negative for heat intolerance.   Skin: Negative for rash.   Musculoskeletal: Positive for arthritis. Negative for myalgias.   Gastrointestinal: Negative for abdominal pain, melena, nausea and vomiting.   Genitourinary: Negative for dysuria and hematuria.   Neurological: Negative for dizziness, light-headedness, numbness and tremors.   Psychiatric/Behavioral: Negative for depression. The patient is not nervous/anxious.          Vital Signs  Vitals:    10/23/21 0004 10/23/21 0356 10/23/21 0737 10/23/21 1137   BP: 119/80 121/78 111/84 113/77   BP Location: Right arm Left arm Left arm Left arm   Patient Position: Lying Lying Lying Lying   Pulse: 96 85 88 95   Resp: 18 16 18 16   Temp: 98.4 °F (36.9 °C) 98.4 °F (36.9 °C) 98.3 °F (36.8 °C) 98.2 °F (36.8 °C)   TempSrc: Oral Oral Oral Oral   SpO2: 95% 96% 95% 97%   Weight:  109 kg (239 lb 3.2 oz)     Height:         Wt Readings from Last 1 Encounters:   10/23/21 109 kg (239 lb 3.2 oz)       Intake/Output Summary (Last 24 hours) at 10/23/2021 1402  Last data filed at 10/23/2021 0900  Gross per 24 hour   Intake 820 ml   Output 700 ml   Net 120 ml     Physical Exam:  Constitutional:       Appearance: Well-developed.   Eyes:      General: No scleral  icterus.        Right eye: No discharge.   HENT:      Head: Normocephalic and atraumatic.   Neck:      Thyroid: No thyromegaly.      Lymphadenopathy: No cervical adenopathy.   Pulmonary:      Effort: Pulmonary effort is normal. No respiratory distress.      Breath sounds: Normal breath sounds. No wheezing. No rales.   Cardiovascular:      Normal rate. Regular rhythm.      No gallop.   Edema:     Peripheral edema absent.   Abdominal:      Tenderness: There is no abdominal tenderness.   Skin:     Findings: No erythema or rash.   Neurological:      Mental Status: Alert and oriented to person, place, and time.         Results Review:   Lab Results (last 24 hours)     Procedure Component Value Units Date/Time    Extra Tubes [[id number]] Collected: 10/23/21 0418    Specimen: Blood, Venous Line Updated: 10/23/21 0546    Narrative:      The following orders were created for panel order Extra Tubes.  Procedure                               Abnormality         Status                     ---------                               -----------         ------                     Lavender Top[[id number]]                                     Final result                 Please view results for these tests on the individual orders.    Lavender Top [[id number]] Collected: 10/23/21 0418    Specimen: Blood Updated: 10/23/21 0546     Extra Tube hold for add-on     Comment: Auto resulted       Magnesium [[id number]]  (Normal) Collected: 10/23/21 0418    Specimen: Blood Updated: 10/23/21 0530     Magnesium 1.9 mg/dL         Imaging Results (Last 72 Hours)     Procedure Component Value Units Date/Time    XR Knee 1 or 2 View Right [[id number]] Collected: 10/21/21 0834     Updated: 10/21/21 0837    Narrative:      DATE OF EXAM:  10/21/2021 8:10 AM     PROCEDURE:  XR KNEE 1 OR 2 VW RIGHT-     INDICATIONS:  Right knee pain and swelling.     COMPARISON:  No Comparisons Available     TECHNIQUE:   One to two radiologic views of the right knee were  obtained.        FINDINGS:  There is narrowing of the lateral compartment with near bone-on-bone  contact. There is subchondral sclerosis and spurring of all 3 joint  space compartments. The findings are consistent with tricompartment  osteoarthritis. There is a questionable suprapatellar joint effusion.  There appears to be diffuse soft tissue swelling. There is no acute  fracture or dislocation.        Impression:         1. Tricompartment osteoarthritis.  2. Questionable suprapatellar joint effusion with soft tissue swelling.     Electronically Signed By-Matthew Damon MD On:10/21/2021 8:35 AM  This report was finalized on [id number] by  Matthew Damon MD.    XR Chest 1 View [[id number]] Collected: 10/21/21 0717     Updated: 10/21/21 0720    Narrative:      EXAMINATION: XR CHEST 1 VW-     DATE OF EXAM: 10/21/2021 6:24 AM     INDICATION: Chest pain with deep breaths, shortness of air with exertion     COMPARISON: Chest radiograph dated 8/25/2021     TECHNIQUE: Portable AP view of the chest was obtained.     FINDINGS:  There is a left chest wall ICD with lead in unchanged position. There is  cardiomegaly and central vascular congestion. There is hazy right  basilar airspace opacity likely representing a small layering pleural  effusion. There is no evidence of pneumothorax. There are degenerative  changes of the thoracic spine.       Impression:      1. Cardiomegaly with central vascular congestion.  2. Probable layering small right-sided pleural effusion.     Electronically Signed By-Cole Squires MD On:10/21/2021 7:18 AM  This report was finalized on [id number] by  Coel Squires MD.        ECG/EMG Results (most recent)     Procedure Component Value Units Date/Time    ECG 12 Lead [[id number]] Collected: 10/21/21 0545     Updated: 10/21/21 0548     QT Interval 335 ms     Narrative:      HEART RATE= 99  bpm  RR Interval= 604  ms  IN Interval= 164  ms  P Horizontal Axis= 0  deg  P Front Axis= 82  deg  QRSD  Interval= 86  ms  QT Interval= 335  ms  QRS Axis= 45  deg  T Wave Axis=   deg  - ABNORMAL ECG -  Sinus rhythm  Abnormal T, consider ischemia, lateral leads  Electronically Signed By:   Date and Time of Study: 2021-10-21 05:45:43        CBC    Results from last 7 days   Lab Units 10/22/21  0403 10/21/21  0602   WBC 10*3/mm3 4.60 4.80   HEMOGLOBIN g/dL 13.3 13.5   PLATELETS 10*3/mm3 162 168     BMP   Results from last 7 days   Lab Units 10/23/21  0418 10/22/21  0403 10/21/21  0602   SODIUM mmol/L  --  140 143   POTASSIUM mmol/L  --  4.1 4.1   CHLORIDE mmol/L  --  104 106   CO2 mmol/L  --  25.0 25.0   BUN mg/dL  --  20 15   CREATININE mg/dL  --  1.41* 1.16   GLUCOSE mg/dL  --  116* 110*   MAGNESIUM mg/dL 1.9 1.8 1.7     CMP   Results from last 7 days   Lab Units 10/22/21  0403 10/21/21  0602   SODIUM mmol/L 140 143   POTASSIUM mmol/L 4.1 4.1   CHLORIDE mmol/L 104 106   CO2 mmol/L 25.0 25.0   BUN mg/dL 20 15   CREATININE mg/dL 1.41* 1.16   GLUCOSE mg/dL 116* 110*   ALBUMIN g/dL  --  4.00   BILIRUBIN mg/dL  --  2.2*   ALK PHOS U/L  --  200*   AST (SGOT) U/L  --  22   ALT (SGPT) U/L  --  20     Cardiac Studies:  Echo- Results for orders placed during the hospital encounter of 06/04/21    Adult Transthoracic Echo Complete W/ Cont if Necessary Per Protocol    Interpretation Summary  · Left atrial volume is severely increased.  · The left ventricular cavity is moderately dilated.  · Left ventricular wall thickness is consistent with mild to moderate concentric hypertrophy.  · Mild dilation of the aortic root is present. Mild dilation of the sinuses of Valsalva is present.  · Moderate tricuspid valve regurgitation is present.  · Estimated right ventricular systolic pressure from tricuspid regurgitation is mildly elevated (35-45 mmHg).  · The right atrial cavity is severely dilated.  · The right ventricular cavity is moderately dilated.  · Moderately reduced right ventricular systolic function noted.  · Left ventricular  diastolic function is consistent with (grade III w/high LAP) reversible restrictive pattern.  · Estimated left ventricular EF = 25% Estimated left ventricular EF was in disagreement with the calculated left ventricular EF. Left ventricular ejection fraction appears to be 21 - 25%. Left ventricular systolic function is moderately decreased.  · Abnormal mitral valve structure consistent with dilated annulus.  · Moderate mitral valve regurgitation is present with a centrally-directed jet noted.  · Mild to moderate pulmonary hypertension is present.    Stress Myoview-  Cath-      Medication Review:   Scheduled Meds:atorvastatin, 10 mg, Oral, Nightly  carvedilol, 6.25 mg, Oral, BID With Meals  furosemide, 60 mg, Oral, BID  losartan, 25 mg, Oral, Daily  rivaroxaban, 20 mg, Oral, Daily With Dinner  sodium chloride, 10 mL, Intravenous, Q12H  spironolactone, 25 mg, Oral, Daily      Continuous Infusions:   PRN Meds:.•  acetaminophen **OR** acetaminophen **OR** acetaminophen  •  aluminum-magnesium hydroxide-simethicone  •  magnesium sulfate **OR** magnesium sulfate **OR** magnesium sulfate  •  melatonin  •  metOLazone  •  nitroglycerin  •  ondansetron **OR** ondansetron  •  potassium chloride  •  potassium chloride  •  sodium chloride      Assessment/Plan   Patient Active Problem List   Diagnosis   • Essential hypertension   • Mixed hyperlipidemia   • Paroxysmal atrial fibrillation (HCC)   • Chronic pain   • Tobacco abuse   • NICM (nonischemic cardiomyopathy) (HCC)   • Acute on chronic combined systolic (congestive) and diastolic (congestive) heart failure (HCC)   • CKD (chronic kidney disease) stage 2, GFR 60-89 ml/min   • ICD (implantable cardioverter-defibrillator) in place   • Degenerative joint disease   • Erectile dysfunction   • Gastroesophageal reflux disease   • Sprain of rotator cuff capsule   • Moderate mitral regurgitation   • Moderate tricuspid regurgitation   • Pulmonary hypertension (HCC)   • Serum total  bilirubin elevated   • Substance use   • Chest pain     MDM:    1.  Acute on chronic systolic congestive heart failure:    Patient has slowly improved.  Patient denies any significant shortness of breath.  Patient appears to be in euvolemic status.    2.  Dilated cardiomyopathy nonischemic:    Patient is on losartan and carvedilol.  I would increase carvedilol to 12.5 mg twice daily    3.  Hypertension:    Blood pressure is controlled    4.  History of paroxysmal atrial fibrillation.    Patient is maintaining sinus rhythm.  Patient is on Xarelto.  I would recommend to increase carvedilol to control heart rate better    Roosevelt Faustin MD  10/23/21  14:02 EDT

## 2021-10-23 NOTE — PLAN OF CARE
Goal Outcome Evaluation:  Plan of Care Reviewed With: patient        Progress: no change  Outcome Summary: pt has had no complaints overnight. will continue to monitor pt

## 2021-10-23 NOTE — DISCHARGE SUMMARY
[facility]  DISCHARGE SUMMARY    Patient Name: Thony Dumont  : 1960  MRN: [id number]    Date of Admission: 10/21/2021  Date of Discharge:  10/23/2021  Primary Care Physician: Rose Rao APRN      Presenting Problem:   Methamphetamine abuse (HCC) [F15.10]  Chest pain, unspecified type [R07.9]  Acute on chronic congestive heart failure, unspecified heart failure type (HCC) [I50.9]    Active and Resolved Hospital Problems:  Active Hospital Problems    Diagnosis POA   • **Chest pain [R07.9] Yes   • Serum total bilirubin elevated [R17] Yes   • Substance use [F19.90] Yes   • ICD (implantable cardioverter-defibrillator) in place [Z95.810] Yes   • Pulmonary hypertension (HCC) [I27.20] Yes   • CKD (chronic kidney disease) stage 2, GFR 60-89 ml/min [N18.2] Yes   • Acute on chronic combined systolic (congestive) and diastolic (congestive) heart failure (HCC) [I50.43] Yes   • NICM (nonischemic cardiomyopathy) (HCC) [I42.8] Yes   • Tobacco abuse [Z72.0] Yes   • Essential hypertension [I10] Yes   • Paroxysmal atrial fibrillation (HCC) [I48.0] Yes   • Mixed hyperlipidemia [E78.2] Yes      Resolved Hospital Problems   No resolved problems to display.         Hospital Course     Hospital Course by problem list    Acute chest pain resolved,  - CXR reviewed  - EKG reviewed   - Troponin X1 normal, trend   - Continuous cardiac monitoring   - Nitrostat ordered, Patient has allergic reaction to aspirin   - Cardiology consulted, advised no further ischemic work-up     Acute on chronic systolic and diastolic congestive heart failure/nonischemic cardiomyopathy improving  - Last 2D echo on 21 showed Estimated left ventricular EF = 25%  - proBNP 1747, monitor   - Received lasix in the ED   - Daily weights  - Strict I and O   -Continue spironolactone, metolazone, patient treated with IV Lasix changed to oral on discharge.  Compliance with fluid striction and low salt  diet were discussed.  Patient was strongly suggested to quit substance abuse.    Acute right knee swelling secondary to Silvia arthritis, outpatient follow-up  Elevated total bilirubin  - Bilirubin 2.2, monitor      Essential HTN  - Controlled  - Monitor blood pressure  - Continue carvedilol, losartan, Lasix, spironolactone  - IV hydralazine ordered PRN      Pulmonary HTN  - 2D echo on 6/6/21 showed mild to moderate      Paroxysmal atrial fibrillation with ICD in place   - Currently in sinus rhythm   - Continue xarelto and carvedilol      HLD  - Continue statin      CKD Stage III  - CR 1.1, monitor  - Baseline CR 1.1     History of tobacco abuse/substance use  -Patient reports he puffs a cigarette occasionally and last used methamphetamine 4 to 5 days ago  -Patient advised strongly to quit.    Condition at discharge stable.    DISCHARGE Follow Up with PCP in a week time.  Follow up with cardiology service in 2 weeks time    Reasons For Change In Medications and Indications for New Medications:      Day of Discharge     Vital Signs:  Temp:  [97.3 °F (36.3 °C)-98.4 °F (36.9 °C)] 98.2 °F (36.8 °C)  Heart Rate:  [85-96] 95  Resp:  [16-18] 16  BP: (111-129)/(77-94) 113/77    Physical Exam:  Physical Exam  Vitals and nursing note reviewed.   Constitutional:       General: He is not in acute distress.     Appearance: Normal appearance. He is well-developed. He is not ill-appearing, toxic-appearing or diaphoretic.   HENT:      Head: Normocephalic and atraumatic.      Right Ear: Ear canal and external ear normal.      Left Ear: Ear canal and external ear normal.      Nose: Nose normal. No congestion or rhinorrhea.      Mouth/Throat:      Mouth: Mucous membranes are moist.      Pharynx: No oropharyngeal exudate.   Eyes:      General: No scleral icterus.        Right eye: No discharge.         Left eye: No discharge.      Extraocular Movements: Extraocular movements intact.      Conjunctiva/sclera: Conjunctivae normal.       Pupils: Pupils are equal, round, and reactive to light.   Neck:      Thyroid: No thyromegaly.      Vascular: No carotid bruit or JVD.      Trachea: No tracheal deviation.   Cardiovascular:      Rate and Rhythm: Normal rate and regular rhythm.      Pulses: Normal pulses.      Heart sounds: Normal heart sounds. No murmur heard.  No friction rub. No gallop.    Pulmonary:      Effort: Pulmonary effort is normal. No respiratory distress.      Breath sounds: Normal breath sounds. No stridor. No wheezing, rhonchi or rales.   Chest:      Chest wall: No tenderness.   Abdominal:      General: Bowel sounds are normal. There is no distension.      Palpations: Abdomen is soft. There is no mass.      Tenderness: There is no abdominal tenderness. There is no guarding or rebound.      Hernia: No hernia is present.   Musculoskeletal:         General: No swelling, tenderness, deformity or signs of injury. Normal range of motion.      Cervical back: Normal range of motion and neck supple. No rigidity. No muscular tenderness.      Right lower leg: No edema.      Left lower leg: No edema.   Lymphadenopathy:      Cervical: No cervical adenopathy.   Skin:     General: Skin is warm and dry.      Coloration: Skin is not jaundiced or pale.      Findings: No bruising, erythema or rash.   Neurological:      General: No focal deficit present.      Mental Status: He is alert and oriented to person, place, and time. Mental status is at baseline.      Cranial Nerves: No cranial nerve deficit.      Sensory: No sensory deficit.      Motor: No weakness or abnormal muscle tone.      Coordination: Coordination normal.   Psychiatric:         Mood and Affect: Mood normal.         Behavior: Behavior normal.         Thought Content: Thought content normal.         Judgment: Judgment normal.              Pertinent  and/or Most Recent Results     LAB RESULTS:      Lab 10/22/21  0403 10/21/21  0602   WBC 4.60 4.80   HEMOGLOBIN 13.3 13.5   HEMATOCRIT 39.7 40.1    PLATELETS 162 168   NEUTROS ABS 2.80 3.40   LYMPHS ABS 0.90 0.60*   MONOS ABS 0.70 0.70   EOS ABS 0.10 0.00   MCV 93.3 94.6   PROTIME  --  12.8*   APTT  --  27.3         Lab 10/23/21  0418 10/22/21  0403 10/21/21  0602   SODIUM  --  140 143   POTASSIUM  --  4.1 4.1   CHLORIDE  --  104 106   CO2  --  25.0 25.0   ANION GAP  --  11.0 12.0   BUN  --  20 15   CREATININE  --  1.41* 1.16   GLUCOSE  --  116* 110*   CALCIUM  --  8.8 8.3*   MAGNESIUM 1.9 1.8 1.7         Lab 10/21/21  0602   TOTAL PROTEIN 6.6   ALBUMIN 4.00   GLOBULIN 2.6   ALT (SGPT) 20   AST (SGOT) 22   BILIRUBIN 2.2*   ALK PHOS 200*         Lab 10/21/21  1607 10/21/21  0602   PROBNP  --  1,747.0*   TROPONIN T <0.010 <0.010   PROTIME  --  12.8*   INR  --  1.17*                 Brief Urine Lab Results     None        Microbiology Results (last 10 days)     Procedure Component Value - Date/Time    COVID-19,CEPHEID/DIEGO/BDMAX,COR/[facility]/PAD/CHELLY IN-HOUSE(OR EMERGENT/ADD-ON),NP SWAB IN TRANSPORT MEDIA 3-4 HR TAT, RT-PCR - Swab, Nasopharynx [[id number]]  (Normal) Collected: 10/21/21 0657    Lab Status: Final result Specimen: Swab from Nasopharynx Updated: 10/21/21 0725     COVID19 Not Detected    Narrative:      Fact sheet for providers: https://www.fda.gov/media/051025/download     Fact sheet for patients: https://www.fda.gov/media/958740/download  Fact sheet for providers: https://www.fda.gov/media/969471/download    Fact sheet for patients: https://www.fda.gov/media/189267/download    Test performed by PCR.          XR Knee 1 or 2 View Right    Result Date: 10/21/2021  Impression:  1. Tricompartment osteoarthritis. 2. Questionable suprapatellar joint effusion with soft tissue swelling.  Electronically Signed By-Matthew Damon MD On:10/21/2021 8:35 AM This report was finalized on [id number] by  Matthew Damon MD.    XR Chest 1 View    Result Date: 10/21/2021  Impression: 1. Cardiomegaly with central vascular congestion. 2. Probable layering small right-sided pleural  effusion.  Electronically Signed By-Cole Squires MD On:10/21/2021 7:18 AM This report was finalized on [id number] by  Cole Squires MD.      Results for orders placed during the hospital encounter of 08/09/21    Duplex Venous Lower Extremity - Bilateral CAR    Interpretation Summary  · Normal bilateral lower extremity venous duplex scan.      Results for orders placed during the hospital encounter of 08/09/21    Duplex Venous Lower Extremity - Bilateral CAR    Interpretation Summary  · Normal bilateral lower extremity venous duplex scan.      Results for orders placed during the hospital encounter of 06/04/21    Adult Transthoracic Echo Complete W/ Cont if Necessary Per Protocol    Interpretation Summary  · Left atrial volume is severely increased.  · The left ventricular cavity is moderately dilated.  · Left ventricular wall thickness is consistent with mild to moderate concentric hypertrophy.  · Mild dilation of the aortic root is present. Mild dilation of the sinuses of Valsalva is present.  · Moderate tricuspid valve regurgitation is present.  · Estimated right ventricular systolic pressure from tricuspid regurgitation is mildly elevated (35-45 mmHg).  · The right atrial cavity is severely dilated.  · The right ventricular cavity is moderately dilated.  · Moderately reduced right ventricular systolic function noted.  · Left ventricular diastolic function is consistent with (grade III w/high LAP) reversible restrictive pattern.  · Estimated left ventricular EF = 25% Estimated left ventricular EF was in disagreement with the calculated left ventricular EF. Left ventricular ejection fraction appears to be 21 - 25%. Left ventricular systolic function is moderately decreased.  · Abnormal mitral valve structure consistent with dilated annulus.  · Moderate mitral valve regurgitation is present with a centrally-directed jet noted.  · Mild to moderate pulmonary hypertension is present.      Labs Pending at  Discharge:      Procedures Performed           Consults:   Consults     Date and Time Order Name Status Description    10/21/2021  2:15 PM Inpatient Cardiology Consult Completed     10/21/2021  6:59 AM Inpatient Hospitalist Consult Completed             Discharge Details        Discharge Medications      Continue These Medications      Instructions Start Date   Acetaminophen Extra Strength 500 MG tablet  Generic drug: acetaminophen   1,000 mg, Oral, Every 6 Hours PRN      atorvastatin 10 MG tablet  Commonly known as: Lipitor   10 mg, Oral, Daily      carvedilol 6.25 MG tablet  Commonly known as: COREG   6.25 mg, Oral, 2 Times Daily With Meals      furosemide 40 MG tablet  Commonly known as: LASIX   60 mg, Oral, 2 Times Daily      losartan 25 MG tablet  Commonly known as: COZAAR   25 mg, Oral, Daily      metOLazone 2.5 MG tablet  Commonly known as: ZAROXOLYN   2.5 mg, Oral, Daily PRN      rivaroxaban 20 MG tablet  Commonly known as: Xarelto   20 mg, Oral, Daily      spironolactone 25 MG tablet  Commonly known as: ALDACTONE   25 mg, Oral, Daily             Allergies   Allergen Reactions   • Aspirin Swelling   • Penicillins Swelling   • Pork Allergy Unknown - High Severity         Discharge Disposition:   Home or Self Care    Diet:  Hospital:  Diet Order   Procedures   • Diet Cardiac; 2gm Na+; Fluid Restriction; 1500cc/day         Discharge Activity:         CODE STATUS:  Code Status and Medical Interventions:   Ordered at: 10/21/21 0756     Level Of Support Discussed With:    Patient     Code Status:    CPR     Medical Interventions (Level of Support Prior to Arrest):    Full         Future Appointments   Date Time Provider Department Center   11/22/2021  9:15 AM Michael Rollins MD [facility]           Time spent on Discharge including face to face service:  25 minutes    This patient has been examined wearing appropriate Personal Protective Equipment and discussed with hospital infection control department.  10/23/21      Signature:

## 2021-10-24 LAB — QT INTERVAL: 335 MS

## 2021-10-24 NOTE — OUTREACH NOTE
Prep Survey      Responses   [facility] facility patient discharged from? [facility]   Is LACE score < 7 ? No   Emergency Room discharge w/ pulse ox? No   Eligibility Not Eligible   What are the reasons patient is not eligible? Other   Does the patient have one of the following disease processes/diagnoses(primary or secondary)? CHF   Prep survey completed? Yes          Maylin Muñoz RN

## 2021-10-24 NOTE — CASE MANAGEMENT/SOCIAL WORK
Case Management Discharge Note      Final Note: Home         Selected Continued Care - Discharged on 10/23/2021 Admission date: 10/21/2021 - Discharge disposition: Home or Self Care              Final Discharge Disposition Code: 01 - home or self-care

## 2021-10-28 ENCOUNTER — TELEPHONE (OUTPATIENT)
Dept: CARDIOLOGY | Facility: CLINIC | Age: 61
End: 2021-10-28

## 2021-10-28 NOTE — TELEPHONE ENCOUNTER
Attempted to call pt regarding home monitor not transmitting.  Unable to leave voicemail d/t mailbox full

## 2021-11-10 ENCOUNTER — HOSPITAL ENCOUNTER (INPATIENT)
Facility: HOSPITAL | Age: 61
LOS: 3 days | Discharge: NURSING, REHAB OR LONG-TERM CARE | End: 2021-11-17
Attending: EMERGENCY MEDICINE | Admitting: INTERNAL MEDICINE

## 2021-11-10 ENCOUNTER — APPOINTMENT (OUTPATIENT)
Dept: GENERAL RADIOLOGY | Facility: HOSPITAL | Age: 61
End: 2021-11-10

## 2021-11-10 DIAGNOSIS — F15.10 METHAMPHETAMINE ABUSE (HCC): ICD-10-CM

## 2021-11-10 DIAGNOSIS — J96.01 ACUTE RESPIRATORY FAILURE WITH HYPOXEMIA (HCC): Primary | ICD-10-CM

## 2021-11-10 DIAGNOSIS — Z20.822 LAB TEST NEGATIVE FOR COVID-19 VIRUS: ICD-10-CM

## 2021-11-10 DIAGNOSIS — I50.9 ACUTE ON CHRONIC CONGESTIVE HEART FAILURE, UNSPECIFIED HEART FAILURE TYPE (HCC): ICD-10-CM

## 2021-11-10 LAB
AMPHET+METHAMPHET UR QL: POSITIVE
BARBITURATES UR QL SCN: NEGATIVE
BASOPHILS # BLD AUTO: 0 10*3/MM3 (ref 0–0.2)
BASOPHILS NFR BLD AUTO: 0.8 % (ref 0–1.5)
BENZODIAZ UR QL SCN: NEGATIVE
CANNABINOIDS SERPL QL: POSITIVE
COCAINE UR QL: NEGATIVE
DEPRECATED RDW RBC AUTO: 56 FL (ref 37–54)
EOSINOPHIL # BLD AUTO: 0.1 10*3/MM3 (ref 0–0.4)
EOSINOPHIL NFR BLD AUTO: 1.4 % (ref 0.3–6.2)
ERYTHROCYTE [DISTWIDTH] IN BLOOD BY AUTOMATED COUNT: 16.7 % (ref 12.3–15.4)
HCT VFR BLD AUTO: 41.6 % (ref 37.5–51)
HGB BLD-MCNC: 14.6 G/DL (ref 13–17.7)
LYMPHOCYTES # BLD AUTO: 0.8 10*3/MM3 (ref 0.7–3.1)
LYMPHOCYTES NFR BLD AUTO: 16.7 % (ref 19.6–45.3)
MCH RBC QN AUTO: 33.5 PG (ref 26.6–33)
MCHC RBC AUTO-ENTMCNC: 35.1 G/DL (ref 31.5–35.7)
MCV RBC AUTO: 95.6 FL (ref 79–97)
METHADONE UR QL SCN: NEGATIVE
MONOCYTES # BLD AUTO: 0.7 10*3/MM3 (ref 0.1–0.9)
MONOCYTES NFR BLD AUTO: 14.7 % (ref 5–12)
NEUTROPHILS NFR BLD AUTO: 3.1 10*3/MM3 (ref 1.7–7)
NEUTROPHILS NFR BLD AUTO: 66.4 % (ref 42.7–76)
OPIATES UR QL: NEGATIVE
OXYCODONE UR QL SCN: NEGATIVE
PLATELET # BLD AUTO: 251 10*3/MM3 (ref 140–450)
PMV BLD AUTO: 8.5 FL (ref 6–12)
RBC # BLD AUTO: 4.35 10*6/MM3 (ref 4.14–5.8)
WBC # BLD AUTO: 4.7 10*3/MM3 (ref 3.4–10.8)

## 2021-11-10 PROCEDURE — 80307 DRUG TEST PRSMV CHEM ANLYZR: CPT | Performed by: EMERGENCY MEDICINE

## 2021-11-10 PROCEDURE — 0202U NFCT DS 22 TRGT SARS-COV-2: CPT | Performed by: EMERGENCY MEDICINE

## 2021-11-10 PROCEDURE — 81001 URINALYSIS AUTO W/SCOPE: CPT | Performed by: EMERGENCY MEDICINE

## 2021-11-10 PROCEDURE — 87040 BLOOD CULTURE FOR BACTERIA: CPT | Performed by: EMERGENCY MEDICINE

## 2021-11-10 PROCEDURE — 99284 EMERGENCY DEPT VISIT MOD MDM: CPT

## 2021-11-10 PROCEDURE — 85025 COMPLETE CBC W/AUTO DIFF WBC: CPT | Performed by: EMERGENCY MEDICINE

## 2021-11-10 PROCEDURE — 93005 ELECTROCARDIOGRAM TRACING: CPT | Performed by: EMERGENCY MEDICINE

## 2021-11-10 PROCEDURE — 25010000002 FUROSEMIDE PER 20 MG: Performed by: EMERGENCY MEDICINE

## 2021-11-10 PROCEDURE — 71045 X-RAY EXAM CHEST 1 VIEW: CPT

## 2021-11-10 RX ORDER — FUROSEMIDE 10 MG/ML
40 INJECTION INTRAMUSCULAR; INTRAVENOUS ONCE
Status: COMPLETED | OUTPATIENT
Start: 2021-11-10 | End: 2021-11-10

## 2021-11-10 RX ADMIN — FUROSEMIDE 40 MG: 10 INJECTION, SOLUTION INTRAMUSCULAR; INTRAVENOUS at 23:15

## 2021-11-10 RX ADMIN — NITROGLYCERIN 1 INCH: 20 OINTMENT TOPICAL at 23:15

## 2021-11-11 PROBLEM — F15.10 METHAMPHETAMINE ABUSE (HCC): Status: ACTIVE | Noted: 2021-11-11

## 2021-11-11 PROBLEM — J96.01 ACUTE RESPIRATORY FAILURE WITH HYPOXEMIA: Status: ACTIVE | Noted: 2021-11-11

## 2021-11-11 LAB
ALBUMIN SERPL-MCNC: 4 G/DL (ref 3.5–5.2)
ALBUMIN/GLOB SERPL: 1.4 G/DL
ALP SERPL-CCNC: 203 U/L (ref 39–117)
ALT SERPL W P-5'-P-CCNC: 18 U/L (ref 1–41)
ANION GAP SERPL CALCULATED.3IONS-SCNC: 13 MMOL/L (ref 5–15)
AST SERPL-CCNC: 18 U/L (ref 1–40)
B PARAPERT DNA SPEC QL NAA+PROBE: NOT DETECTED
B PERT DNA SPEC QL NAA+PROBE: NOT DETECTED
BACTERIA UR QL AUTO: ABNORMAL /HPF
BILIRUB SERPL-MCNC: 1.8 MG/DL (ref 0–1.2)
BILIRUB UR QL STRIP: ABNORMAL
BUN SERPL-MCNC: 19 MG/DL (ref 6–20)
BUN/CREAT SERPL: 13.9 (ref 7–25)
C PNEUM DNA NPH QL NAA+NON-PROBE: NOT DETECTED
CALCIUM SPEC-SCNC: 8.8 MG/DL (ref 8.6–10.5)
CHLORIDE SERPL-SCNC: 104 MMOL/L (ref 98–107)
CLARITY UR: ABNORMAL
CO2 SERPL-SCNC: 25 MMOL/L (ref 22–29)
COLOR UR: ABNORMAL
CREAT SERPL-MCNC: 1.37 MG/DL (ref 0.76–1.27)
FLUAV SUBTYP SPEC NAA+PROBE: NOT DETECTED
FLUBV RNA ISLT QL NAA+PROBE: NOT DETECTED
GFR SERPL CREATININE-BSD FRML MDRD: 64 ML/MIN/1.73
GLOBULIN UR ELPH-MCNC: 2.9 GM/DL
GLUCOSE SERPL-MCNC: 79 MG/DL (ref 65–99)
GLUCOSE UR STRIP-MCNC: NEGATIVE MG/DL
HADV DNA SPEC NAA+PROBE: NOT DETECTED
HCOV 229E RNA SPEC QL NAA+PROBE: NOT DETECTED
HCOV HKU1 RNA SPEC QL NAA+PROBE: NOT DETECTED
HCOV NL63 RNA SPEC QL NAA+PROBE: NOT DETECTED
HCOV OC43 RNA SPEC QL NAA+PROBE: NOT DETECTED
HGB UR QL STRIP.AUTO: NEGATIVE
HMPV RNA NPH QL NAA+NON-PROBE: NOT DETECTED
HOLD SPECIMEN: NORMAL
HPIV1 RNA SPEC QL NAA+PROBE: NOT DETECTED
HPIV2 RNA SPEC QL NAA+PROBE: NOT DETECTED
HPIV3 RNA NPH QL NAA+PROBE: NOT DETECTED
HPIV4 P GENE NPH QL NAA+PROBE: NOT DETECTED
HYALINE CASTS UR QL AUTO: ABNORMAL /LPF
KETONES UR QL STRIP: ABNORMAL
LEUKOCYTE ESTERASE UR QL STRIP.AUTO: NEGATIVE
M PNEUMO IGG SER IA-ACNC: NOT DETECTED
MAGNESIUM SERPL-MCNC: 1.9 MG/DL (ref 1.6–2.6)
NITRITE UR QL STRIP: NEGATIVE
NT-PROBNP SERPL-MCNC: 1989 PG/ML (ref 0–900)
PH UR STRIP.AUTO: 5.5 [PH] (ref 5–8)
POTASSIUM SERPL-SCNC: 3.9 MMOL/L (ref 3.5–5.2)
PROCALCITONIN SERPL-MCNC: 0.1 NG/ML (ref 0–0.25)
PROT SERPL-MCNC: 6.9 G/DL (ref 6–8.5)
PROT UR QL STRIP: ABNORMAL
RBC # UR: ABNORMAL /HPF
REF LAB TEST METHOD: ABNORMAL
RHINOVIRUS RNA SPEC NAA+PROBE: NOT DETECTED
RSV RNA NPH QL NAA+NON-PROBE: NOT DETECTED
SARS-COV-2 RNA NPH QL NAA+NON-PROBE: NOT DETECTED
SODIUM SERPL-SCNC: 142 MMOL/L (ref 136–145)
SP GR UR STRIP: 1.03 (ref 1–1.03)
SQUAMOUS #/AREA URNS HPF: ABNORMAL /HPF
TROPONIN T SERPL-MCNC: <0.01 NG/ML (ref 0–0.03)
UROBILINOGEN UR QL STRIP: ABNORMAL
WBC UR QL AUTO: ABNORMAL /HPF
WHOLE BLOOD HOLD SPECIMEN: NORMAL

## 2021-11-11 PROCEDURE — 83880 ASSAY OF NATRIURETIC PEPTIDE: CPT | Performed by: EMERGENCY MEDICINE

## 2021-11-11 PROCEDURE — 84484 ASSAY OF TROPONIN QUANT: CPT | Performed by: EMERGENCY MEDICINE

## 2021-11-11 PROCEDURE — 99214 OFFICE O/P EST MOD 30 MIN: CPT | Performed by: INTERNAL MEDICINE

## 2021-11-11 PROCEDURE — 84145 PROCALCITONIN (PCT): CPT | Performed by: EMERGENCY MEDICINE

## 2021-11-11 PROCEDURE — G0378 HOSPITAL OBSERVATION PER HR: HCPCS

## 2021-11-11 PROCEDURE — 25010000002 FUROSEMIDE PER 20 MG: Performed by: NURSE PRACTITIONER

## 2021-11-11 PROCEDURE — 80053 COMPREHEN METABOLIC PANEL: CPT | Performed by: EMERGENCY MEDICINE

## 2021-11-11 PROCEDURE — 87040 BLOOD CULTURE FOR BACTERIA: CPT | Performed by: EMERGENCY MEDICINE

## 2021-11-11 PROCEDURE — 36415 COLL VENOUS BLD VENIPUNCTURE: CPT | Performed by: EMERGENCY MEDICINE

## 2021-11-11 PROCEDURE — 83735 ASSAY OF MAGNESIUM: CPT | Performed by: NURSE PRACTITIONER

## 2021-11-11 PROCEDURE — 99223 1ST HOSP IP/OBS HIGH 75: CPT | Performed by: HOSPITALIST

## 2021-11-11 RX ORDER — LOSARTAN POTASSIUM 25 MG/1
25 TABLET ORAL DAILY
Status: DISCONTINUED | OUTPATIENT
Start: 2021-11-11 | End: 2021-11-17 | Stop reason: HOSPADM

## 2021-11-11 RX ORDER — ATORVASTATIN CALCIUM 10 MG/1
10 TABLET, FILM COATED ORAL DAILY
Status: DISCONTINUED | OUTPATIENT
Start: 2021-11-11 | End: 2021-11-17 | Stop reason: HOSPADM

## 2021-11-11 RX ORDER — METOLAZONE 2.5 MG/1
2.5 TABLET ORAL DAILY PRN
Status: DISCONTINUED | OUTPATIENT
Start: 2021-11-11 | End: 2021-11-17 | Stop reason: HOSPADM

## 2021-11-11 RX ORDER — SPIRONOLACTONE 25 MG/1
25 TABLET ORAL DAILY
Status: DISCONTINUED | OUTPATIENT
Start: 2021-11-11 | End: 2021-11-17 | Stop reason: HOSPADM

## 2021-11-11 RX ORDER — ACETAMINOPHEN 500 MG
1000 TABLET ORAL EVERY 6 HOURS PRN
Status: DISCONTINUED | OUTPATIENT
Start: 2021-11-11 | End: 2021-11-17 | Stop reason: HOSPADM

## 2021-11-11 RX ORDER — FUROSEMIDE 10 MG/ML
40 INJECTION INTRAMUSCULAR; INTRAVENOUS EVERY 12 HOURS
Status: DISCONTINUED | OUTPATIENT
Start: 2021-11-11 | End: 2021-11-17

## 2021-11-11 RX ORDER — CARVEDILOL 6.25 MG/1
6.25 TABLET ORAL 2 TIMES DAILY WITH MEALS
Status: DISCONTINUED | OUTPATIENT
Start: 2021-11-11 | End: 2021-11-17 | Stop reason: HOSPADM

## 2021-11-11 RX ORDER — SODIUM CHLORIDE 0.9 % (FLUSH) 0.9 %
10 SYRINGE (ML) INJECTION AS NEEDED
Status: DISCONTINUED | OUTPATIENT
Start: 2021-11-11 | End: 2021-11-17 | Stop reason: HOSPADM

## 2021-11-11 RX ORDER — SODIUM CHLORIDE 0.9 % (FLUSH) 0.9 %
10 SYRINGE (ML) INJECTION EVERY 12 HOURS SCHEDULED
Status: DISCONTINUED | OUTPATIENT
Start: 2021-11-11 | End: 2021-11-17 | Stop reason: HOSPADM

## 2021-11-11 RX ADMIN — FUROSEMIDE 40 MG: 10 INJECTION, SOLUTION INTRAMUSCULAR; INTRAVENOUS at 22:52

## 2021-11-11 RX ADMIN — SODIUM CHLORIDE, PRESERVATIVE FREE 10 ML: 5 INJECTION INTRAVENOUS at 21:30

## 2021-11-11 RX ADMIN — LOSARTAN POTASSIUM 25 MG: 25 TABLET, FILM COATED ORAL at 15:06

## 2021-11-11 RX ADMIN — SODIUM CHLORIDE, PRESERVATIVE FREE 10 ML: 5 INJECTION INTRAVENOUS at 09:25

## 2021-11-11 RX ADMIN — ACETAMINOPHEN 1000 MG: 500 TABLET, FILM COATED ORAL at 15:12

## 2021-11-11 RX ADMIN — RIVAROXABAN 20 MG: 20 TABLET, FILM COATED ORAL at 18:46

## 2021-11-11 RX ADMIN — CARVEDILOL 6.25 MG: 6.25 TABLET, FILM COATED ORAL at 18:46

## 2021-11-11 RX ADMIN — SPIRONOLACTONE 25 MG: 25 TABLET ORAL at 15:06

## 2021-11-11 RX ADMIN — ATORVASTATIN CALCIUM 10 MG: 10 TABLET, FILM COATED ORAL at 15:06

## 2021-11-11 RX ADMIN — FUROSEMIDE 40 MG: 10 INJECTION, SOLUTION INTRAMUSCULAR; INTRAVENOUS at 11:32

## 2021-11-11 NOTE — CASE MANAGEMENT/SOCIAL WORK
Discharge Planning Assessment  [facility]     Patient Name: Thony Dumont  MRN: [id number]  Today's Date: 11/11/2021    Admit Date: 11/10/2021     Discharge Needs Assessment     Row Name 11/11/21 1457       Living Environment    Lives With alone    [facility]    Primary Care Provided by self    Provides Primary Care For no one    Family Caregiver if Needed none    Able to Return to Prior Arrangements yes       Resource/Environmental Concerns    Resource/Environmental Concerns financial; home accessibility    Environment Concerns permanent residence, none    Transportation Concerns car, none       Transition Planning    Patient/Family Anticipates Transition to home with help/services    Transportation Anticipated family or friend will provide       Discharge Needs Assessment    Readmission Within the Last 30 Days no previous admission in last 30 days    Concerns to be Addressed discharge planning    Anticipated Changes Related to Illness none    Equipment Needed After Discharge commode    Current Discharge Risk lives alone; substance use/abuse; financial support inadequate               Discharge Plan     Row Name 11/11/21 1500       Plan    Plan D/C Plan: Pending PT/OT evals, [facility] referral, BSC ordered through [facility]. Pt lives in hotel in [place].    Patient/Family in Agreement with Plan yes    Plan Comments Met with patient in room to discuss d/c planning.  Patient said he lives in a hotel in [place].  He needs assistance finding housing, and care inside of the home.  Informed patient with his payor source, a [facility] referral can be made.  Reached out to [employer] regarding issues mentioned, and they can not be of assistance.  Patient said he is need of a BSC, order placed in [facility], and liason at [facility] notified.  Patient positive drug screen for Meth and THC.  Cardiology consult, [facility] referral, BSC ordered through [facility], PT/OT consults.    Row Name 11/11/21  1217       Plan    Plan Comments [facility] referral placed per ISABELLA Florentino. Patient agreeable to referral.              Continued Care and Services - Admitted Since 11/10/2021     Durable Medical Equipment     Service Provider Request Status Selected Services Address Phone Fax Patient Preferred    [facility]  Pending - Request Sent N/A [id number], [facility] [phone] --              Expected Discharge Date and Time     Expected Discharge Date Expected Discharge Time    Nov 13, 2021          Demographic Summary     Row Name 11/11/21 1457       General Information    Admission Type observation    Arrived From emergency department    Referral Source admission list    Reason for Consult discharge planning    Preferred Language English     Used During This Interaction no               Functional Status     Row Name 11/11/21 1457       Functional Status    Usual Activity Tolerance good    Current Activity Tolerance good       Functional Status, IADL    Medications independent    Meal Preparation independent    Housekeeping independent    Laundry independent    Shopping independent       Mental Status    General Appearance WDL WDL       Mental Status Summary    Recent Changes in Mental Status/Cognitive Functioning no changes                      Mishel Grullon RN

## 2021-11-11 NOTE — H&P
[facility] Medicine Services      Patient Name: Thony Dumont  : 1960  MRN: [id number]  Primary Care Physician:  Rose Roa APRN  Date of admission: 11/10/2021      Subjective      Chief Complaint:  Shortness of air     History of Present Illness: Thony Dumont is a 61 y.o. male who presented to [facility] on 11/10/2021 complaining of increased shortness of air and bilateral leg swelling past week. He reports he has been taking his home medications. Drug screen positive today for methamphetamines and THC. He reports he snorted yesterday and does not do IV drug use. He reports increased dyspnea on exertion. He denies chest pain , nausea, fever, or dizziness. He reports non productive cough. On exam he has BLE edema +3 from feet to above groin. He has a PMH of ischemic cardiomyopathy combined systolic heart failure with last EF per echo in 2021 of 21-25%. He has an AICD in place. He reports he has stopped smoking. . Troponin today is less than 0.010.  proBNP .  Creatinine is 1.37 with a past medical history of chronic kidney disease stage III, bilirubin 1.8 alk phos 203 without abdominal complaints.  He was COVID-19 negative.  Chest x-ray image below with radiology report pending appears to show cardiomegaly with vascular congestion.  EKG shows mild sinus tachycardia with multiple PVCs.  He was given 1 inch of Nitropaste in ED and 40 mg IV Lasix and will be admitted for gentle diuresis.  Review of records shows he was recently admitted here from  to 2021 with similar complaints.  He was also admitted here in 2021 with acute systolic CHF.  He was hypoxic oxygen saturation 80s on admission but is now stable on 4 L oxygen via nasal cannula.   have been consulted for methamphetamine abuse.      Review of Systems   Constitutional: Negative.   HENT: Negative.    Eyes: Negative.    Cardiovascular: Positive for  dyspnea on exertion, leg swelling and orthopnea.   Respiratory: Positive for cough and shortness of breath.    Endocrine: Negative.    Hematologic/Lymphatic: Negative.    Skin: Negative.    Musculoskeletal: Positive for myalgias.   Gastrointestinal: Negative.    Genitourinary: Negative.    Neurological: Negative.    Psychiatric/Behavioral: Positive for substance abuse.   Allergic/Immunologic: Negative.         Personal History     Past Medical History:   Diagnosis Date   • Acute on chronic congestive heart failure (HCC) 06/07/2021   • Afib ([facility])    • Chronic pain    • CKD (chronic kidney disease) stage 2, GFR 60-89 ml/min 8/9/2021   • Combined systolic and diastolic congestive heart failure ([facility])    • Degenerative joint disease    • Erectile dysfunction    • Essential hypertension    • Former smoker    • Gastroesophageal reflux disease    • Homeless 08/25/2021    living in his car   • ICD (implantable cardioverter-defibrillator) in place 06/15/2021   • Mixed hyperlipidemia    • Moderate mitral regurgitation    • Moderate tricuspid regurgitation    • NICM (nonischemic cardiomyopathy) ([facility]) 06/10/2021   • Paroxysmal atrial fibrillation ([facility])    • Pleural effusion 8/9/2021   • Pulmonary hypertension ([facility])    • Sprain of rotator cuff capsule 3/14/2014   • Substance abuse ([facility])    • Valvular heart disease        Past Surgical History:   Procedure Laterality Date   • CARDIAC DEFIBRILLATOR PLACEMENT     • CARDIAC ELECTROPHYSIOLOGY PROCEDURE Left 6/15/2021    Procedure: Device Implant;  Surgeon: Michael Rollins MD;  Location: [facility] INVAS[facility] LOC[facility];  Service: Cardiovascular;  Laterality: Left;   • HERNIA REPAIR         Family History: family history includes Cancer in his mother. Otherwise pertinent FHx was reviewed and not pertinent to current issue.    Social History:  reports that he has been smoking cigarettes. He has never used smokeless tobacco. He reports current alcohol use of about 7.0 standard drinks of  "alcohol per week. He reports previous drug use. Drugs: Marijuana, \"Crack\" cocaine, and Methamphetamines.    Home Medications:  Prior to Admission Medications     Prescriptions Last Dose Informant Patient Reported? Taking?    acetaminophen (TYLENOL) 500 MG tablet   No No    Take 2 tablets by mouth Every 6 (Six) Hours As Needed for Mild Pain  or Moderate Pain .    atorvastatin (Lipitor) 10 MG tablet   No No    Take 1 tablet by mouth Daily.    carvedilol (COREG) 6.25 MG tablet   No No    Take 1 tablet by mouth 2 (Two) Times a Day With Meals.    furosemide (LASIX) 40 MG tablet   No No    Take 1.5 tablets by mouth 2 (Two) Times a Day for 30 days.    losartan (COZAAR) 25 MG tablet   No No    Take 1 tablet by mouth Daily.    metOLazone (ZAROXOLYN) 2.5 MG tablet   No No    Take 1 tablet by mouth Daily As Needed (edema).    rivaroxaban (Xarelto) 20 MG tablet   No No    Take 1 tablet by mouth Daily.    spironolactone (ALDACTONE) 25 MG tablet   No No    Take 1 tablet by mouth Daily.            Allergies:  Allergies   Allergen Reactions   • Aspirin Swelling   • Penicillins Swelling   • Pork Allergy Unknown - High Severity       Objective      Vitals:   Temp:  [98.5 °F (36.9 °C)] 98.5 °F (36.9 °C)  Heart Rate:  [] 105  Resp:  [16-21] 16  BP: (127-145)/() 127/98  Flow (L/min):  [3-4] 3    Physical Exam  Vitals reviewed.   Constitutional:       Appearance: He is obese. He is ill-appearing.   HENT:      Head: Normocephalic and atraumatic.      Right Ear: External ear normal.      Left Ear: External ear normal.      Nose: Nose normal.      Mouth/Throat:      Mouth: Mucous membranes are moist.   Eyes:      Extraocular Movements: Extraocular movements intact.   Cardiovascular:      Rate and Rhythm: Regular rhythm. Tachycardia present.      Heart sounds: Normal heart sounds.   Pulmonary:      Breath sounds: Wheezing present.   Abdominal:      General: There is distension.      Palpations: Abdomen is soft.   Genitourinary:   "   Comments: edema  Musculoskeletal:         General: Swelling present.      Cervical back: Normal range of motion and neck supple.      Right lower leg: Edema present.      Left lower leg: Edema present.      Comments: +3   Skin:     General: Skin is warm and dry.   Neurological:      General: No focal deficit present.      Mental Status: He is alert and oriented to person, place, and time.   Psychiatric:         Mood and Affect: Mood normal.         Behavior: Behavior normal.         Thought Content: Thought content normal.         Judgment: Judgment normal.          Result Review    Result Review:  I have personally reviewed the results from the time of this admission to 11/11/2021 02:29 EST and agree with these findings:  [x]  Laboratory  [x]  Microbiology  [x]  Radiology  [x]  EKG/Telemetry   []  Cardiology/Vascular   []  Pathology  [x]  Old records  []  Other:  Most notable findings include: Opponent less than 0.010, proBNP 1989, creatinine 1.37, x-ray radiology report pending appearing to show cardiomegaly with vascular congestion and clinical presentation.      Assessment/Plan        Active Hospital Problems:  Active Hospital Problems    Diagnosis    • Acute respiratory failure with hypoxemia (HCC)    • Methamphetamine abuse (HCC)    • Acute on chronic combined systolic (congestive) and diastolic (congestive) heart failure (HCC)    • CKD (chronic kidney disease) stage 2, GFR 60-89 ml/min      Plan:    Acute respiratory failure with hypoxemia likely secondary to below, now stable on 4 L via nasal cannula    Acute on chronic combined systolic congestive and diastolic heart failure with ischemic cardiomyopathy AICD in place ejection fraction 21 to 25%, received 1 inch Nitropaste in ED continue 40 mg IV Lasix twice daily Home meds unverified at this time reorder pending verification from pharmacy,    Methamphetamine abuse, toxicology screen positive for methamphetamines and THC reports snorted yesterday social  services consulted Theodore martinez    Chronic kidney disease stage II, creatinine today 1.37, has been between 1.16 and 1.53 in past avoid nephrotoxic meds although may worsen with Lasix trend renal function    Atrial fibrillation, on his rhythm on monitor EKG, home meds unverified at this time, was on home Coreg and Xarelto reorder pending verification from pharmacy continuous cardiac monitoring    Hyperlipidemia was on statin reorder pending verification from pharmacy    Hypertensive on admission improved with Nitropaste and Lasix monitor BP Home meds unverified at this time reorder pending verification from pharmacy      DVT prophylaxis:  No DVT prophylaxis order currently exists.    CODE STATUS:    Code Status (Patient has no pulse and is not breathing): CPR (Attempt to Resuscitate)  Medical Interventions (Patient has pulse or is breathing): Full Support    Admission Status:  I believe this patient meets observation status.    I discussed the patient's findings and my recommendations with patient.    This patient has been examined wearing appropriate Personal Protective Equipment     Signature: Electronically signed by CORTEZ Loza, 11/11/21, 2:41 AM EST.    Attending attestation: Patient is a 61-year-old gentleman who currently abuses methamphetamine and THC who presented to the emergency room complaining of shortness of breath with generalized weakness, severe edema of the lower extremities, the scrotum, and the lower abdominal wall which is felt to be secondary to acute on chronic heart failure related to ischemic cardiomyopathy.  He is status post AICD.  Physical exam: Vital signs and nurses notes reviewed.  Mucous membranes moist; lungs with decreased air entry bilaterally; CV regular rate and rhythm; the patient has diffuse anasarca of the lower abdominal wall, penis and scrotum and bilateral lower extremities.  He has no cyanosis.  Assessment and plan: Acute hypoxic respiratory failure  secondary to acute on chronic systolic congestive heart failure due to ischemic cardiomyopathy and methamphetamine abuse   -Diuresis has been initiated with close monitoring of renal function  The patient was seen and examined and chart reviewed on 11/11/2021.  I agree with the assessment and plan of the APRN.  Jennifer Dumont MD  Electronically signed by Jennifer Dumont MD, 11/17/21, 8:10 AM EST.

## 2021-11-11 NOTE — ED NOTES
Pt reports coughing fit around 25 minutes ago. Pt says he is SOA, denies CP and numbness/ tinging. Pt says he has CHF and takes Xarelto, Spironolactone, and Furosemide. Pt says he is coughing up clear phlegm occasionally.      Aron Quezada, RN  11/10/21 [id number]

## 2021-11-11 NOTE — DISCHARGE PLACEMENT REQUEST
"Thony Dumont (61 y.o. Male)             Date of Birth Social Security Number Address Home Phone MRN    1960  [id number] [place] [address] [place] [id number] [phone] [id number]    [employer] Marital Status             None Single       Admission Date Admission Type Admitting Provider Attending Provider Department, Room/Bed    11/10/21 Emergency Jennifer Dumont MD Hall, Kelli G, MD [facility], 2127/1    Discharge Date Discharge Disposition Discharge Destination                         Attending Provider: Jennifer Dumont MD    Allergies: Aspirin, Penicillins, Pork Allergy    Isolation: None   Infection: None   Code Status: CPR   Advance Care Planning Activity    Ht: 190.5 cm (75\")   Wt: 119 kg (261 lb 14.5 oz)    Admission Cmt: None   Principal Problem: None                Active Insurance as of 11/10/2021     Primary Coverage     Payor Plan Insurance Group Employer/Plan Group    [place]      Payor Plan Address Payor Plan Phone Number Payor Plan Fax Number Effective Dates    [address]   10/21/2021 - None Entered    [place] [address]       Subscriber Name Subscriber Birth Date Member ID       THONY DUMONT 1960 [id number]                 Emergency Contacts      (Rel.) Home Phone Work Phone Mobile Phone    LJ TA (Friend) [phone] -- [phone]              "

## 2021-11-11 NOTE — ED NOTES
Lab notified of need for Phlebotomist to redraw hemolyzed blood.     Aron Quezada RN  11/11/21 0034

## 2021-11-11 NOTE — CONSULTS
Patient given Cardiac Rehab brochure. Educated on risk factors for CAD,  and healthy heart diet.

## 2021-11-11 NOTE — ED PROVIDER NOTES
Subjective   61-year-old male with history of nonischemic cardiomyopathy, substance abuse, medical noncompliance complains of 1 week of decreased urine output, leg swelling, scrotal swelling, dyspnea on exertion, cough with clear sputum, no fever, no chest pain.  Symptoms are moderate.          Review of Systems   HENT: Positive for nosebleeds.    Respiratory: Positive for cough and shortness of breath.    Cardiovascular: Positive for leg swelling.   Genitourinary:        As per HPI   Neurological: Positive for headaches.   All other systems reviewed and are negative.      Past Medical History:   Diagnosis Date   • Acute on chronic congestive heart failure (HCC) 06/07/2021   • Afib ([facility])    • Chronic pain    • CKD (chronic kidney disease) stage 2, GFR 60-89 ml/min 8/9/2021   • Combined systolic and diastolic congestive heart failure ([facility])    • Degenerative joint disease    • Erectile dysfunction    • Essential hypertension    • Former smoker    • Gastroesophageal reflux disease    • Homeless 08/25/2021    living in his car   • ICD (implantable cardioverter-defibrillator) in place 06/15/2021   • Mixed hyperlipidemia    • Moderate mitral regurgitation    • Moderate tricuspid regurgitation    • NICM (nonischemic cardiomyopathy) ([facility]) 06/10/2021   • Paroxysmal atrial fibrillation ([facility])    • Pleural effusion 8/9/2021   • Pulmonary hypertension ([facility])    • Sprain of rotator cuff capsule 3/14/2014   • Substance abuse ([facility])    • Valvular heart disease        Allergies   Allergen Reactions   • Aspirin Swelling   • Penicillins Swelling   • Pork Allergy Unknown - High Severity       Past Surgical History:   Procedure Laterality Date   • CARDIAC DEFIBRILLATOR PLACEMENT     • CARDIAC ELECTROPHYSIOLOGY PROCEDURE Left 6/15/2021    Procedure: Device Implant;  Surgeon: Michael Rollins MD;  Location: [facility] INVASIVE LOCATION;  Service: Cardiovascular;  Laterality: Left;   • HERNIA REPAIR         Family History   Problem  "Relation Age of Onset   • Cancer Mother        Social History     Socioeconomic History   • Marital status: Single   Tobacco Use   • Smoking status: Current Some Day Smoker     Types: Cigarettes   • Smokeless tobacco: Never Used   • Tobacco comment: complete smoking cessation educated    Vaping Use   • Vaping Use: Every day   Substance and Sexual Activity   • Alcohol use: Yes     Alcohol/week: 7.0 standard drinks     Types: 7 Cans of beer per week     Comment: 1 beer a day   • Drug use: Not Currently     Types: Marijuana, \"Crack\" cocaine, Methamphetamines     Comment: 6/15/21 report cocaine in 2018, marijuana 6/14/2021 @0050- snorts meth occasionally.   • Sexual activity: Defer           Objective   Physical Exam  Constitutional:       Appearance: He is well-developed.   HENT:      Head: Normocephalic and atraumatic.      Mouth/Throat:      Mouth: Mucous membranes are moist.      Pharynx: Oropharynx is clear.   Eyes:      Conjunctiva/sclera: Conjunctivae normal.      Pupils: Pupils are equal, round, and reactive to light.   Neck:      Comments: JVD present  Cardiovascular:      Rate and Rhythm: Normal rate and regular rhythm.      Heart sounds: Normal heart sounds.   Pulmonary:      Effort: Pulmonary effort is normal.      Comments: Mild basilar rhonchi, good air movement, no wheezes  Abdominal:      Comments: Abdominal wall edema extending down into the groin with scrotal edema, sterile appearing, nontender   Musculoskeletal:      Cervical back: Normal range of motion and neck supple.      Comments: Bilateral lower extremity edema extending to groin and lower abdomen   Skin:     General: Skin is warm and dry.      Capillary Refill: Capillary refill takes less than 2 seconds.   Neurological:      General: No focal deficit present.      Mental Status: He is alert and oriented to person, place, and time.   Psychiatric:         Mood and Affect: Mood normal.         Behavior: Behavior normal.         Procedures     "       ED Course  ED Course as of 11/11/21 0138   Wed Nov 10, 2021   2241 EKG interpretation: Sinus tachycardia with occasional PVC, left ventricular strain similar to pattern seen on 10/21/2021 [JR]      ED Course User Index  [JR] Nando Green MD                                           MDM  Number of Diagnoses or Management Options  Acute on chronic congestive heart failure, unspecified heart failure type (HCC)  Acute respiratory failure with hypoxemia (HCC)  Lab test negative for COVID-19 virus  Methamphetamine abuse (HCC)  Diagnosis management comments: Results for orders placed or performed during the hospital encounter of 11/10/21  -Respiratory Panel PCR w/COVID-19(SARS-CoV-2) BASIL/PARTHA/MANOJ/PAD/COR/MAD/IRINA In-House, NP Swab in UTM/VTM, 3-4 HR TAT - Swab, Nasopharynx:   Specimen: Nasopharynx; Swab       Result                      Value             Ref Range           ADENOVIRUS, PCR             Not Detected      Not Detected        Coronavirus 229E            Not Detected      Not Detected        Coronavirus HKU1            Not Detected      Not Detected        Coronavirus NL63            Not Detected      Not Detected        Coronavirus OC43            Not Detected      Not Detected        COVID19                     Not Detected      Not Detected*       Human Metapneumovirus       Not Detected      Not Detected        Human Rhinovirus/Enter*     Not Detected      Not Detected        Influenza A PCR             Not Detected      Not Detected        Influenza B PCR             Not Detected      Not Detected        Parainfluenza Virus 1       Not Detected      Not Detected        Parainfluenza Virus 2       Not Detected      Not Detected        Parainfluenza Virus 3       Not Detected      Not Detected        Parainfluenza Virus 4       Not Detected      Not Detected        RSV, PCR                    Not Detected      Not Detected        Bordetella pertussis p*     Not Detected      Not Detected         Bordetella parapertuss*     Not Detected      Not Detected        Chlamydophila pneumoni*     Not Detected      Not Detected        Mycoplasma pneumo by P*     Not Detected      Not Detected   -Procalcitonin:   Specimen: Blood       Result                      Value             Ref Range           Procalcitonin               0.10              0.00 - 0.25 *  -Comprehensive Metabolic Panel:   Specimen: Blood       Result                      Value             Ref Range           Glucose                     79                65 - 99 mg/dL       BUN                         19                6 - 20 mg/dL        Creatinine                  1.37 (H)          0.76 - 1.27 *       Sodium                      142               136 - 145 mm*       Potassium                   3.9               3.5 - 5.2 mm*       Chloride                    104               98 - 107 mmo*       CO2                         25.0              22.0 - 29.0 *       Calcium                     8.8               8.6 - 10.5 m*       Total Protein               6.9               6.0 - 8.5 g/*       Albumin                     4.00              3.50 - 5.20 *       ALT (SGPT)                  18                1 - 41 U/L          AST (SGOT)                  18                1 - 40 U/L          Alkaline Phosphatase        203 (H)           39 - 117 U/L        Total Bilirubin             1.8 (H)           0.0 - 1.2 mg*       eGFR   Amer          64                >60 mL/min/1*       Globulin                    2.9               gm/dL               A/G Ratio                   1.4               g/dL                BUN/Creatinine Ratio        13.9              7.0 - 25.0          Anion Gap                   13.0              5.0 - 15.0 m*  -Troponin:   Specimen: Blood       Result                      Value             Ref Range           Troponin T                  <0.010            0.000 - 0.03*  -BNP:   Specimen: Blood       Result                       Value             Ref Range           proBNP                      1,989.0 (H)       0.0 - 900.0 *  -Urinalysis With Culture If Indicated - Urine, Clean Catch:   Specimen: Urine, Clean Catch       Result                      Value             Ref Range           Color, UA                                     Yellow, Straw   Dark Yellow (A)       Appearance, UA              Hazy (A)          Clear               pH, UA                      5.5               5.0 - 8.0           Specific [place], UA        1.027             1.005 - 1.030       Glucose, UA                 Negative          Negative            Ketones, UA                 Trace (A)         Negative            Bilirubin, UA                                 Negative        Small (1+) (A)       Blood, UA                   Negative          Negative            Protein, UA                                   Negative        30 mg/dL (1+) (A)       Leuk Esterase, UA           Negative          Negative            Nitrite, UA                 Negative          Negative            Urobilinogen, UA            1.0 E.U./dL       0.2 - 1.0 E.*  -Urine Drug Screen - Urine, Clean Catch:   Specimen: Urine, Clean Catch       Result                      Value             Ref Range           Amphet/Methamphet, Scr*     Positive (A)      Negative            Barbiturates Screen, U*     Negative          Negative            Benzodiazepine Screen,*     Negative          Negative            Cocaine Screen, Urine       Negative          Negative            Opiate Screen               Negative          Negative            THC, Screen, Urine          Positive (A)      Negative            Methadone Screen, Urine     Negative          Negative            Oxycodone Screen, Urine     Negative          Negative       -CBC Auto Differential:   Specimen: Blood       Result                      Value             Ref Range           WBC                         4.70              3.40 - 10.80*        RBC                         4.35              4.14 - 5.80 *       Hemoglobin                  14.6              13.0 - 17.7 *       Hematocrit                  41.6              37.5 - 51.0 %       MCV                         95.6              79.0 - 97.0 *       MCH                         33.5 (H)          26.6 - 33.0 *       MCHC                        35.1              31.5 - 35.7 *       RDW                         16.7 (H)          12.3 - 15.4 %       RDW-SD                      56.0 (H)          37.0 - 54.0 *       MPV                         8.5               6.0 - 12.0 fL       Platelets                   251               140 - 450 10*       Neutrophil %                66.4              42.7 - 76.0 %       Lymphocyte %                16.7 (L)          19.6 - 45.3 %       Monocyte %                  14.7 (H)          5.0 - 12.0 %        Eosinophil %                1.4               0.3 - 6.2 %         Basophil %                  0.8               0.0 - 1.5 %         Neutrophils, Absolute       3.10              1.70 - 7.00 *       Lymphocytes, Absolute       0.80              0.70 - 3.10 *       Monocytes, Absolute         0.70              0.10 - 0.90 *       Eosinophils, Absolute       0.10              0.00 - 0.40 *       Basophils, Absolute         0.00              0.00 - 0.20 *  -Urinalysis, Microscopic Only - Urine, Clean Catch:   Specimen: Urine, Clean Catch       Result                      Value             Ref Range           RBC, UA                     0-2 (A)           None Seen /H*       WBC, UA                     0-2 (A)           None Seen /H*       Bacteria, UA                None Seen         None Seen /H*       Squamous Epithelial Ce*     0-2               None Seen, 0*       Hyaline Casts, UA           3-6               None Seen /L*       Methodology                                                   Automated Microscopy  -ECG 12 Lead:        Result                      Value              Ref Range           QT Interval                 370               ms             -Gold Top - SST:        Result                      Value             Ref Range           Extra Tube                                                    Hold for add-ons.  -Light Blue Top:        Result                      Value             Ref Range           Extra Tube                                                    hold for add-on  Per nurse, patient with oxygen desaturation in the 80s on room air, patient currently on 3 L.  No distress.  Patient has diuresed well here in the ED but does need to be admitted.  Likely commendation of medical noncompliance with methamphetamine abuse.  Unfortunately this is a recurrent pattern in this patient.       Amount and/or Complexity of Data Reviewed  Clinical lab tests: ordered and reviewed  Tests in the radiology section of CPT®: reviewed and ordered  Tests in the medicine section of CPT®: reviewed and ordered        Final diagnoses:   Acute respiratory failure with hypoxemia (HCC)   Acute on chronic congestive heart failure, unspecified heart failure type (HCC)   Methamphetamine abuse (HCC)   Lab test negative for COVID-19 virus       ED Disposition  ED Disposition     ED Disposition Condition Comment    Decision to Admit            No follow-up provider specified.       Medication List      No changes were made to your prescriptions during this visit.          Nando Green MD  11/11/21 0138

## 2021-11-11 NOTE — CONSULTS
CC--- decompensated acute on chronic class III to class IV CHF        Sub--- Patient presented to [facility] today with worsening symptoms of  CHF.    He reports significant activity intolerance and orthopnea with lower extremity edema extending proximally up to his thighs.  Patient was initially diagnosed with dilated cardiomyopathy in March of this year in [place].  Work-up here included echocardiogram with EF 20-25% and grade 2 DD, no evidence of LV apical thrombus but there was spontaneous contrast contrast noted suggesting sluggish flow in the LV apex.  Moderate biatrial enlargement with mild TR.  RVSP 50 mmHg.  Review of patient's home medications include beta-blocker, ARB, LD aspirin, Lasix and rivaroxaban.  Patient ran out of his medications and started having increasing symptoms of dyspnea and leg edema a week ago and started his back to his new medication 2 days ago and he admitted to hospital for decompensated heart failure .  He denies any complaints of chest discomfort.  Post ICD implant in 6/21  Patient is significantly noncompliant in the past          Past Medical History:   Diagnosis Date   • Acute on chronic congestive heart failure (HCC) 06/07/2021   • Afib ([facility])    • Chronic pain    • CKD (chronic kidney disease) stage 2, GFR 60-89 ml/min 8/9/2021   • Combined systolic and diastolic congestive heart failure ([facility])    • Degenerative joint disease    • Erectile dysfunction    • Essential hypertension    • Former smoker    • Gastroesophageal reflux disease    • Homeless 08/25/2021    living in his car   • ICD (implantable cardioverter-defibrillator) in place 06/15/2021   • Mixed hyperlipidemia    • Moderate mitral regurgitation    • Moderate tricuspid regurgitation    • NICM (nonischemic cardiomyopathy) ([facility]) 06/10/2021   • Paroxysmal atrial fibrillation ([facility])    • Pleural effusion 8/9/2021   • Pulmonary hypertension ([facility])    • Sprain of rotator cuff capsule 3/14/2014   • Substance abuse ([facility])  "   • Valvular heart disease      Past Surgical History:   Procedure Laterality Date   • CARDIAC DEFIBRILLATOR PLACEMENT     • CARDIAC ELECTROPHYSIOLOGY PROCEDURE Left 6/15/2021    Procedure: Device Implant;  Surgeon: Michael Rollins MD;  Location: [facility] INVASIVE LOCATION;  Service: Cardiovascular;  Laterality: Left;   • HERNIA REPAIR       Family History   Problem Relation Age of Onset   • Cancer Mother      Social History     Tobacco Use   • Smoking status: Current Some Day Smoker     Types: Cigarettes   • Smokeless tobacco: Never Used   • Tobacco comment: complete smoking cessation educated    Vaping Use   • Vaping Use: Every day   Substance Use Topics   • Alcohol use: Yes     Alcohol/week: 7.0 standard drinks     Types: 7 Cans of beer per week     Comment: 1 beer a day   • Drug use: Not Currently     Types: Marijuana, \"Crack\" cocaine, Methamphetamines     Comment: 6/15/21 report cocaine in 2018, marijuana 6/14/2021 @0050- snorts meth occasionally.     Medications Prior to Admission   Medication Sig Dispense Refill Last Dose   • atorvastatin (Lipitor) 10 MG tablet Take 1 tablet by mouth Daily. 30 tablet 0 Past Week at Unknown time   • carvedilol (COREG) 6.25 MG tablet Take 1 tablet by mouth 2 (Two) Times a Day With Meals. 180 tablet 3 11/10/2021 at Unknown time   • furosemide (LASIX) 40 MG tablet Take 1.5 tablets by mouth 2 (Two) Times a Day for 30 days. 270 tablet 1 11/10/2021 at Unknown time   • losartan (COZAAR) 25 MG tablet Take 1 tablet by mouth Daily. 90 tablet 3 11/10/2021 at Unknown time   • metOLazone (ZAROXOLYN) 2.5 MG tablet Take 1 tablet by mouth Daily As Needed (edema). 30 tablet 3 11/10/2021 at Unknown time   • rivaroxaban (Xarelto) 20 MG tablet Take 1 tablet by mouth Daily. 90 tablet 1 11/10/2021 at Unknown time   • spironolactone (ALDACTONE) 25 MG tablet Take 1 tablet by mouth Daily. 90 tablet 1 11/10/2021 at Unknown time   • acetaminophen (TYLENOL) 500 MG tablet Take 2 tablets by mouth " Every 6 (Six) Hours As Needed for Mild Pain  or Moderate Pain . 40 tablet 0 Unknown at Unknown time     Allergies:  Aspirin, Penicillins, and Pork allergy    Review of Systems   General:  positive for fatigue and tiredness  Eyes: No redness  Cardiovascular: No chest pain, no palpitations  Respiratory:   positive for class 3 shortness of breath  Gastrointestinal: No nausea or vomiting, bleeding  Genitourinary: no hematuria or dysuria  Musculoskeletal: No arthralgia or myalgia  Skin: No rash  Neurologic: No numbness, tingling, syncope  Hematologic/Lymphatic: No abnormal bleeding      Physical Exam  VITALS REVIEWED--blood pressure 116/70, pulse rate was 90 patient is afebrile respiration 12 times a minute    General:      well developed, well nourished, in no acute distress.    Head:      normocephalic and atraumatic.    Eyes:      PERRL/EOM intact, conjunctiva and sclera clear with out nystagmus.    Neck:      no masses, thyromegaly,  trachea central with normal respiratory effort and PMI displaced laterally  Lungs:      Reduced breath sounds at bases.    Heart:       Sinus tachycardia with muffled heart sounds with soft S3 gallop   Msk:      no deformity or scoliosis noted of thoracic or lumbar spine.    Pulses:      pulses normal in all 4 extremities.    Extremities:       no cyanosis or clubbing--bilateral 1+ edema     Neurologic:      no focal deficits.   alert oriented x3  Skin:      intact without lesions or rashes.    Psych:      alert and cooperative; normal mood and affect; normal attention span and concentration.          CBC    Results from last 7 days   Lab Units 11/10/21  2306   WBC 10*3/mm3 4.70   HEMOGLOBIN g/dL 14.6   PLATELETS 10*3/mm3 251     BMP   Results from last 7 days   Lab Units 11/11/21  0038   SODIUM mmol/L 142   POTASSIUM mmol/L 3.9   CHLORIDE mmol/L 104   CO2 mmol/L 25.0   BUN mg/dL 19   CREATININE mg/dL 1.37*   GLUCOSE mg/dL 79   MAGNESIUM mg/dL 1.9     Infection   Results from last 7 days    Lab Units 11/11/21  0038   PROCALCITONIN ng/mL 0.10     CMP   Results from last 7 days   Lab Units 11/11/21  0038   SODIUM mmol/L 142   POTASSIUM mmol/L 3.9   CHLORIDE mmol/L 104   CO2 mmol/L 25.0   BUN mg/dL 19   CREATININE mg/dL 1.37*   GLUCOSE mg/dL 79   ALBUMIN g/dL 4.00   BILIRUBIN mg/dL 1.8*   ALK PHOS U/L 203*   AST (SGOT) U/L 18   ALT (SGPT) U/L 18     Radiology(recent) XR Chest 1 View    Result Date: 11/11/2021   1. Findings consistent with CHF, which is more pronounced than on prior exam. 2. Small left and increasing right pleural effusion and bibasilar opacities favored represent atelectasis.  Electronically Signed By-Matthew Beaevr MD On:11/11/2021 7:16 AM This report was finalized on [id number] by  Matthew Beaver MD.              Assessment plan      Decompensated class III to class IV acute on chronic systolic heart failure with history of noncompliance--responding well to intravenous diuretics and medications have been resumed again.  Patient educated about compliance and importance of medical treatment for heart failure  Single-chamber ICD in situ  Paroxysmal atrial fibrillation without recurrence currently on Xarelto  Severe nonischemic dilated cardiomyopathy  Cardiorenal syndrome monitor renal panel      Electronically signed by Michael Rollins MD, 11/11/21, 6:07 PM EST.

## 2021-11-11 NOTE — PROGRESS NOTES
"Heart Failure Program  Nurse Navigator  Discharge Planning    Patient Name:Thony Dumont  :1960  Cardiologist:Ayo  Current Admission Date: 11/10/2021   Previous Admission: 10/21/21  Admission frequency: 3 admissions in 6 months    Heart Failure history per record:    Symptoms on admission:c/o Swelling legs and scrotal, SOA, cough began 1 week ago. Pt advises he is out of two of his medications, metalazone and sprionolactone. Has his other medications and advises he was taking them but \"they did not work\". Pt advises his SOA is worsening with exersion and can't tie his shoes due to SOA./ pt admits to smoking drug use 24 hours ago,\"i know it is killing me, I want to quit\".      Admission Weight:  Flowsheet Rows      First Filed Value   Admission Height 190.5 cm (75\") Documented at 11/10/2021 2226   Admission Weight 118 kg (259 lb 11.2 oz) Documented at 11/10/2021 2229            Current Home Medications:  Prior to Admission medications    Medication Sig Start Date End Date Taking? Authorizing Provider   atorvastatin (Lipitor) 10 MG tablet Take 1 tablet by mouth Daily. 21  Yes Michael Rollins MD   carvedilol (COREG) 6.25 MG tablet Take 1 tablet by mouth 2 (Two) Times a Day With Meals. 21  Yes Michael Rollins MD   furosemide (LASIX) 40 MG tablet Take 1.5 tablets by mouth 2 (Two) Times a Day for 30 days. 21 Yes Michael Rollins MD   losartan (COZAAR) 25 MG tablet Take 1 tablet by mouth Daily. 21  Yes Michael Rollins MD   metOLazone (ZAROXOLYN) 2.5 MG tablet Take 1 tablet by mouth Daily As Needed (edema). 21  Yes Michael Rollins MD   rivaroxaban (Xarelto) 20 MG tablet Take 1 tablet by mouth Daily. 21  Yes Michael Rollins MD   spironolactone (ALDACTONE) 25 MG tablet Take 1 tablet by mouth Daily. 21  Yes Michael Rollins MD   acetaminophen (TYLENOL) 500 MG tablet Take 2 tablets by mouth Every 6 (Six) Hours As Needed for Mild Pain  or " "Moderate Pain . 8/12/21   Del Roberts DO       Social history:   Pt advises he is currently living in a hotel room and is working with Ifeanyi Childs with [facility] to assist him in finding housing. Advises he has most of his medication but not two of them due to his prescription has ran out. No issues with cost of medications, \"insurance covers them\". Pt advises he wants a new PCP \"mine refuses to write my medicines and  Can't get into see her\".  Advises he uses on friends for transportation.    Smoking status:quit few days ago, \"I have been cuting back\"    Diagnostics Testing:  proBNP level: 1989    Echocardiogram:Results for orders placed during the hospital encounter of 06/04/21    Adult Transthoracic Echo Complete W/ Cont if Necessary Per Protocol    Interpretation Summary  · Left atrial volume is severely increased.  · The left ventricular cavity is moderately dilated.  · Left ventricular wall thickness is consistent with mild to moderate concentric hypertrophy.  · Mild dilation of the aortic root is present. Mild dilation of the sinuses of Valsalva is present.  · Moderate tricuspid valve regurgitation is present.  · Estimated right ventricular systolic pressure from tricuspid regurgitation is mildly elevated (35-45 mmHg).  · The right atrial cavity is severely dilated.  · The right ventricular cavity is moderately dilated.  · Moderately reduced right ventricular systolic function noted.  · Left ventricular diastolic function is consistent with (grade III w/high LAP) reversible restrictive pattern.  · Estimated left ventricular EF = 25% Estimated left ventricular EF was in disagreement with the calculated left ventricular EF. Left ventricular ejection fraction appears to be 21 - 25%. Left ventricular systolic function is moderately decreased.  · Abnormal mitral valve structure consistent with dilated annulus.  · Moderate mitral valve regurgitation is present with a centrally-directed jet noted.  · " "Mild to moderate pulmonary hypertension is present.        Patient Assessment:   Pt in bed, resp even and unlabored, Slight SOA with coversatation, noted edema from toes to hips, abd \"bloated\" per patient,     Current O2: 2L NC  Home O2: none    Education provided to patient:  yes- Heart Failure disease education  yes -Symptom identification/management  yes -Daily Weights  pending- Diet education  pending- Fluid restriction (if ordered)  pending- Activity education  yes- Medication education  pending- Smoking cessation  yes- Follow-up Appointments  yes-Provided information on how to access [facility] My HF Guide/Heart Failure Interactive workbook  Discussion on effects of drugs and tobacco on cardiovascular status. Pt expresses interest in quitting.    Acceptance of learning: acceptance, needs reienforcement    Heart Failure education interactive teaching session time: 45 minutes    Identified needs/barriers:   Volume status, I & O, daily weights, request new PCP, medication - enrolled meds to bed, possible 6 min walk need    Intervention:   RNISABELLA          "

## 2021-11-12 PROBLEM — Z91.199 PATIENT'S NONCOMPLIANCE WITH OTHER MEDICAL TREATMENT AND REGIMEN: Status: ACTIVE | Noted: 2021-11-12

## 2021-11-12 LAB
ANION GAP SERPL CALCULATED.3IONS-SCNC: 12 MMOL/L (ref 5–15)
BUN SERPL-MCNC: 21 MG/DL (ref 6–20)
BUN/CREAT SERPL: 15.6 (ref 7–25)
CALCIUM SPEC-SCNC: 8.1 MG/DL (ref 8.6–10.5)
CHLORIDE SERPL-SCNC: 104 MMOL/L (ref 98–107)
CO2 SERPL-SCNC: 24 MMOL/L (ref 22–29)
CREAT SERPL-MCNC: 1.35 MG/DL (ref 0.76–1.27)
GFR SERPL CREATININE-BSD FRML MDRD: 66 ML/MIN/1.73
GLUCOSE SERPL-MCNC: 92 MG/DL (ref 65–99)
POTASSIUM SERPL-SCNC: 3.9 MMOL/L (ref 3.5–5.2)
SODIUM SERPL-SCNC: 140 MMOL/L (ref 136–145)

## 2021-11-12 PROCEDURE — 97165 OT EVAL LOW COMPLEX 30 MIN: CPT

## 2021-11-12 PROCEDURE — 99214 OFFICE O/P EST MOD 30 MIN: CPT | Performed by: NURSE PRACTITIONER

## 2021-11-12 PROCEDURE — 80048 BASIC METABOLIC PNL TOTAL CA: CPT | Performed by: NURSE PRACTITIONER

## 2021-11-12 PROCEDURE — 36415 COLL VENOUS BLD VENIPUNCTURE: CPT | Performed by: NURSE PRACTITIONER

## 2021-11-12 PROCEDURE — G0378 HOSPITAL OBSERVATION PER HR: HCPCS

## 2021-11-12 PROCEDURE — 99232 SBSQ HOSP IP/OBS MODERATE 35: CPT | Performed by: HOSPITALIST

## 2021-11-12 PROCEDURE — 97161 PT EVAL LOW COMPLEX 20 MIN: CPT

## 2021-11-12 PROCEDURE — 25010000002 FUROSEMIDE PER 20 MG: Performed by: NURSE PRACTITIONER

## 2021-11-12 RX ORDER — FUROSEMIDE 40 MG/1
60 TABLET ORAL 2 TIMES DAILY
Qty: 90 TABLET | Refills: 0 | Status: SHIPPED | OUTPATIENT
Start: 2021-11-12 | End: 2021-11-17

## 2021-11-12 RX ORDER — SPIRONOLACTONE 25 MG/1
25 TABLET ORAL DAILY
Qty: 30 TABLET | Refills: 0 | Status: SHIPPED | OUTPATIENT
Start: 2021-11-12 | End: 2022-05-02 | Stop reason: SDUPTHER

## 2021-11-12 RX ORDER — METOLAZONE 2.5 MG/1
2.5 TABLET ORAL DAILY PRN
Qty: 30 TABLET | Refills: 0 | Status: SHIPPED | OUTPATIENT
Start: 2021-11-12 | End: 2021-11-17 | Stop reason: SDUPTHER

## 2021-11-12 RX ORDER — CARVEDILOL 6.25 MG/1
6.25 TABLET ORAL 2 TIMES DAILY WITH MEALS
Qty: 60 TABLET | Refills: 0 | Status: SHIPPED | OUTPATIENT
Start: 2021-11-12 | End: 2022-05-02 | Stop reason: SDUPTHER

## 2021-11-12 RX ORDER — LOSARTAN POTASSIUM 25 MG/1
25 TABLET ORAL DAILY
Qty: 30 TABLET | Refills: 0 | Status: SHIPPED | OUTPATIENT
Start: 2021-11-12 | End: 2022-05-02 | Stop reason: SDUPTHER

## 2021-11-12 RX ORDER — ATORVASTATIN CALCIUM 10 MG/1
10 TABLET, FILM COATED ORAL DAILY
Qty: 30 TABLET | Refills: 0 | Status: SHIPPED | OUTPATIENT
Start: 2021-11-12 | End: 2021-12-27 | Stop reason: SDUPTHER

## 2021-11-12 RX ADMIN — SODIUM CHLORIDE, PRESERVATIVE FREE 10 ML: 5 INJECTION INTRAVENOUS at 21:02

## 2021-11-12 RX ADMIN — RIVAROXABAN 20 MG: 20 TABLET, FILM COATED ORAL at 17:39

## 2021-11-12 RX ADMIN — SODIUM CHLORIDE, PRESERVATIVE FREE 10 ML: 5 INJECTION INTRAVENOUS at 08:10

## 2021-11-12 RX ADMIN — CARVEDILOL 6.25 MG: 6.25 TABLET, FILM COATED ORAL at 17:39

## 2021-11-12 RX ADMIN — ATORVASTATIN CALCIUM 10 MG: 10 TABLET, FILM COATED ORAL at 08:11

## 2021-11-12 RX ADMIN — CARVEDILOL 6.25 MG: 6.25 TABLET, FILM COATED ORAL at 08:10

## 2021-11-12 RX ADMIN — SPIRONOLACTONE 25 MG: 25 TABLET ORAL at 08:11

## 2021-11-12 RX ADMIN — FUROSEMIDE 40 MG: 10 INJECTION, SOLUTION INTRAMUSCULAR; INTRAVENOUS at 23:11

## 2021-11-12 RX ADMIN — FUROSEMIDE 40 MG: 10 INJECTION, SOLUTION INTRAMUSCULAR; INTRAVENOUS at 10:32

## 2021-11-12 RX ADMIN — LOSARTAN POTASSIUM 25 MG: 25 TABLET, FILM COATED ORAL at 08:11

## 2021-11-12 NOTE — PLAN OF CARE
Goal Outcome Evaluation:  Plan of Care Reviewed With: patient        Progress: no change  Outcome Summary: Pt is a 62 y/o male admitted for cardiomegaly and CHF exacerbation. Pt (+) for meth and THC at admit. PMHx: OA, a-fib, stage II CKD, HTN. Pt lives alone in extended stay hotel with no stairs. Pt. reports he lived w/ SO that assisted w/ dressing/bathing ADLs, not longer has that social support. Pt. w/ severe BLE/BUE swelling this date, limited AROM and BUE  strength 4-/5. Pt. completes functional transfers w/ supervision for safety, demonstrates decreased dynamic standing balance w/ increased risk for falls. Pt. provided max A for all LB ADLs this date. Pt. not safe to d/c home at this time and will require IP rehab at d/c.

## 2021-11-12 NOTE — PLAN OF CARE
Goal Outcome Evaluation:  Plan of Care Reviewed With: patient, no distress noted, rested well this shift

## 2021-11-12 NOTE — DISCHARGE PLACEMENT REQUEST
"Thony Dumont (61 y.o. Male)             Date of Birth Social Security Number Address Home Phone MRN    1960  [address] [place] [address] [place] [address] [phone] [id number]    Jewish Marital Status             None Single       Admission Date Admission Type Admitting Provider Attending Provider Department, Room/Bed    11/10/21 Emergency Jennifer Dumont MD Hall, Kelli G, MD [facility], 2127/1    Discharge Date Discharge Disposition Discharge Destination           Home or Self Care              Attending Provider: Jennifer Dumont MD    Allergies: Aspirin, Penicillins, Pork Allergy    Isolation: None   Infection: None   Code Status: CPR   Advance Care Planning Activity    Ht: 190.5 cm (75\")   Wt: 119 kg (262 lb 12.6 oz)    Admission Cmt: None   Principal Problem: Acute on chronic combined systolic (congestive) and diastolic (congestive) heart failure (HCC) [I50.43]                 Active Insurance as of 11/10/2021     Primary Coverage     Payor Plan Insurance Group Employer/Plan Group    [place]      Payor Plan Address Payor Plan Phone Number Payor Plan Fax Number Effective Dates    [address]   10/21/2021 - None Entered    [place] [address]       Subscriber Name Subscriber Birth Date Member ID       THONY DUMONT 1960 [id number]                 Emergency Contacts      (Rel.) Home Phone Work Phone Mobile Phone    LJ TA (Friend) [phone] -- [phone]              "

## 2021-11-12 NOTE — PROGRESS NOTES
Heart Failure Program  Nurse Navigator  Discharge Planning: Follow-up Note    Patient Name:Thony Dumont  :1960  Current Admission Date: 11/10/2021       Last 3 Weights:  Wt Readings from Last 3 Encounters:   21 119 kg (262 lb 12.6 oz)   10/23/21 109 kg (239 lb 3.2 oz)   21 102 kg (224 lb 12.8 oz)       Intake and Output totals: I/O last 3 completed shifts:  In: 480 [P.O.:480]  Out: 2350 [Urine:2350]  I/O this shift:  In: 480 [P.O.:480]  Out: 125 [Urine:125]          Patient Assessment:   pt lying in bed, resp even and unlabored, no SOA with conversation today., noted 3-4+ edema legs extending to abd and scrotum.       Patient Education:   review HF s/s    Review HF Education provided to patient:  yes-Symptoms worsening  yes-Prescribed medications  yes-HF self-care  yes-Follow-up Appointments       Acceptance of learning: needs reinforced    Heart Failure education interactive teaching session time: 30 minutes      Identified needs/barriers:   Volume status, new PCP, 6 min walk, smoking    Intervention follow-up:  RN

## 2021-11-12 NOTE — THERAPY EVALUATION
Patient Name: Thony Dumont  : 1960    MRN: [id number]                              Today's Date: 2021       Admit Date: 11/10/2021    Visit Dx:     ICD-10-CM ICD-9-CM   1. Acute respiratory failure with hypoxemia ([facility])  J96.01 518.81   2. Acute on chronic congestive heart failure, unspecified heart failure type ([facility])  I50.9 428.0   3. Methamphetamine abuse ([facility])  F15.10 305.70   4. Lab test negative for COVID-19 virus  Z20.822 V01.79     Patient Active Problem List   Diagnosis   • Essential hypertension   • Mixed hyperlipidemia   • Paroxysmal atrial fibrillation (HCC)   • Chronic pain   • Tobacco abuse   • NICM (nonischemic cardiomyopathy) (HCC)   • Acute on chronic combined systolic (congestive) and diastolic (congestive) heart failure (HCC)   • CKD (chronic kidney disease) stage 2, GFR 60-89 ml/min   • ICD (implantable cardioverter-defibrillator) in place   • Degenerative joint disease   • Erectile dysfunction   • Gastroesophageal reflux disease   • Sprain of rotator cuff capsule   • Moderate mitral regurgitation   • Moderate tricuspid regurgitation   • Pulmonary hypertension (HCC)   • Serum total bilirubin elevated   • Substance use   • Chest pain   • Acute respiratory failure with hypoxemia (HCC)   • Methamphetamine abuse (HCC)   • Patient's noncompliance with other medical treatment and regimen     Past Medical History:   Diagnosis Date   • Acute on chronic congestive heart failure (HCC) 2021   • Afib (HCC)    • Chronic pain    • CKD (chronic kidney disease) stage 2, GFR 60-89 ml/min 2021   • Combined systolic and diastolic congestive heart failure (HCC)    • Degenerative joint disease    • Erectile dysfunction    • Essential hypertension    • Former smoker    • Gastroesophageal reflux disease    • Homeless 2021    living in his car   • ICD (implantable cardioverter-defibrillator) in place 06/15/2021   • Mixed hyperlipidemia    • Moderate mitral regurgitation    • Moderate  tricuspid regurgitation    • NICM (nonischemic cardiomyopathy) ([facility]) 06/10/2021   • Paroxysmal atrial fibrillation ([facility])    • Pleural effusion 8/9/2021   • Pulmonary hypertension ([facility])    • Sprain of rotator cuff capsule 3/14/2014   • Substance abuse ([facility])    • Valvular heart disease      Past Surgical History:   Procedure Laterality Date   • CARDIAC DEFIBRILLATOR PLACEMENT     • CARDIAC ELECTROPHYSIOLOGY PROCEDURE Left 6/15/2021    Procedure: Device Implant;  Surgeon: Michael Rollins MD;  Location: [facility] INVASIVE LOCATION;  Service: Cardiovascular;  Laterality: Left;   • HERNIA REPAIR        General Information     Row Name 11/12/21 1639          OT Time and Intention    Document Type evaluation  -MP     Mode of Treatment co-treatment; physical therapy; occupational therapy  -MP     Row Name 11/12/21 1639          General Information    Patient Profile Reviewed yes  -MP     Prior Level of Function ADL's; min assist:  -MP     Existing Precautions/Restrictions fall  -MP     Row Name 11/12/21 1639          Living Environment    Lives With alone  -MP     Row Name 11/12/21 1639          Cognition    Orientation Status (Cognition) oriented x 4; oriented to; person; place; situation; time  -MP     Row Name 11/12/21 1639          Safety Issues, Functional Mobility    Impairments Affecting Function (Mobility) balance; strength  -MP           User Key  (r) = Recorded By, (t) = Taken By, (c) = Cosigned By    Initials Name Provider Type    MP Jayesh Gutierrez OT Occupational Therapist                 Mobility/ADL's     Row Name 11/12/21 1640          Bed Mobility    Bed Mobility bed mobility (all) activities  -MP     All Activities, [place] (Bed Mobility) independent  -MP     Row Name 11/12/21 1640          Transfers    Bed-Chair [place] (Transfers) standby assist  -MP     Assistive Device (Bed-Chair Transfers) walker, front-wheeled  -MP     Sit-Stand [place] (Transfers) standby assist  -MP     Row  Name 11/12/21 1640          Sit-Stand Transfer    Assistive Device (Sit-Stand Transfers) walker, front-wheeled  -MP     Row Name 11/12/21 1640          Functional Mobility    Functional Mobility- Ind. Level contact guard assist  -MP     Row Name 11/12/21 1640          Activities of Daily Living    BADL Assessment/Intervention lower body dressing  -MP     Row Name 11/12/21 1640          Lower Body Dressing Assessment/Training    [place] Level (Lower Body Dressing) don; doff; socks; maximum assist (25% patient effort)  -MP           User Key  (r) = Recorded By, (t) = Taken By, (c) = Cosigned By    Initials Name Provider Type    Jayesh Jauregui OT Occupational Therapist               Obj/Interventions     Row Name 11/12/21 1640          Range of Motion Comprehensive    Comment, General Range of Motion B shoulder AROM impacted 25-50%  -MP     Row Name 11/12/21 1640          Strength Comprehensive (MMT)    Comment, General Manual Muscle Testing (MMT) Assessment BUE 4-/5  -MP     Row Name 11/12/21 1640          Balance    Static Sitting Balance WFL; supported  -MP     Dynamic Sitting Balance supported; WFL  -MP     Static Standing Balance standing; WFL; supported  -MP     Dynamic Standing Balance mild impairment; supported; standing  -MP     Balance Interventions sitting; standing; sit to stand; static; supported; dynamic  -MP           User Key  (r) = Recorded By, (t) = Taken By, (c) = Cosigned By    Initials Name Provider Type    Jayesh Jauregui OT Occupational Therapist               Goals/Plan     Row Name 11/12/21 1649          Bed Mobility Goal 1 (OT)    Activity/Assistive Device (Bed Mobility Goal 1, OT) bed mobility activities, all  -MP     [place] Level/Cues Needed (Bed Mobility Goal 1, OT) modified independence  -MP     Time Frame (Bed Mobility Goal 1, OT) long term goal (LTG); 2 weeks  -MP     Row Name 11/12/21 1644          Transfer Goal 1 (OT)    Activity/Assistive Device (Transfer  Goal 1, OT) sit-to-stand/stand-to-sit; toilet  -MP     [place] Level/Cues Needed (Transfer Goal 1, OT) modified independence  -MP     Time Frame (Transfer Goal 1, OT) long term goal (LTG); 2 weeks  -MP     Row Name 11/12/21 1644          Dressing Goal 1 (OT)    Activity/Device (Dressing Goal 1, OT) lower body dressing  -MP     [place]/Cues Needed (Dressing Goal 1, OT) minimum assist (75% or more patient effort)  -MP     Time Frame (Dressing Goal 1, OT) long term goal (LTG); 2 weeks  -MP           User Key  (r) = Recorded By, (t) = Taken By, (c) = Cosigned By    Initials Name Provider Type    Jayesh Jauregui, ZEKE Occupational Therapist               Clinical Impression     Row Name 11/12/21 1642          Pain Scale: Numbers Pre/Post-Treatment    Pretreatment Pain Rating 0/10 - no pain  -MP     Posttreatment Pain Rating 0/10 - no pain  -MP     Row Name 11/12/21 1642          Plan of Care Review    Plan of Care Reviewed With patient  -MP     Progress no change  -MP     Outcome Summary Pt is a 62 y/o male admitted for cardiomegaly and CHF exacerbation. Pt (+) for meth and THC at admit. PMHx: OA, a-fib, stage II CKD, HTN. Pt lives alone in extended stay hotel with no stairs. Pt. reports he lived w/ SO that assisted w/ dressing/bathing ADLs, not longer has that social support. Pt. w/ severe BLE/BUE swelling this date, limited AROM and BUE  strength 4-/5. Pt. completes functional transfers w/ supervision for safety, demonstrates decreased dynamic standing balance w/ increased risk for falls. Pt. provided max A for all LB ADLs this date. Pt. not safe to d/c home at this time and will require IP rehab at d/c.  -MP     Row Name 11/12/21 1642          Therapy Assessment/Plan (OT)    Rehab Potential (OT) good, to achieve stated therapy goals  -MP     Criteria for Skilled Therapeutic Interventions Met (OT) yes; skilled treatment is necessary  -MP     Therapy Frequency (OT) 3 times/wk  -MP     Row Name 11/12/21  1642          Therapy Plan Review/Discharge Plan (OT)    Anticipated Discharge Disposition (OT) inpatient rehabilitation facility  -     Row Name 11/12/21 1642          Vital Signs    Pre Patient Position Supine  -MP     Intra Patient Position Standing  -MP     Post Patient Position Sitting  -MP     Row Name 11/12/21 1642          Positioning and Restraints    Pre-Treatment Position in bed  -MP     Post Treatment Position chair  -MP     In Chair sitting; call light within reach; encouraged to call for assist; exit alarm on  -MP           User Key  (r) = Recorded By, (t) = Taken By, (c) = Cosigned By    Initials Name Provider Type    Jayesh Jauregui OT Occupational Therapist               Outcome Measures     Row Name 11/12/21 1146          How much help from another person do you currently need...    Turning from your back to your side while in flat bed without using bedrails? 4  -KS     Moving from lying on back to sitting on the side of a flat bed without bedrails? 4  -KS     Moving to and from a bed to a chair (including a wheelchair)? 3  -KS     Standing up from a chair using your arms (e.g., wheelchair, bedside chair)? 3  -KS     Climbing 3-5 steps with a railing? 2  -KS     To walk in hospital room? 3  -KS     AM-PAC 6 Clicks Score (PT) 19  -KS     Row Name 11/12/21 1146          Functional Assessment    Outcome Measure Options AM-PAC 6 Clicks Basic Mobility (PT)  -KS           User Key  (r) = Recorded By, (t) = Taken By, (c) = Cosigned By    Initials Name Provider Type    Venecia Moore PT Physical Therapist                Occupational Therapy Education                 Title: PT OT SLP Therapies (In Progress)     Topic: Occupational Therapy (In Progress)     Point: ADL training (Not Started)     Description:   Instruct learner(s) on proper safety adaptation and remediation techniques during self care or transfers.   Instruct in proper use of assistive devices.              Learner Progress:  Not  documented in this visit.          Point: Home exercise program (Not Started)     Description:   Instruct learner(s) on appropriate technique for monitoring, assisting and/or progressing therapeutic exercises/activities.              Learner Progress:  Not documented in this visit.          Point: Precautions (Not Started)     Description:   Instruct learner(s) on prescribed precautions during self-care and functional transfers.              Learner Progress:  Not documented in this visit.          Point: Body mechanics (Done)     Description:   Instruct learner(s) on proper positioning and spine alignment during self-care, functional mobility activities and/or exercises.              Learning Progress Summary           Patient Acceptance, E,TB, VU by  at 11/12/2021 1645                               User Key     Initials Effective Dates Name Provider Type Discipline     06/16/21 -  Jayesh Gutierrez, ZEKE Occupational Therapist OT              OT Recommendation and Plan  Therapy Frequency (OT): 3 times/wk  Plan of Care Review  Plan of Care Reviewed With: patient  Progress: no change  Outcome Summary: Pt is a 60 y/o male admitted for cardiomegaly and CHF exacerbation. Pt (+) for meth and THC at admit. PMHx: OA, a-fib, stage II CKD, HTN. Pt lives alone in extended stay hotel with no stairs. Pt. reports he lived w/ SO that assisted w/ dressing/bathing ADLs, not longer has that social support. Pt. w/ severe BLE/BUE swelling this date, limited AROM and BUE  strength 4-/5. Pt. completes functional transfers w/ supervision for safety, demonstrates decreased dynamic standing balance w/ increased risk for falls. Pt. provided max A for all LB ADLs this date. Pt. not safe to d/c home at this time and will require IP rehab at d/c.     Time Calculation:    Time Calculation- OT     Row Name 11/12/21 1645             Time Calculation- OT    OT Start Time 1053  -MP      OT Stop Time 1115  -MP      OT Time Calculation (min) 22  min  -MP      Total Timed Code Minutes- OT 0 minute(s)  -MP      OT Received On 11/12/21  -MP      OT - Next Appointment 11/15/21  -MP      OT Goal Re-Cert Due Date 11/26/21  -            User Key  (r) = Recorded By, (t) = Taken By, (c) = Cosigned By    Initials Name Provider Type    Jayesh Jauregui OT Occupational Therapist              Therapy Charges for Today     Code Description Service Date Service Provider Modifiers Qty    [id number] HC OT EVAL LOW COMPLEXITY 3 11/12/2021 Jayesh Gutierrez OT GO 1               Jayesh Gutierrez OT  11/12/2021

## 2021-11-12 NOTE — CASE MANAGEMENT/SOCIAL WORK
Continued Stay Note  HELEN Romero     Patient Name: Thony Dumont  MRN: [id number]  Today's Date: 11/12/2021    Admit Date: 11/10/2021     Discharge Plan     Row Name 11/12/21 1609       Plan    Plan DC Plan: Referral to [facility], pending acceptance. [facility] referral, pt currently lives in [facility] in [place]. BSC ordered through [place].    Plan Comments Spoke with patient in room regarding IP rehab.  Patient would like referral to [facility].  Referrals sent in Epic, and liasons notified by text message, pending acceptance.  Updated patient on [facility] referral for housing, and home care.  Secure chat sent to patient RN regarding pt needing IP rehab, and called patient floor to relay message to pt nurse.                   Expected Discharge Date and Time     Expected Discharge Date Expected Discharge Time    Nov 12, 2021             Mishel Grullon RN

## 2021-11-12 NOTE — PROGRESS NOTES
Cardiology Progress Note-EP      Patient Care Team:  Rose Rao APRN as PCP - General (Nurse Practitioner)  Michael Rollins MD as Consulting Physician (Cardiac Electrophysiology)    PATIENT IDENTIFICATION  Name: Thony Dumont  Age: 61 y.o.  Sex: male  :  1960  MRN: [id number]             REASON FOR FOLLOW-UP  Acute on chronic heart failure with reduced ejection fraction  AICD in situ      SUBJECTIVE    Patient seen and examined, chart and labs reviewed.  Patient sitting up in bed eating breakfast and reports he does feel some better today.  Shortness of breath improved, still has significant edema to lower extremities extending proximally to abdomen.  He denies any chest discomfort, palpitations.  No nausea.  Patient has multiple drinks on his bedside table as well as large can of nuts.      REVIEW OF SYSTEMS:  Pertinent items are noted in HPI, all other systems reviewed and negative    OBJECTIVE   Creatinine 1.35 (1.37)  Magnesium 1.9  UDS positive for methamphetamine and THC.    ASSESSMENT  Acute on chronic decompensated heart failure with reduced ejection fraction/nonischemic/severe dilated cardiomyopathy with severe LV dysfunction  Class III-IV heart failure  Single-chamber ICD in situ  Paroxysmal atrial fibrillation  Coagulopathy on Xarelto  Cardiorenal syndrome  Pulmonary hypertension  History of medical noncompliance    RECOMMENDATIONS  Patient needs aggressive diuresis.  Currently on Lasix 40 mg twice daily.  Monitor renal function closely-history of CKD  [place] fluid restriction at 1800 mL/24H, 2 g sodium diet.  Blood pressure borderline low at 98 systolic  Patient needs compliance with fluid/sodium intake at home  Continue statin, BB, ARB for coronary disease/cardiomyopathy  Continue Xarelto for stroke prevention with A. fib  Further recommendations as patient's hospital course progresses          Vital Signs  Visit Vitals  BP 98/67 (BP Location: Left arm, Patient  "Position: Lying)   Pulse 85   Temp 98.3 °F (36.8 °C) (Oral)   Resp 20   Ht 190.5 cm (75\")   Wt 119 kg (262 lb 12.6 oz)   SpO2 100%   BMI 32.85 kg/m²     Oxygen Therapy  SpO2: 100 %  Pulse Oximetry Type: Continuous  Device (Oxygen Therapy): room air  Flow (L/min): 1  Oxygen Concentration (%): 100  Flowsheet Rows      First Filed Value   Admission Height 190.5 cm (75\") Documented at 11/10/2021 2226   Admission Weight 118 kg (259 lb 11.2 oz) Documented at 11/10/2021 2229        Intake & Output (last 3 days)       11/09 0701  11/10 0700 11/10 0701  11/11 0700 11/11 0701  11/12 0700 11/12 0701 11/13 0700    P.O.  240 240 480    Total Intake(mL/kg)  240 (2) 240 (2) 480 (4)    Urine (mL/kg/hr)   2350 (0.8) 125 (0.2)    Total Output   2350 125    Net  +240 -2110 +355            Urine Unmeasured Occurrence   1 x     Stool Unmeasured Occurrence   1 x         Lines, Drains & Airways     Active LDAs     Name Placement date Placement time Site Days    Peripheral IV 11/10/21 2306 Right Forearm 11/10/21  2306  Forearm  1                       BP 98/67 (BP Location: Left arm, Patient Position: Lying)   Pulse 85   Temp 98.3 °F (36.8 °C) (Oral)   Resp 20   Ht 190.5 cm (75\")   Wt 119 kg (262 lb 12.6 oz)   SpO2 100%   BMI 32.85 kg/m²   Intake/Output last 3 shifts:  I/O last 3 completed shifts:  In: 480 [P.O.:480]  Out: 2350 [Urine:2350]  Intake/Output this shift:  I/O this shift:  In: 480 [P.O.:480]  Out: 125 [Urine:125]    PHYSICAL EXAM:    General: Alert, cooperative, no distress, appears stated age  Head:  Normocephalic, atraumatic, mucous membranes moist  Eyes:  Conjunctiva/corneas clear, EOM's intact     Neck:  Supple,  no adenopathy;      Lungs: Clear to auscultation bilaterally, no wheezes rhonchi rales are noted  Chest wall: No tenderness  Heart::  Regular rate and rhythm, S1 and S2 normal, no murmur, rub or gallop  Abdomen: Soft, non-tender, nondistended bowel sounds active  Extremities: No cyanosis, clubbing, or edema " groin soft no hematoma.  Pulses: 2+ and symmetric all extremities  Skin:  No rashes or lesions  Neuro/psych: A&O x3. CN II through XII are grossly intact with appropriate affect      Scheduled Meds:      atorvastatin, 10 mg, Oral, Daily  carvedilol, 6.25 mg, Oral, BID With Meals  furosemide, 40 mg, Intravenous, Q12H  losartan, 25 mg, Oral, Daily  rivaroxaban, 20 mg, Oral, Daily With Dinner  sodium chloride, 10 mL, Intravenous, Q12H  spironolactone, 25 mg, Oral, Daily        Continuous Infusions:         PRN Meds:    •  acetaminophen  •  influenza vaccine  •  metOLazone  •  sodium chloride        Results Review:     I reviewed the patient's new clinical results.    CBC    Results from last 7 days   Lab Units 11/10/21  2306   WBC 10*3/mm3 4.70   HEMOGLOBIN g/dL 14.6   PLATELETS 10*3/mm3 251     Cr Clearance Estimated Creatinine Clearance: 81.9 mL/min (A) (by C-G formula based on SCr of 1.35 mg/dL (H)).  Coag     HbA1C No results found for: HGBA1C  Blood Glucose No results found for: POCGLU  Infection   Results from last 7 days   Lab Units 11/11/21  0043 11/11/21  0038 11/10/21  2321   BLOODCX  No growth at 24 hours  --  No growth at 24 hours   PROCALCITONIN ng/mL  --  0.10  --      CMP   Results from last 7 days   Lab Units 11/12/21  0428 11/11/21  0038   SODIUM mmol/L 140 142   POTASSIUM mmol/L 3.9 3.9   CHLORIDE mmol/L 104 104   CO2 mmol/L 24.0 25.0   BUN mg/dL 21* 19   CREATININE mg/dL 1.35* 1.37*   GLUCOSE mg/dL 92 79   ALBUMIN g/dL  --  4.00   BILIRUBIN mg/dL  --  1.8*   ALK PHOS U/L  --  203*   AST (SGOT) U/L  --  18   ALT (SGPT) U/L  --  18     ABG      UA    Results from last 7 days   Lab Units 11/10/21  2315   NITRITE UA  Negative   WBC UA /HPF 0-2*   BACTERIA UA /HPF None Seen   SQUAM EPITHEL UA /HPF 0-2     EDD  No results found for: POCMETH, POCAMPHET, POCBARBITUR, POCBENZO, POCCOCAINE, POCOPIATES, POCOXYCODO, POCPHENCYC, POCPROPOXY, POCTHC, POCTRICYC  Lysis Labs   Results from last 7 days   Lab Units  11/12/21  0428 11/11/21  0038 11/10/21  2306   HEMOGLOBIN g/dL  --   --  14.6   PLATELETS 10*3/mm3  --   --  251   CREATININE mg/dL 1.35* 1.37*  --      Radiology(recent) XR Chest 1 View    Result Date: 11/11/2021   1. Findings consistent with CHF, which is more pronounced than on prior exam. 2. Small left and increasing right pleural effusion and bibasilar opacities favored represent atelectasis.  Electronically Signed By-Matthew Beaver MD On:11/11/2021 7:16 AM This report was finalized on [id number] by  Matthew Beaver MD.        Results from last 7 days   Lab Units 11/11/21 0038   TROPONIN T ng/mL <0.010       Xrays, labs reviewed personally by physician.    ECG/EMG Results (most recent)     Procedure Component Value Units Date/Time    ECG 12 Lead [[id number]] Collected: 11/10/21 2238     Updated: 11/10/21 2240     QT Interval 370 ms     Narrative:      HEART RATE= 102  bpm  RR Interval= 592  ms  AK Interval= 162  ms  P Horizontal Axis= -11  deg  P Front Axis= 70  deg  QRSD Interval= 83  ms  QT Interval= 370  ms  QRS Axis= 54  deg  T Wave Axis=   deg  - ABNORMAL ECG -  Sinus tachycardia  Multiple ventricular premature complexes  Abnormal T, consider ischemia, lateral leads  Electronically Signed By:   Date and Time of Study: 2021-11-10 22:38:30            Medication Review:   I have reviewed the patient's current medication list  Scheduled Meds:atorvastatin, 10 mg, Oral, Daily  carvedilol, 6.25 mg, Oral, BID With Meals  furosemide, 40 mg, Intravenous, Q12H  losartan, 25 mg, Oral, Daily  rivaroxaban, 20 mg, Oral, Daily With Dinner  sodium chloride, 10 mL, Intravenous, Q12H  spironolactone, 25 mg, Oral, Daily      Continuous Infusions:   PRN Meds:.•  acetaminophen  •  influenza vaccine  •  metOLazone  •  sodium chloride    Imaging:  Imaging Results (Last 72 Hours)     Procedure Component Value Units Date/Time    XR Chest 1 View [[id number]] Collected: 11/11/21 0715     Updated: 11/11/21 0718    Narrative:      DATE  "OF EXAM:  11/10/2021 10:52 PM     PROCEDURE:  XR CHEST 1 VW-     INDICATIONS:  soa     COMPARISON:  10/21/2021     TECHNIQUE:   Single radiographic view of the chest was obtained.     FINDINGS:  Left single lead AICD pacemaker is unchanged in position. Cardiomegaly  is unchanged. Mediastinal is unchanged. There is increasing right  effusion and right basilar opacity. There is likely a small left  effusion. There is vascular prominence, and interstitial and groundglass  opacities suggestive of pulmonary edema.       Impression:         1. Findings consistent with CHF, which is more pronounced than on prior  exam.  2. Small left and increasing right pleural effusion and bibasilar  opacities favored represent atelectasis.     Electronically Signed By-Matthew Beaver MD On:11/11/2021 7:16 AM  This report was finalized on [id number] by  Matthew Beaver MD.            CORTEZ Rodriguez  11/12/21  12:57 EST      EMR Dragon/Transcription:   \"Dictated utilizing Dragon dictation\".        Electronically signed by CORTEZ Rodriguez, 11/12/21, 12:57 PM EST.    "

## 2021-11-12 NOTE — DISCHARGE PLACEMENT REQUEST
"Thony Dumont (61 y.o. Male)             Date of Birth Social Security Number Address Home Phone MRN    1960  [address] [place] [address] [place] [address] [phone] [id number]    Catholic Marital Status             None Single       Admission Date Admission Type Admitting Provider Attending Provider Department, Room/Bed    11/10/21 Emergency Jennifer Dumont MD Hall, Kelli G, MD [facility], 2127/1    Discharge Date Discharge Disposition Discharge Destination           Home or Self Care              Attending Provider: Jennifer Dumont MD    Allergies: Aspirin, Penicillins, Pork Allergy    Isolation: None   Infection: None   Code Status: CPR   Advance Care Planning Activity    Ht: 190.5 cm (75\")   Wt: 119 kg (262 lb 12.6 oz)    Admission Cmt: None   Principal Problem: Acute on chronic combined systolic (congestive) and diastolic (congestive) heart failure (HCC) [I50.43]                 Active Insurance as of 11/10/2021     Primary Coverage     Payor Plan Insurance Group Employer/Plan Group    [place]      Payor Plan Address Payor Plan Phone Number Payor Plan Fax Number Effective Dates    [address]   10/21/2021 - None Entered    [place] [address]       Subscriber Name Subscriber Birth Date Member ID       THONY DUMONT 1960 [id number]                 Emergency Contacts      (Rel.) Home Phone Work Phone Mobile Phone    LJ TA (Friend) [phone] -- [phone]              "

## 2021-11-12 NOTE — THERAPY EVALUATION
Patient Name: Thony Dumont  : 1960    MRN: [id number]                              Today's Date: 2021       Admit Date: 11/10/2021    Visit Dx:     ICD-10-CM ICD-9-CM   1. Acute respiratory failure with hypoxemia ([facility])  J96.01 518.81   2. Acute on chronic congestive heart failure, unspecified heart failure type ([facility])  I50.9 428.0   3. Methamphetamine abuse ([facility])  F15.10 305.70   4. Lab test negative for COVID-19 virus  Z20.822 V01.79     Patient Active Problem List   Diagnosis   • Essential hypertension   • Mixed hyperlipidemia   • Paroxysmal atrial fibrillation (HCC)   • Chronic pain   • Tobacco abuse   • NICM (nonischemic cardiomyopathy) (HCC)   • Acute on chronic combined systolic (congestive) and diastolic (congestive) heart failure (HCC)   • CKD (chronic kidney disease) stage 2, GFR 60-89 ml/min   • ICD (implantable cardioverter-defibrillator) in place   • Degenerative joint disease   • Erectile dysfunction   • Gastroesophageal reflux disease   • Sprain of rotator cuff capsule   • Moderate mitral regurgitation   • Moderate tricuspid regurgitation   • Pulmonary hypertension (HCC)   • Serum total bilirubin elevated   • Substance use   • Chest pain   • Acute respiratory failure with hypoxemia (HCC)   • Methamphetamine abuse (HCC)   • Patient's noncompliance with other medical treatment and regimen     Past Medical History:   Diagnosis Date   • Acute on chronic congestive heart failure (HCC) 2021   • Afib (HCC)    • Chronic pain    • CKD (chronic kidney disease) stage 2, GFR 60-89 ml/min 2021   • Combined systolic and diastolic congestive heart failure (HCC)    • Degenerative joint disease    • Erectile dysfunction    • Essential hypertension    • Former smoker    • Gastroesophageal reflux disease    • Homeless 2021    living in his car   • ICD (implantable cardioverter-defibrillator) in place 06/15/2021   • Mixed hyperlipidemia    • Moderate mitral regurgitation    • Moderate  tricuspid regurgitation    • NICM (nonischemic cardiomyopathy) ([facility]) 06/10/2021   • Paroxysmal atrial fibrillation ([facility])    • Pleural effusion 8/9/2021   • Pulmonary hypertension ([facility])    • Sprain of rotator cuff capsule 3/14/2014   • Substance abuse ([facility])    • Valvular heart disease      Past Surgical History:   Procedure Laterality Date   • CARDIAC DEFIBRILLATOR PLACEMENT     • CARDIAC ELECTROPHYSIOLOGY PROCEDURE Left 6/15/2021    Procedure: Device Implant;  Surgeon: Michael Rollins MD;  Location: [facility] INVASIVE LOCATION;  Service: Cardiovascular;  Laterality: Left;   • HERNIA REPAIR        General Information     Row Name 11/12/21 1130          Physical Therapy Time and Intention    Document Type evaluation  -KS     Mode of Treatment co-treatment; physical therapy; occupational therapy  -KS     Row Name 11/12/21 1130          General Information    Patient Profile Reviewed yes  -KS     Prior Level of Function min assist:; all household mobility; community mobility; gait; transfer; bed mobility; independent:; ADL's  Pt independent for household distances with a cane. Pt stated that he navigates grocery stores with a scooter. Pt typically requires assist with dressing but does not have anyone to help assist him as significant other recently kicked him out.  -KS     Existing Precautions/Restrictions fall  -KS     Barriers to Rehab previous functional deficit  -KS     Row Name 11/12/21 1130          Living Environment    Lives With alone  -KS     Row Name 11/12/21 1130          Home Main Entrance    Number of Stairs, Main Entrance none  -KS     Stair Railings, Main Entrance none  -KS     Row Name 11/12/21 1130          Stairs Within Home, Primary    Number of Stairs, Within Home, Primary none  -KS     Stair Railings, Within Home, Primary none  -KS     Row Name 11/12/21 1130          Cognition    Orientation Status (Cognition) oriented x 4; oriented to; person; place; situation; time  -KS     Row Name  11/12/21 1130          Safety Issues, Functional Mobility    Safety Issues Affecting Function (Mobility) impulsivity; safety precaution awareness; safety precautions follow-through/compliance; insight into deficits/self-awareness; ability to follow commands; at risk behavior observed  -KS     Impairments Affecting Function (Mobility) balance; strength  -KS           User Key  (r) = Recorded By, (t) = Taken By, (c) = Cosigned By    Initials Name Provider Type    Venecia Moore PT Physical Therapist               Mobility     Row Name 11/12/21 1133          Bed Mobility    Bed Mobility bed mobility (all) activities  -KS     All Activities, [place] (Bed Mobility) independent  -KS     Row Name 11/12/21 1133          Bed-Chair Transfer    Bed-Chair [place] (Transfers) standby assist  -KS     Assistive Device (Bed-Chair Transfers) walker, front-wheeled  -KS     Row Name 11/12/21 1133          Sit-Stand Transfer    Sit-Stand [place] (Transfers) standby assist  -KS     Assistive Device (Sit-Stand Transfers) walker, front-wheeled  -KS     Row Name 11/12/21 1133          Gait/Stairs (Locomotion)    [place] Level (Gait) not tested  Pt presented with orthostatic hypotension so ambulation not safe this date.  -KS           User Key  (r) = Recorded By, (t) = Taken By, (c) = Cosigned By    Initials Name Provider Type    Venecia Moore PT Physical Therapist               Obj/Interventions     Row Name 11/12/21 1135          Range of Motion Comprehensive    General Range of Motion bilateral upper extremity ROM WFL; bilateral lower extremity ROM WFL  -KS     Row Name 11/12/21 1135          Strength Comprehensive (MMT)    General Manual Muscle Testing (MMT) Assessment upper extremity strength deficits identified; lower extremity strength deficits identified  -KS     Comment, General Manual Muscle Testing (MMT) Assessment see OT note for UE strength testing; BLE 4/5 grossly  -KS     Row Name 11/12/21 1138           Balance    Balance Assessment sitting static balance; sitting dynamic balance; standing static balance; standing dynamic balance  -KS     Static Sitting Balance WFL  -KS     Dynamic Sitting Balance WFL  -KS     Static Standing Balance WFL  -KS     Dynamic Standing Balance mild impairment  -KS     Balance Interventions sitting; standing; sit to stand; supported; static; dynamic; minimal challenge  -KS     Row Name 11/12/21 1135          Sensory Assessment (Somatosensory)    Sensory Assessment (Somatosensory) sensation intact  -KS           User Key  (r) = Recorded By, (t) = Taken By, (c) = Cosigned By    Initials Name Provider Type    Venecia Moore PT Physical Therapist               Goals/Plan     Row Name 11/12/21 1146          Transfer Goal 1 (PT)    Activity/Assistive Device (Transfer Goal 1, PT) transfers, all; cane, straight  -KS     [place] Level/Cues Needed (Transfer Goal 1, PT) independent  -KS     Time Frame (Transfer Goal 1, PT) 2 weeks  -KS     Row Name 11/12/21 1146          Gait Training Goal 1 (PT)    Activity/Assistive Device (Gait Training Goal 1, PT) gait (walking locomotion); cane, straight  -KS     [place] Level (Gait Training Goal 1, PT) independent  -KS     Time Frame (Gait Training Goal 1, PT) 2 weeks  -KS     Strategies/Barriers (Gait Training Goal 1, PT) 75 ft  -KS           User Key  (r) = Recorded By, (t) = Taken By, (c) = Cosigned By    Initials Name Provider Type    Venecia Moore PT Physical Therapist               Clinical Impression     Row Name 11/12/21 1135          Pain    Additional Documentation Pain Scale: Numbers Pre/Post-Treatment (Group)  -KS     Row Name 11/12/21 1135          Pain Scale: Numbers Pre/Post-Treatment    Pretreatment Pain Rating 0/10 - no pain  -KS     Posttreatment Pain Rating 0/10 - no pain  -KS     Row Name 11/12/21 1135          Plan of Care Review    Plan of Care Reviewed With patient  -KS     Progress no change  -KS     Outcome  Summary Pt is a 62 y/o male admitted for cardiomegaly and CHF exacerbation. Pt (+) for meth and THC at admit. PMHx: OA, a-fib, stage II CKD, HTN. Pt lives alone in extended stay hotel with no stairs. Pt is independent with household distances with use of rw. Pt requires scooter for grocery shopping. Pt states that he requires assist for dressing but does not have anyone to assist him as significant other recently kicked him out. Pt was independent for bed mobility this date and required standby assist for transfers. Ambulation not safe as MAP <60 in standing. Pt will require IP rehab following d/c and is unsafe to return home alone. Will follow.  -KS     Row Name 11/12/21 1131          Therapy Assessment/Plan (PT)    Patient/Family Therapy Goals Statement (PT) Pt agreeable to stated plan of care.  -KS     Rehab Potential (PT) good, to achieve stated therapy goals  -KS     Criteria for Skilled Interventions Met (PT) yes; meets criteria; skilled treatment is necessary  -KS     Row Name 11/12/21 1135          Vital Signs    Intra Systolic BP Rehab 97  -KS     Intra Treatment Diastolic BP 34  MAP 51  -KS     Post Systolic BP Rehab 97  -KS     Post Treatment Diastolic BP 60  MAP 69  -KS     Pre Patient Position Supine  -KS     Intra Patient Position Standing  -KS     Post Patient Position Sitting  -KS     Row Name 11/12/21 1138          Positioning and Restraints    Pre-Treatment Position in bed  -KS     Post Treatment Position chair  -KS     In Chair notified nsg; sitting; call light within reach; exit alarm on; encouraged to call for assist  -KS           User Key  (r) = Recorded By, (t) = Taken By, (c) = Cosigned By    Initials Name Provider Type    Venecia Moore, PT Physical Therapist               Outcome Measures     Row Name 11/12/21 1146          How much help from another person do you currently need...    Turning from your back to your side while in flat bed without using bedrails? 4  -KS     Moving from  lying on back to sitting on the side of a flat bed without bedrails? 4  -KS     Moving to and from a bed to a chair (including a wheelchair)? 3  -KS     Standing up from a chair using your arms (e.g., wheelchair, bedside chair)? 3  -KS     Climbing 3-5 steps with a railing? 2  -KS     To walk in hospital room? 3  -KS     AM-PAC 6 Clicks Score (PT) 19  -KS     Row Name 11/12/21 1146          Functional Assessment    Outcome Measure Options AM-PAC 6 Clicks Basic Mobility (PT)  -KS           User Key  (r) = Recorded By, (t) = Taken By, (c) = Cosigned By    Initials Name Provider Type    Venecia Moore PT Physical Therapist                             Physical Therapy Education                 Title: PT OT SLP Therapies (Done)     Topic: Physical Therapy (Done)     Point: Mobility training (Done)     Learning Progress Summary           Patient Acceptance, E,TB, VU by KS at 11/12/2021 1148                               User Key     Initials Effective Dates Name Provider Type Discipline    KS 08/31/21 -  Venecia Santos PT Physical Therapist PT              PT Recommendation and Plan  Planned Therapy Interventions (PT): balance training, gait training, patient/family education, strengthening, transfer training  Plan of Care Reviewed With: patient  Progress: no change  Outcome Summary: Pt is a 62 y/o male admitted for cardiomegaly and CHF exacerbation. Pt (+) for meth and THC at admit. PMHx: OA, a-fib, stage II CKD, HTN. Pt lives alone in extended stay hotel with no stairs. Pt is independent with household distances with use of rw. Pt requires scooter for grocery shopping. Pt states that he requires assist for dressing but does not have anyone to assist him as significant other recently kicked him out. Pt was independent for bed mobility this date and required standby assist for transfers. Ambulation not safe as MAP <60 in standing. Pt will require IP rehab following d/c and is unsafe to return home alone. Will  follow.     Time Calculation:    PT Charges     Row Name 11/12/21 1149             Time Calculation    Start Time 1051  -KS      Stop Time 1110  -KS      Time Calculation (min) 19 min  -KS      PT Received On 11/12/21  -KS      PT - Next Appointment 11/15/21  -KS      PT Goal Re-Cert Due Date 11/26/21  -KS              Time Calculation- PT    Total Timed Code Minutes- PT 0 minute(s)  -KS            User Key  (r) = Recorded By, (t) = Taken By, (c) = Cosigned By    Initials Name Provider Type    Saul Moore, PT Physical Therapist              Therapy Charges for Today     Code Description Service Date Service Provider Modifiers Qty    68725576958 HC PT EVAL LOW COMPLEXITY 3 11/12/2021 Saul Barnett, PT GP 1          PT G-Codes  Outcome Measure Options: AM-PAC 6 Clicks Basic Mobility (PT)  AM-PAC 6 Clicks Score (PT): 19    SAUL BARNETT PT  11/12/2021

## 2021-11-12 NOTE — PLAN OF CARE
Goal Outcome Evaluation:  Plan of Care Reviewed With: patient        Progress: no change  Outcome Summary: Pt is a 62 y/o male admitted for cardiomegaly and CHF exacerbation. Pt (+) for meth and THC at admit. PMHx: OA, a-fib, stage II CKD, HTN. Pt lives alone in extended stay hotel with no stairs. Pt is independent with household distances with use of rw. Pt requires scooter for grocery shopping. Pt states that he requires assist for dressing but does not have anyone to assist him as significant other recently kicked him out. Pt was independent for bed mobility this date and required standby assist for transfers. Ambulation not safe as MAP <60 in standing. Pt will require IP rehab following d/c and is unsafe to return home alone. Will follow.

## 2021-11-12 NOTE — DISCHARGE PLACEMENT REQUEST
"Thony Dumont (61 y.o. Male)             Date of Birth Social Security Number Address Home Phone MRN    1960  [address] [place] [address] [place] [address] [phone] [id number]    [employer] Mar[employer] Status             None Single       Admission Date Admission Type Admitting Provider Attending Provider Department, Room/Bed    11/10/21 Emergency Jennifer Dumont MD Hall, Kelli G, MD [facility], 2127/1    Discharge Date Discharge Disposition Discharge Destination           Home or Self Care              Attending Provider: Jennifer Dumont MD    Allergies: Aspirin, Penicillins, Pork Allergy    Isolation: None   Infection: None   Code Status: CPR   Advance Care Planning Activity    Ht: 190.5 cm (75\")   Wt: 119 kg (262 lb 12.6 oz)    Admission Cmt: None   Principal Problem: Acute on chronic combined systolic (congestive) and diastolic (congestive) heart failure (HCC) [I50.43]                 Active Insurance as of 11/10/2021     Primary Coverage     Payor Plan Insurance Group Employer/Plan Group    [place]      Payor Plan Address Payor Plan Phone Number Payor Plan Fax Number Effective Dates    [address]   10/21/2021 - None Entered    [place] [address]       Subscriber Name Subscriber Birth Date Member ID       THONY DUMONT 1960 [id number]                 Emergency Contacts      (Rel.) Home Phone Work Phone Mobile Phone    LJ TA (Friend) [phone] -- [phone]              "

## 2021-11-13 LAB
ANION GAP SERPL CALCULATED.3IONS-SCNC: 10 MMOL/L (ref 5–15)
BUN SERPL-MCNC: 23 MG/DL (ref 6–20)
BUN/CREAT SERPL: 14.7 (ref 7–25)
CALCIUM SPEC-SCNC: 8.5 MG/DL (ref 8.6–10.5)
CHLORIDE SERPL-SCNC: 103 MMOL/L (ref 98–107)
CO2 SERPL-SCNC: 28 MMOL/L (ref 22–29)
CREAT SERPL-MCNC: 1.56 MG/DL (ref 0.76–1.27)
GFR SERPL CREATININE-BSD FRML MDRD: 55 ML/MIN/1.73
GLUCOSE SERPL-MCNC: 97 MG/DL (ref 65–99)
POTASSIUM SERPL-SCNC: 4.8 MMOL/L (ref 3.5–5.2)
SODIUM SERPL-SCNC: 141 MMOL/L (ref 136–145)

## 2021-11-13 PROCEDURE — 80048 BASIC METABOLIC PNL TOTAL CA: CPT | Performed by: NURSE PRACTITIONER

## 2021-11-13 PROCEDURE — 25010000002 FUROSEMIDE PER 20 MG: Performed by: HOSPITALIST

## 2021-11-13 PROCEDURE — 25010000002 FUROSEMIDE PER 20 MG: Performed by: NURSE PRACTITIONER

## 2021-11-13 PROCEDURE — G0378 HOSPITAL OBSERVATION PER HR: HCPCS

## 2021-11-13 PROCEDURE — 36415 COLL VENOUS BLD VENIPUNCTURE: CPT | Performed by: NURSE PRACTITIONER

## 2021-11-13 PROCEDURE — 99232 SBSQ HOSP IP/OBS MODERATE 35: CPT | Performed by: HOSPITALIST

## 2021-11-13 PROCEDURE — 99214 OFFICE O/P EST MOD 30 MIN: CPT | Performed by: INTERNAL MEDICINE

## 2021-11-13 RX ADMIN — ATORVASTATIN CALCIUM 10 MG: 10 TABLET, FILM COATED ORAL at 08:55

## 2021-11-13 RX ADMIN — SPIRONOLACTONE 25 MG: 25 TABLET ORAL at 08:55

## 2021-11-13 RX ADMIN — SODIUM CHLORIDE, PRESERVATIVE FREE 10 ML: 5 INJECTION INTRAVENOUS at 08:55

## 2021-11-13 RX ADMIN — CARVEDILOL 6.25 MG: 6.25 TABLET, FILM COATED ORAL at 17:32

## 2021-11-13 RX ADMIN — FUROSEMIDE 40 MG: 10 INJECTION, SOLUTION INTRAMUSCULAR; INTRAVENOUS at 11:07

## 2021-11-13 RX ADMIN — SODIUM CHLORIDE, PRESERVATIVE FREE 10 ML: 5 INJECTION INTRAVENOUS at 21:24

## 2021-11-13 RX ADMIN — LOSARTAN POTASSIUM 25 MG: 25 TABLET, FILM COATED ORAL at 08:55

## 2021-11-13 RX ADMIN — RIVAROXABAN 20 MG: 20 TABLET, FILM COATED ORAL at 17:32

## 2021-11-13 RX ADMIN — CARVEDILOL 6.25 MG: 6.25 TABLET, FILM COATED ORAL at 08:55

## 2021-11-13 RX ADMIN — FUROSEMIDE 40 MG: 10 INJECTION, SOLUTION INTRAMUSCULAR; INTRAVENOUS at 21:23

## 2021-11-13 NOTE — CONSULTS
[facility]                                                                                             Nephrology Initial Consult Note    Patient Identification:  Name: Thony Dumont MRN: [id number]  Age: 61 y.o. : 1960  Sex: male  Date:2021    Requesting Physician: As per consult order.  Reason for Consultation: CKD/CHF/diuretic management    Information from:patient/ family/ chart      History of Present Illness: This is a 61 y.o. year old male with no prior diagnosed history of chronic kidney disease but baseline creatinine over the last 12 months looks to be around 1.4 and he is 1.5 today.  He came to the hospital and was admitted on 11/10 with increasing shortness of breath and increasing lower extremity edema.  States compliance with home medications but tested positive for methamphetamines and THC.  Shortness of breath improved with diuresis but still has significant edema today on exam, also complains of scrotal edema.  Creatinine has been fairly stable for most of this admission.  Oxygen saturations have improved and he is currently 100% on room air.    The following medical history and medications personally reviewed by me:    Problem List:   Patient Active Problem List    Diagnosis    • *Acute on chronic combined systolic (congestive) and diastolic (congestive) heart failure (HCC) [I50.43]    • Patient's noncompliance with other medical treatment and regimen [Z91.19]    • Acute respiratory failure with hypoxemia (HCC) [J96.01]    • Methamphetamine abuse (HCC) [F15.10]    • Serum total bilirubin elevated [R17]    • Substance use [F19.90]    • Chest pain [R07.9]    • ICD (implantable cardioverter-defibrillator) in place [Z95.810]    • Degenerative joint disease [M19.90]    • Erectile dysfunction [N52.9]    • Gastroesophageal reflux disease [K21.9]    • Moderate mitral regurgitation  [I34.0]    • Moderate tricuspid regurgitation [I07.1]    • Pulmonary hypertension (HCC) [I27.20]    • CKD (chronic kidney disease) stage 2, GFR 60-89 ml/min [N18.2]    • NICM (nonischemic cardiomyopathy) (HCC) [I42.8]    • Tobacco abuse [Z72.0]    • Essential hypertension [I10]    • Mixed hyperlipidemia [E78.2]    • Paroxysmal atrial fibrillation (HCC) [I48.0]    • Chronic pain [G89.29]    • Sprain of rotator cuff capsule [S43.429A]        Past Medical History:  Past Medical History:   Diagnosis Date   • Acute on chronic congestive heart failure (HCC) 06/07/2021   • Afib ([facility])    • Chronic pain    • CKD (chronic kidney disease) stage 2, GFR 60-89 ml/min 8/9/2021   • Combined systolic and diastolic congestive heart failure ([facility])    • Degenerative joint disease    • Erectile dysfunction    • Essential hypertension    • Former smoker    • Gastroesophageal reflux disease    • Homeless 08/25/2021    living in his car   • ICD (implantable cardioverter-defibrillator) in place 06/15/2021   • Mixed hyperlipidemia    • Moderate mitral regurgitation    • Moderate tricuspid regurgitation    • NICM (nonischemic cardiomyopathy) ([facility]) 06/10/2021   • Paroxysmal atrial fibrillation ([facility])    • Pleural effusion 8/9/2021   • Pulmonary hypertension ([facility])    • Sprain of rotator cuff capsule 3/14/2014   • Substance abuse ([facility])    • Valvular heart disease        Past Surgical History:  Past Surgical History:   Procedure Laterality Date   • CARDIAC DEFIBRILLATOR PLACEMENT     • CARDIAC ELECTROPHYSIOLOGY PROCEDURE Left 6/15/2021    Procedure: Device Implant;  Surgeon: Michael Rollins MD;  Location: [facility] INVASIVE LOC[facility];  Service: Cardiovascular;  Laterality: Left;   • HERNIA REPAIR          Home Meds:   Medications Prior to Admission   Medication Sig Dispense Refill Last Dose   • [DISCONTINUED] atorvastatin (Lipitor) 10 MG tablet Take 1 tablet by mouth Daily. 30 tablet 0 Past Week at Unknown time   • [DISCONTINUED] carvedilol  (COREG) 6.25 MG tablet Take 1 tablet by mouth 2 (Two) Times a Day With Meals. 180 tablet 3 11/10/2021 at Unknown time   • [DISCONTINUED] furosemide (LASIX) 40 MG tablet Take 1.5 tablets by mouth 2 (Two) Times a Day for 30 days. 270 tablet 1 11/10/2021 at Unknown time   • [DISCONTINUED] losartan (COZAAR) 25 MG tablet Take 1 tablet by mouth Daily. 90 tablet 3 11/10/2021 at Unknown time   • [DISCONTINUED] metOLazone (ZAROXOLYN) 2.5 MG tablet Take 1 tablet by mouth Daily As Needed (edema). 30 tablet 3 11/10/2021 at Unknown time   • [DISCONTINUED] rivaroxaban (Xarelto) 20 MG tablet Take 1 tablet by mouth Daily. 90 tablet 1 11/10/2021 at Unknown time   • [DISCONTINUED] spironolactone (ALDACTONE) 25 MG tablet Take 1 tablet by mouth Daily. 90 tablet 1 11/10/2021 at Unknown time   • acetaminophen (TYLENOL) 500 MG tablet Take 2 tablets by mouth Every 6 (Six) Hours As Needed for Mild Pain  or Moderate Pain . 40 tablet 0 Unknown at Unknown time       Current Meds:   Current Facility-Administered Medications   Medication Dose Route Frequency Provider Last Rate Last Admin   • acetaminophen (TYLENOL) tablet 1,000 mg  1,000 mg Oral Q6H PRN Jennifer Dumont MD   1,000 mg at 11/11/21 1512   • atorvastatin (LIPITOR) tablet 10 mg  10 mg Oral Daily Jennifer Dumont MD   10 mg at 11/13/21 0855   • carvedilol (COREG) tablet 6.25 mg  6.25 mg Oral BID With Meals Jennifer Dumont MD   6.25 mg at 11/13/21 0855   • furosemide (LASIX) injection 40 mg  40 mg Intravenous Q12H Nataliia Montoya APRN   40 mg at 11/13/21 1107   • influenza vac split quad (FLUZONE,FLUARIX,AFLURIA,FLULAVAL) injection 0.5 mL  0.5 mL Intramuscular During Hospitalization Jocelin Candelario MD       • losartan (COZAAR) tablet 25 mg  25 mg Oral Daily Jennifer Dumont MD   25 mg at 11/13/21 0855   • metOLazone (ZAROXOLYN) tablet 2.5 mg  2.5 mg Oral Daily PRN Jennifer Dumont MD       • rivaroxaban (XARELTO) tablet 20 mg  20 mg Oral Daily With Dinner Jennifer Dumont MD   20 mg at  "11/12/21 1739   • sodium chloride 0.9 % flush 10 mL  10 mL Intravenous Q12H Nataliia Montoya APRN   10 mL at 11/13/21 0855   • sodium chloride 0.9 % flush 10 mL  10 mL Intravenous PRN Nataliia Montoya APRN       • spironolactone (ALDACTONE) tablet 25 mg  25 mg Oral Daily Jennifer Dumont MD   25 mg at 11/13/21 0855       Allergies:  Allergies   Allergen Reactions   • Aspirin Swelling   • Penicillins Swelling   • Pork Allergy Unknown - High Severity       Social History:   Social History     Socioeconomic History   • Marital status: Single   Tobacco Use   • Smoking status: Current Some Day Smoker     Types: Cigarettes   • Smokeless tobacco: Never Used   • Tobacco comment: complete smoking cessation educated    Vaping Use   • Vaping Use: Every day   Substance and Sexual Activity   • Alcohol use: Yes     Alcohol/week: 7.0 standard drinks     Types: 7 Cans of beer per week     Comment: 1 beer a day   • Drug use: Not Currently     Types: Marijuana, \"Crack\" cocaine, Methamphetamines     Comment: 6/15/21 report cocaine in 2018, marijuana 6/14/2021 @0050- snorts meth occasionally.   • Sexual activity: Defer        Family History:  Family History   Problem Relation Age of Onset   • Cancer Mother         Review of Systems: as per HPI, in addition:    General:      Improved weakness / fatigue,                       No fevers / chills                       no weight loss  HEENT:       no dysphagia / odynophagia  Neck:           normal range of motion, no swelling  Respiratory: no cough / congestion                      Decreased shortness of air                       No wheezing  CV:              No chest pain                       No palpitations  Abdomen/GI: no nausea / vomiting                      No diarrhea / constipation                      No abdominal pain  :             no dysuria / urinary frequency                       No urgency, normal output  Endocrine:   no polyuria / polydipsia,                    " "  No heat or cold intolerance  Skin:           no rashes or skin breakdown   Vascular:   + Edema                     No claudication  Psych:        no depression/ anxiety  Neuro:        no focal weakness, no seizures  Musculoskeletal: no joint pain or deformities      Physical Exam:  Vitals:   Temp (24hrs), Av.1 °F (36.7 °C), Min:97.7 °F (36.5 °C), Max:98.4 °F (36.9 °C)    /76   Pulse 90   Temp 98.2 °F (36.8 °C) (Oral)   Resp 20   Ht 190.5 cm (75\")   Wt 123 kg (271 lb 2.7 oz)   SpO2 100%   BMI 33.89 kg/m²   Intake/Output:     Intake/Output Summary (Last 24 hours) at 2021 1248  Last data filed at 2021 0900  Gross per 24 hour   Intake 720 ml   Output 250 ml   Net 470 ml        Wt Readings from Last 1 Encounters:   21 0509 123 kg (271 lb 2.7 oz)   21 0500 119 kg (262 lb 12.6 oz)   21 0500 119 kg (261 lb 14.5 oz)   11/10/21 2229 118 kg (259 lb 11.2 oz)       Exam:    General Appearance:  Awake, alert, oriented x3, no acute distress  Fairly well-appearing   Head and Face:  Normocephalic, atraumatic, mucus membranes moist, oropharynx clear   Eyes:  No icterus, pupils equal round and reactive to light, extraocular movements intact    ENMT: Moist mucosa, tongue symmetric    Neck: Supple  no jugular venous distention  no thyromegaly   Pulmonary:  Respiratory effort: Normal  Auscultation of lungs: Clear bilaterally  No wheezes  No rhonchi  Good air movement, good expansion   Chest wall:  No tenderness or deformity   Cardiovascular:  Auscultation of the heart: Normal rhythm, no murmurs  2-3+ edema of bilateral lower extremities, extending all the way to his presacral area.  Significant scrotal edema present as well   Abdomen:  Abdomen: soft, non-tender, normal bowel sounds all four quadrants, no masses   Liver and spleen: no hepatosplenomegaly   Musculoskeletal: Digits and nails: normal  Normal range of motion  No joint swelling or gross deformities    Skin: Skin inspection: color " normal, no visible rashes or lesions  Skin palpation: texture, turgor normal, no palpable lesions   Lymphatic:  no cervical lymphadenopathy    Psychiatric: Judgement and insight: normal  Orientation to person place and time: normal  Mood and affect: normal       DATA:  Radiology and Labs:  The following labs independently reviewed by me, additional AM labs ordered  Old records independently reviewed showing CKD 3 a baseline creatinine 1.4-1.5  The following radiologic studies independently viewed by me, findings admission chest x-ray consistent with CHF exacerbation  Interval notes, chart personally reviewed by me.  I have reviewed and summarized old records as detailed above  Plan of care discussed with patient himself at bedside  New problems include acute CHF exacerbation        Risk/ complexity of medical care/ medical decision making: High risk, acute decompensated CHF with need for IV diuretics  Chronic illness with severe exacerbation or progression      Labs:   Recent Results (from the past 24 hour(s))   Basic Metabolic Panel    Collection Time: 11/13/21  6:45 AM    Specimen: Blood   Result Value Ref Range    Glucose 97 65 - 99 mg/dL    BUN 23 (H) 6 - 20 mg/dL    Creatinine 1.56 (H) 0.76 - 1.27 mg/dL    Sodium 141 136 - 145 mmol/L    Potassium 4.8 3.5 - 5.2 mmol/L    Chloride 103 98 - 107 mmol/L    CO2 28.0 22.0 - 29.0 mmol/L    Calcium 8.5 (L) 8.6 - 10.5 mg/dL    eGFR  African Amer 55 (L) >60 mL/min/1.73    BUN/Creatinine Ratio 14.7 7.0 - 25.0    Anion Gap 10.0 5.0 - 15.0 mmol/L       Radiology:  Imaging Results (Last 24 Hours)     ** No results found for the last 24 hours. **               ASSESSMENT:   CKD stage IIIb, stable, near baseline  Volume overload  Hypoxemic respiratory failure on admission, improving with diuresis    Acute on chronic combined systolic (congestive) and diastolic (congestive) heart failure (HCC)    Essential hypertension    Mixed hyperlipidemia    Paroxysmal atrial fibrillation  (HCC)    Tobacco abuse    CKD (chronic kidney disease) stage 2, GFR 60-89 ml/min    ICD (implantable cardioverter-defibrillator) in place    Gastroesophageal reflux disease    Moderate mitral regurgitation    Moderate tricuspid regurgitation    Pulmonary hypertension (HCC)    Acute respiratory failure with hypoxemia (HCC)    Methamphetamine abuse (HCC)    Patient's noncompliance with other medical treatment and regimen        PLAN:   Does appear to have some chronic kidney disease at baseline, likely from underlying hypertension and CHF  Creatinine fairly stable, near baseline  Remains significantly volume overloaded on exam  Agree with continuing current IV diuretics and oral spironolactone  Okay to continue ARB but may need to hold if renal function worsens with diuresis  We will recheck urine studies and quantify any proteinuria    Continue to monitor electrolytes and volume closely, avoid IV contrast and nephrotoxic medications     I appreciate the consult request, please call if any questions      Berry Baer M.D  [facility]  Office phone number: [phone]  Answering service phone number: [phone]        11/13/2021        Dictation via Dragon dictation software

## 2021-11-13 NOTE — PROGRESS NOTES
Referring Provider: Jennifer Dumont MD    Reason for follow-up:  Status post ICD  Acute on chronic congestive heart failure.     Patient Care Team:  Rose Rao APRN as PCP - General (Nurse Practitioner)  Michael Rollins MD as Consulting Physician (Cardiac Electrophysiology)    Subjective .      ROS    The patient has been without any chest discomfort ,shortness of breath, palpitations, dizziness or syncope.  Denies having any headache ,abdominal pain ,nausea, vomiting , diarrhea constipation, loss of weight or loss of appetite.  Denies having any excessive bruising ,hematuria or blood in the stool.    Review of all systems negative except as indicated    History  Past Medical History:   Diagnosis Date   • Acute on chronic congestive heart failure (HCC) 06/07/2021   • Afib ([facility])    • Chronic pain    • CKD (chronic kidney disease) stage 2, GFR 60-89 ml/min 8/9/2021   • Combined systolic and diastolic congestive heart failure ([facility])    • Degenerative joint disease    • Erectile dysfunction    • Essential hypertension    • Former smoker    • Gastroesophageal reflux disease    • Homeless 08/25/2021    living in his car   • ICD (implantable cardioverter-defibrillator) in place 06/15/2021   • Mixed hyperlipidemia    • Moderate mitral regurgitation    • Moderate tricuspid regurgitation    • NICM (nonischemic cardiomyopathy) ([facility]) 06/10/2021   • Paroxysmal atrial fibrillation ([facility])    • Pleural effusion 8/9/2021   • Pulmonary hypertension ([facility])    • Sprain of rotator cuff capsule 3/14/2014   • Substance abuse ([facility])    • Valvular heart disease        Past Surgical History:   Procedure Laterality Date   • CARDIAC DEFIBRILLATOR PLACEMENT     • CARDIAC ELECTROPHYSIOLOGY PROCEDURE Left 6/15/2021    Procedure: Device Implant;  Surgeon: Michael Rollins MD;  Location: [facility] INVASIVE LOCATION;  Service: Cardiovascular;  Laterality: Left;   • HERNIA REPAIR         Family History   Problem Relation Age of Onset  "  • Cancer Mother        Social History     Tobacco Use   • Smoking status: Current Some Day Smoker     Types: Cigarettes   • Smokeless tobacco: Never Used   • Tobacco comment: complete smoking cessation educated    Vaping Use   • Vaping Use: Every day   Substance Use Topics   • Alcohol use: Yes     Alcohol/week: 7.0 standard drinks     Types: 7 Cans of beer per week     Comment: 1 beer a day   • Drug use: Not Currently     Types: Marijuana, \"Crack\" cocaine, Methamphetamines     Comment: 6/15/21 report cocaine in 2018, marijuana 6/14/2021 @0050- snorts meth occasionally.        Medications Prior to Admission   Medication Sig Dispense Refill Last Dose   • [DISCONTINUED] atorvastatin (Lipitor) 10 MG tablet Take 1 tablet by mouth Daily. 30 tablet 0 Past Week at Unknown time   • [DISCONTINUED] carvedilol (COREG) 6.25 MG tablet Take 1 tablet by mouth 2 (Two) Times a Day With Meals. 180 tablet 3 11/10/2021 at Unknown time   • [DISCONTINUED] furosemide (LASIX) 40 MG tablet Take 1.5 tablets by mouth 2 (Two) Times a Day for 30 days. 270 tablet 1 11/10/2021 at Unknown time   • [DISCONTINUED] losartan (COZAAR) 25 MG tablet Take 1 tablet by mouth Daily. 90 tablet 3 11/10/2021 at Unknown time   • [DISCONTINUED] metOLazone (ZAROXOLYN) 2.5 MG tablet Take 1 tablet by mouth Daily As Needed (edema). 30 tablet 3 11/10/2021 at Unknown time   • [DISCONTINUED] rivaroxaban (Xarelto) 20 MG tablet Take 1 tablet by mouth Daily. 90 tablet 1 11/10/2021 at Unknown time   • [DISCONTINUED] spironolactone (ALDACTONE) 25 MG tablet Take 1 tablet by mouth Daily. 90 tablet 1 11/10/2021 at Unknown time   • acetaminophen (TYLENOL) 500 MG tablet Take 2 tablets by mouth Every 6 (Six) Hours As Needed for Mild Pain  or Moderate Pain . 40 tablet 0 Unknown at Unknown time       Allergies  Aspirin, Penicillins, and Pork allergy    Scheduled Meds:atorvastatin, 10 mg, Oral, Daily  carvedilol, 6.25 mg, Oral, BID With Meals  furosemide, 40 mg, Intravenous, " "Q12H  losartan, 25 mg, Oral, Daily  rivaroxaban, 20 mg, Oral, Daily With Dinner  sodium chloride, 10 mL, Intravenous, Q12H  spironolactone, 25 mg, Oral, Daily      Continuous Infusions:   PRN Meds:.•  acetaminophen  •  influenza vaccine  •  metOLazone  •  sodium chloride    Objective     VITAL SIGNS  Vitals:    11/13/21 0236 11/13/21 0509 11/13/21 1001 11/13/21 1431   BP: 119/71 106/80 121/76 120/72   BP Location: Right arm Right arm     Patient Position: Lying Lying     Pulse: 102 88 90 95   Resp: 16 24 20 20   Temp: 98.3 °F (36.8 °C) 98.4 °F (36.9 °C) 98.2 °F (36.8 °C) 97.9 °F (36.6 °C)   TempSrc: Oral Oral Oral Oral   SpO2: 96% 96% 100% 98%   Weight:  123 kg (271 lb 2.7 oz)     Height:           Flowsheet Rows      First Filed Value   Admission Height 190.5 cm (75\") Documented at 11/10/2021 2226   Admission Weight 118 kg (259 lb 11.2 oz) Documented at 11/10/2021 2229            Intake/Output Summary (Last 24 hours) at 11/13/2021 1508  Last data filed at 11/13/2021 1431  Gross per 24 hour   Intake 720 ml   Output 950 ml   Net -230 ml        TELEMETRY: Sinus rhythm    Physical Exam:  The patient is alert, oriented and in no distress.  Vital signs as noted above.  Head and neck revealed no carotid bruits or jugular venous distention.  No thyromegaly or lymphadenopathy is present  Lungs clear.  No wheezing.  Breath sounds are normal bilaterally.  Heart normal first and second heart sounds.  No murmur. No precordial rub is present.  No gallop is present.  Abdomen soft and nontender.  No organomegaly is present.  Extremities with good peripheral pulses without any pedal edema.  Skin warm and dry.  ICD site looks normal.  Musculoskeletal system is grossly normal  CNS grossly normal      Results Review:   I reviewed the patient's new clinical results.  Lab Results (last 24 hours)     Procedure Component Value Units Date/Time    Basic Metabolic Panel [[id number]]  (Abnormal) Collected: 11/13/21 0645    Specimen: Blood " Updated: 11/13/21 0727     Glucose 97 mg/dL      BUN 23 mg/dL      Creatinine 1.56 mg/dL      Sodium 141 mmol/L      Potassium 4.8 mmol/L      Chloride 103 mmol/L      CO2 28.0 mmol/L      Calcium 8.5 mg/dL      eGFR  African Amer 55 mL/min/1.73      BUN/Creatinine Ratio 14.7     Anion Gap 10.0 mmol/L     Narrative:      GFR Normal >60  Chronic Kidney Disease <60  Kidney Failure <15      Blood Culture - Blood, Blood, Venous Line [[id number]]  (Normal) Collected: 11/11/21 0043    Specimen: Blood, Venous Line Updated: 11/13/21 0100     Blood Culture No growth at 2 days    Blood Culture - Blood, Arm, Right [[id number]]  (Normal) Collected: 11/10/21 2321    Specimen: Blood from Arm, Right Updated: 11/12/21 2330     Blood Culture No growth at 2 days          Imaging Results (Last 24 Hours)     ** No results found for the last 24 hours. **      LAB RESULTS (LAST 7 DAYS)    CBC  Results from last 7 days   Lab Units 11/10/21  2306   WBC 10*3/mm3 4.70   RBC 10*6/mm3 4.35   HEMOGLOBIN g/dL 14.6   HEMATOCRIT % 41.6   MCV fL 95.6   PLATELETS 10*3/mm3 251       BMP  Results from last 7 days   Lab Units 11/13/21  0645 11/12/21 0428 11/11/21  0038   SODIUM mmol/L 141 140 142   POTASSIUM mmol/L 4.8 3.9 3.9   CHLORIDE mmol/L 103 104 104   CO2 mmol/L 28.0 24.0 25.0   BUN mg/dL 23* 21* 19   CREATININE mg/dL 1.56* 1.35* 1.37*   GLUCOSE mg/dL 97 92 79   MAGNESIUM mg/dL  --   --  1.9       CMP   Results from last 7 days   Lab Units 11/13/21  0645 11/12/21 0428 11/11/21  0038   SODIUM mmol/L 141 140 142   POTASSIUM mmol/L 4.8 3.9 3.9   CHLORIDE mmol/L 103 104 104   CO2 mmol/L 28.0 24.0 25.0   BUN mg/dL 23* 21* 19   CREATININE mg/dL 1.56* 1.35* 1.37*   GLUCOSE mg/dL 97 92 79   ALBUMIN g/dL  --   --  4.00   BILIRUBIN mg/dL  --   --  1.8*   ALK PHOS U/L  --   --  203*   AST (SGOT) U/L  --   --  18   ALT (SGPT) U/L  --   --  18         BNP        TROPONIN  Results from last 7 days   Lab Units 11/11/21  0038   TROPONIN T ng/mL <0.010        CoAg        Creatinine Clearance  Estimated Creatinine Clearance: 72 mL/min (A) (by C-G formula based on SCr of 1.56 mg/dL (H)).    ABG        Radiology  No radiology results for the last day            EKG          I personally viewed and interpreted the patient's EKG/Telemetry data: Sinus tachycardia    ECHOCARDIOGRAM:    Results for orders placed during the hospital encounter of 06/04/21    Adult Transthoracic Echo Complete W/ Cont if Necessary Per Protocol    Interpretation Summary  · Left atrial volume is severely increased.  · The left ventricular cavity is moderately dilated.  · Left ventricular wall thickness is consistent with mild to moderate concentric hypertrophy.  · Mild dilation of the aortic root is present. Mild dilation of the sinuses of Valsalva is present.  · Moderate tricuspid valve regurgitation is present.  · Estimated right ventricular systolic pressure from tricuspid regurgitation is mildly elevated (35-45 mmHg).  · The right atrial cavity is severely dilated.  · The right ventricular cavity is moderately dilated.  · Moderately reduced right ventricular systolic function noted.  · Left ventricular diastolic function is consistent with (grade III w/high LAP) reversible restrictive pattern.  · Estimated left ventricular EF = 25% Estimated left ventricular EF was in disagreement with the calculated left ventricular EF. Left ventricular ejection fraction appears to be 21 - 25%. Left ventricular systolic function is moderately decreased.  · Abnormal mitral valve structure consistent with dilated annulus.  · Moderate mitral valve regurgitation is present with a centrally-directed jet noted.  · Mild to moderate pulmonary hypertension is present.          STRESS MYOVIEW:    Cardiolite (Tc-99m Sestamibi) stress test    CARDIAC CATHETERIZATION:            OTHER:         Assessment/Plan     Principal Problem:    Acute on chronic combined systolic (congestive) and diastolic (congestive) heart  failure (HCC)  Active Problems:    Essential hypertension    Mixed hyperlipidemia    Paroxysmal atrial fibrillation (HCC)    Tobacco abuse    CKD (chronic kidney disease) stage 2, GFR 60-89 ml/min    ICD (implantable cardioverter-defibrillator) in place    Gastroesophageal reflux disease    Moderate mitral regurgitation    Moderate tricuspid regurgitation    Pulmonary hypertension (HCC)    Acute respiratory failure with hypoxemia (HCC)    Methamphetamine abuse (HCC)    Patient's noncompliance with other medical treatment and regimen      Acute on chronic heart failure with reduced ejection fraction  AICD in situ        SUBJECTIVE    Patient seen and examined, chart and labs reviewed.  Patient sitting up in bed eating breakfast and reports he does feel some better today.  Shortness of breath improved, still has significant edema to lower extremities extending proximally to abdomen.  He denies any chest discomfort, palpitations.  No nausea.  Patient has multiple drinks on his bedside table as well as large can of nuts.        REVIEW OF SYSTEMS:  Pertinent items are noted in HPI, all other systems reviewed and negative     OBJECTIVE   Creatinine 1.35 (1.37)  Magnesium 1.9  UDS positive for methamphetamine and THC.     ASSESSMENT  Acute on chronic decompensated heart failure with reduced ejection fraction/nonischemic/severe dilated cardiomyopathy with severe LV dysfunction  Class III-IV heart failure  Single-chamber ICD in situ  Paroxysmal atrial fibrillation  Coagulopathy on Xarelto  Cardiorenal syndrome  Pulmonary hypertension  History of medical noncompliance     RECOMMENDATIONS  Patient needs aggressive diuresis.  Currently on Lasix 40 mg twice daily.  Monitor renal function closely-history of CKD  [place] fluid restriction at 1800 mL/24H, 2 g sodium diet.  Blood pressure borderline low at 98 systolic  Patient needs compliance with fluid/sodium intake at home  Continue statin, BB, ARB for coronary  disease/cardiomyopathy  Continue Xarelto for stroke prevention with A. fib  Further recommendations as patient's hospital course progresses     ]]]]]]]]]]]]]]]]]]]  11/13/2021  Patient is not having any angina pectoris or congestive heart failure  Denies having any ICD shocks.  Acute on chronic congestive heart failure.  Continued diuresis with close observation of renal function.    BUN 23 creatinine 1.56-11/13/2021    Anticoagulation  Patient is on Xarelto.  Observe for toxic effects.    Medications were reviewed and updated.  Current medications include Coreg 6.25 mg twice a day atorvastatin losartan Lasix 40 mg twice a day Xarelto and spironolactone 25 mg a day.    Further plan will depend on patient's progress.        Iona Mariee MD  11/13/21  15:08 EST

## 2021-11-13 NOTE — PROGRESS NOTES
Discharge Planning Assessment  [facility]     Patient Name: Thony Dumont  MRN: [id number]  Today's Date: 11/13/2021    Admit Date: 11/10/2021       Discharge Plan     Row Name 11/13/21 1659       Plan    Plan DC plan: referral to [facility]. Agreeable to any [facility] building in [facility]. Pending acceptance. Will require precert and PASRR.    Provided Post Acute Provider List? Yes    Post Acute Provider List Inpatient Rehab    Delivered To Patient    Method of Delivery In person    Plan Comments [facility] and [facility] declined. Referral made to [facility] via [facility] and notified Dora of referral. Pt agreeable to any [facility] building in [place] or [facility]. Jennifer made aware. Pt is agreeable to referral for [facility] if [facility] building unable to accept. Will require precert. Met with patient in room wearing PPE: mask, goggles. Maintained distance greater than six feet and spent less than 15 minutes in the room.              Continued Care and Services - Admitted Since 11/10/2021     Destination     Service Provider Request Status Selected Services Address Phone Fax Patient Preferred    [facility]  Pending - Request Sent N/A [address] [place] [address] [phone] --    [facility] [place]  Declined  Current or History of Substance Abuse N/A [address] [place] [address] [phone] --    [facility]  Declined N/A [address] [place] [address] [phone] --          Durable Medical Equipment     Service Provider Request Status Selected Services Address Phone Fax Patient Preferred    [facility]  Pending - Request Sent N/A [address] [address] [phone] --              Expected Discharge Date and Time     Expected Discharge Date Expected Discharge Time    Nov 12, 2021         Mahogany Paul RN

## 2021-11-13 NOTE — PROGRESS NOTES
[facility] Services Daily Progress Note    Patient Name: Thony Dumont  : 1960  MRN: [id number]  Primary Care Physician:  Rose Rao APRN  Date of admission: 11/10/2021      Subjective      Chief Complaint: Shortness of air      Patient Reports   2021 patient reports feeling generally poorly with generalized weakness.  He was able to take a shower today independently.  He reports ongoing swelling diffusely.    ROS 12 point review of systems was reviewed and was negative except for severe bilateral lower extremity scrotum and lower abdominal wall edema and generalized weakness.      Objective      Vitals:   Temp:  [97.7 °F (36.5 °C)-98.4 °F (36.9 °C)] 98.4 °F (36.9 °C)  Heart Rate:  [] 88  Resp:  [16-24] 24  BP: ()/(55-80) 106/80    Physical Exam vital signs and nurses notes reviewed.  Well-developed well-nourished gentleman sitting up in bed awake and alert in no acute distress; mucous membranes moist, sclera anicteric; lungs with decreased air entry bilaterally; CV distant heart sounds; abdomen soft and protuberant with anasarca involving the lower abdominal wall groin and scrotum with bilateral lower extremity anasarca; no cyanosis; capillary refill less than 2 seconds distally.  No Parker catheter.       Result Review    Result Review:  I have personally reviewed the results from the time of this admission to 2021 09:35 EST and agree with these findings:  [x]  Laboratory  []  Microbiology  [x]  Radiology  [x]  EKG/Telemetry   [x]  Cardiology/Vascular   []  Pathology  [x]  Old records  []  Other:  Most notable findings noted in the assessment and plan.          Assessment/Plan      Brief Patient Summary:  Thony Dumont is a 61 y.o. male who presented to [facility] on 11/10/2021 complaining of increased shortness of air and bilateral leg swelling past week. He reports he has been taking his home medications. Drug screen  positive today for methamphetamines and THC. He reports he snorted yesterday and denies IV drug use. He reports increased dyspnea on exertion. He denies chest pain, nausea, fever, or dizziness. He reports non productive cough. On exam he has BLE edema +3 from feet to above groin. He has a PMH of ischemic cardiomyopathy combined systolic heart failure with last EF per echo in June 2021 of 21-25%. He has an AICD in place. He reports he has stopped smoking. He was COVID-19 negative.  Chest x-ray showed cardiomegaly with vascular congestion.  EKG shows mild sinus tachycardia with multiple PVCs.  Troponin was negative and proBNP was 1989 (0-900). He was given 1 inch of Nitropaste in ED and 40 mg IV Lasix and was admitted for diuresis.      Review of records shows he was recently admitted here from October 21 to October 23, 2021 with similar complaints.  He was also admitted here in August 25, 2021 with acute systolic CHF.  He was hypoxic oxygen saturation 80s on admission but is now stable on 4 L oxygen via nasal cannula.   have been consulted for methamphetamine abuse.    Current inpatient medications include:  atorvastatin, 10 mg, Oral, Daily  carvedilol, 6.25 mg, Oral, BID With Meals  furosemide, 40 mg, Intravenous, Q12H  losartan, 25 mg, Oral, Daily  rivaroxaban, 20 mg, Oral, Daily With Dinner  sodium chloride, 10 mL, Intravenous, Q12H  spironolactone, 25 mg, Oral, Daily             Active Hospital Problems:  Active Hospital Problems    Diagnosis    • **Acute on chronic combined systolic (congestive) and diastolic (congestive) heart failure (HCC)    • Acute respiratory failure with hypoxemia (HCC)    • Patient's noncompliance with other medical treatment and regimen    • Methamphetamine abuse (HCC)    • ICD (implantable cardioverter-defibrillator) in place    • Gastroesophageal reflux disease    • Moderate mitral regurgitation    • Moderate tricuspid regurgitation    • Pulmonary hypertension (HCC)    •  CKD (chronic kidney disease) stage 2, GFR 60-89 ml/min    • Tobacco abuse    • Essential hypertension    • Mixed hyperlipidemia    • Paroxysmal atrial fibrillation (HCC)      Plan:   Acute hypoxic respiratory failure due to Acute on chronic systolic and diastolic congestive heart failure due to ischemic cardiomyopathy, methamphetamine abuse and hypertensive heart disease   -AICD in place   -Echo 6/6/2021 showed LVEF of 21 to 25% with moderate mitral valve regurgitation and mild to moderate pulmonary hypertension  -Continue gentle IV diuresis and monitor renal function closely    Methamphetamine and THC abuse  -toxicology screen positive for methamphetamines and THC    - consulted   -Cessation counseling     Chronic kidney disease stage II due to to hypertension  - creatinine on admission 1.37 with baseline creatinine 1.16-1.53  -avoid nephrotoxic meds  -Monitor closely with diuresis     Paroxysmal atrial fibrillation  -Sinus rhythm on admission  -Continue home Coreg and Xarelto      Hyperlipidemia   -Continue atorvastatin     Essential hypertensive, chronic and uncontrolled on admission but has improved  -Continue Coreg, losartan, metolazone and spironolactone  -Hold oral Lasix while on IV Lasix    DVT prophylaxis:  Medical DVT prophylaxis orders are present.    CODE STATUS:    Code Status (Patient has no pulse and is not breathing): CPR (Attempt to Resuscitate)  Medical Interventions (Patient has pulse or is breathing): Full Support      Disposition:  I expect patient to be discharged once inpatient rehab is arranged.    This patient has been examined wearing appropriate Personal Protective Equipment and discussed with hospital infection control department. 11/13/21      Electronically signed by Jennifer Dumont MD, 11/13/21, 09:35 EST.  Nelda Romero Hospitalist Team

## 2021-11-13 NOTE — PLAN OF CARE
Goal Outcome Evaluation:  Plan of Care Reviewed With: patient   Ready for discharge  per Md

## 2021-11-13 NOTE — DISCHARGE PLACEMENT REQUEST
"Thony Gong (61 y.o. Male)             Date of Birth Social Security Number Address Home Phone MRN    1960  [address] [place] [address] [place] [address] [phone] [id number]    [employer] Marital Status             None Single       Admission Date Admission Type Admitting Provider Attending Provider Department, Room/Bed    11/10/21 Emergency Jennifer Dumont MD Hall, Kelli G, MD [facility] PROGRESS CARE,     Discharge Date Discharge Disposition Discharge Destination           Home or Self Care              Attending Provider: Jennifer Dumont MD    Allergies: Aspirin, Penicillins, Pork Allergy    Isolation: None   Infection: None   Code Status: CPR   Advance Care Planning Activity    Ht: 190.5 cm (75\")   Wt: 123 kg (271 lb 2.7 oz)    Admission Cmt: None   Principal Problem: Acute on chronic combined systolic (congestive) and diastolic (congestive) heart failure (HCC) [I50.43]                 Active Insurance as of 11/10/2021     Primary Coverage     Payor Plan Insurance Group Employer/Plan Group    [place]      Payor Plan Address Payor Plan Phone Number Payor Plan Fax Number Effective Dates    [address]   10/21/2021 - None Entered    [place] [id number]       Subscriber Name Subscriber Birth Date Member ID       THONY GONG 1960 [id number]                 Emergency Contacts      (Rel.) Home Phone Work Phone Mobile Phone    LJ TA (Friend) [phone] -- [phone]               History & Physical      Demarioing-Nataliia Dominique APRN at 21 0211              [facility] Medicine Services      Patient Name: Thony Gong  : 1960  MRN: [id number]  Primary Care Physician:  Rose Rao APRN  Date of admission: 11/10/2021      Subjective      Chief Complaint:  Shortness of air     History of Present Illness: Thony Gong is a 61 y.o. male who presented to [facility] on " 11/10/2021 complaining of increased shortness of air and bilateral leg swelling past week. He reports he has been taking his home medications. Drug screen positive today for methamphetamines and THC. He reports he snorted yesterday and does not do IV drug use. He reports increased dyspnea on exertion. He denies chest pain , nausea, fever, or dizziness. He reports non productive cough. On exam he has BLE edema +3 from feet to above groin. He has a PMH of ischemic cardiomyopathy combined systolic heart failure with last EF per echo in June 2021 of 21-25%. He has an AICD in place. He reports he has stopped smoking. . Troponin today is less than 0.010.  proBNP 1989.  Creatinine is 1.37 with a past medical history of chronic kidney disease stage III, bilirubin 1.8 alk phos 203 without abdominal complaints.  He was COVID-19 negative.  Chest x-ray image below with radiology report pending appears to show cardiomegaly with vascular congestion.  EKG shows mild sinus tachycardia with multiple PVCs.  He was given 1 inch of Nitropaste in ED and 40 mg IV Lasix and will be admitted for gentle diuresis.  Review of records shows he was recently admitted here from October 21 to October 23, 2021 with similar complaints.  He was also admitted here in August 25, 2021 with acute systolic CHF.  He was hypoxic oxygen saturation 80s on admission but is now stable on 4 L oxygen via nasal cannula.   have been consulted for methamphetamine abuse.      Review of Systems   Constitutional: Negative.   HENT: Negative.    Eyes: Negative.    Cardiovascular: Positive for dyspnea on exertion, leg swelling and orthopnea.   Respiratory: Positive for cough and shortness of breath.    Endocrine: Negative.    Hematologic/Lymphatic: Negative.    Skin: Negative.    Musculoskeletal: Positive for myalgias.   Gastrointestinal: Negative.    Genitourinary: Negative.    Neurological: Negative.    Psychiatric/Behavioral: Positive for substance  "abuse.   Allergic/Immunologic: Negative.         Personal History     Past Medical History:   Diagnosis Date   • Acute on chronic congestive heart failure (HCC) 06/07/2021   • Afib (Formerly [facility])    • Chronic pain    • CKD (chronic kidney disease) stage 2, GFR 60-89 ml/min 8/9/2021   • Combined systolic and diastolic congestive heart failure (Formerly [facility])    • Degenerative joint disease    • Erectile dysfunction    • Essential hypertension    • Former smoker    • Gastroesophageal reflux disease    • Homeless 08/25/2021    living in his car   • ICD (implantable cardioverter-defibrillator) in place 06/15/2021   • Mixed hyperlipidemia    • Moderate mitral regurgitation    • Moderate tricuspid regurgitation    • NICM (nonischemic cardiomyopathy) (Formerly [facility]) 06/10/2021   • Paroxysmal atrial fibrillation (Formerly [facility])    • Pleural effusion 8/9/2021   • Pulmonary hypertension (Formerly [facility])    • Sprain of rotator cuff capsule 3/14/2014   • Substance abuse (Formerly [facility])    • Valvular heart disease        Past Surgical History:   Procedure Laterality Date   • CARDIAC DEFIBRILLATOR PLACEMENT     • CARDIAC ELECTROPHYSIOLOGY PROCEDURE Left 6/15/2021    Procedure: Device Implant;  Surgeon: Michael Rollins MD;  Location: [facility] INVASIVE LOCATION;  Service: Cardiovascular;  Laterality: Left;   • HERNIA REPAIR         Family History: family history includes Cancer in his mother. Otherwise pertinent FHx was reviewed and not pertinent to current issue.    Social History:  reports that he has been smoking cigarettes. He has never used smokeless tobacco. He reports current alcohol use of about 7.0 standard drinks of alcohol per week. He reports previous drug use. Drugs: Marijuana, \"Crack\" cocaine, and Methamphetamines.    Home Medications:  Prior to Admission Medications     Prescriptions Last Dose Informant Patient Reported? Taking?    acetaminophen (TYLENOL) 500 MG tablet   No No    Take 2 tablets by mouth Every 6 (Six) Hours As Needed for Mild Pain  or Moderate Pain .    " atorvastatin (Lipitor) 10 MG tablet   No No    Take 1 tablet by mouth Daily.    carvedilol (COREG) 6.25 MG tablet   No No    Take 1 tablet by mouth 2 (Two) Times a Day With Meals.    furosemide (LASIX) 40 MG tablet   No No    Take 1.5 tablets by mouth 2 (Two) Times a Day for 30 days.    losartan (COZAAR) 25 MG tablet   No No    Take 1 tablet by mouth Daily.    metOLazone (ZAROXOLYN) 2.5 MG tablet   No No    Take 1 tablet by mouth Daily As Needed (edema).    rivaroxaban (Xarelto) 20 MG tablet   No No    Take 1 tablet by mouth Daily.    spironolactone (ALDACTONE) 25 MG tablet   No No    Take 1 tablet by mouth Daily.            Allergies:  Allergies   Allergen Reactions   • Aspirin Swelling   • Penicillins Swelling   • Pork Allergy Unknown - High Severity       Objective      Vitals:   Temp:  [98.5 °F (36.9 °C)] 98.5 °F (36.9 °C)  Heart Rate:  [] 105  Resp:  [16-21] 16  BP: (127-145)/() 127/98  Flow (L/min):  [3-4] 3    Physical Exam  Vitals reviewed.   Constitutional:       Appearance: He is obese. He is ill-appearing.   HENT:      Head: Normocephalic and atraumatic.      Right Ear: External ear normal.      Left Ear: External ear normal.      Nose: Nose normal.      Mouth/Throat:      Mouth: Mucous membranes are moist.   Eyes:      Extraocular Movements: Extraocular movements intact.   Cardiovascular:      Rate and Rhythm: Regular rhythm. Tachycardia present.      Heart sounds: Normal heart sounds.   Pulmonary:      Breath sounds: Wheezing present.   Abdominal:      General: There is distension.      Palpations: Abdomen is soft.   Genitourinary:     Comments: edema  Musculoskeletal:         General: Swelling present.      Cervical back: Normal range of motion and neck supple.      Right lower leg: Edema present.      Left lower leg: Edema present.      Comments: +3   Skin:     General: Skin is warm and dry.   Neurological:      General: No focal deficit present.      Mental Status: He is alert and  oriented to person, place, and time.   Psychiatric:         Mood and Affect: Mood normal.         Behavior: Behavior normal.         Thought Content: Thought content normal.         Judgment: Judgment normal.          Result Review    Result Review:  I have personally reviewed the results from the time of this admission to 11/11/2021 02:29 EST and agree with these findings:  [x]  Laboratory  [x]  Microbiology  [x]  Radiology  [x]  EKG/Telemetry   []  Cardiology/Vascular   []  Pathology  [x]  Old records  []  Other:  Most notable findings include: Opponent less than 0.010, proBNP 1989, creatinine 1.37, x-ray radiology report pending appearing to show cardiomegaly with vascular congestion and clinical presentation.      Assessment/Plan        Active Hospital Problems:  Active Hospital Problems    Diagnosis    • Acute respiratory failure with hypoxemia (HCC)    • Methamphetamine abuse (HCC)    • Acute on chronic combined systolic (congestive) and diastolic (congestive) heart failure (HCC)    • CKD (chronic kidney disease) stage 2, GFR 60-89 ml/min      Plan:    Acute respiratory failure with hypoxemia likely secondary to below, now stable on 4 L via nasal cannula    Acute on chronic combined systolic congestive and diastolic heart failure with ischemic cardiomyopathy AICD in place ejection fraction 21 to 25%, received 1 inch Nitropaste in ED continue 40 mg IV Lasix twice daily Home meds unverified at this time reorder pending verification from pharmacy,    Methamphetamine abuse, toxicology screen positive for methamphetamines and THC reports snorted yesterday  consulted Kurd cessation    Chronic kidney disease stage II, creatinine today 1.37, has been between 1.16 and 1.53 in past avoid nephrotoxic meds although may worsen with Lasix trend renal function    Atrial fibrillation, on his rhythm on monitor EKG, home meds unverified at this time, was on home Coreg and Xarelto reorder pending verification from  pharmacy continuous cardiac monitoring    Hyperlipidemia was on statin reorder pending verification from pharmacy    Hypertensive on admission improved with Nitropaste and Lasix monitor BP Home meds unverified at this time reorder pending verification from pharmacy      DVT prophylaxis:  No DVT prophylaxis order currently exists.    CODE STATUS:    Code Status (Patient has no pulse and is not breathing): CPR (Attempt to Resuscitate)  Medical Interventions (Patient has pulse or is breathing): Full Support    Admission Status:  I believe this patient meets observation status.    I discussed the patient's findings and my recommendations with patient.    This patient has been examined wearing appropriate Personal Protective Equipment     Signature: Electronically signed by CORTEZ Loza, 11/11/21, 2:41 AM EST.    Electronically signed by Nataliia Montoya APRN at 11/11/21 0245          Physical Therapy Notes (last 48 hours)      Venecia Santos, PT at 11/12/21 1148  Version 1 of 1    Attestation signed by Janeth Adams PT at 11/12/21 1311    PT eval/treatment reviewed & approved.     Janeth Adams, PT, DPT.                   Goal Outcome Evaluation:  Plan of Care Reviewed With: patient        Progress: no change  Outcome Summary: Pt is a 60 y/o male admitted for cardiomegaly and CHF exacerbation. Pt (+) for meth and THC at admit. PMHx: OA, a-fib, stage II CKD, HTN. Pt lives alone in extended stay hotel with no stairs. Pt is independent with household distances with use of rw. Pt requires scooter for grocery shopping. Pt states that he requires assist for dressing but does not have anyone to assist him as significant other recently kicked him out. Pt was independent for bed mobility this date and required standby assist for transfers. Ambulation not safe as MAP <60 in standing. Pt will require IP rehab following d/c and is unsafe to return home alone. Will follow.    Electronically signed by Angie  Janeth PURVIS, PT at 21 1311     Venecia Santos, PT at 21 1150  Version 1 of 1    Attestation signed by Janeth Adams, PT at 21 1311    PT eval/treatment reviewed & approved.     Janeth Adams, PT, DPT.                     Patient Name: Thony Dumont  : 1960    MRN: [id number]                              Today's Date: 2021       Admit Date: 11/10/2021    Visit Dx:     ICD-10-CM ICD-9-CM   1. Acute respiratory failure with hypoxemia (HCC)  J96.01 518.81   2. Acute on chronic congestive heart failure, unspecified heart failure type (HCC)  I50.9 428.0   3. Methamphetamine abuse (HCC)  F15.10 305.70   4. Lab test negative for COVID-19 virus  Z20.822 V01.79     Patient Active Problem List   Diagnosis   • Essential hypertension   • Mixed hyperlipidemia   • Paroxysmal atrial fibrillation (HCC)   • Chronic pain   • Tobacco abuse   • NICM (nonischemic cardiomyopathy) (HCC)   • Acute on chronic combined systolic (congestive) and diastolic (congestive) heart failure (HCC)   • CKD (chronic kidney disease) stage 2, GFR 60-89 ml/min   • ICD (implantable cardioverter-defibrillator) in place   • Degenerative joint disease   • Erectile dysfunction   • Gastroesophageal reflux disease   • Sprain of rotator cuff capsule   • Moderate mitral regurgitation   • Moderate tricuspid regurgitation   • Pulmonary hypertension (HCC)   • Serum total bilirubin elevated   • Substance use   • Chest pain   • Acute respiratory failure with hypoxemia (HCC)   • Methamphetamine abuse (HCC)   • Patient's noncompliance with other medical treatment and regimen     Past Medical History:   Diagnosis Date   • Acute on chronic congestive heart failure (HCC) 2021   • Afib (HCC)    • Chronic pain    • CKD (chronic kidney disease) stage 2, GFR 60-89 ml/min 2021   • Combined systolic and diastolic congestive heart failure (HCC)    • Degenerative joint disease    • Erectile dysfunction    • Essential hypertension    •  Former smoker    • Gastroesophageal reflux disease    • Homeless 08/25/2021    living in his car   • ICD (implantable cardioverter-defibrillator) in place 06/15/2021   • Mixed hyperlipidemia    • Moderate mitral regurgitation    • Moderate tricuspid regurgitation    • NICM (nonischemic cardiomyopathy) ([facility]) 06/10/2021   • Paroxysmal atrial fibrillation ([facility])    • Pleural effusion 8/9/2021   • Pulmonary hypertension ([facility])    • Sprain of rotator cuff capsule 3/14/2014   • Substance abuse ([facility])    • Valvular heart disease      Past Surgical History:   Procedure Laterality Date   • CARDIAC DEFIBRILLATOR PLACEMENT     • CARDIAC ELECTROPHYSIOLOGY PROCEDURE Left 6/15/2021    Procedure: Device Implant;  Surgeon: Michael Rollins MD;  Location: [facility] INVASIVE LOCATION;  Service: Cardiovascular;  Laterality: Left;   • HERNIA REPAIR        General Information     Row Name 11/12/21 1130          Physical Therapy Time and Intention    Document Type evaluation  -KS     Mode of Treatment co-treatment; physical therapy; occupational therapy  -KS     Row Name 11/12/21 1130          General Information    Patient Profile Reviewed yes  -KS     Prior Level of Function min assist:; all household mobility; community mobility; gait; transfer; bed mobility; independent:; ADL's  Pt independent for household distances with a cane. Pt stated that he navigates grocery stores with a scooter. Pt typically requires assist with dressing but does not have anyone to help assist him as significant other recently kicked him out.  -KS     Existing Precautions/Restrictions fall  -KS     Barriers to Rehab previous functional deficit  -KS     Row Name 11/12/21 1130          Living Environment    Lives With alone  -KS     Row Name 11/12/21 1130          Home Main Entrance    Number of Stairs, Main Entrance none  -KS     Stair Railings, Main Entrance none  -KS     Row Name 11/12/21 1130          Stairs Within Home, Primary    Number of Stairs,  Within Home, Primary none  -KS     Stair Railings, Within Home, Primary none  -KS     Row Name 11/12/21 1130          Cognition    Orientation Status (Cognition) oriented x 4; oriented to; person; place; situation; time  -KS     Row Name 11/12/21 1130          Safety Issues, Functional Mobility    Safety Issues Affecting Function (Mobility) impulsivity; safety precaution awareness; safety precautions follow-through/compliance; insight into deficits/self-awareness; ability to follow commands; at risk behavior observed  -KS     Impairments Affecting Function (Mobility) balance; strength  -KS           User Key  (r) = Recorded By, (t) = Taken By, (c) = Cosigned By    Initials Name Provider Type    Venecia Moore PT Physical Therapist               Mobility     Row Name 11/12/21 1133          Bed Mobility    Bed Mobility bed mobility (all) activities  -KS     All Activities, [place] (Bed Mobility) independent  -KS     Row Name 11/12/21 1133          Bed-Chair Transfer    Bed-Chair [place] (Transfers) standby assist  -KS     Assistive Device (Bed-Chair Transfers) walker, front-wheeled  -KS     Row Name 11/12/21 1133          Sit-Stand Transfer    Sit-Stand [place] (Transfers) standby assist  -KS     Assistive Device (Sit-Stand Transfers) walker, front-wheeled  -KS     Row Name 11/12/21 1133          Gait/Stairs (Locomotion)    [place] Level (Gait) not tested  Pt presented with orthostatic hypotension so ambulation not safe this date.  -KS           User Key  (r) = Recorded By, (t) = Taken By, (c) = Cosigned By    Initials Name Provider Type    Venecia Moore PT Physical Therapist               Obj/Interventions     Row Name 11/12/21 1135          Range of Motion Comprehensive    General Range of Motion bilateral upper extremity ROM WFL; bilateral lower extremity ROM WFL  -KS     Row Name 11/12/21 1135          Strength Comprehensive (MMT)    General Manual Muscle Testing (MMT) Assessment upper  extremity strength deficits identified; lower extremity strength deficits identified  -KS     Comment, General Manual Muscle Testing (MMT) Assessment see OT note for UE strength testing; BLE 4/5 grossly  -KS     Row Name 11/12/21 1135          Balance    Balance Assessment sitting static balance; sitting dynamic balance; standing static balance; standing dynamic balance  -KS     Static Sitting Balance WFL  -KS     Dynamic Sitting Balance WFL  -KS     Static Standing Balance WFL  -KS     Dynamic Standing Balance mild impairment  -KS     Balance Interventions sitting; standing; sit to stand; supported; static; dynamic; minimal challenge  -KS     Row Name 11/12/21 1135          Sensory Assessment (Somatosensory)    Sensory Assessment (Somatosensory) sensation intact  -KS           User Key  (r) = Recorded By, (t) = Taken By, (c) = Cosigned By    Initials Name Provider Type    Venecia Moore PT Physical Therapist               Goals/Plan     Row Name 11/12/21 1146          Transfer Goal 1 (PT)    Activity/Assistive Device (Transfer Goal 1, PT) transfers, all; cane, straight  -KS     [place] Level/Cues Needed (Transfer Goal 1, PT) independent  -KS     Time Frame (Transfer Goal 1, PT) 2 weeks  -KS     Row Name 11/12/21 1146          Gait Training Goal 1 (PT)    Activity/Assistive Device (Gait Training Goal 1, PT) gait (walking locomotion); cane, straight  -KS     [place] Level (Gait Training Goal 1, PT) independent  -KS     Time Frame (Gait Training Goal 1, PT) 2 weeks  -KS     Strategies/Barriers (Gait Training Goal 1, PT) 75 ft  -KS           User Key  (r) = Recorded By, (t) = Taken By, (c) = Cosigned By    Initials Name Provider Type    Venecia Moore PT Physical Therapist               Clinical Impression     Row Name 11/12/21 1135          Pain    Additional Documentation Pain Scale: Numbers Pre/Post-Treatment (Group)  -KS     Row Name 11/12/21 1135          Pain Scale: Numbers Pre/Post-Treatment     Pretreatment Pain Rating 0/10 - no pain  -KS     Posttreatment Pain Rating 0/10 - no pain  -KS     Row Name 11/12/21 1135          Plan of Care Review    Plan of Care Reviewed With patient  -KS     Progress no change  -KS     Outcome Summary Pt is a 60 y/o male admitted for cardiomegaly and CHF exacerbation. Pt (+) for meth and THC at admit. PMHx: OA, a-fib, stage II CKD, HTN. Pt lives alone in extended stay hotel with no stairs. Pt is independent with household distances with use of rw. Pt requires scooter for grocery shopping. Pt states that he requires assist for dressing but does not have anyone to assist him as significant other recently kicked him out. Pt was independent for bed mobility this date and required standby assist for transfers. Ambulation not safe as MAP <60 in standing. Pt will require IP rehab following d/c and is unsafe to return home alone. Will follow.  -KS     Row Name 11/12/21 1135          Therapy Assessment/Plan (PT)    Patient/Family Therapy Goals Statement (PT) Pt agreeable to stated plan of care.  -KS     Rehab Potential (PT) good, to achieve stated therapy goals  -KS     Criteria for Skilled Interventions Met (PT) yes; meets criteria; skilled treatment is necessary  -KS     Row Name 11/12/21 1135          Vital Signs    Intra Systolic BP Rehab 97  -KS     Intra Treatment Diastolic BP 34  MAP 51  -KS     Post Systolic BP Rehab 97  -KS     Post Treatment Diastolic BP 60  MAP 69  -KS     Pre Patient Position Supine  -KS     Intra Patient Position Standing  -KS     Post Patient Position Sitting  -KS     Row Name 11/12/21 1135          Positioning and Restraints    Pre-Treatment Position in bed  -KS     Post Treatment Position chair  -KS     In Chair notified nsg; sitting; call light within reach; exit alarm on; encouraged to call for assist  -KS           User Key  (r) = Recorded By, (t) = Taken By, (c) = Cosigned By    Initials Name Provider Type    Venecia Moore, PT Physical  Therapist               Outcome Measures     Row Name 11/12/21 1146          How much help from another person do you currently need...    Turning from your back to your side while in flat bed without using bedrails? 4  -KS     Moving from lying on back to sitting on the side of a flat bed without bedrails? 4  -KS     Moving to and from a bed to a chair (including a wheelchair)? 3  -KS     Standing up from a chair using your arms (e.g., wheelchair, bedside chair)? 3  -KS     Climbing 3-5 steps with a railing? 2  -KS     To walk in hospital room? 3  -KS     AM-PAC 6 Clicks Score (PT) 19  -KS     Row Name 11/12/21 1146          Functional Assessment    Outcome Measure Options AM-PAC 6 Clicks Basic Mobility (PT)  -KS           User Key  (r) = Recorded By, (t) = Taken By, (c) = Cosigned By    Initials Name Provider Type    Venecia Moore, EMMIE Physical Therapist                             Physical Therapy Education                 Title: PT OT SLP Therapies (Done)     Topic: Physical Therapy (Done)     Point: Mobility training (Done)     Learning Progress Summary           Patient Acceptance, E,TB, VU by KS at 11/12/2021 1148                               User Key     Initials Effective Dates Name Provider Type Discipline    KS 08/31/21 -  Venecia Santos, EMMIE Physical Therapist PT              PT Recommendation and Plan  Planned Therapy Interventions (PT): balance training, gait training, patient/family education, strengthening, transfer training  Plan of Care Reviewed With: patient  Progress: no change  Outcome Summary: Pt is a 60 y/o male admitted for cardiomegaly and CHF exacerbation. Pt (+) for meth and THC at admit. PMHx: OA, a-fib, stage II CKD, HTN. Pt lives alone in extended stay hotel with no stairs. Pt is independent with household distances with use of rw. Pt requires scooter for grocery shopping. Pt states that he requires assist for dressing but does not have anyone to assist him as significant other  recently kicked him out. Pt was independent for bed mobility this date and required standby assist for transfers. Ambulation not safe as MAP <60 in standing. Pt will require IP rehab following d/c and is unsafe to return home alone. Will follow.     Time Calculation:    PT Charges     Row Name 21 1149             Time Calculation    Start Time 1051  -KS      Stop Time 1110  -KS      Time Calculation (min) 19 min  -KS      PT Received On 21  -KS      PT - Next Appointment 11/15/21  -KS      PT Goal Re-Cert Due Date 21  -KS              Time Calculation- PT    Total Timed Code Minutes- PT 0 minute(s)  -KS            User Key  (r) = Recorded By, (t) = Taken By, (c) = Cosigned By    Initials Name Provider Type    Saul Moore PT Physical Therapist              Therapy Charges for Today     Code Description Service Date Service Provider Modifiers Qty    30812998573 HC PT EVAL LOW COMPLEXITY 3 2021 Saul Barnett PT GP 1          PT G-Codes  Outcome Measure Options: AM-PAC 6 Clicks Basic Mobility (PT)  AM-PAC 6 Clicks Score (PT): 19    SAUL BARNETT PT  2021      Electronically signed by Janeth Adams PT at 21 1311          Occupational Therapy Notes (last 48 hours)      Jayesh Gutierrez, OT at 21 1646          Patient Name: Thony Dumont  : 1960    MRN: [id number]                              Today's Date: 2021       Admit Date: 11/10/2021    Visit Dx:     ICD-10-CM ICD-9-CM   1. Acute respiratory failure with hypoxemia (HCC)  J96.01 518.81   2. Acute on chronic congestive heart failure, unspecified heart failure type (HCC)  I50.9 428.0   3. Methamphetamine abuse (HCC)  F15.10 305.70   4. Lab test negative for COVID-19 virus  Z20.822 V01.79     Patient Active Problem List   Diagnosis   • Essential hypertension   • Mixed hyperlipidemia   • Paroxysmal atrial fibrillation (HCC)   • Chronic pain   • Tobacco abuse   • NICM (nonischemic cardiomyopathy)  ([facility])   • Acute on chronic combined systolic (congestive) and diastolic (congestive) heart failure (HCC)   • CKD (chronic kidney disease) stage 2, GFR 60-89 ml/min   • ICD (implantable cardioverter-defibrillator) in place   • Degenerative joint disease   • Erectile dysfunction   • Gastroesophageal reflux disease   • Sprain of rotator cuff capsule   • Moderate mitral regurgitation   • Moderate tricuspid regurgitation   • Pulmonary hypertension (HCC)   • Serum total bilirubin elevated   • Substance use   • Chest pain   • Acute respiratory failure with hypoxemia ([facility])   • Methamphetamine abuse ([facility])   • Patient's noncompliance with other medical treatment and regimen     Past Medical History:   Diagnosis Date   • Acute on chronic congestive heart failure (HCC) 06/07/2021   • Afib ([facility])    • Chronic pain    • CKD (chronic kidney disease) stage 2, GFR 60-89 ml/min 8/9/2021   • Combined systolic and diastolic congestive heart failure (HCC)    • Degenerative joint disease    • Erectile dysfunction    • Essential hypertension    • Former smoker    • Gastroesophageal reflux disease    • Homeless 08/25/2021    living in his car   • ICD (implantable cardioverter-defibrillator) in place 06/15/2021   • Mixed hyperlipidemia    • Moderate mitral regurgitation    • Moderate tricuspid regurgitation    • NICM (nonischemic cardiomyopathy) ([facility]) 06/10/2021   • Paroxysmal atrial fibrillation ([facility])    • Pleural effusion 8/9/2021   • Pulmonary hypertension ([facility])    • Sprain of rotator cuff capsule 3/14/2014   • Substance abuse ([facility])    • Valvular heart disease      Past Surgical History:   Procedure Laterality Date   • CARDIAC DEFIBRILLATOR PLACEMENT     • CARDIAC ELECTROPHYSIOLOGY PROCEDURE Left 6/15/2021    Procedure: Device Implant;  Surgeon: Michael Rollins MD;  Location: [facility] INVASIVE LOCATION;  Service: Cardiovascular;  Laterality: Left;   • HERNIA REPAIR        General Information     Row Name 11/12/21 1639          OT  Time and Intention    Document Type evaluation  -     Mode of Treatment co-treatment; physical therapy; occupational therapy  -     Row Name 11/12/21 1639          General Information    Patient Profile Reviewed yes  -MP     Prior Level of Function ADL's; min assist:  -MP     Existing Precautions/Restrictions fall  -     Row Name 11/12/21 1639          Living Environment    Lives With alone  -     Row Name 11/12/21 1639          Cognition    Orientation Status (Cognition) oriented x 4; oriented to; person; place; situation; time  -     Row Name 11/12/21 1639          Safety Issues, Functional Mobility    Impairments Affecting Function (Mobility) balance; strength  -           User Key  (r) = Recorded By, (t) = Taken By, (c) = Cosigned By    Initials Name Provider Type    Jayesh Jauregui OT Occupational Therapist                 Mobility/ADL's     Row Name 11/12/21 1640          Bed Mobility    Bed Mobility bed mobility (all) activities  -     All Activities, [place] (Bed Mobility) independent  -[facility] Name 11/12/21 1640          Transfers    Bed-Chair [place] (Transfers) standby assist  -     Assistive Device (Bed-Chair Transfers) walker, front-wheeled  -MP     Sit-Stand [place] (Transfers) standby assist  -     Row Name 11/12/21 1640          Sit-Stand Transfer    Assistive Device (Sit-Stand Transfers) walker, front-wheeled  -MP     Row Name 11/12/21 1640          Functional Mobility    Functional Mobility- Ind. Level contact guard assist  -     Row Name 11/12/21 1640          Activities of Daily Living    BADL Assessment/Intervention lower body dressing  -     Row Name 11/12/21 1640          Lower Body Dressing Assessment/Training    [place] Level (Lower Body Dressing) don; doff; socks; maximum assist (25% patient effort)  -           User Key  (r) = Recorded By, (t) = Taken By, (c) = Cosigned By    Initials Name Provider Type    Jayesh Jauregui OT  Occupational Therapist               Obj/Interventions     Row Name 11/12/21 1640          Range of Motion Comprehensive    Comment, General Range of Motion B shoulder AROM impacted 25-50%  -MP     Row Name 11/12/21 1640          Strength Comprehensive (MMT)    Comment, General Manual Muscle Testing (MMT) Assessment BUE 4-/5  -MP     Row Name 11/12/21 1640          Balance    Static Sitting Balance WFL; supported  -MP     Dynamic Sitting Balance supported; WFL  -MP     Static Standing Balance standing; WFL; supported  -MP     Dynamic Standing Balance mild impairment; supported; standing  -MP     Balance Interventions sitting; standing; sit to stand; static; supported; dynamic  -MP           User Key  (r) = Recorded By, (t) = Taken By, (c) = Cosigned By    Initials Name Provider Type    Jayesh Jauregui OT Occupational Therapist               Goals/Plan     Row Name 11/12/21 1644          Bed Mobility Goal 1 (OT)    Activity/Assistive Device (Bed Mobility Goal 1, OT) bed mobility activities, all  -MP     [place] Level/Cues Needed (Bed Mobility Goal 1, OT) modified independence  -MP     Time Frame (Bed Mobility Goal 1, OT) long term goal (LTG); 2 weeks  -MP     Row Name 11/12/21 1644          Transfer Goal 1 (OT)    Activity/Assistive Device (Transfer Goal 1, OT) sit-to-stand/stand-to-sit; toilet  -MP     [place] Level/Cues Needed (Transfer Goal 1, OT) modified independence  -MP     Time Frame (Transfer Goal 1, OT) long term goal (LTG); 2 weeks  -MP     Row Name 11/12/21 1644          Dressing Goal 1 (OT)    Activity/Device (Dressing Goal 1, OT) lower body dressing  -MP     [place]/Cues Needed (Dressing Goal 1, OT) minimum assist (75% or more patient effort)  -MP     Time Frame (Dressing Goal 1, OT) long term goal (LTG); 2 weeks  -MP           User Key  (r) = Recorded By, (t) = Taken By, (c) = Cosigned By    Initials Name Provider Type    Jayesh Jauregui OT Occupational Therapist                Clinical Impression     Row Name 11/12/21 1642          Pain Scale: Numbers Pre/Post-Treatment    Pretreatment Pain Rating 0/10 - no pain  -MP     Posttreatment Pain Rating 0/10 - no pain  -MP     Row Name 11/12/21 1642          Plan of Care Review    Plan of Care Reviewed With patient  -MP     Progress no change  -MP     Outcome Summary Pt is a 62 y/o male admitted for cardiomegaly and CHF exacerbation. Pt (+) for meth and THC at admit. PMHx: OA, a-fib, stage II CKD, HTN. Pt lives alone in extended stay hotel with no stairs. Pt. reports he lived w/ SO that assisted w/ dressing/bathing ADLs, not longer has that social support. Pt. w/ severe BLE/BUE swelling this date, limited AROM and BUE  strength 4-/5. Pt. completes functional transfers w/ supervision for safety, demonstrates decreased dynamic standing balance w/ increased risk for falls. Pt. provided max A for all LB ADLs this date. Pt. not safe to d/c home at this time and will require IP rehab at d/c.  -MP     Row Name 11/12/21 1642          Therapy Assessment/Plan (OT)    Rehab Potential (OT) good, to achieve stated therapy goals  -     Criteria for Skilled Therapeutic Interventions Met (OT) yes; skilled treatment is necessary  -     Therapy Frequency (OT) 3 times/wk  -MP     Row Name 11/12/21 1642          Therapy Plan Review/Discharge Plan (OT)    Anticipated Discharge Disposition (OT) inpatient rehabilitation facility  -     Row Name 11/12/21 1642          Vital Signs    Pre Patient Position Supine  -MP     Intra Patient Position Standing  -MP     Post Patient Position Sitting  -MP     Row Name 11/12/21 1642          Positioning and Restraints    Pre-Treatment Position in bed  -MP     Post Treatment Position chair  -MP     In Chair sitting; call light within reach; encouraged to call for assist; exit alarm on  -MP           User Key  (r) = Recorded By, (t) = Taken By, (c) = Cosigned By    Initials Name Provider Type    Jayesh Jauregui, OT  Occupational Therapist               Outcome Measures     Row Name 11/12/21 1146          How much help from another person do you currently need...    Turning from your back to your side while in flat bed without using bedrails? 4  -KS     Moving from lying on back to sitting on the side of a flat bed without bedrails? 4  -KS     Moving to and from a bed to a chair (including a wheelchair)? 3  -KS     Standing up from a chair using your arms (e.g., wheelchair, bedside chair)? 3  -KS     Climbing 3-5 steps with a railing? 2  -KS     To walk in hospital room? 3  -KS     AM-PAC 6 Clicks Score (PT) 19  -KS     Row Name 11/12/21 1146          Functional Assessment    Outcome Measure Options AM-PAC 6 Clicks Basic Mobility (PT)  -KS           User Key  (r) = Recorded By, (t) = Taken By, (c) = Cosigned By    Initials Name Provider Type    Venecia Moore, EMMIE Physical Therapist                Occupational Therapy Education                 Title: PT OT SLP Therapies (In Progress)     Topic: Occupational Therapy (In Progress)     Point: ADL training (Not Started)     Description:   Instruct learner(s) on proper safety adaptation and remediation techniques during self care or transfers.   Instruct in proper use of assistive devices.              Learner Progress:  Not documented in this visit.          Point: Home exercise program (Not Started)     Description:   Instruct learner(s) on appropriate technique for monitoring, assisting and/or progressing therapeutic exercises/activities.              Learner Progress:  Not documented in this visit.          Point: Precautions (Not Started)     Description:   Instruct learner(s) on prescribed precautions during self-care and functional transfers.              Learner Progress:  Not documented in this visit.          Point: Body mechanics (Done)     Description:   Instruct learner(s) on proper positioning and spine alignment during self-care, functional mobility activities and/or  exercises.              Learning Progress Summary           Patient Acceptance, E,TB, VU by  at 11/12/2021 1645                               User Key     Initials Effective Dates Name Provider Type Discipline     06/16/21 -  Jayesh Gutierrez OT Occupational Therapist OT              OT Recommendation and Plan  Therapy Frequency (OT): 3 times/wk  Plan of Care Review  Plan of Care Reviewed With: patient  Progress: no change  Outcome Summary: Pt is a 60 y/o male admitted for cardiomegaly and CHF exacerbation. Pt (+) for meth and THC at admit. PMHx: OA, a-fib, stage II CKD, HTN. Pt lives alone in extended stay hotel with no stairs. Pt. reports he lived w/ SO that assisted w/ dressing/bathing ADLs, not longer has that social support. Pt. w/ severe BLE/BUE swelling this date, limited AROM and BUE  strength 4-/5. Pt. completes functional transfers w/ supervision for safety, demonstrates decreased dynamic standing balance w/ increased risk for falls. Pt. provided max A for all LB ADLs this date. Pt. not safe to d/c home at this time and will require IP rehab at d/c.     Time Calculation:    Time Calculation- OT     Row Name 11/12/21 1645             Time Calculation- OT    OT Start Time 1053  -      OT Stop Time 1115  -      OT Time Calculation (min) 22 min  -      Total Timed Code Minutes- OT 0 minute(s)  -      OT Received On 11/12/21  -      OT - Next Appointment 11/15/21  -      OT Goal Re-Cert Due Date 11/26/21  -            User Key  (r) = Recorded By, (t) = Taken By, (c) = Cosigned By    Initials Name Provider Type     Jayesh Gutierrez OT Occupational Therapist              Therapy Charges for Today     Code Description Service Date Service Provider Modifiers Qty    80794878954 HC OT EVAL LOW COMPLEXITY 3 11/12/2021 Jayesh Gutierrez OT GO 1               Jayesh Gutierrez OT  11/12/2021    Electronically signed by Jayesh Gutierrez OT at 11/12/21 3786     Jayesh Gutierrez OT at  11/12/21 4783        Goal Outcome Evaluation:  Plan of Care Reviewed With: patient        Progress: no change  Outcome Summary: Pt is a 62 y/o male admitted for cardiomegaly and CHF exacerbation. Pt (+) for meth and THC at admit. PMHx: OA, a-fib, stage II CKD, HTN. Pt lives alone in extended stay hotel with no stairs. Pt. reports he lived w/ SO that assisted w/ dressing/bathing ADLs, not longer has that social support. Pt. w/ severe BLE/BUE swelling this date, limited AROM and BUE  strength 4-/5. Pt. completes functional transfers w/ supervision for safety, demonstrates decreased dynamic standing balance w/ increased risk for falls. Pt. provided max A for all LB ADLs this date. Pt. not safe to d/c home at this time and will require IP rehab at d/c.    Electronically signed by Jayesh Gutierrez OT at 11/12/21 9002

## 2021-11-13 NOTE — PROGRESS NOTES
[facility] Medicine Services Daily Progress Note    Patient Name: Thony Dumont  : 1960  MRN: [id number]  Primary Care Physician:  Rose Rao APRN  Date of admission: 11/10/2021      Subjective      Chief Complaint: Shortness of air      Patient Reports   2021   Patient reports feeling generally poorly with generalized weakness.  He was able to take a shower today independently.  He reports ongoing swelling diffusely.  2021  Patient continues to complain of significant edema of the lower extremities scrotum and lower abdomen and despite IV diuresis and he is having worsening renal function with continued weight gain.  Nephrology has been consulted.    ROS 12 point review of systems was reviewed and was negative except for severe bilateral lower extremity, scrotum and lower abdominal wall edema and generalized weakness.      Objective      Vitals:   Temp:  [97.7 °F (36.5 °C)-98.4 °F (36.9 °C)] 98.2 °F (36.8 °C)  Heart Rate:  [] 90  Resp:  [16-24] 20  BP: ()/(55-80) 121/76    Physical Exam vital signs and nurses notes reviewed.  Well-developed well-nourished gentleman sitting up in bed awake and alert in no acute distress; mucous membranes moist, sclera anicteric; lungs with decreased air entry bilaterally; CV distant heart sounds; abdomen soft and protuberant with anasarca involving the lower abdominal wall groin and scrotum with bilateral lower extremity anasarca; no cyanosis; capillary refill less than 2 seconds distally.  No Parker catheter. Exam unchanged from 2021.       Result Review    Result Review:  I have personally reviewed the results from the time of this admission to 2021 10:39 EST and agree with these findings:  [x]  Laboratory  []  Microbiology  [x]  Radiology  [x]  EKG/Telemetry   [x]  Cardiology/Vascular   []  Pathology  [x]  Old records  []  Other:  Most notable findings noted in the assessment and  plan.          Assessment/Plan      Brief Patient Summary:  Thony Dumont is a 61 y.o. male who presented to [facility] on 11/10/2021 complaining of increased shortness of air and bilateral leg swelling past week. He reports he has been taking his home medications. Drug screen positive today for methamphetamines and THC. He reports he snorted yesterday and denies IV drug use. He reports increased dyspnea on exertion. He denies chest pain, nausea, fever, or dizziness. He reports non productive cough. On exam he has BLE edema +3 from feet to above groin. He has a PMH of ischemic cardiomyopathy combined systolic heart failure with last EF per echo in June 2021 of 21-25%. He has an AICD in place. He reports he has stopped smoking. He was COVID-19 negative.  Chest x-ray showed cardiomegaly with vascular congestion.  EKG shows mild sinus tachycardia with multiple PVCs.  Troponin was negative and proBNP was 1989 (0-900). He was given 1 inch of Nitropaste in ED and 40 mg IV Lasix and was admitted for diuresis.      Review of records shows he was recently admitted here from October 21 to October 23, 2021 with similar complaints.  He was also admitted here in August 25, 2021 with acute systolic CHF.  He was hypoxic oxygen saturation 80s on admission but is now stable on 4 L oxygen via nasal cannula.   have been consulted for methamphetamine abuse.    Current inpatient medications include:  atorvastatin, 10 mg, Oral, Daily  carvedilol, 6.25 mg, Oral, BID With Meals  furosemide, 40 mg, Intravenous, Q12H  losartan, 25 mg, Oral, Daily  rivaroxaban, 20 mg, Oral, Daily With Dinner  sodium chloride, 10 mL, Intravenous, Q12H  spironolactone, 25 mg, Oral, Daily             Active Hospital Problems:  Active Hospital Problems    Diagnosis    • **Acute on chronic combined systolic (congestive) and diastolic (congestive) heart failure (HCC)    • Acute respiratory failure with hypoxemia (HCC)    • Patient's  noncompliance with other medical treatment and regimen    • Methamphetamine abuse (HCC)    • ICD (implantable cardioverter-defibrillator) in place    • Gastroesophageal reflux disease    • Moderate mitral regurgitation    • Moderate tricuspid regurgitation    • Pulmonary hypertension (HCC)    • CKD (chronic kidney disease) stage 2, GFR 60-89 ml/min    • Tobacco abuse    • Essential hypertension    • Mixed hyperlipidemia    • Paroxysmal atrial fibrillation (HCC)      Plan:   Acute hypoxic respiratory failure due to Acute on chronic systolic and diastolic congestive heart failure due to ischemic cardiomyopathy, methamphetamine abuse and hypertensive heart disease  -AICD in place   -Echo 6/6/2021 showed LVEF of 21 to 25% with moderate mitral valve regurgitation and mild to moderate pulmonary hypertension  -Renal function worsening on IV Lasix and the patient is not losing weight with diuresis, so nephrology has been consulted    Methamphetamine and THC abuse  -toxicology screen positive for methamphetamines and THC    - consulted   -Cessation counseling     Chronic kidney disease stage II due to to hypertension and cardiorenal syndrome  - creatinine on admission 1.37 with baseline creatinine 1.16-1.53  -Creatinine increased to 1.56 with diuresis  -avoid nephrotoxic meds  -Nephrology has been consulted to assist with diuresis     Paroxysmal atrial fibrillation  -Sinus rhythm on admission  -Continue home Coreg and Xarelto      Hyperlipidemia   -Continue atorvastatin     Essential hypertensive, chronic and uncontrolled on admission but has improved  -Continue Coreg, losartan, metolazone and spironolactone  -Hold oral Lasix while on IV Lasix    DVT prophylaxis:  Medical DVT prophylaxis orders are present.    CODE STATUS:    Code Status (Patient has no pulse and is not breathing): CPR (Attempt to Resuscitate)  Medical Interventions (Patient has pulse or is breathing): Full Support      Disposition:  I expect  patient to be discharged once inpatient rehab is arranged once clinically improved.    This patient has been examined wearing appropriate Personal Protective Equipment and discussed with hospital infection control department. 11/13/21      Electronically signed by Jennifer Dumont MD, 11/13/21, 10:39 EST.  [facility] Hospitalist Team

## 2021-11-13 NOTE — PLAN OF CARE
Goal Outcome Evaluation:     Downgrading to appropriate level of care while awaiting precert since not expecting discharge today. Pt more compliant with fluid restriction and notifying staff of output for better I/O tracking yet still disgruntled, agitated and verbally abusive today.

## 2021-11-14 LAB
ALBUMIN SERPL-MCNC: 3.6 G/DL (ref 3.5–5.2)
ANION GAP SERPL CALCULATED.3IONS-SCNC: 12 MMOL/L (ref 5–15)
BUN SERPL-MCNC: 25 MG/DL (ref 6–20)
BUN/CREAT SERPL: 17.9 (ref 7–25)
CALCIUM SPEC-SCNC: 8.7 MG/DL (ref 8.6–10.5)
CHLORIDE SERPL-SCNC: 104 MMOL/L (ref 98–107)
CO2 SERPL-SCNC: 24 MMOL/L (ref 22–29)
CREAT SERPL-MCNC: 1.4 MG/DL (ref 0.76–1.27)
GFR SERPL CREATININE-BSD FRML MDRD: 63 ML/MIN/1.73
GLUCOSE BLDC GLUCOMTR-MCNC: 106 MG/DL (ref 70–105)
GLUCOSE SERPL-MCNC: 102 MG/DL (ref 65–99)
PHOSPHATE SERPL-MCNC: 2.8 MG/DL (ref 2.5–4.5)
POTASSIUM SERPL-SCNC: 4 MMOL/L (ref 3.5–5.2)
SODIUM SERPL-SCNC: 140 MMOL/L (ref 136–145)

## 2021-11-14 PROCEDURE — 99232 SBSQ HOSP IP/OBS MODERATE 35: CPT | Performed by: HOSPITALIST

## 2021-11-14 PROCEDURE — 80069 RENAL FUNCTION PANEL: CPT | Performed by: HOSPITALIST

## 2021-11-14 PROCEDURE — 25010000002 FUROSEMIDE PER 20 MG: Performed by: HOSPITALIST

## 2021-11-14 PROCEDURE — 99232 SBSQ HOSP IP/OBS MODERATE 35: CPT | Performed by: INTERNAL MEDICINE

## 2021-11-14 PROCEDURE — 82962 GLUCOSE BLOOD TEST: CPT

## 2021-11-14 RX ADMIN — FUROSEMIDE 40 MG: 10 INJECTION, SOLUTION INTRAMUSCULAR; INTRAVENOUS at 10:46

## 2021-11-14 RX ADMIN — ATORVASTATIN CALCIUM 10 MG: 10 TABLET, FILM COATED ORAL at 08:58

## 2021-11-14 RX ADMIN — CARVEDILOL 6.25 MG: 6.25 TABLET, FILM COATED ORAL at 07:37

## 2021-11-14 RX ADMIN — FUROSEMIDE 40 MG: 10 INJECTION, SOLUTION INTRAMUSCULAR; INTRAVENOUS at 23:20

## 2021-11-14 RX ADMIN — CARVEDILOL 6.25 MG: 6.25 TABLET, FILM COATED ORAL at 17:00

## 2021-11-14 RX ADMIN — SPIRONOLACTONE 25 MG: 25 TABLET ORAL at 08:58

## 2021-11-14 RX ADMIN — RIVAROXABAN 20 MG: 20 TABLET, FILM COATED ORAL at 17:00

## 2021-11-14 RX ADMIN — SODIUM CHLORIDE, PRESERVATIVE FREE 10 ML: 5 INJECTION INTRAVENOUS at 21:01

## 2021-11-14 RX ADMIN — SODIUM CHLORIDE, PRESERVATIVE FREE 10 ML: 5 INJECTION INTRAVENOUS at 08:58

## 2021-11-14 RX ADMIN — LOSARTAN POTASSIUM 25 MG: 25 TABLET, FILM COATED ORAL at 08:58

## 2021-11-14 NOTE — PROGRESS NOTES
Discharge Planning Assessment   Nick     Patient Name: Thony Dumont  MRN: [id number]  Today's Date: 11/14/2021    Admit Date: 11/10/2021       Discharge Plan     Row Name 11/14/21 1843       Plan    Plan ND plan: referral to [facility]. Pending acceptance. Will require precert and PASRR.    Plan Comments CM received message from ASC liaisonJennifer. No ASC facilities able to accept d/t +UDS. Referral sent to [facility] via [facility] and notified liaison. Pending acceptance. Will require precert and PASRR.              Continued Care and Services - Admitted Since 11/10/2021     Destination     Service Provider Request Status Selected Services Address Phone Fax Patient Preferred    [facility]  Pending - Request Sent N/A [address] [place] [address] [phone] --    [facility] [place]  Declined  Current or History of Substance Abuse N/A [address] [facility] [place] [address] [phone] --    [facility]  Declined N/A [address] [facility] [place] [address] [phone] --    [facility]  Declined  Current or History of Substance Abuse N/A [address] [place] [address] [phone] --          Durable Medical Equipment     Service Provider Request Status Selected Services Address Phone Fax Patient Preferred    [facility]  Pending - Request Sent N/A [address], [facility] [address] [phone] --              Expected Discharge Date and Time     Expected Discharge Date Expected Discharge Time    Nov 12, 2021           Mahogany Paul RN

## 2021-11-14 NOTE — PLAN OF CARE
Goal Outcome Evaluation:  Plan of Care Reviewed With: patient           Outcome Summary: pt being diuresed, awaiting rehab placement, patient verbal inappropiate with staff at times, will continue to monitor

## 2021-11-14 NOTE — PROGRESS NOTES
[facility] Medicine Services Daily Progress Note    Patient Name: Thony Dumont  : 1960  MRN: [id number]  Primary Care Physician:  Rose Rao APRN  Date of admission: 11/10/2021      Subjective      Chief Complaint: Shortness of air      Patient Reports   2021   Patient reports feeling generally poorly with generalized weakness.  He was able to take a shower today independently.  He reports ongoing swelling diffusely.  2021  Patient continues to complain of significant edema of the lower extremities scrotum and lower abdomen and despite IV diuresis and he is having worsening renal function with continued weight gain.  Nephrology has been consulted.  2021  Patient reports significant improvement in his swelling today compared to yesterday.  He still has significant penile and scrotal edema and bilateral lower extremity edema.  The patient has had excellent urine output but his weight is still up compared to admission.      ROS 12 point review of systems was reviewed and was negative except for severe bilateral lower extremity, scrotum and lower abdominal wall edema and generalized weakness.      Objective      Vitals:   Temp:  [97.6 °F (36.4 °C)-97.9 °F (36.6 °C)] 97.6 °F (36.4 °C)  Heart Rate:  [72-95] 81  Resp:  [17-20] 18  BP: (119-125)/(60-84) 122/75    Physical Exam vital signs and nurses notes reviewed.  Well-developed well-nourished gentleman sitting up in bed awake and alert in no acute distress; mucous membranes moist, sclera anicteric; lungs with decreased air entry bilaterally; CV distant heart sounds; abdomen soft and protuberant with anasarca involving the lower abdominal wall, groin and scrotum with bilateral lower extremity anasarca which is gradually improving compared to admission; no cyanosis; capillary refill less than 2 seconds distally.  No Parker catheter.        Result Review    Result Review:  I have personally reviewed the results  from the time of this admission to 11/14/2021 13:31 EST and agree with these findings:  [x]  Laboratory  []  Microbiology  [x]  Radiology  [x]  EKG/Telemetry   [x]  Cardiology/Vascular   []  Pathology  [x]  Old records  []  Other:  Most notable findings noted in the assessment and plan.          Assessment/Plan      Brief Patient Summary:  Thony Dumont is a 61 y.o. male who [facility] complaining of increased shortness of air and bilateral leg swelling past week. He reports he has been taking his home medications. Drug screen positive today for methamphetamines and THC. He reports he snorted yesterday and denies IV drug use. He reports increased dyspnea on exertion. He denies chest pain, nausea, fever, or dizziness. He reports non productive cough. On exam he has BLE edema +3 from feet to above groin. He has a PMH of ischemic cardiomyopathy combined systolic heart failure with last EF per echo in June 2021 of 21-25%. He has an AICD in place. He reports he has stopped smoking. He was COVID-19 negative.  Chest x-ray showed cardiomegaly with vascular congestion.  EKG shows mild sinus tachycardia with multiple PVCs.  Troponin was negative and proBNP was 1989 (0-900). He was given 1 inch of Nitropaste in ED and 40 mg IV Lasix and was admitted for diuresis.      Review of records shows he was recently admitted here from October 21 to October 23, 2021 with similar complaints.  He was also admitted here in August 25, 2021 with acute systolic CHF.  He was hypoxic oxygen saturation 80s on admission but is now stable on 4 L oxygen via nasal cannula.   have been consulted for methamphetamine abuse.    Current inpatient medications include:  atorvastatin, 10 mg, Oral, Daily  carvedilol, 6.25 mg, Oral, BID With Meals  furosemide, 40 mg, Intravenous, Q12H  losartan, 25 mg, Oral, Daily  rivaroxaban, 20 mg, Oral, Daily With Dinner  sodium chloride, 10 mL, Intravenous,  Q12H  spironolactone, 25 mg, Oral, Daily             Active Hospital Problems:  Active Hospital Problems    Diagnosis    • **Acute on chronic combined systolic (congestive) and diastolic (congestive) heart failure (HCC)    • Acute respiratory failure with hypoxemia (HCC)    • Patient's noncompliance with other medical treatment and regimen    • Methamphetamine abuse (HCC)    • ICD (implantable cardioverter-defibrillator) in place    • Gastroesophageal reflux disease    • Moderate mitral regurgitation    • Moderate tricuspid regurgitation    • Pulmonary hypertension (HCC)    • CKD (chronic kidney disease) stage 2, GFR 60-89 ml/min    • Tobacco abuse    • Essential hypertension    • Mixed hyperlipidemia    • Paroxysmal atrial fibrillation (HCC)      Plan:   Acute hypoxic respiratory failure due to Acute on chronic systolic and diastolic congestive heart failure due to ischemic cardiomyopathy, methamphetamine abuse and hypertensive heart disease  -AICD in place   -Echo 6/6/2021 showed LVEF of 21 to 25% with moderate mitral valve regurgitation and mild to moderate pulmonary hypertension  -Renal function worsening on IV Lasix and the patient is not losing weight with diuresis, so nephrology was consulted on 11/13/2021; IV Lasix and oral spironolactone were continued    Methamphetamine and THC abuse  -toxicology screen positive for methamphetamines and THC    - consulted   -Cessation counseling     Chronic kidney disease stage II due to to hypertension and cardiorenal syndrome  - creatinine on admission 1.37 with baseline creatinine 1.16-1.53  -Creatinine increased to 1.56 with diuresis on 11/13/2021 but has improved to 1.4  -avoid nephrotoxic meds  -Nephrology has been consulted to assist with diuresis     Paroxysmal atrial fibrillation  -Sinus rhythm on admission  -Continue home Coreg and Xarelto      Hyperlipidemia   -Continue atorvastatin     Essential hypertensive, chronic and uncontrolled on admission  but has improved  -Continue Coreg, losartan, metolazone and spironolactone  -Hold oral Lasix while on IV Lasix    DVT prophylaxis:  Medical DVT prophylaxis orders are present.    CODE STATUS:    Code Status (Patient has no pulse and is not breathing): CPR (Attempt to Resuscitate)  Medical Interventions (Patient has pulse or is breathing): Full Support      Disposition:  I expect patient to be discharged once inpatient rehab is arranged once clinically improved.    This patient has been examined wearing appropriate Personal Protective Equipment and discussed with hospital infection control department. 11/14/21      Electronically signed by Jennifer Dumont MD, 11/14/21, 13:31 EST.  [facility] Hospitalist Team

## 2021-11-14 NOTE — PROGRESS NOTES
Referring Provider: Jennifer Dumotn MD    Reason for follow-up:  Status post ICD  Acute on chronic congestive heart failure.     Patient Care Team:  Rose Rao APRN as PCP - General (Nurse Practitioner)  Michael Rollins MD as Consulting Physician (Cardiac Electrophysiology)    Subjective .  Feeling okay     ROS    The patient has been without any chest discomfort ,shortness of breath, palpitations, dizziness or syncope.  Denies having any headache ,abdominal pain ,nausea, vomiting , diarrhea constipation, loss of weight or loss of appetite.  Denies having any excessive bruising ,hematuria or blood in the stool.    Review of all systems negative except as indicated    History  Past Medical History:   Diagnosis Date   • Acute on chronic congestive heart failure (HCC) 06/07/2021   • Afib ([facility])    • Chronic pain    • CKD (chronic kidney disease) stage 2, GFR 60-89 ml/min 8/9/2021   • Combined systolic and diastolic congestive heart failure ([facility])    • Degenerative joint disease    • Erectile dysfunction    • Essential hypertension    • Former smoker    • Gastroesophageal reflux disease    • Homeless 08/25/2021    living in his car   • ICD (implantable cardioverter-defibrillator) in place 06/15/2021   • Mixed hyperlipidemia    • Moderate mitral regurgitation    • Moderate tricuspid regurgitation    • NICM (nonischemic cardiomyopathy) ([facility]) 06/10/2021   • Paroxysmal atrial fibrillation ([facility])    • Pleural effusion 8/9/2021   • Pulmonary hypertension ([facility])    • Sprain of rotator cuff capsule 3/14/2014   • Substance abuse ([facility])    • Valvular heart disease        Past Surgical History:   Procedure Laterality Date   • CARDIAC DEFIBRILLATOR PLACEMENT     • CARDIAC ELECTROPHYSIOLOGY PROCEDURE Left 6/15/2021    Procedure: Device Implant;  Surgeon: Michael Rollins MD;  Location: [facility] INVASIVE LOCATION;  Service: Cardiovascular;  Laterality: Left;   • HERNIA REPAIR         Family History   Problem Relation  "Age of Onset   • Cancer Mother        Social History     Tobacco Use   • Smoking status: Current Some Day Smoker     Types: Cigarettes   • Smokeless tobacco: Never Used   • Tobacco comment: complete smoking cessation educated    Vaping Use   • Vaping Use: Every day   Substance Use Topics   • Alcohol use: Yes     Alcohol/week: 7.0 standard drinks     Types: 7 Cans of beer per week     Comment: 1 beer a day   • Drug use: Not Currently     Types: Marijuana, \"Crack\" cocaine, Methamphetamines     Comment: 6/15/21 report cocaine in 2018, marijuana 6/14/2021 @0050- snorts meth occasionally.        Medications Prior to Admission   Medication Sig Dispense Refill Last Dose   • [DISCONTINUED] atorvastatin (Lipitor) 10 MG tablet Take 1 tablet by mouth Daily. 30 tablet 0 Past Week at Unknown time   • [DISCONTINUED] carvedilol (COREG) 6.25 MG tablet Take 1 tablet by mouth 2 (Two) Times a Day With Meals. 180 tablet 3 11/10/2021 at Unknown time   • [DISCONTINUED] furosemide (LASIX) 40 MG tablet Take 1.5 tablets by mouth 2 (Two) Times a Day for 30 days. 270 tablet 1 11/10/2021 at Unknown time   • [DISCONTINUED] losartan (COZAAR) 25 MG tablet Take 1 tablet by mouth Daily. 90 tablet 3 11/10/2021 at Unknown time   • [DISCONTINUED] metOLazone (ZAROXOLYN) 2.5 MG tablet Take 1 tablet by mouth Daily As Needed (edema). 30 tablet 3 11/10/2021 at Unknown time   • [DISCONTINUED] rivaroxaban (Xarelto) 20 MG tablet Take 1 tablet by mouth Daily. 90 tablet 1 11/10/2021 at Unknown time   • [DISCONTINUED] spironolactone (ALDACTONE) 25 MG tablet Take 1 tablet by mouth Daily. 90 tablet 1 11/10/2021 at Unknown time   • acetaminophen (TYLENOL) 500 MG tablet Take 2 tablets by mouth Every 6 (Six) Hours As Needed for Mild Pain  or Moderate Pain . 40 tablet 0 Unknown at Unknown time       Allergies  Aspirin, Penicillins, and Pork allergy    Scheduled Meds:atorvastatin, 10 mg, Oral, Daily  carvedilol, 6.25 mg, Oral, BID With Meals  furosemide, 40 mg, " "Intravenous, Q12H  losartan, 25 mg, Oral, Daily  rivaroxaban, 20 mg, Oral, Daily With Dinner  sodium chloride, 10 mL, Intravenous, Q12H  spironolactone, 25 mg, Oral, Daily      Continuous Infusions:   PRN Meds:.•  acetaminophen  •  influenza vaccine  •  metOLazone  •  sodium chloride    Objective     VITAL SIGNS  Vitals:    11/13/21 1001 11/13/21 1431 11/13/21 2000 11/14/21 0400   BP: 121/76 120/72 119/60 119/74   BP Location:   Right arm Right arm   Patient Position:   Lying Lying   Pulse: 90 95 80 79   Resp: 20 20 18 17   Temp: 98.2 °F (36.8 °C) 97.9 °F (36.6 °C) 97.7 °F (36.5 °C) 97.9 °F (36.6 °C)   TempSrc: Oral Oral Oral Oral   SpO2: 100% 98% 96% 98%   Weight:    121 kg (267 lb 10.2 oz)   Height:           Flowsheet Rows      First Filed Value   Admission Height 190.5 cm (75\") Documented at 11/10/2021 2226   Admission Weight 118 kg (259 lb 11.2 oz) Documented at 11/10/2021 2229            Intake/Output Summary (Last 24 hours) at 11/14/2021 0656  Last data filed at 11/14/2021 0101  Gross per 24 hour   Intake 1160 ml   Output 2300 ml   Net -1140 ml        TELEMETRY: Sinus rhythm    Physical Exam:  The patient is alert, oriented and in no distress.  Vital signs as noted above.  Head and neck revealed no carotid bruits or jugular venous distention.  No thyromegaly or lymphadenopathy is present  Lungs clear.  No wheezing.  Breath sounds are normal bilaterally.  Heart normal first and second heart sounds.  No murmur. No precordial rub is present.  No gallop is present.  Abdomen soft and nontender.  No organomegaly is present.  Extremities with good peripheral pulses without any pedal edema.  Skin warm and dry.  ICD site looks normal.  Musculoskeletal system is grossly normal  CNS grossly normal      Results Review:   I reviewed the patient's new clinical results.  Lab Results (last 24 hours)     Procedure Component Value Units Date/Time    Renal Function Panel [[id number]]  (Abnormal) Collected: 11/14/21 0404    " Specimen: Blood Updated: 11/14/21 0520     Glucose 102 mg/dL      BUN 25 mg/dL      Creatinine 1.40 mg/dL      Sodium 140 mmol/L      Potassium 4.0 mmol/L      Chloride 104 mmol/L      CO2 24.0 mmol/L      Calcium 8.7 mg/dL      Albumin 3.60 g/dL      Phosphorus 2.8 mg/dL      Anion Gap 12.0 mmol/L      BUN/Creatinine Ratio 17.9     eGFR  African Amer 63 mL/min/1.73     Narrative:      GFR Normal >60  Chronic Kidney Disease <60  Kidney Failure <15      Blood Culture - Blood, Blood, Venous Line [[id number]]  (Normal) Collected: 11/11/21 0043    Specimen: Blood, Venous Line Updated: 11/14/21 0100     Blood Culture No growth at 3 days    Blood Culture - Blood, Arm, Right [[id number]]  (Normal) Collected: 11/10/21 2321    Specimen: Blood from Arm, Right Updated: 11/13/21 2330     Blood Culture No growth at 3 days    Basic Metabolic Panel [[id number]]  (Abnormal) Collected: 11/13/21 0645    Specimen: Blood Updated: 11/13/21 0727     Glucose 97 mg/dL      BUN 23 mg/dL      Creatinine 1.56 mg/dL      Sodium 141 mmol/L      Potassium 4.8 mmol/L      Chloride 103 mmol/L      CO2 28.0 mmol/L      Calcium 8.5 mg/dL      eGFR  African Amer 55 mL/min/1.73      BUN/Creatinine Ratio 14.7     Anion Gap 10.0 mmol/L     Narrative:      GFR Normal >60  Chronic Kidney Disease <60  Kidney Failure <15            Imaging Results (Last 24 Hours)     ** No results found for the last 24 hours. **      LAB RESULTS (LAST 7 DAYS)    CBC  Results from last 7 days   Lab Units 11/10/21  2306   WBC 10*3/mm3 4.70   RBC 10*6/mm3 4.35   HEMOGLOBIN g/dL 14.6   HEMATOCRIT % 41.6   MCV fL 95.6   PLATELETS 10*3/mm3 251       BMP  Results from last 7 days   Lab Units 11/14/21  0404 11/13/21  0645 11/12/21  0428 11/11/21  0038   SODIUM mmol/L 140 141 140 142   POTASSIUM mmol/L 4.0 4.8 3.9 3.9   CHLORIDE mmol/L 104 103 104 104   CO2 mmol/L 24.0 28.0 24.0 25.0   BUN mg/dL 25* 23* 21* 19   CREATININE mg/dL 1.40* 1.56* 1.35* 1.37*   GLUCOSE mg/dL 102* 97 92 79    MAGNESIUM mg/dL  --   --   --  1.9   PHOSPHORUS mg/dL 2.8  --   --   --        CMP   Results from last 7 days   Lab Units 11/14/21  0404 11/13/21  0645 11/12/21  0428 11/11/21  0038   SODIUM mmol/L 140 141 140 142   POTASSIUM mmol/L 4.0 4.8 3.9 3.9   CHLORIDE mmol/L 104 103 104 104   CO2 mmol/L 24.0 28.0 24.0 25.0   BUN mg/dL 25* 23* 21* 19   CREATININE mg/dL 1.40* 1.56* 1.35* 1.37*   GLUCOSE mg/dL 102* 97 92 79   ALBUMIN g/dL 3.60  --   --  4.00   BILIRUBIN mg/dL  --   --   --  1.8*   ALK PHOS U/L  --   --   --  203*   AST (SGOT) U/L  --   --   --  18   ALT (SGPT) U/L  --   --   --  18         BNP        TROPONIN  Results from last 7 days   Lab Units 11/11/21  0038   TROPONIN T ng/mL <0.010       CoAg        Creatinine Clearance  Estimated Creatinine Clearance: 79.6 mL/min (A) (by C-G formula based on SCr of 1.4 mg/dL (H)).    ABG        Radiology  No radiology results for the last day            EKG          I personally viewed and interpreted the patient's EKG/Telemetry data: Sinus tachycardia    ECHOCARDIOGRAM:    Results for orders placed during the hospital encounter of 06/04/21    Adult Transthoracic Echo Complete W/ Cont if Necessary Per Protocol    Interpretation Summary  · Left atrial volume is severely increased.  · The left ventricular cavity is moderately dilated.  · Left ventricular wall thickness is consistent with mild to moderate concentric hypertrophy.  · Mild dilation of the aortic root is present. Mild dilation of the sinuses of Valsalva is present.  · Moderate tricuspid valve regurgitation is present.  · Estimated right ventricular systolic pressure from tricuspid regurgitation is mildly elevated (35-45 mmHg).  · The right atrial cavity is severely dilated.  · The right ventricular cavity is moderately dilated.  · Moderately reduced right ventricular systolic function noted.  · Left ventricular diastolic function is consistent with (grade III w/high LAP) reversible restrictive pattern.  ·  Estimated left ventricular EF = 25% Estimated left ventricular EF was in disagreement with the calculated left ventricular EF. Left ventricular ejection fraction appears to be 21 - 25%. Left ventricular systolic function is moderately decreased.  · Abnormal mitral valve structure consistent with dilated annulus.  · Moderate mitral valve regurgitation is present with a centrally-directed jet noted.  · Mild to moderate pulmonary hypertension is present.          STRESS MYOVIEW:    Cardiolite (Tc-99m Sestamibi) stress test    CARDIAC CATHETERIZATION:            OTHER:         Assessment/Plan     Principal Problem:    Acute on chronic combined systolic (congestive) and diastolic (congestive) heart failure (HCC)  Active Problems:    Essential hypertension    Mixed hyperlipidemia    Paroxysmal atrial fibrillation (HCC)    Tobacco abuse    CKD (chronic kidney disease) stage 2, GFR 60-89 ml/min    ICD (implantable cardioverter-defibrillator) in place    Gastroesophageal reflux disease    Moderate mitral regurgitation    Moderate tricuspid regurgitation    Pulmonary hypertension (HCC)    Acute respiratory failure with hypoxemia (HCC)    Methamphetamine abuse (HCC)    Patient's noncompliance with other medical treatment and regimen      Acute on chronic heart failure with reduced ejection fraction  AICD in situ        SUBJECTIVE    Patient seen and examined, chart and labs reviewed.  Patient sitting up in bed eating breakfast and reports he does feel some better today.  Shortness of breath improved, still has significant edema to lower extremities extending proximally to abdomen.  He denies any chest discomfort, palpitations.  No nausea.  Patient has multiple drinks on his bedside table as well as large can of nuts.        REVIEW OF SYSTEMS:  Pertinent items are noted in HPI, all other systems reviewed and negative     OBJECTIVE   Creatinine 1.35 (1.37)  Magnesium 1.9  UDS positive for methamphetamine and  THC.     ASSESSMENT  Acute on chronic decompensated heart failure with reduced ejection fraction/nonischemic/severe dilated cardiomyopathy with severe LV dysfunction  Class III-IV heart failure  Single-chamber ICD in situ  Paroxysmal atrial fibrillation  Coagulopathy on Xarelto  Cardiorenal syndrome  Pulmonary hypertension  History of medical noncompliance     RECOMMENDATIONS  Patient needs aggressive diuresis.  Currently on Lasix 40 mg twice daily.  Monitor renal function closely-history of CKD  [place] fluid restriction at 1800 mL/24H, 2 g sodium diet.  Blood pressure borderline low at 98 systolic  Patient needs compliance with fluid/sodium intake at home  Continue statin, BB, ARB for coronary disease/cardiomyopathy  Continue Xarelto for stroke prevention with A. fib  Further recommendations as patient's hospital course progresses     ]]]]]]]]]]]]]]]]]]]  11/14/2021  Patient is not having any angina pectoris or congestive heart failure  Denies having any ICD shocks.  Acute on chronic congestive heart failure.  Continued diuresis with close observation of renal function.    BUN 23 creatinine 1.56-11/13/2021  25/1.4-11/14/2021    Anticoagulation  Patient is on Xarelto.  Observe for toxic effects.    Medications were reviewed and updated.  Current medications include Coreg 6.25 mg twice a day atorvastatin losartan Lasix 40 mg twice a day Xarelto and spironolactone 25 mg a day.    Primary cardiologist will see in the morning.    Further plan will depend on patient's progress.        Iona Mariee MD  11/14/21  06:56 EST

## 2021-11-14 NOTE — PLAN OF CARE
Goal Outcome Evaluation:  Plan of Care Reviewed With: patient        Progress: improving   Pt complains of no pain, vitals stable throughout shift on room air.  Up to bathroom with assistance.  Will continue to monitor

## 2021-11-14 NOTE — PROGRESS NOTES
"   LOS: 0 days     Chief Complaint/ Reason for encounter: CKD, diuretic management  Chief Complaint   Patient presents with   • Shortness of Breath     pt reports BLE swelling and \"coughing fit\" that started about 25 mins ago, reports SOA since         Subjective   Patient is doing well today with no new complaints  Good appetite with no nausea or vomiting  No shortness of breath chest pain, still with persistent edema  Voiding well with no dysuria      Medical history reviewed:  History of Present Illness    Subjective    History taken from: Patient and chart    Vital Signs  Temp:  [97.6 °F (36.4 °C)-97.9 °F (36.6 °C)] 97.6 °F (36.4 °C)  Heart Rate:  [72-95] 81  Resp:  [17-20] 18  BP: (119-125)/(60-84) 122/75       Wt Readings from Last 1 Encounters:   11/14/21 0400 121 kg (267 lb 10.2 oz)   11/13/21 0509 123 kg (271 lb 2.7 oz)   11/12/21 0500 119 kg (262 lb 12.6 oz)   11/11/21 0500 119 kg (261 lb 14.5 oz)   11/10/21 2229 118 kg (259 lb 11.2 oz)       Objective    Objective:  General Appearance:  Comfortable, well-appearing, in no acute distress and not in pain.  Awake, alert, oriented  HEENT: Mucous membranes moist, no injury, oropharynx clear  Lungs:  Normal effort and normal respiratory rate.  Breath sounds clear to auscultation.  No  respiratory distress.  No rales, decreased breath sounds or rhonchi.    Heart: Normal rate.  Regular rhythm.  S1 normal.  No murmur.   Abdomen: Abdomen is soft.  Bowel sounds are normal, no abdominal tenderness.  There is no rebound or guarding  Extremities: Normal range of motion.  2+ edema of bilateral lower extremities, distal pulses intact  Neurological: No focal motor or sensory deficits, pupils reactive  Skin:  Warm and dry.  No rash or cyanosis.       Results Review:    Intake/Output:     Intake/Output Summary (Last 24 hours) at 11/14/2021 1243  Last data filed at 11/14/2021 1226  Gross per 24 hour   Intake 1040 ml   Output 3200 ml   Net -2160 ml         DATA:  Radiology and " Labs:  The following labs independently reviewed by me. Additional labs ordered for tomorrow a.m.  Interval notes, chart personally reviewed by me.   Old records independently reviewed showing CKD, history of CHF  The following radiologic studies independently viewed by me, findings nothing new today  New problems include anasarca  Discussed with patient    Risk/ complexity of medical care/ medical decision making high, need for IV diuretics with underlying CKD and CHF    Labs:   Recent Results (from the past 24 hour(s))   Renal Function Panel    Collection Time: 11/14/21  4:04 AM    Specimen: Blood   Result Value Ref Range    Glucose 102 (H) 65 - 99 mg/dL    BUN 25 (H) 6 - 20 mg/dL    Creatinine 1.40 (H) 0.76 - 1.27 mg/dL    Sodium 140 136 - 145 mmol/L    Potassium 4.0 3.5 - 5.2 mmol/L    Chloride 104 98 - 107 mmol/L    CO2 24.0 22.0 - 29.0 mmol/L    Calcium 8.7 8.6 - 10.5 mg/dL    Albumin 3.60 3.50 - 5.20 g/dL    Phosphorus 2.8 2.5 - 4.5 mg/dL    Anion Gap 12.0 5.0 - 15.0 mmol/L    BUN/Creatinine Ratio 17.9 7.0 - 25.0    eGFR  African Amer 63 >60 mL/min/1.73   POC Glucose Once    Collection Time: 11/14/21  8:20 AM    Specimen: Blood   Result Value Ref Range    Glucose 106 (H) 70 - 105 mg/dL       Radiology:  Imaging Results (Last 24 Hours)     ** No results found for the last 24 hours. **             Medications have been reviewed:  Current Facility-Administered Medications   Medication Dose Route Frequency Provider Last Rate Last Admin   • acetaminophen (TYLENOL) tablet 1,000 mg  1,000 mg Oral Q6H PRN Jennifer Dumont MD   1,000 mg at 11/11/21 1512   • atorvastatin (LIPITOR) tablet 10 mg  10 mg Oral Daily Jennifer Dumont MD   10 mg at 11/14/21 0858   • carvedilol (COREG) tablet 6.25 mg  6.25 mg Oral BID With Meals Jennifer Dumont MD   6.25 mg at 11/14/21 0737   • furosemide (LASIX) injection 40 mg  40 mg Intravenous Q12H Jennifer Dumont MD   40 mg at 11/14/21 1046   • influenza vac split quad  (FLUZONE,FLUARIX,AFLURIA,FLULAVAL) injection 0.5 mL  0.5 mL Intramuscular During Hospitalization Jennifer Dumont MD       • losartan (COZAAR) tablet 25 mg  25 mg Oral Daily Jennifer Dumont MD   25 mg at 11/14/21 0858   • metOLazone (ZAROXOLYN) tablet 2.5 mg  2.5 mg Oral Daily PRN Jennifer Dumont MD       • rivaroxaban (XARELTO) tablet 20 mg  20 mg Oral Daily With Dinner Jennifer Dumont MD   20 mg at 11/13/21 1732   • sodium chloride 0.9 % flush 10 mL  10 mL Intravenous Q12H Jennifer Dumont MD   10 mL at 11/14/21 0858   • sodium chloride 0.9 % flush 10 mL  10 mL Intravenous PRN Jennifer Dumont MD       • spironolactone (ALDACTONE) tablet 25 mg  25 mg Oral Daily Jennifer uDmont MD   25 mg at 11/14/21 0858       ASSESSMENT:  CKD stage IIIb, stable, near baseline, tolerating diuresis well  Volume overload  Hypoxemic respiratory failure on admission, improving with diuresis    Acute on chronic combined systolic (congestive) and diastolic (congestive) heart failure (HCC)    Essential hypertension    Mixed hyperlipidemia    Paroxysmal atrial fibrillation (HCC)    Tobacco abuse    CKD (chronic kidney disease) stage 2, GFR 60-89 ml/min    ICD (implantable cardioverter-defibrillator) in place    Gastroesophageal reflux disease    Moderate mitral regurgitation    Moderate tricuspid regurgitation    Pulmonary hypertension (HCC)    Acute respiratory failure with hypoxemia (HCC)    Methamphetamine abuse (HCC)    Patient's noncompliance with other medical treatment and regimen           PLAN:   Does appear to have some chronic kidney disease at baseline, likely from underlying hypertension and CHF  Creatinine remained stable today with diuresis  Remains significantly volume overloaded on exam, would benefit from continued IV diuretics  Maintain on IV Lasix and oral spironolactone today  Okay to continue ARB but may need to hold if renal function worsens with diuresis  BP at goal     Continue to monitor electrolytes and volume closely,  avoid IV contrast and nephrotoxic medications       Berry Baer MD   [facility]   Office phone number: [phone]  Answering service phone number: [phone]    11/14/21  12:43 EST    Dictation performed using Dragon dictation [employer]

## 2021-11-15 LAB
ALBUMIN SERPL-MCNC: 3.5 G/DL (ref 3.5–5.2)
ANION GAP SERPL CALCULATED.3IONS-SCNC: 13 MMOL/L (ref 5–15)
BACTERIA SPEC AEROBE CULT: NORMAL
BUN SERPL-MCNC: 23 MG/DL (ref 6–20)
BUN/CREAT SERPL: 18.3 (ref 7–25)
CALCIUM SPEC-SCNC: 8.7 MG/DL (ref 8.6–10.5)
CHLORIDE SERPL-SCNC: 104 MMOL/L (ref 98–107)
CO2 SERPL-SCNC: 24 MMOL/L (ref 22–29)
CREAT SERPL-MCNC: 1.26 MG/DL (ref 0.76–1.27)
GFR SERPL CREATININE-BSD FRML MDRD: 71 ML/MIN/1.73
GLUCOSE SERPL-MCNC: 109 MG/DL (ref 65–99)
PHOSPHATE SERPL-MCNC: 3.2 MG/DL (ref 2.5–4.5)
POTASSIUM SERPL-SCNC: 4 MMOL/L (ref 3.5–5.2)
QT INTERVAL: 370 MS
SODIUM SERPL-SCNC: 141 MMOL/L (ref 136–145)

## 2021-11-15 PROCEDURE — 99232 SBSQ HOSP IP/OBS MODERATE 35: CPT | Performed by: INTERNAL MEDICINE

## 2021-11-15 PROCEDURE — 80069 RENAL FUNCTION PANEL: CPT | Performed by: HOSPITALIST

## 2021-11-15 PROCEDURE — 25010000002 FUROSEMIDE PER 20 MG: Performed by: HOSPITALIST

## 2021-11-15 PROCEDURE — 97110 THERAPEUTIC EXERCISES: CPT

## 2021-11-15 RX ORDER — SPIRONOLACTONE 25 MG/1
25 TABLET ORAL DAILY
Status: ON HOLD | COMMUNITY
Start: 2021-11-13 | End: 2021-11-15

## 2021-11-15 RX ORDER — LOSARTAN POTASSIUM 25 MG/1
25 TABLET ORAL DAILY
Status: ON HOLD | COMMUNITY
Start: 2021-11-13 | End: 2021-11-15

## 2021-11-15 RX ORDER — CARVEDILOL 6.25 MG/1
6.25 TABLET ORAL 2 TIMES DAILY WITH MEALS
Status: ON HOLD | COMMUNITY
Start: 2021-11-13 | End: 2021-11-15

## 2021-11-15 RX ORDER — METOLAZONE 2.5 MG/1
2.5 TABLET ORAL DAILY PRN
Status: ON HOLD | COMMUNITY
Start: 2021-11-13 | End: 2021-11-15

## 2021-11-15 RX ORDER — METOLAZONE 5 MG/1
5 TABLET ORAL ONCE
Status: COMPLETED | OUTPATIENT
Start: 2021-11-15 | End: 2021-11-15

## 2021-11-15 RX ORDER — ATORVASTATIN CALCIUM 10 MG/1
10 TABLET, FILM COATED ORAL DAILY
Status: ON HOLD | COMMUNITY
Start: 2021-11-13 | End: 2021-11-15

## 2021-11-15 RX ADMIN — ATORVASTATIN CALCIUM 10 MG: 10 TABLET, FILM COATED ORAL at 09:15

## 2021-11-15 RX ADMIN — SODIUM CHLORIDE, PRESERVATIVE FREE 10 ML: 5 INJECTION INTRAVENOUS at 20:19

## 2021-11-15 RX ADMIN — METOLAZONE 5 MG: 5 TABLET ORAL at 15:20

## 2021-11-15 RX ADMIN — LOSARTAN POTASSIUM 25 MG: 25 TABLET, FILM COATED ORAL at 09:15

## 2021-11-15 RX ADMIN — SPIRONOLACTONE 25 MG: 25 TABLET ORAL at 09:15

## 2021-11-15 RX ADMIN — CARVEDILOL 6.25 MG: 6.25 TABLET, FILM COATED ORAL at 09:15

## 2021-11-15 RX ADMIN — CARVEDILOL 6.25 MG: 6.25 TABLET, FILM COATED ORAL at 17:21

## 2021-11-15 RX ADMIN — SODIUM CHLORIDE, PRESERVATIVE FREE 10 ML: 5 INJECTION INTRAVENOUS at 09:16

## 2021-11-15 RX ADMIN — RIVAROXABAN 20 MG: 20 TABLET, FILM COATED ORAL at 17:21

## 2021-11-15 RX ADMIN — FUROSEMIDE 40 MG: 10 INJECTION, SOLUTION INTRAMUSCULAR; INTRAVENOUS at 11:20

## 2021-11-15 NOTE — CASE MANAGEMENT/SOCIAL WORK
Continued Stay Note  HELEN Romero     Patient Name: Thony Dumont  MRN: [id number]  Today's Date: 11/15/2021    Admit Date: 11/10/2021     Discharge Plan     Row Name 11/15/21 1637       Plan    Plan DC Plan: [facility], accepted, no precert required, PASRR per facility. Pt can DC when medically stable.    Plan Comments Continue diuresis per cardiology.                       Expected Discharge Date and Time     Expected Discharge Date Expected Discharge Time    Nov 12, 2021             Elena Burrell RN

## 2021-11-15 NOTE — PROGRESS NOTES
[facility] Medicine Services Daily Progress Note    Patient Name: Thony Dumont  : 1960  MRN: [id number]  Primary Care Physician:  Rose Rao APRN  Date of admission: 11/10/2021      Subjective      Chief Complaint: Shortness of air      Patient Reports   2021   Patient reports feeling generally poorly with generalized weakness.  He was able to take a shower today independently.  He reports ongoing swelling diffusely.  2021  Patient continues to complain of significant edema of the lower extremities scrotum and lower abdomen and despite IV diuresis and he is having worsening renal function with continued weight gain.  Nephrology has been consulted.  2021  Patient reports significant improvement in his swelling today compared to yesterday.  He still has significant penile and scrotal edema and bilateral lower extremity edema.  The patient has had excellent urine output but his weight is still up compared to admission.  11/15/21 patient seen and examined in bed no acute distress, still having significant edema, good urine output. case discussed with RN.      ROS 12 point review of systems was reviewed and was negative except for severe bilateral lower extremity, scrotum and lower abdominal wall edema and generalized weakness.      Objective      Vitals:   Temp:  [97.9 °F (36.6 °C)-98.2 °F (36.8 °C)] 98.2 °F (36.8 °C)  Heart Rate:  [74-89] 89  Resp:  [16-18] 18  BP: (116-124)/(63-92) 124/92    Physical Exam vital signs and nurses notes reviewed.  Well-developed well-nourished gentleman sitting up in bed awake and alert in no acute distress; mucous membranes moist, sclera anicteric; lungs with decreased air entry bilaterally; CV distant heart sounds; abdomen soft and protuberant with anasarca involving the lower abdominal wall, groin and scrotum with bilateral lower extremity anasarca which is gradually improving compared to admission; no cyanosis; capillary  refill less than 2 seconds distally.  No Parker catheter.        Result Review    Result Review:  I have personally reviewed the results from the time of this admission to 11/15/2021 17:16 EST and agree with these findings:  [x]  Laboratory  []  Microbiology  [x]  Radiology  [x]  EKG/Telemetry   [x]  Cardiology/Vascular   []  Pathology  [x]  Old records  []  Other:  Most notable findings noted in the assessment and plan.          Assessment/Plan      Brief Patient Summary:  Thony Dumont is a 61 y.o. male who presented to [facility] on 11/10/2021 complaining of increased shortness of air and bilateral leg swelling past week. He reports he has been taking his home medications. Drug screen positive today for methamphetamines and THC. He reports he snorted yesterday and denies IV drug use. He reports increased dyspnea on exertion. He denies chest pain, nausea, fever, or dizziness. He reports non productive cough. On exam he has BLE edema +3 from feet to above groin. He has a PMH of ischemic cardiomyopathy combined systolic heart failure with last EF per echo in June 2021 of 21-25%. He has an AICD in place. He reports he has stopped smoking. He was COVID-19 negative.  Chest x-ray showed cardiomegaly with vascular congestion.  EKG shows mild sinus tachycardia with multiple PVCs.  Troponin was negative and proBNP was 1989 (0-900). He was given 1 inch of Nitropaste in ED and 40 mg IV Lasix and was admitted for diuresis.      Review of records shows he was recently admitted here from October 21 to October 23, 2021 with similar complaints.  He was also admitted here in August 25, 2021 with acute systolic CHF.  He was hypoxic oxygen saturation 80s on admission but is now stable on 4 L oxygen via nasal cannula.   have been consulted for methamphetamine abuse.    Current inpatient medications include:  atorvastatin, 10 mg, Oral, Daily  carvedilol, 6.25 mg, Oral, BID With Meals  furosemide, 40 mg,  Intravenous, Q12H  losartan, 25 mg, Oral, Daily  metOLazone, 5 mg, Oral, Once  rivaroxaban, 20 mg, Oral, Daily With Dinner  sodium chloride, 10 mL, Intravenous, Q12H  spironolactone, 25 mg, Oral, Daily             Active Hospital Problems:  Active Hospital Problems    Diagnosis    • **Acute on chronic combined systolic (congestive) and diastolic (congestive) heart failure (HCC)    • Patient's noncompliance with other medical treatment and regimen    • Acute respiratory failure with hypoxemia (HCC)    • Methamphetamine abuse (HCC)    • ICD (implantable cardioverter-defibrillator) in place    • Gastroesophageal reflux disease    • Moderate mitral regurgitation    • Moderate tricuspid regurgitation    • Pulmonary hypertension (HCC)    • CKD (chronic kidney disease) stage 2, GFR 60-89 ml/min    • Tobacco abuse    • Essential hypertension    • Mixed hyperlipidemia    • Paroxysmal atrial fibrillation (HCC)        Acute hypoxic respiratory failure due to Acute on chronic systolic and diastolic congestive heart failure due to ischemic cardiomyopathy, methamphetamine abuse and hypertensive heart disease  -AICD in place   -Echo 6/6/2021 showed LVEF of 21 to 25% with moderate mitral valve regurgitation and mild to moderate pulmonary hypertension  -Renal function worsening on IV Lasix and the patient is not losing weight with diuresis, so nephrology was consulted on 11/13/2021; IV Lasix and oral spironolactone were continued    Methamphetamine and THC abuse  -toxicology screen positive for methamphetamines and THC    - consulted   -Cessation counseling     Chronic kidney disease stage II due to to hypertension and cardiorenal syndrome  - creatinine on admission 1.37 with baseline creatinine 1.16-1.53  -Creatinine increased to 1.56 with diuresis on 11/13/2021 but has improved to 1.4  -avoid nephrotoxic meds  -Nephrology has been consulted to assist with diuresis     Paroxysmal atrial fibrillation  -Sinus rhythm on  admission  -Continue home Coreg and Xarelto      Hyperlipidemia   -Continue atorvastatin     Essential hypertensive, chronic and uncontrolled on admission but has improved  -Continue Coreg, losartan, metolazone and spironolactone  -Hold oral Lasix while on IV Lasix    DVT prophylaxis:  Medical DVT prophylaxis orders are present.    CODE STATUS:    Code Status (Patient has no pulse and is not breathing): CPR (Attempt to Resuscitate)  Medical Interventions (Patient has pulse or is breathing): Full Support      Disposition:  I expect patient to be discharged once inpatient rehab is arranged once clinically improved.    This patient has been examined wearing appropriate Personal Protective Equipment and discussed with hospital infection control department. 11/15/21      Electronically signed by Luis Eduardo Toro MD, 11/15/21, 17:16 EST.  [facility] Hospitalist Team

## 2021-11-15 NOTE — PROGRESS NOTES
Heart Failure Program  Nurse Navigator  Discharge Planning: Follow-up Note    Patient Name:Thony Dumont  :1960  Current Admission Date: 11/10/2021       Last 3 Weights:  Wt Readings from Last 3 Encounters:   11/15/21 116 kg (256 lb 2.8 oz)   10/23/21 109 kg (239 lb 3.2 oz)   21 102 kg (224 lb 12.8 oz)       Intake and Output totals: I/O last 3 completed shifts:  In: 1520 [P.O.:1520]  Out: 4225 [Urine:4225]  No intake/output data recorded.          Patient Assessment:   pt lying in bed, resp even and unlabored, no SOA with conversation, noted edema improve but remain 1-2+ bilateral legs to hips, abd edema improved.       Patient Education:   Review HF s/s, pt advises of rehab at [facility].    Review HF Education provided to patient:  yes-Symptoms worsening  yes-Prescribed medications  yes-HF self-care  yes-Follow-up Appointments       Acceptance of learning: acceptance, cooperative    Heart Failure education interactive teaching session time: 15 minutes      Identified needs/barriers:   Volume status, I&O, daily weights, fluid restriction continue, pt needs medications at [facility] (out of some of his medications)    Intervention follow-up:  RN, NP

## 2021-11-15 NOTE — PLAN OF CARE
Goal Outcome Evaluation:  Plan of Care Reviewed With: patient        Progress: no change  Outcome Summary: pt rested most of the night with no complaints, d/c awaiting rehab placement

## 2021-11-15 NOTE — PROGRESS NOTES
Cardiology Progress Note-EP      Patient Care Team:  Rose Rao APRN as PCP - General (Nurse Practitioner)  Michael Rollins MD as Consulting Physician (Cardiac Electrophysiology)    PATIENT IDENTIFICATION  Name: Thony Dumont  Age: 61 y.o.  Sex: male  :  1960  MRN: [id number]             REASON FOR FOLLOW-UP  Acute on chronic heart failure with reduced ejection fraction  AICD in situ      SUBJECTIVE    Patient seen and examined, chart and labs reviewed.  Patient sitting up in bed with normal respiratory effort, reports that he does feel better today.  Shortness of breath improved, still has significant edema to lower extremities.  He denies any chest discomfort, palpitations.  No nausea.    Patient on Aldactone, Lasix 40 mg IV every 12 hours    REVIEW OF SYSTEMS:  Pertinent items are noted in HPI, all other systems reviewed and negative    OBJECTIVE   Creatinine 1.35 (1.37)  Magnesium 1.9  UDS positive for methamphetamine and THC.    ASSESSMENT  Acute on chronic decompensated heart failure with reduced ejection fraction/nonischemic/severe dilated cardiomyopathy with severe LV dysfunction  Class III-IV heart failure  Single-chamber ICD in situ  Paroxysmal atrial fibrillation  Coagulopathy on Xarelto  Cardiorenal syndrome  Pulmonary hypertension  History of medical noncompliance    RECOMMENDATIONS  Patient needs aggressive diuresis.  Currently on Lasix 40 mg twice daily.  Monitor renal function closely-history of CKD.  Patient may benefit from continuous IV diuretic.  [place] fluid restriction at 1800 mL/24H, 2 g sodium diet.  Blood pressure borderline low at 98 systolic  Patient needs compliance with fluid/sodium intake at home  Continue statin, BB, ARB for coronary disease/cardiomyopathy  Continue Xarelto for stroke prevention with A. fib  Further recommendations as patient's hospital course progresses          Vital Signs  Visit Vitals  /63 (BP Location: Right arm, Patient  "Position: Lying)   Pulse 84   Temp 97.9 °F (36.6 °C) (Axillary)   Resp 16   Ht 190.5 cm (75\")   Wt 116 kg (256 lb 2.8 oz)   SpO2 96%   BMI 32.02 kg/m²     Oxygen Therapy  SpO2: 96 %  Pulse Oximetry Type: Intermittent  Device (Oxygen Therapy): room air  Flow (L/min): 1  Oxygen Concentration (%): 96  Flowsheet Rows      First Filed Value   Admission Height 190.5 cm (75\") Documented at 11/10/2021 2226   Admission Weight 118 kg (259 lb 11.2 oz) Documented at 11/10/2021 2229        Intake & Output (last 3 days)       11/12 0701 11/13 0700 11/13 0701 11/14 0700 11/14 0701  11/15 0700 11/15 0701 11/16 0700    P.O. 840 1160 720     Total Intake(mL/kg) 840 (6.8) 1160 (9.6) 720 (6.2)     Urine (mL/kg/hr) 375 (0.1) 2300 (0.8) 2625 (0.9)     Stool 0       Total Output 375 2300 2625     Net +465 -1140 -1905             Urine Unmeasured Occurrence 3 x  2 x     Stool Unmeasured Occurrence 3 x           Lines, Drains & Airways     Active LDAs     Name Placement date Placement time Site Days    Peripheral IV 11/10/21 2306 Right Forearm 11/10/21  2306  Forearm  1                       /63 (BP Location: Right arm, Patient Position: Lying)   Pulse 84   Temp 97.9 °F (36.6 °C) (Axillary)   Resp 16   Ht 190.5 cm (75\")   Wt 116 kg (256 lb 2.8 oz)   SpO2 96%   BMI 32.02 kg/m²   Intake/Output last 3 shifts:  I/O last 3 completed shifts:  In: 1520 [P.O.:1520]  Out: 4225 [Urine:4225]  Intake/Output this shift:  No intake/output data recorded.    PHYSICAL EXAM:    General: Well-developed, well-nourished 61-year-old male who is alert, cooperative, no distress, appears stated age  Head:  Normocephalic, atraumatic, mucous membranes moist  Eyes:  Conjunctiva/corneas clear, EOM's intact     Neck:  Supple,  no bruit  Lungs: Clear to auscultation bilaterally, no wheezes rhonchi rales are noted  Chest wall: No tenderness  Heart::  Regular rate and rhythm, S1 and S2 normal, 1/6 holosystolic murmur. No rub or gallop  Abdomen: Soft, " non-tender, nondistended bowel sounds active  Extremities: No cyanosis, clubbing. 2+ edema to lower extremities  Pulses: 2+ and symmetric all extremities  Skin:  No rashes or lesions  Neuro/psych: A&O x3. CN II through XII are grossly intact with appropriate affect      Scheduled Meds:      atorvastatin, 10 mg, Oral, Daily  carvedilol, 6.25 mg, Oral, BID With Meals  furosemide, 40 mg, Intravenous, Q12H  losartan, 25 mg, Oral, Daily  rivaroxaban, 20 mg, Oral, Daily With Dinner  sodium chloride, 10 mL, Intravenous, Q12H  spironolactone, 25 mg, Oral, Daily        Continuous Infusions:         PRN Meds:    •  acetaminophen  •  influenza vaccine  •  metOLazone  •  sodium chloride        Results Review:     I reviewed the patient's new clinical results.    CBC    Results from last 7 days   Lab Units 11/10/21  2306   WBC 10*3/mm3 4.70   HEMOGLOBIN g/dL 14.6   PLATELETS 10*3/mm3 251     Cr Clearance Estimated Creatinine Clearance: 86.7 mL/min (by C-G formula based on SCr of 1.26 mg/dL).  Coag     HbA1C No results found for: HGBA1C  Blood Glucose   Glucose   Date/Time Value Ref Range Status   11/14/2021 0820 106 (H) 70 - 105 mg/dL Final     Comment:     Serial Number: [id number]:  [id number]     Infection   Results from last 7 days   Lab Units 11/11/21  0043 11/11/21  0038 11/10/21  2321   BLOODCX  No growth at 4 days  --  No growth at 4 days   PROCALCITONIN ng/mL  --  0.10  --      CMP   Results from last 7 days   Lab Units 11/15/21  0407 11/14/21  0404 11/13/21  0645 11/12/21  0428 11/11/21  0038   SODIUM mmol/L 141 140 141 140 142   POTASSIUM mmol/L 4.0 4.0 4.8 3.9 3.9   CHLORIDE mmol/L 104 104 103 104 104   CO2 mmol/L 24.0 24.0 28.0 24.0 25.0   BUN mg/dL 23* 25* 23* 21* 19   CREATININE mg/dL 1.26 1.40* 1.56* 1.35* 1.37*   GLUCOSE mg/dL 109* 102* 97 92 79   ALBUMIN g/dL 3.50 3.60  --   --  4.00   BILIRUBIN mg/dL  --   --   --   --  1.8*   ALK PHOS U/L  --   --   --   --  203*   AST (SGOT) U/L  --   --   --   --  18    ALT (SGPT) U/L  --   --   --   --  18     ABG      UA    Results from last 7 days   Lab Units 11/10/21  2315   NITRITE UA  Negative   WBC UA /HPF 0-2*   BACTERIA UA /HPF None Seen   SQUAM EPITHEL UA /HPF 0-2     EDD  No results found for: POCMETH, POCAMPHET, POCBARBITUR, POCBENZO, POCCOCAINE, POCOPIATES, POCOXYCODO, POCPHENCYC, POCPROPOXY, POCTHC, POCTRICYC  Lysis Labs   Results from last 7 days   Lab Units 11/15/21  0407 11/14/21  0404 11/13/21  0645 11/12/21  0428 11/11/21  0038 11/10/21  2306   HEMOGLOBIN g/dL  --   --   --   --   --  14.6   PLATELETS 10*3/mm3  --   --   --   --   --  251   CREATININE mg/dL 1.26 1.40* 1.56* 1.35* 1.37*  --      Radiology(recent) No radiology results for the last day      Results from last 7 days   Lab Units 11/11/21  0038   TROPONIN T ng/mL <0.010       Xrays, labs reviewed personally by physician.    ECG/EMG Results (most recent)     Procedure Component Value Units Date/Time    ECG 12 Lead [[id number]] Collected: 11/10/21 2238     Updated: 11/10/21 2240     QT Interval 370 ms     Narrative:      HEART RATE= 102  bpm  RR Interval= 592  ms  RI Interval= 162  ms  P Horizontal Axis= -11  deg  P Front Axis= 70  deg  QRSD Interval= 83  ms  QT Interval= 370  ms  QRS Axis= 54  deg  T Wave Axis=   deg  - ABNORMAL ECG -  Sinus tachycardia  Multiple ventricular premature complexes  Abnormal T, consider ischemia, lateral leads  Electronically Signed By:   Date and Time of Study: 2021-11-10 22:38:30            Medication Review:   I have reviewed the patient's current medication list  Scheduled Meds:atorvastatin, 10 mg, Oral, Daily  carvedilol, 6.25 mg, Oral, BID With Meals  furosemide, 40 mg, Intravenous, Q12H  losartan, 25 mg, Oral, Daily  rivaroxaban, 20 mg, Oral, Daily With Dinner  sodium chloride, 10 mL, Intravenous, Q12H  spironolactone, 25 mg, Oral, Daily      Continuous Infusions:   PRN Meds:.•  acetaminophen  •  influenza vaccine  •  metOLazone  •  sodium  "chloride    Imaging:  Imaging Results (Last 72 Hours)     ** No results found for the last 72 hours. **            CORTEZ Rodriguez  11/15/21  07:38 EST      EMR Dragon/Transcription:   \"Dictated utilizing Dragon dictation\".        Electronically signed by CORTEZ Rodriguez, 11/15/21, 7:38 AM EST.    Cardiology attending:  As above. Agree with assessment plan. Seen and examined. Chart labs reviewed. Note scribed by APC and reviewed for accuracy with above changes noted. Patient reports continued diuresis. Patient reports that he denies any chest pain. Heart regular. Patient has diuresed over 10 L since admission. Continue to diurese as renal function allows. Follow renal function, electrolytes, and rhythm closely.    "

## 2021-11-15 NOTE — PROGRESS NOTES
"RENAL/KCC:     LOS: 1 day     Chief Complaint/ Reason for encounter: CKD, diuretic management  Chief Complaint   Patient presents with   • Shortness of Breath     pt reports BLE swelling and \"coughing fit\" that started about 25 mins ago, reports SOA since         Subjective   C/O continued edema    Vital Signs  Temp:  [97.5 °F (36.4 °C)-98.2 °F (36.8 °C)] 98.2 °F (36.8 °C)  Heart Rate:  [74-89] 89  Resp:  [16-18] 18  BP: (116-126)/(63-92) 124/92       Wt Readings from Last 1 Encounters:   11/15/21 0605 116 kg (256 lb 2.8 oz)   11/14/21 0400 121 kg (267 lb 10.2 oz)   11/13/21 0509 123 kg (271 lb 2.7 oz)   11/12/21 0500 119 kg (262 lb 12.6 oz)   11/11/21 0500 119 kg (261 lb 14.5 oz)   11/10/21 2229 118 kg (259 lb 11.2 oz)       Objective:  Vital signs: (most recent): Blood pressure 124/92, pulse 89, temperature 98.2 °F (36.8 °C), resp. rate 18, height 190.5 cm (75\"), weight 116 kg (256 lb 2.8 oz), SpO2 96 %.          Objective:  General Appearance:  Comfortable, well-appearing, in no acute distress and not in pain.  Awake, alert, oriented  HEENT: Mucous membranes moist, no injury, oropharynx clear  Lungs:  Normal effort and normal respiratory rate.  Breath sounds clear to auscultation.  No  respiratory distress.  No rales, decreased breath sounds or rhonchi.    Heart: Normal rate.  Regular rhythm.  S1 normal.  No murmur.   Abdomen: Abdomen is soft.  Bowel sounds are normal, no abdominal tenderness.  There is no rebound or guarding  Extremities: Normal range of motion.  2+ edema of bilateral lower extremities, distal pulses intact  Neurological: No focal motor or sensory deficits, pupils reactive  Skin:  Warm and dry.  No rash or cyanosis.       Results Review:    Intake/Output:     Intake/Output Summary (Last 24 hours) at 11/15/2021 1406  Last data filed at 11/15/2021 0300  Gross per 24 hour   Intake 480 ml   Output 1425 ml   Net -945 ml       Labs:   Recent Results (from the past 24 hour(s))   Renal Function Panel    " Collection Time: 11/15/21  4:07 AM    Specimen: Blood   Result Value Ref Range    Glucose 109 (H) 65 - 99 mg/dL    BUN 23 (H) 6 - 20 mg/dL    Creatinine 1.26 0.76 - 1.27 mg/dL    Sodium 141 136 - 145 mmol/L    Potassium 4.0 3.5 - 5.2 mmol/L    Chloride 104 98 - 107 mmol/L    CO2 24.0 22.0 - 29.0 mmol/L    Calcium 8.7 8.6 - 10.5 mg/dL    Albumin 3.50 3.50 - 5.20 g/dL    Phosphorus 3.2 2.5 - 4.5 mg/dL    Anion Gap 13.0 5.0 - 15.0 mmol/L    BUN/Creatinine Ratio 18.3 7.0 - 25.0    eGFR  African Amer 71 >60 mL/min/1.73       Radiology:  Imaging Results (Last 24 Hours)     ** No results found for the last 24 hours. **             Medications have been reviewed:  Current Facility-Administered Medications   Medication Dose Route Frequency Provider Last Rate Last Admin   • acetaminophen (TYLENOL) tablet 1,000 mg  1,000 mg Oral Q6H PRN Jennifer Dumont MD   1,000 mg at 11/11/21 1512   • atorvastatin (LIPITOR) tablet 10 mg  10 mg Oral Daily Jennifer Dumont MD   10 mg at 11/15/21 0915   • carvedilol (COREG) tablet 6.25 mg  6.25 mg Oral BID With Meals Jennifer Dumont MD   6.25 mg at 11/15/21 0915   • furosemide (LASIX) injection 40 mg  40 mg Intravenous Q12H Jennifer Dumont MD   40 mg at 11/15/21 1120   • influenza vac split quad (FLUZONE,FLUARIX,AFLURIA,FLULAVAL) injection 0.5 mL  0.5 mL Intramuscular During Hospitalization Jennifer Dumont MD       • losartan (COZAAR) tablet 25 mg  25 mg Oral Daily Jennifer Dumont MD   25 mg at 11/15/21 0915   • metOLazone (ZAROXOLYN) tablet 2.5 mg  2.5 mg Oral Daily PRN Jennifer Dumont MD       • metOLazone (ZAROXOLYN) tablet 5 mg  5 mg Oral Once Ash Erazo MD       • rivaroxaban (XARELTO) tablet 20 mg  20 mg Oral Daily With Dinner Jennifer Dumont MD   20 mg at 11/14/21 1700   • sodium chloride 0.9 % flush 10 mL  10 mL Intravenous Q12H Jennifer Dumont MD   10 mL at 11/15/21 0916   • sodium chloride 0.9 % flush 10 mL  10 mL Intravenous PRN Jennifer Dumont MD       • spironolactone  (ALDACTONE) tablet 25 mg  25 mg Oral Daily Jennifer Dumont MD   25 mg at 11/15/21 0915       ASSESSMENT:  JOHN - Cardiorenal  CKD stage IIIb, stable, near baseline, tolerating diuresis well  Volume overload  Hypoxemic respiratory failure on admission, improving with diuresis    Acute on chronic combined systolic (congestive) and diastolic (congestive) heart failure (HCC)    Essential hypertension    Mixed hyperlipidemia    Paroxysmal atrial fibrillation (HCC)    Tobacco abuse    CKD (chronic kidney disease) stage 2, GFR 60-89 ml/min    ICD (implantable cardioverter-defibrillator) in place    Gastroesophageal reflux disease    Moderate mitral regurgitation    Moderate tricuspid regurgitation    Pulmonary hypertension (HCC)    Acute respiratory failure with hypoxemia (HCC)    Methamphetamine abuse (HCC)    Patient's noncompliance with other medical treatment and regimen        PLAN:   Cr improved with diuresis, likely cardiorenal  Continue IV Lasix and PO Aldactone. Metolazone x 1 today.  Okay to continue AR  Continue to monitor electrolytes and volume closely, avoid IV contrast and nephrotoxic medications       Ash Erazo MD   [facility]   Office phone number: [phone]  Answering service phone number: [phone]  11/15/21  14:06 EST

## 2021-11-15 NOTE — THERAPY TREATMENT NOTE
"Subjective: Pt agreeable to therapeutic plan of care. \" I am very tired\"    Objective:     Bed mobility - Modified-Independent  AROM BLE 5 reps in supine.    Pain: 5 VAS scrotal area with movement  Education: Provided education on importance of mobility and skilled verbal / tactile cueing throughout intervention.     Assessment: Thony Dumont presents with functional mobility impairments which indicate the need for skilled intervention. Only performed supine LE ex due to discomfort around scrotal area due to swelling. Patient reports to getting up to use bathroom as needed. Tolerating session today without incident. Will continue to follow and progress as tolerated.     Plan/Recommendations:   Pt would benefit from Inpatient Rehabilitation placement at discharge from facility and requires rolling walker at discharge.   Pt desires Inpatient Rehabilitation placement at discharge. Pt cooperative; agreeable to therapeutic recommendations and plan of care.     Basic Mobility 6-click:  Rollin = Total, A lot = 2, A little = 3; 4 = None  Supine>Sit:   1 = Total, A lot = 2, A little = 3; 4 = None   Sit>Stand with arms:  1 = Total, A lot = 2, A little = 3; 4 = None  Bed>Chair:   1 = Total, A lot = 2, A little = 3; 4 = None  Ambulate in room:  1 = Total, A lot = 2, A little = 3; 4 = None  3-5 Steps with railin = Total, A lot = 2, A little = 3; 4 = None  Score: 17    Post-Tx Position: Supine with HOB Elevated and Call light and personal items within reach  PPE: gloves, surgical mask, eyewear protection    "

## 2021-11-15 NOTE — PLAN OF CARE
Goal Outcome Evaluation:      Assessment: Thony Dumont presents with functional mobility impairments which indicate the need for skilled intervention. Only performed supine LE ex due to discomfort around scrotal area due to swelling. Patient reports to getting up to use bathroom as needed. Tolerating session today without incident. Will continue to follow and progress as tolerated.

## 2021-11-16 LAB
ALBUMIN SERPL-MCNC: 3.6 G/DL (ref 3.5–5.2)
ANION GAP SERPL CALCULATED.3IONS-SCNC: 12 MMOL/L (ref 5–15)
BACTERIA SPEC AEROBE CULT: NORMAL
BUN SERPL-MCNC: 22 MG/DL (ref 6–20)
BUN/CREAT SERPL: 16.3 (ref 7–25)
CALCIUM SPEC-SCNC: 8.9 MG/DL (ref 8.6–10.5)
CHLORIDE SERPL-SCNC: 101 MMOL/L (ref 98–107)
CO2 SERPL-SCNC: 27 MMOL/L (ref 22–29)
CREAT SERPL-MCNC: 1.35 MG/DL (ref 0.76–1.27)
GFR SERPL CREATININE-BSD FRML MDRD: 66 ML/MIN/1.73
GLUCOSE BLDC GLUCOMTR-MCNC: 116 MG/DL (ref 70–105)
GLUCOSE BLDC GLUCOMTR-MCNC: 135 MG/DL (ref 70–105)
GLUCOSE BLDC GLUCOMTR-MCNC: 73 MG/DL (ref 70–105)
GLUCOSE SERPL-MCNC: 89 MG/DL (ref 65–99)
PHOSPHATE SERPL-MCNC: 3.4 MG/DL (ref 2.5–4.5)
POTASSIUM SERPL-SCNC: 3.7 MMOL/L (ref 3.5–5.2)
SODIUM SERPL-SCNC: 140 MMOL/L (ref 136–145)

## 2021-11-16 PROCEDURE — 80069 RENAL FUNCTION PANEL: CPT | Performed by: HOSPITALIST

## 2021-11-16 PROCEDURE — 99233 SBSQ HOSP IP/OBS HIGH 50: CPT | Performed by: INTERNAL MEDICINE

## 2021-11-16 PROCEDURE — 97530 THERAPEUTIC ACTIVITIES: CPT

## 2021-11-16 PROCEDURE — 25010000002 FUROSEMIDE PER 20 MG: Performed by: INTERNAL MEDICINE

## 2021-11-16 PROCEDURE — 99232 SBSQ HOSP IP/OBS MODERATE 35: CPT | Performed by: INTERNAL MEDICINE

## 2021-11-16 PROCEDURE — 97535 SELF CARE MNGMENT TRAINING: CPT

## 2021-11-16 PROCEDURE — 25010000002 FUROSEMIDE PER 20 MG: Performed by: HOSPITALIST

## 2021-11-16 PROCEDURE — 82962 GLUCOSE BLOOD TEST: CPT

## 2021-11-16 RX ADMIN — ATORVASTATIN CALCIUM 10 MG: 10 TABLET, FILM COATED ORAL at 10:01

## 2021-11-16 RX ADMIN — CARVEDILOL 6.25 MG: 6.25 TABLET, FILM COATED ORAL at 18:33

## 2021-11-16 RX ADMIN — LOSARTAN POTASSIUM 25 MG: 25 TABLET, FILM COATED ORAL at 10:01

## 2021-11-16 RX ADMIN — ACETAMINOPHEN 1000 MG: 500 TABLET, FILM COATED ORAL at 21:08

## 2021-11-16 RX ADMIN — FUROSEMIDE 40 MG: 10 INJECTION, SOLUTION INTRAMUSCULAR; INTRAVENOUS at 22:24

## 2021-11-16 RX ADMIN — FUROSEMIDE 40 MG: 10 INJECTION, SOLUTION INTRAMUSCULAR; INTRAVENOUS at 10:01

## 2021-11-16 RX ADMIN — RIVAROXABAN 20 MG: 20 TABLET, FILM COATED ORAL at 18:33

## 2021-11-16 RX ADMIN — SODIUM CHLORIDE, PRESERVATIVE FREE 10 ML: 5 INJECTION INTRAVENOUS at 21:00

## 2021-11-16 RX ADMIN — SPIRONOLACTONE 25 MG: 25 TABLET ORAL at 10:01

## 2021-11-16 RX ADMIN — SODIUM CHLORIDE, PRESERVATIVE FREE 10 ML: 5 INJECTION INTRAVENOUS at 10:01

## 2021-11-16 RX ADMIN — CARVEDILOL 6.25 MG: 6.25 TABLET, FILM COATED ORAL at 10:01

## 2021-11-16 RX ADMIN — FUROSEMIDE 40 MG: 10 INJECTION, SOLUTION INTRAMUSCULAR; INTRAVENOUS at 00:09

## 2021-11-16 NOTE — PLAN OF CARE
Goal Outcome Evaluation:      Patient transferred from [facility], VSS, Will continue to monitor

## 2021-11-16 NOTE — PLAN OF CARE
Goal Outcome Evaluation:              Outcome Summary: Pt w/o complaints today. VSS. Pt remains on room air, 02 Sats maintaining in the mid-upper 90's. Pt to dc to [facility] when appropriate.

## 2021-11-16 NOTE — PLAN OF CARE
Thony Dumont presents with ADL impairments below baseline abilities which indicate the need for continued skilled intervention while inpatient. Pt supine in bed and agreeable for OOB activity. Unable to complete LB ADLs secondary to LE edema. Transfers with min A. SpO2 >90% on RA. HR 85-90 BPM with sit<>stand transfers. Pt appears to cover cognitive deficits with humor. Tolerating session today without incident. Will continue to follow and progress as tolerated.

## 2021-11-16 NOTE — PROGRESS NOTES
Cardiology Progress Note-EP      Patient Care Team:  Rose Rao APRN as PCP - General (Nurse Practitioner)  Michael Rollins MD as Consulting Physician (Cardiac Electrophysiology)    PATIENT IDENTIFICATION  Name: Thony Dumont  Age: 61 y.o.  Sex: male  :  1960  MRN: [id number]             REASON FOR FOLLOW-UP  Acute on chronic heart failure with reduced ejection fraction  AICD in situ      SUBJECTIVE    Patient seen and examined, chart and labs reviewed.  Patient asleep in bed with normal respiratory effort.  Shortness of breath improved, still has significant edema to lower extremities.  He denies any chest discomfort, palpitations.  No nausea.  Patient has diuresed greater than 12 L    Patient on Aldactone, Lasix 40 mg IV every 12 hours, Xarelto    Significant social issues.  Patient having difficulty finding his vehicle which he lives in.    REVIEW OF SYSTEMS:  Pertinent items are noted in HPI, all other systems reviewed and negative    OBJECTIVE   Creatinine 1.35     ASSESSMENT  Acute on chronic decompensated heart failure with reduced ejection fraction/nonischemic/severe dilated cardiomyopathy with severe LV dysfunction  Class III-IV heart failure  Single-chamber ICD in situ  Paroxysmal atrial fibrillation  Coagulopathy on Xarelto  Cardiorenal syndrome  Pulmonary hypertension  History of medical noncompliance    RECOMMENDATIONS  Patient needs aggressive diuresis.  Currently on Lasix 40 mg twice daily.  Monitor renal function closely-history of CKD.  [place] fluid restriction at 1800 mL/24H, 2 g sodium diet.  Blood pressure borderline low at 98 systolic  Patient needs compliance with fluid/sodium intake at home  Continue statin, BB, ARB for coronary disease/cardiomyopathy  Continue Xarelto for stroke prevention with A. fib  Further recommendations as patient's hospital course progresses          Vital Signs  Visit Vitals  /77 (BP Location: Right arm, Patient Position: Lying)  "  Pulse 87   Temp 97.9 °F (36.6 °C) (Oral)   Resp 12   Ht 190.5 cm (75\")   Wt 115 kg (253 lb 8.5 oz)   SpO2 97%   BMI 31.69 kg/m²     Oxygen Therapy  SpO2: 97 %  Pulse Oximetry Type: Continuous  Device (Oxygen Therapy): room air  Flow (L/min): 1  Oxygen Concentration (%): 96  Flowsheet Rows        First Filed Value   Admission Height 190.5 cm (75\") Documented at 11/10/2021 2226   Admission Weight 118 kg (259 lb 11.2 oz) Documented at 11/10/2021 2229          Intake & Output (last 3 days)         11/13 0701 11/14 0700 11/14 0701  11/15 0700 11/15 0701 11/16 0700 11/16 0701 11/17 0700    P.O. 1160 720 840     Total Intake(mL/kg) 1160 (9.6) 720 (6.2) 840 (7.3)     Urine (mL/kg/hr) 2300 (0.8) 2625 (0.9) 8575 (3.1) 700 (0.6)    Stool   0     Total Output 2300 2625 8575 700    Net -1140 -1905 -7735 -700            Urine Unmeasured Occurrence  2 x      Stool Unmeasured Occurrence   1 x           Lines, Drains & Airways       Active LDAs       Name Placement date Placement time Site Days    Peripheral IV 11/10/21 2306 Right Forearm 11/10/21  2306  Forearm  1                           /77 (BP Location: Right arm, Patient Position: Lying)   Pulse 87   Temp 97.9 °F (36.6 °C) (Oral)   Resp 12   Ht 190.5 cm (75\")   Wt 115 kg (253 lb 8.5 oz)   SpO2 97%   BMI 31.69 kg/m²   Intake/Output last 3 shifts:  I/O last 3 completed shifts:  In: 1320 [P.O.:1320]  Out: 9700 [Urine:9700]  Intake/Output this shift:  I/O this shift:  In: -   Out: 700 [Urine:700]    PHYSICAL EXAM:    General: Well-developed, well-nourished 61-year-old male who is alert, cooperative, no distress, appears stated age  Head:  Normocephalic, atraumatic, mucous membranes moist  Eyes:  Conjunctiva/corneas clear, EOM's intact     Neck:  Supple,  no bruit  Lungs: Clear to auscultation bilaterally, no wheezes rhonchi rales are noted  Chest wall: No tenderness  Heart::  Regular rate and rhythm, S1 and S2 normal, 1/6 holosystolic murmur. No rub or " gallop  Abdomen: Soft, non-tender, nondistended bowel sounds active  Extremities: No cyanosis, clubbing. 1+ edema to lower extremities  Pulses: 2+ and symmetric all extremities  Skin:  No rashes or lesions  Neuro/psych: A&O x3. CN II through XII are grossly intact with appropriate affect      Scheduled Meds:      atorvastatin, 10 mg, Oral, Daily  carvedilol, 6.25 mg, Oral, BID With Meals  furosemide, 40 mg, Intravenous, Q12H  losartan, 25 mg, Oral, Daily  rivaroxaban, 20 mg, Oral, Daily With Dinner  sodium chloride, 10 mL, Intravenous, Q12H  spironolactone, 25 mg, Oral, Daily        Continuous Infusions:         PRN Meds:      acetaminophen    influenza vaccine    metOLazone    sodium chloride        Results Review:     I reviewed the patient's new clinical results.    CBC    Results from last 7 days   Lab Units 11/10/21  2306   WBC 10*3/mm3 4.70   HEMOGLOBIN g/dL 14.6   PLATELETS 10*3/mm3 251     Cr Clearance Estimated Creatinine Clearance: 80.6 mL/min (A) (by C-G formula based on SCr of 1.35 mg/dL (H)).  Coag     HbA1C No results found for: HGBA1C  Blood Glucose   Glucose   Date/Time Value Ref Range Status   11/16/2021 1545 73 70 - 105 mg/dL Final     Comment:     Serial Number: [id number]:  [id number]   11/16/2021 1120 135 (H) 70 - 105 mg/dL Final     Comment:     Serial Number: [id number]:  [id number]   11/16/2021 0720 116 (H) 70 - 105 mg/dL Final     Comment:     Serial Number: [id number]:  [id number]   11/14/2021 0820 106 (H) 70 - 105 mg/dL Final     Comment:     Serial Number: [id number]:  [id number]     Infection   Results from last 7 days   Lab Units 11/11/21  0043 11/11/21  0038 11/10/21  2321   BLOODCX  No growth at 5 days  --  No growth at 5 days   PROCALCITONIN ng/mL  --  0.10  --      CMP   Results from last 7 days   Lab Units 11/16/21  0222 11/15/21  0407 11/14/21  0404 11/13/21  0645 11/12/21  0428 11/11/21  0038   SODIUM mmol/L 140 141 140 141 140 142   POTASSIUM mmol/L 3.7  4.0 4.0 4.8 3.9 3.9   CHLORIDE mmol/L 101 104 104 103 104 104   CO2 mmol/L 27.0 24.0 24.0 28.0 24.0 25.0   BUN mg/dL 22* 23* 25* 23* 21* 19   CREATININE mg/dL 1.35* 1.26 1.40* 1.56* 1.35* 1.37*   GLUCOSE mg/dL 89 109* 102* 97 92 79   ALBUMIN g/dL 3.60 3.50 3.60  --   --  4.00   BILIRUBIN mg/dL  --   --   --   --   --  1.8*   ALK PHOS U/L  --   --   --   --   --  203*   AST (SGOT) U/L  --   --   --   --   --  18   ALT (SGPT) U/L  --   --   --   --   --  18     ABG      UA    Results from last 7 days   Lab Units 11/10/21  2315   NITRITE UA  Negative   WBC UA /HPF 0-2*   BACTERIA UA /HPF None Seen   SQUAM EPITHEL UA /HPF 0-2     EDD  No results found for: POCMETH, POCAMPHET, POCBARBITUR, POCBENZO, POCCOCAINE, POCOPIATES, POCOXYCODO, POCPHENCYC, POCPROPOXY, POCTHC, POCTRICYC  Lysis Labs   Results from last 7 days   Lab Units 11/16/21  0222 11/15/21  0407 11/14/21  0404 11/13/21  0645 11/12/21  0428 11/11/21  0038 11/10/21  2306   HEMOGLOBIN g/dL  --   --   --   --   --   --  14.6   PLATELETS 10*3/mm3  --   --   --   --   --   --  251   CREATININE mg/dL 1.35* 1.26 1.40* 1.56* 1.35* 1.37*  --      Radiology(recent) No radiology results for the last day      Results from last 7 days   Lab Units 11/11/21  0038   TROPONIN T ng/mL <0.010       Xrays, labs reviewed personally by physician.    ECG/EMG Results (most recent)       Procedure Component Value Units Date/Time    ECG 12 Lead [[id number]] Collected: 11/10/21 2238     Updated: 11/15/21 1118     QT Interval 370 ms     Narrative:      HEART RATE= 102  bpm  RR Interval= 592  ms  OK Interval= 162  ms  P Horizontal Axis= -11  deg  P Front Axis= 70  deg  QRSD Interval= 83  ms  QT Interval= 370  ms  QRS Axis= 54  deg  T Wave Axis=   deg  - ABNORMAL ECG -  Sinus tachycardia  Multiple ventricular premature complexes  Abnormal T, consider ischemia, lateral leads  When compared with ECG of 21-Oct-2021 5:45:43,  No significant change  Electronically Signed By: Nando Green ([place])  "15-Nov-2021 11:11:29  Date and Time of Study: 2021-11-10 22:38:30              Medication Review:   I have reviewed the patient's current medication list  Scheduled Meds:atorvastatin, 10 mg, Oral, Daily  carvedilol, 6.25 mg, Oral, BID With Meals  furosemide, 40 mg, Intravenous, Q12H  losartan, 25 mg, Oral, Daily  rivaroxaban, 20 mg, Oral, Daily With Dinner  sodium chloride, 10 mL, Intravenous, Q12H  spironolactone, 25 mg, Oral, Daily      Continuous Infusions:   PRN Meds:.  acetaminophen    influenza vaccine    metOLazone    sodium chloride    Imaging:  Imaging Results (Last 72 Hours)       ** No results found for the last 72 hours. **              CORTEZ Rodriguez  11/16/21  16:25 EST      EMR Dragon/Transcription:   \"Dictated utilizing Dragon dictation\".        Electronically signed by CORTEZ Rodriguez, 11/16/21, 4:25 PM EST.      Cardiology attending:  As above. Agree with assessment plan. Seen and examined. Chart labs reviewed. Note scribed by APC and reviewed for accuracy with above changes noted.  Patient continues to have good diuresis.  Patient having a significant amount of social issues.  Reports that his vehicle is currently missing and is likely being sold on [employer] marketplace.  He denies chest pain.  He reports that he may have to leave to go find his vehicle.  He lives in his vehicle.  Heart regular.  Recommend another 2 to 3 days of aggressive diuresis if possible.  "

## 2021-11-16 NOTE — THERAPY TREATMENT NOTE
Subjective: Pt agreeable to therapeutic plan of care.  Cognition: oriented to Person, Place, Time and Situation    Objective:     Bed Mobility: Min-A  Functional Transfers: Min-A  Functional Ambulation: Min-A    Lower Body Dressing: Max-A  ADL Position: supported sitting and unsupported sitting  ADL Comments: limited by significant BLE edema    Grooming: Supervision  ADL Position: supported sitting  ADL Comments: in recliner    Pain: 2 VAS  Education: Provided education on importance of mobility and skilled verbal / tactile cueing throughout intervention.     Assessment: Thony Dumont presents with ADL impairments below baseline abilities which indicate the need for continued skilled intervention while inpatient. Pt supine in bed and agreeable for OOB activity. Unable to complete LB ADLs secondary to LE edema. Transfers with min A. SpO2 >90% on RA. HR 85-90 BPM with sit<>stand transfers. Pt appears to cover cognitive deficits with humor. Tolerating session today without incident. Will continue to follow and progress as tolerated.     Plan/Recommendations:   Pt would benefit from Inpatient Rehabilitation placement at discharge from facility.   Pt desires Inpatient Rehabilitation placement at discharge. Pt cooperative; agreeable to therapeutic recommendations and plan of care.     Post-Tx Position: Up in Chair, Alarms activated and Call light and personal items within reach  PPE: gloves, surgical mask, eyewear protection

## 2021-11-16 NOTE — PROGRESS NOTES
: The chart reviewed, follow-up on acute on chronic kidney injury secondary to cardiorenal syndrome.  Patient feels better diuresing well, still has peripheral edema    Vital Signs  Vitals:    11/16/21 1100   BP: 103/73   Pulse: 92   Resp: 22   Temp: 97.8 °F (36.6 °C)   SpO2: 95%     I/O this shift:  In: -   Out: 700 [Urine:700]  I/O last 3 completed shifts:  In: 1320 [P.O.:1320]  Out: 9700 [Urine:9700]      Physical Exam:    General: In no acute distress  HEENT:  Normocephalic, Atraumatic, EOMI, PERRLA, NO icterus,  Neck:  Supple, No JVD, No Carotid artery bruit, No lymphadenopathy  Chest: Clear to auscultation bilaterally,   Cardiovascular: Regular rate and rhythm. Positive S1 & S2, No rub, murmur or gallop.  Abdominal: Soft, nontender, no palpable organomegaly, no abdominal bruit, positive bowel sounds  Musculoskeletal: No joint tenderness or swelling, good range of motion, Adequate muscle strength on both sides, no cyanosis, clubbing , positive edema on lower extremities.  Neuro: Alert oriented x3, CN1 - 12 intact, No focal sensory or motor deficit.      Review of Systems:   CNS: Denied headaches, blurred vision, tingling or numbness in any part of body. No weakness or any impaired speech.  CVS: No chest pain or shortness of breath, No orthopnea or PND or exertional dyspnea,  Pulmonary: Denies shortness of breath, coughs, hematemesis, night sweats or weight loss.  GI: Denies diarrhea, nausea, vomiting. Denies weight loss, hematemesis, melena.  : Denies dysuria, frequency or hesitancy of urination  Vascular: Denies claudication, resting pain, tingling numbness or weakness in any part of the body.  Musculoskeletal: Denies Joint tenderness or pain, denies stiffness in joints, denies fever or weight loss, or skin rashes.    Current Labs  [unfilled]  Lab Results (last 24 hours)     Procedure Component Value Units Date/Time    POC Glucose Once [[id number]]  (Abnormal) Collected: 11/16/21 1120    Specimen: Blood  Updated: 11/16/21 1122     Glucose 135 mg/dL      Comment: Serial Number: [id number]:  [id number]       POC Glucose Once [[id number]]  (Abnormal) Collected: 11/16/21 0720    Specimen: Blood Updated: 11/16/21 0722     Glucose 116 mg/dL      Comment: Serial Number: [id number]:  [id number]       Renal Function Panel [[id number]]  (Abnormal) Collected: 11/16/21 0222    Specimen: Blood Updated: 11/16/21 0339     Glucose 89 mg/dL      BUN 22 mg/dL      Creatinine 1.35 mg/dL      Sodium 140 mmol/L      Potassium 3.7 mmol/L      Chloride 101 mmol/L      CO2 27.0 mmol/L      Calcium 8.9 mg/dL      Albumin 3.60 g/dL      Phosphorus 3.4 mg/dL      Anion Gap 12.0 mmol/L      BUN/Creatinine Ratio 16.3     eGFR  African Amer 66 mL/min/1.73     Narrative:      GFR Normal >60  Chronic Kidney Disease <60  Kidney Failure <15      Blood Culture - Blood, Blood, Venous Line [[id number]]  (Normal) Collected: 11/11/21 0043    Specimen: Blood, Venous Line Updated: 11/16/21 0101     Blood Culture No growth at 5 days    Blood Culture - Blood, Arm, Right [[id number]]  (Normal) Collected: 11/10/21 2321    Specimen: Blood from Arm, Right Updated: 11/15/21 2331     Blood Culture No growth at 5 days        Current Radiology  Imaging Results (Last 24 Hours)     ** No results found for the last 24 hours. **        Current Meds    Current Facility-Administered Medications:   •  acetaminophen (TYLENOL) tablet 1,000 mg, 1,000 mg, Oral, Q6H PRN, Jennifer Dumont MD, 1,000 mg at 11/11/21 1512  •  atorvastatin (LIPITOR) tablet 10 mg, 10 mg, Oral, Daily, Jennifer Dumont MD, 10 mg at 11/16/21 1001  •  carvedilol (COREG) tablet 6.25 mg, 6.25 mg, Oral, BID With Meals, Jennifer Dumont MD, 6.25 mg at 11/16/21 1001  •  furosemide (LASIX) injection 40 mg, 40 mg, Intravenous, Q12H, Jennifer Dumont MD, 40 mg at 11/16/21 1001  •  influenza vac split quad (FLUZONE,FLUARIX,AFLURIA,FLULAVAL) injection 0.5 mL, 0.5 mL, Intramuscular, During Hospitalization, Tim  Jennifer JIMENES MD  •  losartan (COZAAR) tablet 25 mg, 25 mg, Oral, Daily, Jennifer Dumont MD, 25 mg at 11/16/21 1001  •  metOLazone (ZAROXOLYN) tablet 2.5 mg, 2.5 mg, Oral, Daily PRN, Jennifer Dumont MD  •  rivaroxaban (XARELTO) tablet 20 mg, 20 mg, Oral, Daily With Dinner, Jennifer Dumont MD, 20 mg at 11/15/21 1721  •  sodium chloride 0.9 % flush 10 mL, 10 mL, Intravenous, Q12H, Jennifer Dumont MD, 10 mL at 11/16/21 1001  •  sodium chloride 0.9 % flush 10 mL, 10 mL, Intravenous, PRN, Jennifer Dumont MD  •  spironolactone (ALDACTONE) tablet 25 mg, 25 mg, Oral, Daily, Jennifer Dumont MD, 25 mg at 11/16/21 1001              Patient Active Problem List   Diagnosis   • Essential hypertension   • Mixed hyperlipidemia   • Paroxysmal atrial fibrillation (HCC)   • Chronic pain   • Tobacco abuse   • NICM (nonischemic cardiomyopathy) (HCC)   • Acute on chronic combined systolic (congestive) and diastolic (congestive) heart failure (HCC)   • CKD (chronic kidney disease) stage 2, GFR 60-89 ml/min   • ICD (implantable cardioverter-defibrillator) in place   • Degenerative joint disease   • Erectile dysfunction   • Gastroesophageal reflux disease   • Sprain of rotator cuff capsule   • Moderate mitral regurgitation   • Moderate tricuspid regurgitation   • Pulmonary hypertension (HCC)   • Serum total bilirubin elevated   • Substance use   • Chest pain   • Acute respiratory failure with hypoxemia (HCC)   • Methamphetamine abuse (HCC)   • Patient's noncompliance with other medical treatment and regimen   Acute on chronic kidney injury secondary to decompensated heart failure continue diuretics at current dose, creatinine slightly increase, replace potassium and magnesium as needed  Continue salt and water restriction and diuretics      Marian Anna MD FACP, FASN.  11/16/21  13:12 EST

## 2021-11-16 NOTE — PROGRESS NOTES
[facility] Medicine Services Daily Progress Note    Patient Name: Thony Dumont  : 1960  MRN: [id number]  Primary Care Physician:  Rose Rao APRN  Date of admission: 11/10/2021      Subjective      Chief Complaint: Shortness of air      Patient Reports   2021   Patient reports feeling generally poorly with generalized weakness.  He was able to take a shower today independently.  He reports ongoing swelling diffusely.  2021  Patient continues to complain of significant edema of the lower extremities scrotum and lower abdomen and despite IV diuresis and he is having worsening renal function with continued weight gain.  Nephrology has been consulted.  2021  Patient reports significant improvement in his swelling today compared to yesterday.  He still has significant penile and scrotal edema and bilateral lower extremity edema.  The patient has had excellent urine output but his weight is still up compared to admission.  11/15/21 patient seen and examined in bed no acute distress, still having significant edema, good urine output. case discussed with RN.    21 patient seen and examined in bed no acute distress, edema improving.  discussed with RN.    ROS 12 point review of systems was reviewed and was negative except for severe bilateral lower extremity, scrotum and lower abdominal wall edema and generalized weakness.      Objective      Vitals:   Temp:  [97.6 °F (36.4 °C)-99 °F (37.2 °C)] 97.8 °F (36.6 °C)  Heart Rate:  [74-92] 92  Resp:  [13-22] 22  BP: (103-117)/(70-87) 103/73    Physical Exam vital signs and nurses notes reviewed.  Well-developed well-nourished gentleman sitting up in bed awake and alert in no acute distress; mucous membranes moist, sclera anicteric; lungs with decreased air entry bilaterally; CV distant heart sounds; abdomen soft and protuberant with anasarca involving the lower abdominal wall, groin and scrotum with bilateral  lower extremity anasarca which is gradually improving compared to admission; no cyanosis; capillary refill less than 2 seconds distally.  No Parker catheter.        Result Review    Result Review:  I have personally reviewed the results from the time of this admission to 11/16/2021 15:23 EST and agree with these findings:  [x]  Laboratory  []  Microbiology  [x]  Radiology  [x]  EKG/Telemetry   [x]  Cardiology/Vascular   []  Pathology  [x]  Old records  []  Other:  Most notable findings noted in the assessment and plan.          Assessment/Plan      Brief Patient Summary:  Thony Dumont is a 61 y.o. male who presented to [facility] on 11/10/2021 complaining of increased shortness of air and bilateral leg swelling past week. He reports he has been taking his home medications. Drug screen positive today for methamphetamines and THC. He reports he snorted yesterday and denies IV drug use. He reports increased dyspnea on exertion. He denies chest pain, nausea, fever, or dizziness. He reports non productive cough. On exam he has BLE edema +3 from feet to above groin. He has a PMH of ischemic cardiomyopathy combined systolic heart failure with last EF per echo in June 2021 of 21-25%. He has an AICD in place. He reports he has stopped smoking. He was COVID-19 negative.  Chest x-ray showed cardiomegaly with vascular congestion.  EKG shows mild sinus tachycardia with multiple PVCs.  Troponin was negative and proBNP was 1989 (0-900). He was given 1 inch of Nitropaste in ED and 40 mg IV Lasix and was admitted for diuresis.      Review of records shows he was recently admitted here from October 21 to October 23, 2021 with similar complaints.  He was also admitted here in August 25, 2021 with acute systolic CHF.  He was hypoxic oxygen saturation 80s on admission but is now stable on 4 L oxygen via nasal cannula.   have been consulted for methamphetamine abuse.    Current inpatient medications  include:  atorvastatin, 10 mg, Oral, Daily  carvedilol, 6.25 mg, Oral, BID With Meals  furosemide, 40 mg, Intravenous, Q12H  losartan, 25 mg, Oral, Daily  rivaroxaban, 20 mg, Oral, Daily With Dinner  sodium chloride, 10 mL, Intravenous, Q12H  spironolactone, 25 mg, Oral, Daily             Active Hospital Problems:  Active Hospital Problems    Diagnosis    • **Acute on chronic combined systolic (congestive) and diastolic (congestive) heart failure (HCC)    • Patient's noncompliance with other medical treatment and regimen    • Acute respiratory failure with hypoxemia (HCC)    • Methamphetamine abuse (HCC)    • ICD (implantable cardioverter-defibrillator) in place    • Gastroesophageal reflux disease    • Moderate mitral regurgitation    • Moderate tricuspid regurgitation    • Pulmonary hypertension (HCC)    • CKD (chronic kidney disease) stage 2, GFR 60-89 ml/min    • Tobacco abuse    • Essential hypertension    • Mixed hyperlipidemia    • Paroxysmal atrial fibrillation (HCC)        Acute hypoxic respiratory failure due to Acute on chronic systolic and diastolic congestive heart failure due to ischemic cardiomyopathy, methamphetamine abuse and hypertensive heart disease-improving  -AICD in place   -Echo 6/6/2021 showed LVEF of 21 to 25% with moderate mitral valve regurgitation and mild to moderate pulmonary hypertension  -Renal function worsening on IV Lasix and the patient is not losing weight with diuresis, so nephrology was consulted on 11/13/2021; IV Lasix and oral spironolactone were continued    Methamphetamine and THC abuse  -toxicology screen positive for methamphetamines and THC    - consulted   -Cessation counseling     Chronic kidney disease stage II due to to hypertension and cardiorenal syndrome  - creatinine on admission 1.37 with baseline creatinine 1.16-1.53  -Creatinine increased to 1.56 with diuresis on 11/13/2021 but has improved to 1.4  -avoid nephrotoxic meds  -Nephrology has been  consulted to assist with diuresis     Paroxysmal atrial fibrillation-Sinus rhythm on admission  -Continue home Coreg and Xarelto      Hyperlipidemia   -Continue atorvastatin     Essential hypertensive, chronic and uncontrolled on admission- improved  -Continue Coreg, losartan, metolazone and spironolactone  -Hold oral Lasix while on IV Lasix    DVT prophylaxis:  Medical DVT prophylaxis orders are present.    CODE STATUS:    Code Status (Patient has no pulse and is not breathing): CPR (Attempt to Resuscitate)  Medical Interventions (Patient has pulse or is breathing): Full Support      Disposition:  I expect patient to be discharged once inpatient rehab is arranged once clinically improved.    This patient has been examined wearing appropriate Personal Protective Equipment and discussed with hospital infection control department. 11/16/21      Electronically signed by Luis Eduardo Toro MD, 11/16/21, 15:23 EST.  [facility]ist [facility]

## 2021-11-16 NOTE — CASE MANAGEMENT/SOCIAL WORK
Continued Stay Note   [facility]     Patient Name: Thony Dumont  MRN: [id number]  Today's Date: 11/16/2021    Admit Date: 11/10/2021     Discharge Plan     Row Name 11/16/21 1413       Plan    Plan [facility] accepted, no precert required. PASRR per facility. DC when medically stable.    Plan Comments DC when medically stable to [facility].                       Expected Discharge Date and Time     Expected Discharge Date Expected Discharge Time    Nov 18, 2021             Domonique Diallo RN   Met with patient in room wearing PPE: mask    Maintained distance greater than six feet and spent less than 15 minutes in the room.

## 2021-11-17 VITALS
HEIGHT: 75 IN | HEART RATE: 80 BPM | SYSTOLIC BLOOD PRESSURE: 117 MMHG | DIASTOLIC BLOOD PRESSURE: 75 MMHG | WEIGHT: 250 LBS | TEMPERATURE: 98 F | BODY MASS INDEX: 31.08 KG/M2 | RESPIRATION RATE: 20 BRPM | OXYGEN SATURATION: 98 %

## 2021-11-17 LAB
ALBUMIN SERPL-MCNC: 3.5 G/DL (ref 3.5–5.2)
ANION GAP SERPL CALCULATED.3IONS-SCNC: 11 MMOL/L (ref 5–15)
BUN SERPL-MCNC: 22 MG/DL (ref 6–20)
BUN/CREAT SERPL: 17.6 (ref 7–25)
CALCIUM SPEC-SCNC: 9 MG/DL (ref 8.6–10.5)
CHLORIDE SERPL-SCNC: 99 MMOL/L (ref 98–107)
CO2 SERPL-SCNC: 27 MMOL/L (ref 22–29)
CREAT SERPL-MCNC: 1.25 MG/DL (ref 0.76–1.27)
GFR SERPL CREATININE-BSD FRML MDRD: 72 ML/MIN/1.73
GLUCOSE SERPL-MCNC: 92 MG/DL (ref 65–99)
MAGNESIUM SERPL-MCNC: 1.8 MG/DL (ref 1.6–2.6)
PHOSPHATE SERPL-MCNC: 3.7 MG/DL (ref 2.5–4.5)
POTASSIUM SERPL-SCNC: 4 MMOL/L (ref 3.5–5.2)
SODIUM SERPL-SCNC: 137 MMOL/L (ref 136–145)

## 2021-11-17 PROCEDURE — 25010000002 FUROSEMIDE PER 20 MG: Performed by: INTERNAL MEDICINE

## 2021-11-17 PROCEDURE — 94618 PULMONARY STRESS TESTING: CPT

## 2021-11-17 PROCEDURE — 93005 ELECTROCARDIOGRAM TRACING: CPT | Performed by: INTERNAL MEDICINE

## 2021-11-17 PROCEDURE — 80069 RENAL FUNCTION PANEL: CPT | Performed by: INTERNAL MEDICINE

## 2021-11-17 PROCEDURE — 99232 SBSQ HOSP IP/OBS MODERATE 35: CPT | Performed by: INTERNAL MEDICINE

## 2021-11-17 PROCEDURE — 93010 ELECTROCARDIOGRAM REPORT: CPT | Performed by: INTERNAL MEDICINE

## 2021-11-17 PROCEDURE — 99239 HOSP IP/OBS DSCHRG MGMT >30: CPT | Performed by: INTERNAL MEDICINE

## 2021-11-17 PROCEDURE — 83735 ASSAY OF MAGNESIUM: CPT | Performed by: INTERNAL MEDICINE

## 2021-11-17 PROCEDURE — 97116 GAIT TRAINING THERAPY: CPT

## 2021-11-17 RX ORDER — METOLAZONE 2.5 MG/1
2.5 TABLET ORAL EVERY OTHER DAY
Qty: 30 TABLET | Refills: 0 | Status: SHIPPED | OUTPATIENT
Start: 2021-11-17 | End: 2022-10-28

## 2021-11-17 RX ORDER — NITROGLYCERIN 0.4 MG/1
0.4 TABLET SUBLINGUAL
Status: DISCONTINUED | OUTPATIENT
Start: 2021-11-17 | End: 2021-11-17 | Stop reason: HOSPADM

## 2021-11-17 RX ORDER — METOLAZONE 5 MG/1
5 TABLET ORAL ONCE
Status: COMPLETED | OUTPATIENT
Start: 2021-11-17 | End: 2021-11-17

## 2021-11-17 RX ORDER — METOLAZONE 2.5 MG/1
2.5 TABLET ORAL EVERY OTHER DAY
Qty: 30 TABLET | Refills: 0 | Status: SHIPPED | OUTPATIENT
Start: 2021-11-17 | End: 2021-11-17 | Stop reason: SDUPTHER

## 2021-11-17 RX ORDER — NITROGLYCERIN 0.4 MG/1
0.4 TABLET SUBLINGUAL
Qty: 30 TABLET | Refills: 12
Start: 2021-11-17 | End: 2022-10-28

## 2021-11-17 RX ORDER — FUROSEMIDE 10 MG/ML
40 INJECTION INTRAMUSCULAR; INTRAVENOUS EVERY 8 HOURS
Status: DISCONTINUED | OUTPATIENT
Start: 2021-11-17 | End: 2021-11-17 | Stop reason: HOSPADM

## 2021-11-17 RX ORDER — FUROSEMIDE 40 MG/1
80 TABLET ORAL 2 TIMES DAILY
Qty: 90 TABLET | Refills: 0 | Status: SHIPPED | OUTPATIENT
Start: 2021-11-17 | End: 2022-12-01 | Stop reason: SDUPTHER

## 2021-11-17 RX ADMIN — ACETAMINOPHEN 1000 MG: 500 TABLET, FILM COATED ORAL at 07:45

## 2021-11-17 RX ADMIN — LOSARTAN POTASSIUM 25 MG: 25 TABLET, FILM COATED ORAL at 07:46

## 2021-11-17 RX ADMIN — SPIRONOLACTONE 25 MG: 25 TABLET ORAL at 07:46

## 2021-11-17 RX ADMIN — FUROSEMIDE 40 MG: 10 INJECTION INTRAMUSCULAR; INTRAVENOUS at 10:21

## 2021-11-17 RX ADMIN — CARVEDILOL 6.25 MG: 6.25 TABLET, FILM COATED ORAL at 07:45

## 2021-11-17 RX ADMIN — SODIUM CHLORIDE, PRESERVATIVE FREE 10 ML: 5 INJECTION INTRAVENOUS at 08:49

## 2021-11-17 RX ADMIN — METOLAZONE 5 MG: 5 TABLET ORAL at 10:21

## 2021-11-17 RX ADMIN — ATORVASTATIN CALCIUM 10 MG: 10 TABLET, FILM COATED ORAL at 07:46

## 2021-11-17 NOTE — THERAPY TREATMENT NOTE
Subjective: Pt agreeable to therapeutic plan of care.    Objective:     Bed mobility - CGA  Transfers - CGA from elevated bed surface.  Ambulation - 2x40 feet CGA and Min-A requiring few short rest breaks during second gait trial due to SOA.    Pain: 0 VAS  Education: Provided education on importance of mobility and skilled verbal / tactile cueing throughout intervention.     Assessment: Thony Dumont presents with functional mobility impairments which indicate the need for skilled intervention. Tolerating session today without incident. Pt this date requires increased time for gait training. Pt with FFP requiring occasional VC for appropriate posture. Pt with significant SOA following ambulation. O2 saturations monitored following treatment and desat to 86% but recovered following short seated rest. Will continue to follow and progress as tolerated.     Plan/Recommendations:   Pt would benefit from Inpatient Rehabilitation placement at discharge from facility and requires no DME at discharge.   Pt desires Inpatient Rehabilitation placement at discharge. Pt cooperative; agreeable to therapeutic recommendations and plan of care.     Basic Mobility 6-click:  Rollin = Total, A lot = 2, A little = 3; 4 = None  Supine>Sit:   1 = Total, A lot = 2, A little = 3; 4 = None   Sit>Stand with arms:  1 = Total, A lot = 2, A little = 3; 4 = None  Bed>Chair:   1 = Total, A lot = 2, A little = 3; 4 = None  Ambulate in room:  1 = Total, A lot = 2, A little = 3; 4 = None  3-5 Steps with railin = Total, A lot = 2, A little = 3; 4 = None  Score: 16    Modified [place]: N/A = No pre-op stroke/TIA    Post-Tx Position: Call light and personal items within reach sitting EOB with alarm armed  PPE: gloves, surgical mask, eyewear protection

## 2021-11-17 NOTE — PLAN OF CARE
Objective:   Bed mobility - CGA  Transfers - CGA from elevated bed surface.  Ambulation - 2x40 feet CGA and Min-A requiring few short rest breaks during second gait trial due to SOA.    Assessment: Thony Dumont presents with functional mobility impairments which indicate the need for skilled intervention. Tolerating session today without incident. Pt this date requires increased time for gait training. Pt with FFP requiring occasional VC for appropriate posture. Pt with significant SOA following ambulation. O2 saturations monitored following treatment and desat to 86% but recovered following short seated rest. Will continue to follow and progress as tolerated.

## 2021-11-17 NOTE — PROGRESS NOTES
"RENAL/KCC:     LOS: 3 days     Chief Complaint/ Reason for encounter: CKD, diuretic management  Chief Complaint   Patient presents with   • Shortness of Breath     pt reports BLE swelling and \"coughing fit\" that started about 25 mins ago, reports SOA since         Subjective   Feeling better    Vital Signs  Temp:  [97.7 °F (36.5 °C)-97.9 °F (36.6 °C)] 97.7 °F (36.5 °C)  Heart Rate:  [81-92] 81  Resp:  [12-22] 18  BP: (103-112)/(72-77) 112/74       Wt Readings from Last 1 Encounters:   11/17/21 0504 113 kg (250 lb)   11/17/21 0349 113 kg (250 lb)   11/16/21 0024 115 kg (253 lb 8.5 oz)   11/15/21 0605 116 kg (256 lb 2.8 oz)   11/14/21 0400 121 kg (267 lb 10.2 oz)   11/13/21 0509 123 kg (271 lb 2.7 oz)   11/12/21 0500 119 kg (262 lb 12.6 oz)   11/11/21 0500 119 kg (261 lb 14.5 oz)   11/10/21 2229 118 kg (259 lb 11.2 oz)       Objective:  Vital signs: (most recent): Blood pressure 112/74, pulse 81, temperature 97.7 °F (36.5 °C), temperature source Oral, resp. rate 18, height 190.5 cm (75\"), weight 113 kg (250 lb), SpO2 96 %.          Objective:  General Appearance:  Comfortable, well-appearing, in no acute distress and not in pain.  Awake, alert, oriented  HEENT: Mucous membranes moist, no injury, oropharynx clear  Lungs:  Normal effort and normal respiratory rate.  Breath sounds clear to auscultation.  No  respiratory distress.  No rales, decreased breath sounds or rhonchi.    Heart: Normal rate.  Regular rhythm.  S1 normal.  No murmur.   Abdomen: Abdomen is soft.  Bowel sounds are normal, no abdominal tenderness.  There is no rebound or guarding  Extremities: Normal range of motion.  2+ edema of bilateral lower extremities, distal pulses intact  Neurological: No focal motor or sensory deficits, pupils reactive  Skin:  Warm and dry.  No rash or cyanosis.       Results Review:    Intake/Output:     Intake/Output Summary (Last 24 hours) at 11/17/2021 0908  Last data filed at 11/17/2021 0349  Gross per 24 hour   Intake -- "   Output 2700 ml   Net -2700 ml       Labs:   Recent Results (from the past 24 hour(s))   POC Glucose Once    Collection Time: 11/16/21 11:20 AM    Specimen: Blood   Result Value Ref Range    Glucose 135 (H) 70 - 105 mg/dL   POC Glucose Once    Collection Time: 11/16/21  3:45 PM    Specimen: Blood   Result Value Ref Range    Glucose 73 70 - 105 mg/dL   Renal Function Panel    Collection Time: 11/17/21  5:13 AM    Specimen: Blood   Result Value Ref Range    Glucose 92 65 - 99 mg/dL    BUN 22 (H) 6 - 20 mg/dL    Creatinine 1.25 0.76 - 1.27 mg/dL    Sodium 137 136 - 145 mmol/L    Potassium 4.0 3.5 - 5.2 mmol/L    Chloride 99 98 - 107 mmol/L    CO2 27.0 22.0 - 29.0 mmol/L    Calcium 9.0 8.6 - 10.5 mg/dL    Albumin 3.50 3.50 - 5.20 g/dL    Phosphorus 3.7 2.5 - 4.5 mg/dL    Anion Gap 11.0 5.0 - 15.0 mmol/L    BUN/Creatinine Ratio 17.6 7.0 - 25.0    eGFR  African Amer 72 >60 mL/min/1.73   Magnesium    Collection Time: 11/17/21  5:13 AM    Specimen: Blood   Result Value Ref Range    Magnesium 1.8 1.6 - 2.6 mg/dL   ECG 12 Lead    Collection Time: 11/17/21  7:38 AM   Result Value Ref Range    QT Interval 407 ms       Radiology:  Imaging Results (Last 24 Hours)     ** No results found for the last 24 hours. **             Medications have been reviewed:  Current Facility-Administered Medications   Medication Dose Route Frequency Provider Last Rate Last Admin   • acetaminophen (TYLENOL) tablet 1,000 mg  1,000 mg Oral Q6H PRN Luis Eduardo Toro MD   1,000 mg at 11/17/21 0745   • atorvastatin (LIPITOR) tablet 10 mg  10 mg Oral Daily Luis Eduardo Toro MD   10 mg at 11/17/21 0746   • carvedilol (COREG) tablet 6.25 mg  6.25 mg Oral BID With Meals Luis Eduardo Toro MD   6.25 mg at 11/17/21 0745   • furosemide (LASIX) injection 40 mg  40 mg Intravenous Q8H Ash Erazo MD       • influenza vac split quad (FLUZONE,FLUARIX,AFLURIA,FLULAVAL) injection 0.5 mL  0.5 mL Intramuscular During Hospitalization Muna,  Luis Eduardo JULIO MD       • losartan (COZAAR) tablet 25 mg  25 mg Oral Daily Luis Eduardo Toro MD   25 mg at 11/17/21 0746   • metOLazone (ZAROXOLYN) tablet 2.5 mg  2.5 mg Oral Daily PRN Luis Eduardo Toro MD       • metOLazone (ZAROXOLYN) tablet 5 mg  5 mg Oral Once sAh Erazo MD       • nitroglycerin (NITROSTAT) SL tablet 0.4 mg  0.4 mg Sublingual Q5 Min PRN Neal Lipscomb DO       • rivaroxaban (XARELTO) tablet 20 mg  20 mg Oral Daily With Dinner Luis Eduardo Toro MD   20 mg at 11/16/21 1833   • sodium chloride 0.9 % flush 10 mL  10 mL Intravenous Q12H Luis Eduardo Toro MD   10 mL at 11/17/21 0849   • sodium chloride 0.9 % flush 10 mL  10 mL Intravenous PRN Luis Eduardo Toro MD       • spironolactone (ALDACTONE) tablet 25 mg  25 mg Oral Daily Luis Eduardo Toro MD   25 mg at 11/17/21 0746       ASSESSMENT:  JOHN - Cardiorenal  CKD stage IIIb, stable, near baseline, tolerating diuresis well  Fluid overload  Hypoxemic respiratory failure on admission, improving with diuresis    Acute on chronic combined systolic (congestive) and diastolic (congestive) heart failure (HCC)    Essential hypertension    Mixed hyperlipidemia    Paroxysmal atrial fibrillation (HCC)    Tobacco abuse    CKD (chronic kidney disease) stage 2, GFR 60-89 ml/min    ICD (implantable cardioverter-defibrillator) in place    Gastroesophageal reflux disease    Moderate mitral regurgitation    Moderate tricuspid regurgitation    Pulmonary hypertension (HCC)    Acute respiratory failure with hypoxemia (HCC)    Methamphetamine abuse (HCC)    Patient's noncompliance with other medical treatment and regimen        PLAN:   Cr improved with diuresis, likely cardiorenal  Continue IV Lasix - change to TID - and PO Aldactone. Metolazone x 1 today.  Okay to continue ARB  Continue to monitor electrolytes and volume closely, avoid IV contrast and nephrotoxic medications       Ash Erazo MD   [facility]    Office phone number: [phone]  Answering service phone number: [phone]  11/17/21  09:08 EST

## 2021-11-17 NOTE — DISCHARGE SUMMARY
[facility] Medicine Services  DISCHARGE SUMMARY    Patient Name: Thony Dumont  : 1960  MRN: [id number]    Date of Admission: 11/10/2021  Date of Discharge:  21  Primary Care Physician: Rose Rao APRN    Presenting Problem:   Methamphetamine abuse (HCC) [F15.10]  Acute respiratory failure with hypoxemia (HCC) [J96.01]  Acute on chronic congestive heart failure, unspecified heart failure type (HCC) [I50.9]  Lab test negative for COVID-19 virus [Z20.822]  Acute congestive heart failure (HCC) [I50.9]    Active and Resolved Hospital Problems:  Active Hospital Problems    Diagnosis POA   • **Acute on chronic combined systolic (congestive) and diastolic (congestive) heart failure (HCC) [I50.43] Yes   • Patient's noncompliance with other medical treatment and regimen [Z91.19] Not Applicable   • Acute respiratory failure with hypoxemia (HCC) [J96.01] Yes   • Methamphetamine abuse (HCC) [F15.10] Yes   • ICD (implantable cardioverter-defibrillator) in place [Z95.810] Yes   • Gastroesophageal reflux disease [K21.9] Yes   • Moderate mitral regurgitation [I34.0] Yes   • Moderate tricuspid regurgitation [I07.1] Yes   • Pulmonary hypertension (HCC) [I27.20] Yes   • CKD (chronic kidney disease) stage 2, GFR 60-89 ml/min [N18.2] Yes   • Tobacco abuse [Z72.0] Yes   • Essential hypertension [I10] Yes   • Mixed hyperlipidemia [E78.2] Yes   • Paroxysmal atrial fibrillation (HCC) [I48.0] Yes      Resolved Hospital Problems   No resolved problems to display.       Acute hypoxic respiratory failure due to Acute on chronic systolic and diastolic congestive heart failure due to ischemic cardiomyopathy, methamphetamine abuse and hypertensive heart disease-improving  -AICD in place   -Echo 2021 showed LVEF of 21 to 25% with moderate mitral valve regurgitation and mild to moderate pulmonary hypertension  -Renal function worsening on IV Lasix and the patient is not losing weight  with diuresis, so nephrology was consulted on 11/13/2021; IV Lasix and oral spironolactone were continued     Methamphetamine and THC abuse  -toxicology screen positive for methamphetamines and THC    - consulted   -Cessation counseling     Chronic kidney disease stage II due to to hypertension and cardiorenal syndrome  - creatinine on admission 1.37 with baseline creatinine 1.16-1.53  -Creatinine increased to 1.56 with diuresis on 11/13/2021 but has improved to 1.4  -avoid nephrotoxic meds  -Nephrology has been consulted to assist with diuresis     Paroxysmal atrial fibrillation-Sinus rhythm on admission  -Continue home Coreg and Xarelto      Hyperlipidemia   -Continue atorvastatin     Essential hypertensive, chronic and uncontrolled on admission- improved  -Continue Coreg, losartan, metolazone and spironolactone    Hospital Course     Hospital Course:  Thony Dumont is a 61 y.o. male who presented to [facility] on 11/10/2021 complaining of increased shortness of air and bilateral leg swelling past week. He reports he has been taking his home medications. Drug screen positive today for methamphetamines and THC. He reports he snorted yesterday and denies IV drug use. He reports increased dyspnea on exertion. He denies chest pain, nausea, fever, or dizziness. He reports non productive cough. On exam he has BLE edema +3 from feet to above groin. He has a PMH of ischemic cardiomyopathy combined systolic heart failure with last EF per echo in June 2021 of 21-25%. He has an AICD in place. He reports he has stopped smoking. He was COVID-19 negative.  Chest x-ray showed cardiomegaly with vascular congestion.  EKG shows mild sinus tachycardia with multiple PVCs.  Troponin was negative and proBNP was 1989 (0-900). He was given 1 inch of Nitropaste in ED and 40 mg IV Lasix and was admitted for diuresis.       Review of records shows he was recently admitted here from October 21 to October 23, 2021  with similar complaints.  He was also admitted here in August 25, 2021 with acute systolic CHF.  He was hypoxic oxygen saturation 80s on admission but is now stable on 4 L oxygen via nasal cannula.   have been consulted for methamphetamine abuse.  11/12/2021   Patient reports feeling generally poorly with generalized weakness.  He was able to take a shower today independently.  He reports ongoing swelling diffusely.  11/13/2021  Patient continues to complain of significant edema of the lower extremities scrotum and lower abdomen and despite IV diuresis and he is having worsening renal function with continued weight gain.  Nephrology has been consulted.  11/14/2021  Patient reports significant improvement in his swelling today compared to yesterday.  He still has significant penile and scrotal edema and bilateral lower extremity edema.  The patient has had excellent urine output but his weight is still up compared to admission.  11/15/21 patient seen and examined in bed no acute distress, still having significant edema, good urine output. case discussed with RN.  11/16/21 patient seen and examined in bed no acute distress, edema improving.  discussed with RN.  11/17/2021 patient seen and examined in bed no acute distress, edema slightly improving.  Discussed with RN.  Awaiting placement.  Complaining of cough.     DISCHARGE Follow Up Recommendations for labs and diagnostics: cbc,bmp,bnp    Reasons For Change In Medications and Indications for New Medications:  Lasix to 80 mg bid    Day of Discharge     Vital Signs:  Temp:  [97.7 °F (36.5 °C)-98 °F (36.7 °C)] 98 °F (36.7 °C)  Heart Rate:  [80-81] 80  Resp:  [16-20] 20  BP: (106-117)/(72-75) 117/75      Physical Exam  Vitals and nursing note reviewed.   Constitutional:       General: He is not in acute distress.     Appearance: He is well-developed. He is obese. He is not ill-appearing, toxic-appearing or diaphoretic.   HENT:      Head: Normocephalic and  atraumatic.      Nose: Nose normal. No congestion or rhinorrhea.      Mouth/Throat:      Mouth: Mucous membranes are moist.      Pharynx: No oropharyngeal exudate.   Eyes:      General: No scleral icterus.        Right eye: No discharge.         Left eye: No discharge.      Extraocular Movements: Extraocular movements intact.      Conjunctiva/sclera: Conjunctivae normal.      Pupils: Pupils are equal, round, and reactive to light.   Neck:      Thyroid: No thyromegaly.      Vascular: No carotid bruit or JVD.      Trachea: No tracheal deviation.   Cardiovascular:      Rate and Rhythm: Normal rate and regular rhythm.      Pulses: Normal pulses.      Heart sounds: Normal heart sounds. No murmur heard.  No friction rub. No gallop.    Pulmonary:      Effort: Pulmonary effort is normal. No respiratory distress.      Breath sounds: Normal breath sounds. No wheezing or rales.   Abdominal:      General: Bowel sounds are normal. There is no distension.      Palpations: Abdomen is soft.      Tenderness: There is no abdominal tenderness. There is no guarding.   Musculoskeletal:         General: No swelling, tenderness, deformity or signs of injury. Normal range of motion.      Cervical back: Normal range of motion and neck supple. No rigidity. No muscular tenderness.      Right lower leg: Edema present.      Left lower leg: Edema present.   Lymphadenopathy:      Cervical: No cervical adenopathy.   Skin:     General: Skin is warm and dry.      Coloration: Skin is not jaundiced.      Findings: No bruising.   Neurological:      General: No focal deficit present.      Mental Status: He is alert and oriented to person, place, and time. Mental status is at baseline.      Cranial Nerves: No cranial nerve deficit.      Motor: Weakness present. No abnormal muscle tone.   Psychiatric:         Mood and Affect: Mood normal.         Behavior: Behavior normal.         Thought Content: Thought content normal.         Judgment: Judgment normal.             Pertinent  and/or Most Recent Results     LAB RESULTS:      Lab 11/11/21  0038 11/10/21  2306   WBC  --  4.70   HEMOGLOBIN  --  14.6   HEMATOCRIT  --  41.6   PLATELETS  --  251   NEUTROS ABS  --  3.10   LYMPHS ABS  --  0.80   MONOS ABS  --  0.70   EOS ABS  --  0.10   MCV  --  95.6   PROCALCITONIN 0.10  --          Lab 11/17/21  0513 11/16/21  0222 11/15/21  0407 11/14/21  0404 11/13/21  0645 11/12/21  0428 11/11/21 0038   SODIUM 137 140 141 140 141   < > 142   POTASSIUM 4.0 3.7 4.0 4.0 4.8   < > 3.9   CHLORIDE 99 101 104 104 103   < > 104   CO2 27.0 27.0 24.0 24.0 28.0   < > 25.0   ANION GAP 11.0 12.0 13.0 12.0 10.0   < > 13.0   BUN 22* 22* 23* 25* 23*   < > 19   CREATININE 1.25 1.35* 1.26 1.40* 1.56*   < > 1.37*   GLUCOSE 92 89 109* 102* 97   < > 79   CALCIUM 9.0 8.9 8.7 8.7 8.5*   < > 8.8   MAGNESIUM 1.8  --   --   --   --   --  1.9   PHOSPHORUS 3.7 3.4 3.2 2.8  --   --   --     < > = values in this interval not displayed.         Lab 11/17/21  0513 11/16/21  0222 11/15/21  0407 11/14/21  0404 11/11/21 0038   TOTAL PROTEIN  --   --   --   --  6.9   ALBUMIN 3.50 3.60 3.50 3.60 4.00   GLOBULIN  --   --   --   --  2.9   ALT (SGPT)  --   --   --   --  18   AST (SGOT)  --   --   --   --  18   BILIRUBIN  --   --   --   --  1.8*   ALK PHOS  --   --   --   --  203*         Lab 11/11/21 0038   PROBNP 1,989.0*   TROPONIN T <0.010                 Brief Urine Lab Results  (Last result in the past 365 days)      Color   Clarity   Blood   Leuk Est   Nitrite   Protein   CREAT   Urine HCG        11/10/21 2315 Dark Yellow   Hazy   Negative   Negative   Negative   30 mg/dL (1+)               Microbiology Results (last 10 days)     Procedure Component Value - Date/Time    Blood Culture - Blood, Blood, Venous Line [[id number]]  (Normal) Collected: 11/11/21 0043    Lab Status: Final result Specimen: Blood, Venous Line Updated: 11/16/21 0101     Blood Culture No growth at 5 days    Blood Culture - Blood, Arm, Right  [[id number]]  (Normal) Collected: 11/10/21 2321    Lab Status: Final result Specimen: Blood from Arm, Right Updated: 11/15/21 2331     Blood Culture No growth at 5 days    Respiratory Panel PCR w/COVID-19(SARS-CoV-2) BASIL/PARTHA/MANOJ/PAD/COR/MAD/IRINA In-House, NP Swab in UTM/VTM, 3-4 HR TAT - Swab, Nasopharynx [[id number]]  (Normal) Collected: 11/10/21 2306    Lab Status: Final result Specimen: Swab from Nasopharynx Updated: 11/11/21 0000     ADENOVIRUS, PCR Not Detected     Coronavirus 229E Not Detected     Coronavirus HKU1 Not Detected     Coronavirus NL63 Not Detected     Coronavirus OC43 Not Detected     COVID19 Not Detected     Human Metapneumovirus Not Detected     Human Rhinovirus/Enterovirus Not Detected     Influenza A PCR Not Detected     Influenza B PCR Not Detected     Parainfluenza Virus 1 Not Detected     Parainfluenza Virus 2 Not Detected     Parainfluenza Virus 3 Not Detected     Parainfluenza Virus 4 Not Detected     RSV, PCR Not Detected     Bordetella pertussis pcr Not Detected     Bordetella parapertussis PCR Not Detected     Chlamydophila pneumoniae PCR Not Detected     Mycoplasma pneumo by PCR Not Detected    Narrative:      In the setting of a positive respiratory panel with a viral infection PLUS a negative procalcitonin without other underlying concern for bacterial infection, consider observing off antibiotics or discontinuation of antibiotics and continue supportive care. If the respiratory panel is positive for atypical bacterial infection (Bordetella pertussis, Chlamydophila pneumoniae, or Mycoplasma pneumoniae), consider antibiotic de-escalation to target atypical bacterial infection.          XR Knee 1 or 2 View Right    Result Date: 10/21/2021  Impression:  1. Tricompartment osteoarthritis. 2. Questionable suprapatellar joint effusion with soft tissue swelling.  Electronically Signed By-Matthew Damon MD On:10/21/2021 8:35 AM This report was finalized on [id number] by  Matthew Damon MD.    XR  Chest 1 View    Result Date: 11/11/2021  Impression:  1. Findings consistent with CHF, which is more pronounced than on prior exam. 2. Small left and increasing right pleural effusion and bibasilar opacities favored represent atelectasis.  Electronically Signed By-Matthew Beaver MD On:11/11/2021 7:16 AM This report was finalized on [id number] by  Matthew Beaver MD.    XR Chest 1 View    Result Date: 10/21/2021  Impression: 1. Cardiomegaly with central vascular congestion. 2. Probable layering small right-sided pleural effusion.  Electronically Signed By-Cole Squires MD On:10/21/2021 7:18 AM This report was finalized on [id number] by  Cole Squires MD.      Results for orders placed during the hospital encounter of 08/09/21    Duplex Venous Lower Extremity - Bilateral CAR    Interpretation Summary  · Normal bilateral lower extremity venous duplex scan.      Results for orders placed during the hospital encounter of 08/09/21    Duplex Venous Lower Extremity - Bilateral CAR    Interpretation Summary  · Normal bilateral lower extremity venous duplex scan.      Results for orders placed during the hospital encounter of 06/04/21    Adult Transthoracic Echo Complete W/ Cont if Necessary Per Protocol    Interpretation Summary  · Left atrial volume is severely increased.  · The left ventricular cavity is moderately dilated.  · Left ventricular wall thickness is consistent with mild to moderate concentric hypertrophy.  · Mild dilation of the aortic root is present. Mild dilation of the sinuses of Valsalva is present.  · Moderate tricuspid valve regurgitation is present.  · Estimated right ventricular systolic pressure from tricuspid regurgitation is mildly elevated (35-45 mmHg).  · The right atrial cavity is severely dilated.  · The right ventricular cavity is moderately dilated.  · Moderately reduced right ventricular systolic function noted.  · Left ventricular diastolic function is consistent with (grade III  w/high LAP) reversible restrictive pattern.  · Estimated left ventricular EF = 25% Estimated left ventricular EF was in disagreement with the calculated left ventricular EF. Left ventricular ejection fraction appears to be 21 - 25%. Left ventricular systolic function is moderately decreased.  · Abnormal mitral valve structure consistent with dilated annulus.  · Moderate mitral valve regurgitation is present with a centrally-directed jet noted.  · Mild to moderate pulmonary hypertension is present.      Labs Pending at Discharge:      Procedures Performed           Consults:   Consults     Date and Time Order Name Status Description    11/13/2021 10:36 AM Inpatient Nephrology Consult Completed     11/11/2021  1:16 PM Inpatient Cardiology Consult      11/11/2021  1:35 AM Inpatient Hospitalist Consult      10/21/2021  2:15 PM Inpatient Cardiology Consult Completed     10/21/2021  6:59 AM Inpatient Hospitalist Consult Completed             Discharge Details        Discharge Medications      New Medications      Instructions Start Date   atorvastatin 10 MG tablet  Commonly known as: Lipitor   10 mg, Oral, Daily      carvedilol 6.25 MG tablet  Commonly known as: COREG   6.25 mg, Oral, 2 Times Daily With Meals      losartan 25 MG tablet  Commonly known as: COZAAR   25 mg, Oral, Daily      metOLazone 2.5 MG tablet  Commonly known as: ZAROXOLYN   2.5 mg, Oral, Every Other Day, Mondays Wednesdays and Fridays      nitroglycerin 0.4 MG SL tablet  Commonly known as: NITROSTAT   0.4 mg, Sublingual, Every 5 Minutes PRN, Take no more than 3 doses in 15 minutes.      spironolactone 25 MG tablet  Commonly known as: ALDACTONE   25 mg, Oral, Daily      Xarelto 20 MG tablet  Generic drug: rivaroxaban   20 mg, Oral, Daily         Changes to Medications      Instructions Start Date   furosemide 40 MG tablet  Commonly known as: LASIX  What changed: how much to take   80 mg, Oral, 2 Times Daily         Continue These Medications       Instructions Start Date   Acetaminophen Extra Strength 500 MG tablet  Generic drug: acetaminophen   1,000 mg, Oral, Every 6 Hours PRN             Allergies   Allergen Reactions   • Aspirin Swelling   • Penicillins Swelling   • Pork Allergy Unknown - High Severity         Discharge Disposition:   Skilled Nursing Facility (DC - External)    Diet:  Hospital:  Diet Order   Procedures   • Diet Renal, Cardiac; 2gm Na+; 2gm Na+; Fluid Restriction; 1800cc/day         Discharge Activity:   Activity Instructions     Activity as Tolerated      Gradually Increase Activity Until at Pre-Hospitalization Level              CODE STATUS:  Code Status and Medical Interventions:   Ordered at: 11/11/21 0211     Code Status (Patient has no pulse and is not breathing):    CPR (Attempt to Resuscitate)     Medical Interventions (Patient has pulse or is breathing):    Full Support         Future Appointments   Date Time Provider Department Center   11/22/2021  9:15 AM Michael Rollins MD [facility]       Additional Instructions for the Follow-ups that You Need to Schedule     Call MD With Problems / Concerns   As directed      Instructions: Call [phone] or email [email or web address] for problems or concerns.    Order Comments: Instructions: Call [phone] or email [email or web address] for problems or concerns.          Discharge Follow-up with PCP   As directed       Currently Documented PCP:    Rose Rao APRN    PCP Phone Number:    [phone]     Follow Up Details: 2-3 business days         Discharge Follow-up with Specified Provider: cardiology; 2 Weeks   As directed      To: cardiology    Follow Up: 2 Weeks               Time spent on Discharge including face to face service:  34 minutes    This patient has been examined wearing appropriate Personal Protective Equipment and discussed with rn. 11/17/21      Signature: Electronically signed by Luis Eduardo Toro MD, 11/17/21, 4:58 PM EST.

## 2021-11-17 NOTE — NURSING NOTE
Pt is a fall risk, is in yellow gown, fall prevention patient agreement reviewed and signed by patient.  Pt. Verbalizes understanding.  Non skid socks applied, call light within reach.  Pt. Remains on telephone

## 2021-11-17 NOTE — CASE MANAGEMENT/SOCIAL WORK
Continued Stay Note  HELEN Romero     Patient Name: Thony Dumont  MRN: [id number]  Today's Date: 11/17/2021    Admit Date: 11/10/2021     Discharge Plan     Row Name 11/17/21 1547       Plan    Plan Comments Confirmed with Charla liaison at [facility], patient can admit today. Friend to provide transportation.             Phone communication or documentation only - no physical contact with patient or family.        Genoveva Perez RN

## 2021-11-17 NOTE — PLAN OF CARE
"Goal Outcome Evaluation:  Plan of Care Reviewed With: patient   Pt. Resting at this time, initially pt. Voiced frustration with being moved from [facility] to this room, stated \" I was comfortable there\"  Pt. Allowed to ventilate feelings and reassured we were here to take care of him.   He did receive tylenol 650 mg po for complaints of renae lower leg pain, effective relief noted.  Respiratory asst.  Airway patent, occ dry non productive cough noted.  On room air.  Skin color pink, warm and dry.  Respirations are even and non labored at rest.  Pt. Is on 1800 fluid restriction and sodium restricted diet. Noted edema present, abd area, groin and lower legs and ankles renae 3+, pt. Reports swelling \"is much better\".  Discharge plans once stable he has been accepted to [facility]. Tele monitor in place, reading SR, no calls from [facility] unit.     "

## 2021-11-17 NOTE — PROGRESS NOTES
"  Cardiology Progress Note-EP      Patient Care Team:  Rose Rao APRN as PCP - General (Nurse Practitioner)  Michael Rollins MD as Consulting Physician (Cardiac Electrophysiology)    PATIENT IDENTIFICATION  Name: Thony Dumont  Age: 61 y.o.  Sex: male  :  1960  MRN: [id number]             REASON FOR FOLLOW-UP  Acute on chronic heart failure with reduced ejection fraction  AICD in situ      SUBJECTIVE    Patient seen and examined, chart and labs reviewed.  Patient asleep in bed with normal respiratory effort.  Patient states that he feels \"drained\".  He reports some chest discomfort earlier this morning that has dissipated, he denies any palpitations.  No nausea.  Patient has diuresed greater than 14 L    Patient on Aldactone, Lasix 40 mg IV every 12 hours, Xarelto    Significant social issues.  Patient having difficulty finding his vehicle which he lives in.    REVIEW OF SYSTEMS:  Pertinent items are noted in HPI, all other systems reviewed and negative    OBJECTIVE   Creatinine 1.25 (1.35)     ASSESSMENT  Acute on chronic decompensated heart failure with reduced ejection fraction/nonischemic/severe dilated cardiomyopathy with severe LV dysfunction  Class III-IV heart failure  Single-chamber ICD in situ  Paroxysmal atrial fibrillation  Coagulopathy on Xarelto  Cardiorenal syndrome  Pulmonary hypertension  History of medical noncompliance    RECOMMENDATIONS  Patient needs aggressive diuresis.  Currently on Lasix 40 mg twice daily.  Monitor renal function closely-history of CKD.  [place] fluid restriction at 1800 mL/24H, 2 g sodium diet.  Blood pressure borderline low at 98 systolic  Patient needs compliance with fluid/sodium intake at home  Continue statin, BB, ARB for coronary disease/cardiomyopathy  Continue Xarelto for stroke prevention with A. fib  Further recommendations as patient's hospital course progresses          Vital Signs  Visit Vitals  /75 (BP Location: Left arm, " "Patient Position: Sitting)   Pulse 80   Temp 98 °F (36.7 °C) (Oral)   Resp 20   Ht 190.5 cm (75\")   Wt 113 kg (250 lb)   SpO2 98%   BMI 31.25 kg/m²     Oxygen Therapy  SpO2: 98 %  Pulse Oximetry Type: Intermittent  Device (Oxygen Therapy): room air  Flow (L/min): 1  Oxygen Concentration (%): 96  Flowsheet Rows        First Filed Value   Admission Height 190.5 cm (75\") Documented at 11/10/2021 2226   Admission Weight 118 kg (259 lb 11.2 oz) Documented at 11/10/2021 2229          Intake & Output (last 3 days)       11/14 0701  11/15 0700 11/15 0701 11/16 0700 11/16 0701 11/17 0700 11/17 0701 11/18 0700    P.O. 720 840  360    Total Intake(mL/kg) 720 (6.2) 840 (7.3)  360 (3.2)    Urine (mL/kg/hr) 2625 (0.9) 8575 (3.1) 2700 (1)     Stool  0      Total Output 2625 8575 2700     Net -1905 -7735 -2700 +360            Urine Unmeasured Occurrence 2 x       Stool Unmeasured Occurrence  1 x          Lines, Drains & Airways       Active LDAs       Name Placement date Placement time Site Days    Peripheral IV 11/10/21 2306 Right Forearm 11/10/21  2306  Forearm  1                           /75 (BP Location: Left arm, Patient Position: Sitting)   Pulse 80   Temp 98 °F (36.7 °C) (Oral)   Resp 20   Ht 190.5 cm (75\")   Wt 113 kg (250 lb)   SpO2 98%   BMI 31.25 kg/m²   Intake/Output last 3 shifts:  I/O last 3 completed shifts:  In: 600 [P.O.:600]  Out: 4675 [Urine:4675]  Intake/Output this shift:  I/O this shift:  In: 360 [P.O.:360]  Out: -     PHYSICAL EXAM:    General: Well-developed, well-nourished 61-year-old male who is alert, cooperative, no distress, appears stated age  Head:  Normocephalic, atraumatic, mucous membranes moist  Eyes:  Conjunctiva/corneas clear, EOM's intact     Neck:  Supple,  no bruit  Lungs: Clear to auscultation bilaterally, no wheezes rhonchi rales are noted  Chest wall: No tenderness  Heart::  Regular rate and rhythm, S1 and S2 normal, 1/6 holosystolic murmur. No rub or " gallop  Abdomen: Soft, non-tender, nondistended bowel sounds active  Extremities: No cyanosis, clubbing. 1+ edema to lower extremities  Pulses: 2+ and symmetric all extremities  Skin:  No rashes or lesions  Neuro/psych: A&O x3. CN II through XII are grossly intact with appropriate affect      Scheduled Meds:      atorvastatin, 10 mg, Oral, Daily  carvedilol, 6.25 mg, Oral, BID With Meals  furosemide, 40 mg, Intravenous, Q8H  losartan, 25 mg, Oral, Daily  rivaroxaban, 20 mg, Oral, Daily With Dinner  sodium chloride, 10 mL, Intravenous, Q12H  spironolactone, 25 mg, Oral, Daily        Continuous Infusions:         PRN Meds:    •  acetaminophen  •  influenza vaccine  •  metOLazone  •  nitroglycerin  •  sodium chloride        Results Review:     I reviewed the patient's new clinical results.    CBC    Results from last 7 days   Lab Units 11/10/21  2306   WBC 10*3/mm3 4.70   HEMOGLOBIN g/dL 14.6   PLATELETS 10*3/mm3 251     Cr Clearance Estimated Creatinine Clearance: 86.3 mL/min (by C-G formula based on SCr of 1.25 mg/dL).  Coag     HbA1C No results found for: HGBA1C  Blood Glucose   Glucose   Date/Time Value Ref Range Status   11/16/2021 1545 73 70 - 105 mg/dL Final     Comment:     Serial Number: [id number]:  [id number]   11/16/2021 1120 135 (H) 70 - 105 mg/dL Final     Comment:     Serial Number: [id number]:  [id number]   11/16/2021 0720 116 (H) 70 - 105 mg/dL Final     Comment:     Serial Number: [id number]:  [id number]     Infection   Results from last 7 days   Lab Units 11/11/21  0043 11/11/21  0038 11/10/21  2321   BLOODCX  No growth at 5 days  --  No growth at 5 days   PROCALCITONIN ng/mL  --  0.10  --      CMP   Results from last 7 days   Lab Units 11/17/21  0513 11/16/21  0222 11/15/21  0407 11/14/21  0404 11/13/21  0645 11/12/21  0428 11/11/21  0038   SODIUM mmol/L 137 140 141 140 141 140 142   POTASSIUM mmol/L 4.0 3.7 4.0 4.0 4.8 3.9 3.9   CHLORIDE mmol/L 99 101 104 104 103 104 104   CO2  mmol/L 27.0 27.0 24.0 24.0 28.0 24.0 25.0   BUN mg/dL 22* 22* 23* 25* 23* 21* 19   CREATININE mg/dL 1.25 1.35* 1.26 1.40* 1.56* 1.35* 1.37*   GLUCOSE mg/dL 92 89 109* 102* 97 92 79   ALBUMIN g/dL 3.50 3.60 3.50 3.60  --   --  4.00   BILIRUBIN mg/dL  --   --   --   --   --   --  1.8*   ALK PHOS U/L  --   --   --   --   --   --  203*   AST (SGOT) U/L  --   --   --   --   --   --  18   ALT (SGPT) U/L  --   --   --   --   --   --  18     ABG      UA    Results from last 7 days   Lab Units 11/10/21  2315   NITRITE UA  Negative   WBC UA /HPF 0-2*   BACTERIA UA /HPF None Seen   SQUAM EPITHEL UA /HPF 0-2     EDD  No results found for: POCMETH, POCAMPHET, POCBARBITUR, POCBENZO, POCCOCAINE, POCOPIATES, POCOXYCODO, POCPHENCYC, POCPROPOXY, POCTHC, POCTRICYC  Lysis Labs   Results from last 7 days   Lab Units 11/17/21  0513 11/16/21  0222 11/15/21  0407 11/14/21  0404 11/13/21  0645 11/12/21  0428 11/11/21  0038 11/10/21  2306   HEMOGLOBIN g/dL  --   --   --   --   --   --   --  14.6   PLATELETS 10*3/mm3  --   --   --   --   --   --   --  251   CREATININE mg/dL 1.25 1.35* 1.26 1.40* 1.56* 1.35* 1.37*  --      Radiology(recent) No radiology results for the last day      Results from last 7 days   Lab Units 11/11/21  0038   TROPONIN T ng/mL <0.010       Xrays, labs reviewed personally by physician.    ECG/EMG Results (most recent)     Procedure Component Value Units Date/Time    ECG 12 Lead [[id number]] Collected: 11/10/21 2238     Updated: 11/15/21 1118     QT Interval 370 ms     Narrative:      HEART RATE= 102  bpm  RR Interval= 592  ms  IN Interval= 162  ms  P Horizontal Axis= -11  deg  P Front Axis= 70  deg  QRSD Interval= 83  ms  QT Interval= 370  ms  QRS Axis= 54  deg  T Wave Axis=   deg  - ABNORMAL ECG -  Sinus tachycardia  Multiple ventricular premature complexes  Abnormal T, consider ischemia, lateral leads  When compared with ECG of 21-Oct-2021 5:45:43,  No significant change  Electronically Signed By: Nando Green (Akil)  "15-Nov-2021 11:11:29  Date and Time of Study: 2021-11-10 22:38:30    ECG 12 Lead [[id number]] Collected: 11/17/21 0738     Updated: 11/17/21 0740     QT Interval 407 ms     Narrative:      HEART RATE= 77  bpm  RR Interval= 776  ms  WV Interval= 160  ms  P Horizontal Axis= -28  deg  P Front Axis= 74  deg  QRSD Interval= 89  ms  QT Interval= 407  ms  QRS Axis= 43  deg  T Wave Axis= 198  deg  - ABNORMAL ECG -  Sinus rhythm  Probable left atrial enlargement  Abnrm T, consider ischemia, anterolateral lds  When compared with ECG of 10-Nov-2021 22:38:30,  Significant rate decrease  Electronically Signed By:   Date and Time of Study: 2021-11-17 07:38:27            Medication Review:   I have reviewed the patient's current medication list  Scheduled Meds:atorvastatin, 10 mg, Oral, Daily  carvedilol, 6.25 mg, Oral, BID With Meals  furosemide, 40 mg, Intravenous, Q8H  losartan, 25 mg, Oral, Daily  rivaroxaban, 20 mg, Oral, Daily With Dinner  sodium chloride, 10 mL, Intravenous, Q12H  spironolactone, 25 mg, Oral, Daily      Continuous Infusions:   PRN Meds:.•  acetaminophen  •  influenza vaccine  •  metOLazone  •  nitroglycerin  •  sodium chloride    Imaging:  Imaging Results (Last 72 Hours)     ** No results found for the last 72 hours. **            CORTEZ Rodriguez  11/17/21  14:23 EST      EMR Dragon/Transcription:   \"Dictated utilizing Dragon dictation\".        Electronically signed by CORTEZ Rodriguez, 11/17/21, 2:23 PM EST.        Cardiology attending:  As above. Agree with assessment plan. Seen and examined. Chart labs reviewed. Note scribed by APC and reviewed for accuracy with above changes noted.  Continuing to have good diuresis.  Patient is -14 L since admission.  Recommend continued diuresis as renal function allows.  Patient had some chest discomfort this morning that has since dissipated.  This is likely anxiety related due to his current social situation as the patient has no history of ischemic " heart disease.  Heart regular.
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
## 2021-11-17 NOTE — PROGRESS NOTES
Exercise Oximetry    Patient Name:Thony Dumont   MRN: [id number]   Date: 11/17/21             ROOM AIR BASELINE   SpO2% 98   Heart Rate 99   Blood Pressure      EXERCISE ON ROOM AIR SpO2% EXERCISE ON O2 @  LPM SpO2%   1 MINUTE 98 1 MINUTE    2 MINUTES  2 MINUTES    3 MINUTES 96 3 MINUTES    4 MINUTES  4 MINUTES    5 MINUTES 96 5 MINUTES    6 MINUTES 94 6 MINUTES               Distance Walked  6 minutes Distance Walked   Dyspnea (Miri Scale)   Dyspnea (Miri Scale)   Fatigue (Miri Scale)   Fatigue (Miri Scale)   SpO2% Post Exercise  94 SpO2% Post Exercise   HR Post Exercise  92 HR Post Exercise   Time to Recovery  0 Time to Recovery     Comments: Patient walked in room for 6 minutes without pausing or stopping.  Patient has poor peripheral perfusion so Sa02 was hard to get at times.

## 2021-11-17 NOTE — PROGRESS NOTES
[facility] Medicine Services Daily Progress Note    Patient Name: Thony Dumont  : 1960  MRN: [id number]  Primary Care Physician:  Rose Rao APRN  Date of admission: 11/10/2021      Subjective      Chief Complaint: Shortness of air      Patient Reports   2021   Patient reports feeling generally poorly with generalized weakness.  He was able to take a shower today independently.  He reports ongoing swelling diffusely.  2021  Patient continues to complain of significant edema of the lower extremities scrotum and lower abdomen and despite IV diuresis and he is having worsening renal function with continued weight gain.  Nephrology has been consulted.  2021  Patient reports significant improvement in his swelling today compared to yesterday.  He still has significant penile and scrotal edema and bilateral lower extremity edema.  The patient has had excellent urine output but his weight is still up compared to admission.  11/15/21 patient seen and examined in bed no acute distress, still having significant edema, good urine output. case discussed with RN.    21 patient seen and examined in bed no acute distress, edema improving.  discussed with RN.  2021 patient seen and examined in bed no acute distress, edema slightly improving.  Discussed with RN.  Awaiting placement.  Complaining of cough.    ROS 12 point review of systems was reviewed and was negative except for severe bilateral lower extremity, scrotum and lower abdominal wall edema and generalized weakness.      Objective      Vitals:   Temp:  [97.7 °F (36.5 °C)-97.9 °F (36.6 °C)] 97.7 °F (36.5 °C)  Heart Rate:  [81-87] 81  Resp:  [12-18] 18  BP: (106-112)/(72-77) 112/74    Physical Exam vital signs and nurses notes reviewed.  Well-developed well-nourished gentleman sitting up in bed awake and alert in no acute distress; mucous membranes moist, sclera anicteric; lungs with decreased air  entry bilaterally; CV distant heart sounds; abdomen soft and protuberant with anasarca involving the lower abdominal wall, groin and scrotum with bilateral lower extremity anasarca which is gradually improving compared to admission; no cyanosis; capillary refill less than 2 seconds distally.  No Parker catheter.        Result Review    Result Review:  I have personally reviewed the results from the time of this admission to 11/17/2021 12:29 EST and agree with these findings:  [x]  Laboratory  []  Microbiology  [x]  Radiology  [x]  EKG/Telemetry   [x]  Cardiology/Vascular   []  Pathology  [x]  Old records  []  Other:  Most notable findings noted in the assessment and plan.          Assessment/Plan      Brief Patient Summary:  Thony Dumont is a 61 y.o. male who presented to [facility] on 11/10/2021 complaining of increased shortness of air and bilateral leg swelling past week. He reports he has been taking his home medications. Drug screen positive today for methamphetamines and THC. He reports he snorted yesterday and denies IV drug use. He reports increased dyspnea on exertion. He denies chest pain, nausea, fever, or dizziness. He reports non productive cough. On exam he has BLE edema +3 from feet to above groin. He has a PMH of ischemic cardiomyopathy combined systolic heart failure with last EF per echo in June 2021 of 21-25%. He has an AICD in place. He reports he has stopped smoking. He was COVID-19 negative.  Chest x-ray showed cardiomegaly with vascular congestion.  EKG shows mild sinus tachycardia with multiple PVCs.  Troponin was negative and proBNP was 1989 (0-900). He was given 1 inch of Nitropaste in ED and 40 mg IV Lasix and was admitted for diuresis.      Review of records shows he was recently admitted here from October 21 to October 23, 2021 with similar complaints.  He was also admitted here in August 25, 2021 with acute systolic CHF.  He was hypoxic oxygen saturation 80s on admission but  is now stable on 4 L oxygen via nasal cannula.   have been consulted for methamphetamine abuse.    Current inpatient medications include:  atorvastatin, 10 mg, Oral, Daily  carvedilol, 6.25 mg, Oral, BID With Meals  furosemide, 40 mg, Intravenous, Q8H  losartan, 25 mg, Oral, Daily  rivaroxaban, 20 mg, Oral, Daily With Dinner  sodium chloride, 10 mL, Intravenous, Q12H  spironolactone, 25 mg, Oral, Daily             Active Hospital Problems:  Active Hospital Problems    Diagnosis    • **Acute on chronic combined systolic (congestive) and diastolic (congestive) heart failure (HCC)    • Patient's noncompliance with other medical treatment and regimen    • Acute respiratory failure with hypoxemia (HCC)    • Methamphetamine abuse (HCC)    • ICD (implantable cardioverter-defibrillator) in place    • Gastroesophageal reflux disease    • Moderate mitral regurgitation    • Moderate tricuspid regurgitation    • Pulmonary hypertension (HCC)    • CKD (chronic kidney disease) stage 2, GFR 60-89 ml/min    • Tobacco abuse    • Essential hypertension    • Mixed hyperlipidemia    • Paroxysmal atrial fibrillation (HCC)        Acute hypoxic respiratory failure due to Acute on chronic systolic and diastolic congestive heart failure due to ischemic cardiomyopathy, methamphetamine abuse and hypertensive heart disease-improving  -AICD in place   -Echo 6/6/2021 showed LVEF of 21 to 25% with moderate mitral valve regurgitation and mild to moderate pulmonary hypertension  -Renal function worsening on IV Lasix and the patient is not losing weight with diuresis, so nephrology was consulted on 11/13/2021; IV Lasix and oral spironolactone were continued    Methamphetamine and THC abuse  -toxicology screen positive for methamphetamines and THC    - consulted   -Cessation counseling     Chronic kidney disease stage II due to to hypertension and cardiorenal syndrome  - creatinine on admission 1.37 with baseline  creatinine 1.16-1.53  -Creatinine increased to 1.56 with diuresis on 11/13/2021 but has improved to 1.4  -avoid nephrotoxic meds  -Nephrology has been consulted to assist with diuresis     Paroxysmal atrial fibrillation-Sinus rhythm on admission  -Continue home Coreg and Xarelto      Hyperlipidemia   -Continue atorvastatin     Essential hypertensive, chronic and uncontrolled on admission- improved  -Continue Coreg, losartan, metolazone and spironolactone  -Hold oral Lasix while on IV Lasix    DVT prophylaxis:  Medical DVT prophylaxis orders are present.    CODE STATUS:    Code Status (Patient has no pulse and is not breathing): CPR (Attempt to Resuscitate)  Medical Interventions (Patient has pulse or is breathing): Full Support      Disposition:  I expect patient to be discharged once inpatient rehab is arranged once clinically improved.    This patient has been examined wearing appropriate Personal Protective Equipment and discussed with hospital infection control department. 11/17/21      Electronically signed by Luis Eduardo Toro MD, 11/17/21, 12:29 EST.  [facility] Hospitalist Team
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
## 2021-11-17 NOTE — PROGRESS NOTES
Heart Failure Program  Nurse Navigator  Discharge Planning: Follow-up Note    Patient Name:Thony Dumont  :1960  Current Admission Date: 11/10/2021       Last 3 Weights:  Wt Readings from Last 3 Encounters:   21 113 kg (250 lb)   10/23/21 109 kg (239 lb 3.2 oz)   21 102 kg (224 lb 12.8 oz)       Intake and Output totals: I/O last 3 completed shifts:  In: 600 [P.O.:600]  Out: 4675 [Urine:4675]  No intake/output data recorded.          Patient Assessment:   pt lying in bed, resp even and unlabored, no SOA with ambulation in room or conversation, noted 1+ bilateral edema lower legs - swelling improved though.      Patient Education:   review HF s/s, medication adherence and follow-up appointments    Review HF Education provided to patient:  yes-Symptoms worsening  yes-Prescribed medications  yes-HF self-care  yes-Follow-up Appointments       Acceptance of learning: acceptance, cooperative, teachback    Heart Failure education interactive teaching session time: 30 minutes      Identified needs/barriers:   Volume status, I&O, daily weights    Intervention follow-up:  RN-pt advises he was given a bsc for home but now does not have it, pt has been moved to different rooms on his stay - RN notified, scale provide for home.

## 2021-11-17 NOTE — PLAN OF CARE
Goal Outcome Evaluation:  Plan of Care Reviewed With: patient           Outcome Summary: patient c/o chest pain and SOB this morning. pt having some anxiety. pt been in bed most of day resting. pt states he has a BSC that was delivered to him in another room- called PCU and SIPS and whereabouts are unknown- goulds informed. no other complaints. will continue to monitor

## 2021-11-17 NOTE — NURSING NOTE
Pt arrived to room.  Writer introduced self to pt. , pt. Currently on telephone, and verbalizing frustration with recent move to this room.  Attempt to orient to room, pt. Continued talking on the telephone.

## 2021-11-18 ENCOUNTER — TELEPHONE (OUTPATIENT)
Dept: CARDIOLOGY | Facility: CLINIC | Age: 61
End: 2021-11-18

## 2021-11-18 ENCOUNTER — TELEPHONE (OUTPATIENT)
Dept: CARDIAC REHAB | Facility: HOSPITAL | Age: 61
End: 2021-11-18

## 2021-11-18 NOTE — CASE MANAGEMENT/SOCIAL WORK
Case Management Discharge Note         Selected Continued Care - Discharged on 11/17/2021 Admission date: 11/10/2021 - Discharge disposition: Skilled Nursing Facility (DC - External)    Destination Coordination complete.    Service Provider Selected Services Address Phone Fax Patient Preferred    [facility] [place]  [facility] [address] [place] [address] [phone] --           Final Discharge Disposition Code: 03 - skilled nursing facility (SNF)

## 2021-11-18 NOTE — TELEPHONE ENCOUNTER
Telephone follow up for recent hospitalization with CHF and potential cardiac rehab in the future. Pt is currently is in in-patient rehab facility in [place].  Will be unable to make 11/22 OV with Dr Rollins.  Informed Robyn Leigh RN of this and patient status.

## 2021-11-18 NOTE — TELEPHONE ENCOUNTER
Roybn Leigh RN Hargett, Stephanie R, [facility] Rep; Fiona Correa MA  Pt needs cancelled for Monday 11/22/21.  He needs rescheduled for 12/10/21 at 1130 in [place].  I have arranged this with the [facility] where the patient is currently.

## 2021-11-29 NOTE — PROGRESS NOTES
Enter Query Response Below      Query Response:     Yes, JOHN is supported because Cr ade by 0.2 from prior level.         If applicable, please update the problem list.     Patient: Thony Dumont        : 1960  Account: [id number]           Admit Date:          Options to Respond to Query:    1. Access the Encounter     a. From the To-Do Side bar, click Respond With Note.     b. Click New Note     c. Answer query within the yellow box.                d. Update the Problem List if applicable.     Dr. Erazo,     : Nephrology consult note includes, “This is a 61 y.o. year old male  with no prior diagnosed history of chronic kidney disease but baseline creatinine over the last 12 months looks to be around 1.4 and he is 1.5 today.”   “Does appear to have some chronic kidney disease at baseline, likely from underlying hypertension and CHF    Creatinine fairly stable, near baseline”    11/15,  Nephrology progress notes include, “JOHN - Cardiorenal    CKD stage IIIb, stable, near baseline, tolerating diuresis well”  “Cr improved with diuresis, likely cardiorenal    Continue IV Lasix - change to TID - and PO Aldactone. Metolazone x 1 today.    Okay to continue ARB    Continue to monitor electrolytes and volume closely, avoid IV contrast and nephrotoxic medications”    Lab results: serum creatinine:   - 1.37   - 1.35   - 1.56   - 1.40  11/15- 1.26  - 1.35   - 1.25     After study, was the diagnosis of JOHN clinically supported during this admission?    Yes, please include additional clinical indicators: ____________  No  Other- specify________  Unable to determine     JOHN is defined as any of the following:    Increase in SCr by 0.3 mg/dl within 48 hours; or   Increase in SCr to 1.5 times baseline, which is known or presumed to have occurred within the prior 7 days; or  Urine volume <0.5 ml/kg/h for 6 hours.    Reference article:  http://www.kdig[employer].org/clinical_practice_guidelines/pdf/[facility]%20AKI%20Guideline.pdf        By submitting this query, we are merely seeking further clarification of documentation to accurately reflect all conditions that you are monitoring, evaluating, treating or that extend the hospitalization or utilize additional resources of care. Please utilize your independent clinical judgment when addressing the question(s) above.     This query and your response, once completed, will be entered into the legal medical record.    Sincerely,  Sandee Harris  Clinical Documentation Integrity Program   [email or web address]

## 2021-11-30 LAB — QT INTERVAL: 407 MS

## 2021-12-27 RX ORDER — ATORVASTATIN CALCIUM 10 MG/1
10 TABLET, FILM COATED ORAL DAILY
Qty: 30 TABLET | Refills: 0 | Status: SHIPPED | OUTPATIENT
Start: 2021-12-27 | End: 2022-05-02 | Stop reason: SDUPTHER

## 2022-05-02 RX ORDER — LOSARTAN POTASSIUM 25 MG/1
25 TABLET ORAL DAILY
Qty: 30 TABLET | Refills: 0 | Status: SHIPPED | OUTPATIENT
Start: 2022-05-02 | End: 2022-10-28 | Stop reason: SDUPTHER

## 2022-05-02 RX ORDER — ATORVASTATIN CALCIUM 10 MG/1
10 TABLET, FILM COATED ORAL DAILY
Qty: 30 TABLET | Refills: 0 | Status: SHIPPED | OUTPATIENT
Start: 2022-05-02 | End: 2022-12-05

## 2022-05-02 RX ORDER — CARVEDILOL 6.25 MG/1
6.25 TABLET ORAL 2 TIMES DAILY WITH MEALS
Qty: 60 TABLET | Refills: 0 | Status: SHIPPED | OUTPATIENT
Start: 2022-05-02 | End: 2022-12-01 | Stop reason: SDUPTHER

## 2022-05-02 RX ORDER — SPIRONOLACTONE 25 MG/1
25 TABLET ORAL DAILY
Qty: 30 TABLET | Refills: 0 | Status: SHIPPED | OUTPATIENT
Start: 2022-05-02 | End: 2022-10-28

## 2022-10-28 ENCOUNTER — OFFICE VISIT (OUTPATIENT)
Dept: CARDIOLOGY | Facility: CLINIC | Age: 62
End: 2022-10-28

## 2022-10-28 VITALS
BODY MASS INDEX: 24 KG/M2 | HEART RATE: 81 BPM | OXYGEN SATURATION: 97 % | HEIGHT: 75 IN | WEIGHT: 193 LBS | SYSTOLIC BLOOD PRESSURE: 142 MMHG | DIASTOLIC BLOOD PRESSURE: 111 MMHG

## 2022-10-28 DIAGNOSIS — I42.0 DILATED CARDIOMYOPATHY: ICD-10-CM

## 2022-10-28 DIAGNOSIS — I42.8 NON-ISCHEMIC CARDIOMYOPATHY: ICD-10-CM

## 2022-10-28 DIAGNOSIS — I50.22 CHRONIC SYSTOLIC CONGESTIVE HEART FAILURE: ICD-10-CM

## 2022-10-28 DIAGNOSIS — Z95.810 ICD (IMPLANTABLE CARDIOVERTER-DEFIBRILLATOR), DUAL, IN SITU: ICD-10-CM

## 2022-10-28 DIAGNOSIS — I48.0 PAROXYSMAL ATRIAL FIBRILLATION: Primary | ICD-10-CM

## 2022-10-28 PROCEDURE — 93000 ELECTROCARDIOGRAM COMPLETE: CPT | Performed by: INTERNAL MEDICINE

## 2022-10-28 PROCEDURE — 93282 PRGRMG EVAL IMPLANTABLE DFB: CPT | Performed by: INTERNAL MEDICINE

## 2022-10-28 PROCEDURE — 99214 OFFICE O/P EST MOD 30 MIN: CPT | Performed by: INTERNAL MEDICINE

## 2022-10-28 RX ORDER — LOSARTAN POTASSIUM 50 MG/1
50 TABLET ORAL DAILY
Qty: 90 TABLET | Refills: 1 | Status: SHIPPED | OUTPATIENT
Start: 2022-10-28 | End: 2022-12-01 | Stop reason: SDUPTHER

## 2022-10-28 RX ORDER — SPIRONOLACTONE 25 MG/1
25 TABLET ORAL DAILY
Qty: 90 TABLET | Refills: 1 | Status: SHIPPED | OUTPATIENT
Start: 2022-10-28 | End: 2022-12-01 | Stop reason: SDUPTHER

## 2022-10-28 NOTE — PROGRESS NOTES
CC--- nonischemic dilated cardiomyopathy and ICD in situ            Sub--- 62-year-old male patient has severe nonischemic cardiomyopathy and underwent ICD implantation last year.  He presented to [facility] with severe heart failure in June 2021.  Patient was diagnosed with cardiomyopathy in March 2021.  His EF was less than 30% and had moderate biatrial enlargement with moderate pulmonary hypertension.  He was treated medically and underwent ICD implantation and had class III compensated heart failure symptoms and came back for follow-up.  Patient is currently in the half-way system and some of the medicines have been stopped and patient feels dizzy.  He denies any worsening leg edema.  He has chronic unilateral versus bilateral mild edema currently on Lasix.  He has stated that he has quit smoking and denies any alcohol usage.  Denies any azeem syncope.  He does have symptoms of chest discomfort and also dizziness.  He had a left MCA stroke in 2020  Patient has known paroxysmal atrial fibrillation          Past Medical History:   Diagnosis Date   • Afib (CMS/[facility])     • CHF (congestive heart failure) (CMS/[facility]ly [facility])     • Hypertension              Past Surgical History:   Procedure Laterality Date   • CARDIAC DEFIBRILLATOR PLACEMENT       • CARDIAC ELECTROPHYSIOLOGY PROCEDURE Left 6/15/2021     Procedure: Device Implant;  Surgeon: Michael Rollins MD;  Location:  INVASIVE LOCATION;  Service: Cardiovascular;  Laterality: Left;   • HERNIA REPAIR            Physical Exam    General:      well developed, well nourished, in no acute distress.    Head:      normocephalic and atraumatic.    Eyes:      PERRL/EOM intact, conjunctivae and sclerae clear without nystagmus.    Neck:      no  thyromegaly, trachea central with normal respiratory effort  Lungs:      clear bilaterally to auscultation.    Heart:       regular rate and rhythm, S1, S2 without murmurs, rubs, or gallops  Skin:      intact without  lesions or rashes.    Psych:      alert and cooperative; normal mood and affect; normal attention span and concentration.          Assessment and plan    single-chamber ICD in situ which is interrogated in the office and interrogation attached to the chart--- no atrial fibrillation in the last several months and no ventricular arrhythmias.  Heart failure monitoring indices within normal limits.  Severe nonischemic dilated cardiomyopathy  Chronic class III systolic heart failure--currently compensated  History of paroxysmal atrial fibrillation currently in sinus rhythm--- no recurrence on ICD interrogation  Hypertension not well controlled  Prior history of cardiorenal syndrome and chronic kidney disease      Recommendations    Start back Xarelto  Start back losartan  Start back on Aldactone  BMP check in 2 weeks  Follow-up in office in 4 weeks for optimization of hypertension and cardiomyopathy process    Prescriptions and lab receipt sent out        ECG 12 Lead    Date/Time: 10/28/2022 11:09 AM  Performed by: Michael Rollins MD  Authorized by: Michael Rollins MD   Comparison: compared with previous ECG   Similar to previous ECG  Rhythm: sinus rhythm  Rate: normal  Conduction: conduction normal  Other findings: left ventricular hypertrophy with strain and left atrial abnormality            Electronically signed by Michael Rollins MD, 10/28/22, 11:09 AM EDT.

## 2022-12-01 RX ORDER — CARVEDILOL 6.25 MG/1
6.25 TABLET ORAL 2 TIMES DAILY WITH MEALS
Qty: 180 TABLET | Refills: 1 | Status: ON HOLD | OUTPATIENT
Start: 2022-12-01

## 2022-12-01 RX ORDER — LOSARTAN POTASSIUM 50 MG/1
TABLET ORAL
Qty: 90 TABLET | Refills: 1 | Status: ON HOLD | OUTPATIENT
Start: 2022-12-01

## 2022-12-01 RX ORDER — SPIRONOLACTONE 25 MG/1
25 TABLET ORAL DAILY
Qty: 90 TABLET | Refills: 1 | Status: ON HOLD | OUTPATIENT
Start: 2022-12-01

## 2022-12-01 RX ORDER — FUROSEMIDE 40 MG/1
TABLET ORAL
Qty: 90 TABLET | Refills: 1 | Status: ON HOLD | OUTPATIENT
Start: 2022-12-01

## 2022-12-05 RX ORDER — ATORVASTATIN CALCIUM 10 MG/1
TABLET, FILM COATED ORAL
Qty: 30 TABLET | Refills: 10 | Status: ON HOLD | OUTPATIENT
Start: 2022-12-05

## 2023-04-02 ENCOUNTER — HOSPITAL ENCOUNTER (OUTPATIENT)
Facility: HOSPITAL | Age: 63
Setting detail: OBSERVATION
Discharge: NURSING, REHAB OR LONG-TERM CARE | End: 2023-04-07
Attending: EMERGENCY MEDICINE | Admitting: INTERNAL MEDICINE
Payer: MEDICAID

## 2023-04-02 ENCOUNTER — APPOINTMENT (OUTPATIENT)
Dept: GENERAL RADIOLOGY | Facility: HOSPITAL | Age: 63
End: 2023-04-02
Payer: MEDICAID

## 2023-04-02 DIAGNOSIS — R06.02 SHORTNESS OF BREATH: Primary | ICD-10-CM

## 2023-04-02 DIAGNOSIS — I83.813 VARICOSE VEINS OF BOTH LOWER EXTREMITIES WITH PAIN: ICD-10-CM

## 2023-04-02 DIAGNOSIS — E87.70 HYPERVOLEMIA, UNSPECIFIED HYPERVOLEMIA TYPE: ICD-10-CM

## 2023-04-02 LAB
ALBUMIN SERPL-MCNC: 4.4 G/DL (ref 3.5–5.2)
ALBUMIN/GLOB SERPL: 1.6 G/DL
ALP SERPL-CCNC: 106 U/L (ref 39–117)
ALT SERPL W P-5'-P-CCNC: 19 U/L (ref 1–41)
ANION GAP SERPL CALCULATED.3IONS-SCNC: 10 MMOL/L (ref 5–15)
APTT PPP: 29 SECONDS (ref 61–76.5)
AST SERPL-CCNC: 27 U/L (ref 1–40)
BASOPHILS # BLD AUTO: 0 10*3/MM3 (ref 0–0.2)
BASOPHILS NFR BLD AUTO: 0.9 % (ref 0–1.5)
BILIRUB SERPL-MCNC: 1.2 MG/DL (ref 0–1.2)
BUN SERPL-MCNC: 18 MG/DL (ref 8–23)
BUN/CREAT SERPL: 16.5 (ref 7–25)
CALCIUM SPEC-SCNC: 9.4 MG/DL (ref 8.6–10.5)
CHLORIDE SERPL-SCNC: 105 MMOL/L (ref 98–107)
CO2 SERPL-SCNC: 24 MMOL/L (ref 22–29)
CREAT SERPL-MCNC: 1.09 MG/DL (ref 0.76–1.27)
DEPRECATED RDW RBC AUTO: 46.8 FL (ref 37–54)
EGFRCR SERPLBLD CKD-EPI 2021: 77.7 ML/MIN/1.73
EOSINOPHIL # BLD AUTO: 0.1 10*3/MM3 (ref 0–0.4)
EOSINOPHIL NFR BLD AUTO: 1.9 % (ref 0.3–6.2)
ERYTHROCYTE [DISTWIDTH] IN BLOOD BY AUTOMATED COUNT: 14.5 % (ref 12.3–15.4)
GLOBULIN UR ELPH-MCNC: 2.7 GM/DL
GLUCOSE SERPL-MCNC: 107 MG/DL (ref 65–99)
HCT VFR BLD AUTO: 48.5 % (ref 37.5–51)
HGB BLD-MCNC: 15.7 G/DL (ref 13–17.7)
INR PPP: 1.09 (ref 0.93–1.1)
LYMPHOCYTES # BLD AUTO: 0.9 10*3/MM3 (ref 0.7–3.1)
LYMPHOCYTES NFR BLD AUTO: 20.5 % (ref 19.6–45.3)
MCH RBC QN AUTO: 30.2 PG (ref 26.6–33)
MCHC RBC AUTO-ENTMCNC: 32.4 G/DL (ref 31.5–35.7)
MCV RBC AUTO: 93 FL (ref 79–97)
MONOCYTES # BLD AUTO: 0.5 10*3/MM3 (ref 0.1–0.9)
MONOCYTES NFR BLD AUTO: 11.1 % (ref 5–12)
NEUTROPHILS NFR BLD AUTO: 2.8 10*3/MM3 (ref 1.7–7)
NEUTROPHILS NFR BLD AUTO: 65.6 % (ref 42.7–76)
NRBC BLD AUTO-RTO: 0.1 /100 WBC (ref 0–0.2)
NT-PROBNP SERPL-MCNC: 1396 PG/ML (ref 0–900)
PLATELET # BLD AUTO: 202 10*3/MM3 (ref 140–450)
PMV BLD AUTO: 7.9 FL (ref 6–12)
POTASSIUM SERPL-SCNC: 4.7 MMOL/L (ref 3.5–5.2)
PROT SERPL-MCNC: 7.1 G/DL (ref 6–8.5)
PROTHROMBIN TIME: 11.2 SECONDS (ref 9.6–11.7)
RBC # BLD AUTO: 5.21 10*6/MM3 (ref 4.14–5.8)
SODIUM SERPL-SCNC: 139 MMOL/L (ref 136–145)
T4 FREE SERPL-MCNC: 1.21 NG/DL (ref 0.93–1.7)
TROPONIN T SERPL HS-MCNC: 16 NG/L
TROPONIN T SERPL HS-MCNC: 17 NG/L
TSH SERPL DL<=0.05 MIU/L-ACNC: 1.29 UIU/ML (ref 0.27–4.2)
WBC NRBC COR # BLD: 4.3 10*3/MM3 (ref 3.4–10.8)

## 2023-04-02 PROCEDURE — 99222 1ST HOSP IP/OBS MODERATE 55: CPT | Performed by: INTERNAL MEDICINE

## 2023-04-02 PROCEDURE — G0378 HOSPITAL OBSERVATION PER HR: HCPCS

## 2023-04-02 PROCEDURE — 25010000002 FUROSEMIDE PER 20 MG: Performed by: INTERNAL MEDICINE

## 2023-04-02 PROCEDURE — 93005 ELECTROCARDIOGRAM TRACING: CPT | Performed by: EMERGENCY MEDICINE

## 2023-04-02 PROCEDURE — 94618 PULMONARY STRESS TESTING: CPT

## 2023-04-02 PROCEDURE — 25010000002 FUROSEMIDE PER 20 MG: Performed by: EMERGENCY MEDICINE

## 2023-04-02 PROCEDURE — 96374 THER/PROPH/DIAG INJ IV PUSH: CPT

## 2023-04-02 PROCEDURE — 84484 ASSAY OF TROPONIN QUANT: CPT | Performed by: EMERGENCY MEDICINE

## 2023-04-02 PROCEDURE — 83880 ASSAY OF NATRIURETIC PEPTIDE: CPT | Performed by: EMERGENCY MEDICINE

## 2023-04-02 PROCEDURE — 96376 TX/PRO/DX INJ SAME DRUG ADON: CPT

## 2023-04-02 PROCEDURE — 80050 GENERAL HEALTH PANEL: CPT | Performed by: EMERGENCY MEDICINE

## 2023-04-02 PROCEDURE — 84439 ASSAY OF FREE THYROXINE: CPT | Performed by: NURSE PRACTITIONER

## 2023-04-02 PROCEDURE — 71045 X-RAY EXAM CHEST 1 VIEW: CPT

## 2023-04-02 PROCEDURE — 85730 THROMBOPLASTIN TIME PARTIAL: CPT | Performed by: EMERGENCY MEDICINE

## 2023-04-02 PROCEDURE — 85610 PROTHROMBIN TIME: CPT | Performed by: EMERGENCY MEDICINE

## 2023-04-02 PROCEDURE — 99284 EMERGENCY DEPT VISIT MOD MDM: CPT

## 2023-04-02 RX ORDER — SPIRONOLACTONE 25 MG/1
25 TABLET ORAL DAILY
Status: DISCONTINUED | OUTPATIENT
Start: 2023-04-02 | End: 2023-04-07 | Stop reason: HOSPADM

## 2023-04-02 RX ORDER — SODIUM CHLORIDE 0.9 % (FLUSH) 0.9 %
10 SYRINGE (ML) INJECTION AS NEEDED
Status: DISCONTINUED | OUTPATIENT
Start: 2023-04-02 | End: 2023-04-07 | Stop reason: HOSPADM

## 2023-04-02 RX ORDER — ACETAMINOPHEN 325 MG/1
650 TABLET ORAL EVERY 4 HOURS PRN
Status: DISCONTINUED | OUTPATIENT
Start: 2023-04-02 | End: 2023-04-07 | Stop reason: HOSPADM

## 2023-04-02 RX ORDER — SODIUM CHLORIDE 9 MG/ML
40 INJECTION, SOLUTION INTRAVENOUS AS NEEDED
Status: DISCONTINUED | OUTPATIENT
Start: 2023-04-02 | End: 2023-04-07 | Stop reason: HOSPADM

## 2023-04-02 RX ORDER — CYCLOBENZAPRINE HCL 10 MG
10 TABLET ORAL 3 TIMES DAILY PRN
Status: DISCONTINUED | OUTPATIENT
Start: 2023-04-02 | End: 2023-04-07 | Stop reason: HOSPADM

## 2023-04-02 RX ORDER — FUROSEMIDE 10 MG/ML
80 INJECTION INTRAMUSCULAR; INTRAVENOUS ONCE
Status: COMPLETED | OUTPATIENT
Start: 2023-04-02 | End: 2023-04-02

## 2023-04-02 RX ORDER — ORPHENADRINE CITRATE 30 MG/ML
60 INJECTION INTRAMUSCULAR; INTRAVENOUS EVERY 12 HOURS
Status: DISCONTINUED | OUTPATIENT
Start: 2023-04-02 | End: 2023-04-02

## 2023-04-02 RX ORDER — ONDANSETRON 2 MG/ML
4 INJECTION INTRAMUSCULAR; INTRAVENOUS EVERY 6 HOURS PRN
Status: DISCONTINUED | OUTPATIENT
Start: 2023-04-02 | End: 2023-04-07 | Stop reason: HOSPADM

## 2023-04-02 RX ORDER — ONDANSETRON 4 MG/1
4 TABLET, FILM COATED ORAL EVERY 6 HOURS PRN
Status: DISCONTINUED | OUTPATIENT
Start: 2023-04-02 | End: 2023-04-07 | Stop reason: HOSPADM

## 2023-04-02 RX ORDER — ACETAMINOPHEN 160 MG/5ML
650 SOLUTION ORAL EVERY 4 HOURS PRN
Status: DISCONTINUED | OUTPATIENT
Start: 2023-04-02 | End: 2023-04-07 | Stop reason: HOSPADM

## 2023-04-02 RX ORDER — FUROSEMIDE 40 MG/1
40 TABLET ORAL DAILY
Status: DISCONTINUED | OUTPATIENT
Start: 2023-04-03 | End: 2023-04-07 | Stop reason: HOSPADM

## 2023-04-02 RX ORDER — MAGNESIUM SULFATE HEPTAHYDRATE 40 MG/ML
2 INJECTION, SOLUTION INTRAVENOUS AS NEEDED
Status: DISCONTINUED | OUTPATIENT
Start: 2023-04-02 | End: 2023-04-07 | Stop reason: HOSPADM

## 2023-04-02 RX ORDER — FUROSEMIDE 40 MG/1
40 TABLET ORAL DAILY
Status: DISCONTINUED | OUTPATIENT
Start: 2023-04-02 | End: 2023-04-02

## 2023-04-02 RX ORDER — FUROSEMIDE 10 MG/ML
80 INJECTION INTRAMUSCULAR; INTRAVENOUS EVERY 12 HOURS
Status: DISCONTINUED | OUTPATIENT
Start: 2023-04-02 | End: 2023-04-02

## 2023-04-02 RX ORDER — CARVEDILOL 6.25 MG/1
6.25 TABLET ORAL 2 TIMES DAILY WITH MEALS
Status: DISCONTINUED | OUTPATIENT
Start: 2023-04-02 | End: 2023-04-07 | Stop reason: HOSPADM

## 2023-04-02 RX ORDER — ACETAMINOPHEN 650 MG/1
650 SUPPOSITORY RECTAL EVERY 4 HOURS PRN
Status: DISCONTINUED | OUTPATIENT
Start: 2023-04-02 | End: 2023-04-07 | Stop reason: HOSPADM

## 2023-04-02 RX ORDER — ATORVASTATIN CALCIUM 10 MG/1
10 TABLET, FILM COATED ORAL DAILY
Status: DISCONTINUED | OUTPATIENT
Start: 2023-04-02 | End: 2023-04-07 | Stop reason: HOSPADM

## 2023-04-02 RX ORDER — LOSARTAN POTASSIUM 50 MG/1
50 TABLET ORAL
Status: DISCONTINUED | OUTPATIENT
Start: 2023-04-02 | End: 2023-04-07 | Stop reason: HOSPADM

## 2023-04-02 RX ORDER — SODIUM CHLORIDE 0.9 % (FLUSH) 0.9 %
10 SYRINGE (ML) INJECTION EVERY 12 HOURS SCHEDULED
Status: DISCONTINUED | OUTPATIENT
Start: 2023-04-02 | End: 2023-04-07 | Stop reason: HOSPADM

## 2023-04-02 RX ORDER — MAGNESIUM SULFATE HEPTAHYDRATE 40 MG/ML
4 INJECTION, SOLUTION INTRAVENOUS AS NEEDED
Status: DISCONTINUED | OUTPATIENT
Start: 2023-04-02 | End: 2023-04-07 | Stop reason: HOSPADM

## 2023-04-02 RX ADMIN — ACETAMINOPHEN 650 MG: 325 TABLET, FILM COATED ORAL at 21:36

## 2023-04-02 RX ADMIN — ATORVASTATIN CALCIUM 10 MG: 10 TABLET, FILM COATED ORAL at 16:46

## 2023-04-02 RX ADMIN — CARVEDILOL 6.25 MG: 6.25 TABLET, FILM COATED ORAL at 16:46

## 2023-04-02 RX ADMIN — FUROSEMIDE 80 MG: 10 INJECTION, SOLUTION INTRAMUSCULAR; INTRAVENOUS at 16:46

## 2023-04-02 RX ADMIN — RIVAROXABAN 20 MG: 20 TABLET, FILM COATED ORAL at 16:46

## 2023-04-02 RX ADMIN — ACETAMINOPHEN 650 MG: 325 TABLET, FILM COATED ORAL at 16:46

## 2023-04-02 RX ADMIN — LOSARTAN POTASSIUM 50 MG: 50 TABLET, FILM COATED ORAL at 16:46

## 2023-04-02 RX ADMIN — Medication 10 ML: at 21:32

## 2023-04-02 RX ADMIN — CYCLOBENZAPRINE 10 MG: 10 TABLET, FILM COATED ORAL at 18:15

## 2023-04-02 RX ADMIN — SPIRONOLACTONE 25 MG: 25 TABLET ORAL at 16:46

## 2023-04-02 RX ADMIN — FUROSEMIDE 80 MG: 10 INJECTION, SOLUTION INTRAMUSCULAR; INTRAVENOUS at 08:08

## 2023-04-02 RX ADMIN — Medication 10 ML: at 16:49

## 2023-04-02 NOTE — Clinical Note
Level of Care: Med/Surg [1]   Admitting Physician: KASIA AYALA [[id number]]   Attending Physician: KASIA AYALA [[id number]]

## 2023-04-02 NOTE — CONSULTS
[facility] CARDIOLOGY CONSULT NOTE    Referring Provider: Dr. Giles    Patient Care Team:  Provider, No Known as PCP - General    Reason for Consultation: CHF exacerbation    Chief complaint: Shortness of breath    History of present illness:  Thony Dumont is a 62 y.o. male with past medical history of acute on chronic congestive heart failure, atrial fibrillation, CKD stage II, hypertension, GERD, ICD, who presented to the ED with shortness of breath. Last echo in June 2021 with an ejection fraction of 25%.  Patient currently is homeless.  He does report that he is taking his medication as directed.  He sees Dr. Rollins routinely for cardiac care.  The patient reports for about the last 3 days he is had increasing shortness of breath and increased lower extremity edema. Patient complains of pain all over.   He also states he has had 2 recent falls.  Patient denies current shortness of breath, dizziness, palpitations, or chest pain.    Labs reviewed troponin stabilized 17, 16, proBNP 1396, TSH normal, other labs unremarkable  Chest x-ray shows no acute processes  EKG shows sinus rhythm    Review of Systems   Constitutional: Positive for malaise/fatigue. Negative for fever.   Cardiovascular: Positive for dyspnea on exertion and leg swelling. Negative for chest pain and palpitations.   Respiratory: Positive for shortness of breath. Negative for cough.    Musculoskeletal: Positive for falls.   Gastrointestinal: Negative for nausea and vomiting.   Neurological: Positive for dizziness. Negative for light-headedness.   All other systems reviewed and are negative.      History  Past Medical History:   Diagnosis Date   • Acute on chronic congestive heart failure 06/07/2021   • Afib    • Chronic pain    • CKD (chronic kidney disease) stage 2, GFR 60-89 ml/min 8/9/2021   • Combined systolic and diastolic congestive heart failure    • Degenerative joint disease    • Erectile dysfunction    • Essential hypertension    • Former  "smoker    • Gastroesophageal reflux disease    • Homeless 08/25/2021    living in his car   • ICD (implantable cardioverter-defibrillator) in place 06/15/2021   • Mixed hyperlipidemia    • Moderate mitral regurgitation    • Moderate tricuspid regurgitation    • NICM (nonischemic cardiomyopathy) 06/10/2021   • Paroxysmal atrial fibrillation    • Pleural effusion 8/9/2021   • Pulmonary hypertension    • Sprain of rotator cuff capsule 3/14/2014   • Substance abuse    • Valvular heart disease        Past Surgical History:   Procedure Laterality Date   • CARDIAC DEFIBRILLATOR PLACEMENT     • CARDIAC ELECTROPHYSIOLOGY PROCEDURE Left 6/15/2021    Procedure: Device Implant;  Surgeon: Michael Rollins MD;  Location: [facility] CATH INVASIVE LOCATION;  Service: Cardiovascular;  Laterality: Left;   • HERNIA REPAIR         Family History   Problem Relation Age of Onset   • Cancer Mother        Social History     Tobacco Use   • Smoking status: Some Days     Types: Cigarettes   • Smokeless tobacco: Never   • Tobacco comments:     complete smoking cessation educated    Vaping Use   • Vaping Use: Every day   • Substances: Nicotine   Substance Use Topics   • Alcohol use: Yes     Alcohol/week: 7.0 standard drinks     Types: 7 Cans of beer per week     Comment: 1 beer a day   • Drug use: Not Currently     Types: Marijuana, \"Crack\" cocaine, Methamphetamines     Comment: 6/15/21 report cocaine in 2018, marijuana 6/14/2021 @0050- snorts meth occasionally.        Medications Prior to Admission   Medication Sig Dispense Refill Last Dose   • atorvastatin (LIPITOR) 10 MG tablet TAKE 1 TABLET BY MOUTH EVERY DAY 30 tablet 10    • carvedilol (COREG) 6.25 MG tablet Take 1 tablet by mouth 2 (Two) Times a Day With Meals. 180 tablet 1    • furosemide (LASIX) 40 MG tablet 1 tablet daily 90 tablet 1    • losartan (COZAAR) 50 MG tablet 1 tablet daily 90 tablet 1    • rivaroxaban (Xarelto) 20 MG tablet Take 1 tablet by mouth Daily. 90 tablet 1    • " "spironolactone (ALDACTONE) 25 MG tablet Take 1 tablet by mouth Daily. 90 tablet 1          Aspirin, Penicillins, and Pork allergy    Scheduled Meds:       Continuous Infusions:       PRN Meds:  •  [COMPLETED] Insert Peripheral IV **AND** sodium chloride      VITAL SIGNS  Vitals:    04/02/23 0731 04/02/23 0801 04/02/23 0902 04/02/23 1028   BP: 132/97 129/99 140/98 128/89   BP Location:    Right arm   Patient Position:    Lying   Pulse: 82 71 81 87   Resp:    20   Temp:    98 °F (36.7 °C)   TempSrc:    Oral   SpO2:  98% 99%    Weight:    96.2 kg (212 lb)   Height:    190.5 cm (75\")       Flowsheet Rows    Flowsheet Row First Filed Value   Admission Height 190.5 cm (75\") Documented at 04/02/2023 0528   Admission Weight 91.8 kg (202 lb 6.1 oz) Documented at 04/02/2023 0528           TELEMETRY: Sinus    Physical Exam:  Vitals reviewed.   Constitutional:       General: Awake.      Appearance: Overweight. Chronically ill-appearing.   Eyes:      Conjunctiva/sclera: Conjunctivae normal.      Pupils: Pupils are equal, round, and reactive to light.   Pulmonary:      Effort: Pulmonary effort is normal.      Breath sounds: Normal breath sounds.   Cardiovascular:      Normal rate. Regular rhythm. Normal S1. Normal S2.      Comments: Right leg edema greater than left  Pulses:     Intact distal pulses.   Edema:     Peripheral edema present.  Abdominal:      General: Bowel sounds are normal.      Palpations: Abdomen is soft.   Musculoskeletal:      Cervical back: Normal range of motion. Skin:     General: Skin is warm and dry.   Neurological:      General: No focal deficit present.      Mental Status: Alert and oriented to person, place and time.   Psychiatric:         Behavior: Behavior is cooperative.            LAB RESULTS (LAST 7 DAYS)    CBC  Results from last 7 days   Lab Units 04/02/23  0559   WBC 10*3/mm3 4.30   RBC 10*6/mm3 5.21   HEMOGLOBIN g/dL 15.7   HEMATOCRIT % 48.5   MCV fL 93.0   PLATELETS 10*3/mm3 202 "       BMP  Results from last 7 days   Lab Units 04/02/23  0559   SODIUM mmol/L 139   POTASSIUM mmol/L 4.7   CHLORIDE mmol/L 105   CO2 mmol/L 24.0   BUN mg/dL 18   CREATININE mg/dL 1.09   GLUCOSE mg/dL 107*       CMP   Results from last 7 days   Lab Units 04/02/23  0559   SODIUM mmol/L 139   POTASSIUM mmol/L 4.7   CHLORIDE mmol/L 105   CO2 mmol/L 24.0   BUN mg/dL 18   CREATININE mg/dL 1.09   GLUCOSE mg/dL 107*   ALBUMIN g/dL 4.4   BILIRUBIN mg/dL 1.2   ALK PHOS U/L 106   AST (SGOT) U/L 27   ALT (SGPT) U/L 19       ProBNP        TROPONIN  Results from last 7 days   Lab Units 04/02/23  0738   HSTROP T ng/L 16*       Creatinine Clearance  Estimated Creatinine Clearance: 98.1 mL/min (by C-G formula based on SCr of 1.09 mg/dL).      Radiology  XR Chest 1 View    Result Date: 4/2/2023  Impression: 1. Stable cardiac enlargement with postoperative changes of left-sided transvenous pacemaker placement. 2. Mild elevation of the left hemidiaphragm. 3. The chest appears otherwise clear. Electronically Signed: Nando HERRERA Maryjane  4/2/2023 7:42 AM EDT  Workstation ID: [id number]      EKG      I personally viewed and interpreted the patient's EKG/Telemetry data:    ECHOCARDIOGRAM:  Results for orders placed during the hospital encounter of 06/04/21    Adult Transthoracic Echo Complete W/ Cont if Necessary Per Protocol    Interpretation Summary  · Left atrial volume is severely increased.  · The left ventricular cavity is moderately dilated.  · Left ventricular wall thickness is consistent with mild to moderate concentric hypertrophy.  · Mild dilation of the aortic root is present. Mild dilation of the sinuses of Valsalva is present.  · Moderate tricuspid valve regurgitation is present.  · Estimated right ventricular systolic pressure from tricuspid regurgitation is mildly elevated (35-45 mmHg).  · The right atrial cavity is severely dilated.  · The right ventricular cavity is moderately dilated.  · Moderately reduced right ventricular  systolic function noted.  · Left ventricular diastolic function is consistent with (grade III w/high LAP) reversible restrictive pattern.  · Estimated left ventricular EF = 25% Estimated left ventricular EF was in disagreement with the calculated left ventricular EF. Left ventricular ejection fraction appears to be 21 - 25%. Left ventricular systolic function is moderately decreased.  · Abnormal mitral valve structure consistent with dilated annulus.  · Moderate mitral valve regurgitation is present with a centrally-directed jet noted.  · Mild to moderate pulmonary hypertension is present.      STRESS MYOVIEW:    CARDIAC CATHETERIZATION:    OTHER:         Assessment & Plan       Acute on chronic combined systolic (congestive) and diastolic (congestive) heart failure    Essential hypertension    Mixed hyperlipidemia    Paroxysmal atrial fibrillation (HCC)    Shortness of breath    NICM (nonischemic cardiomyopathy) (HCC)    ICD (implantable cardioverter-defibrillator) in place    Pulmonary hypertension (HCC)      PLAN    Echocardiogram pending  Continue cardiac medications  Restart Lasix 40 mg p.o. daily  To be seen by regular cardiologist tomorrow    I discussed the patients findings and my recommendations with patient and nurse.  Further recommendations per Dr. Rollins.    CORTEZ Vanessa  04/02/23  13:40 EDT   Electronically signed by CORTEZ Vanessa, 04/02/23, 1:59 PM EDT.      Patient seen and examined.  Patient is well-known to me.  Complains of increasing shortness of breath with fatigue and denies any chest pain or syncope.  Denies any rapid palpitations.  Patient has nonischemic cardiomyopathy and history of atrial fibrillation and prior history of stroke in the past.  Patient is compliant with medications and denies any alcohol abuse or smoking abuse.      Physical Exam    General:      well developed, well nourished, in no acute distress.    Head:      normocephalic and atraumatic.    Eyes:       PERRL/EOM intact, conjunctivae and sclerae clear without nystagmus.    Neck:      no  thyromegaly, trachea central with normal respiratory effort  Lungs:      Reduced breath sounds in bases  Heart:       regular rate and rhythm, S1, S2 without murmurs, rubs, or gallops  Skin:      intact without lesions or rashes.    Psych:      alert and cooperative; normal mood and affect; normal attention span and concentration.      Mild bilateral leg edema noted      Labs x-rays and EKGs reviewed    Acute on chronic class III systolic heart failure  Additional intravenous Lasix ordered  Continue Coreg losartan and spironolactone  Follow renal panel      Electronically signed by Michael Rollins MD, 04/02/23, 3:59 PM EDT.

## 2023-04-02 NOTE — ED PROVIDER NOTES
Subjective   History of Present Illness  70-year-old male with history of heart failure with reduced ejection fraction, A-fib presents for shortness of breath, bilateral lower extremity edema.  States that he was living with kicked him out and he has been forced to walk around.  States he is still been taking all his medications.  Not having any chest pain.  Been going on for about 3 days.  States his cardiologist is Dr. Rollins.  States he has been short of breath with exertion.  Feels like his oxygen is getting low.      Review of Systems  Positive for shortness of breath and bilateral lower extreme edema.  Negative for chest pain.  Negative for fevers or chills.  Negative for nausea vomiting.  Past Medical History:   Diagnosis Date   • Acute on chronic congestive heart failure 06/07/2021   • Afib    • Chronic pain    • CKD (chronic kidney disease) stage 2, GFR 60-89 ml/min 8/9/2021   • Combined systolic and diastolic congestive heart failure    • Degenerative joint disease    • Erectile dysfunction    • Essential hypertension    • Former smoker    • Gastroesophageal reflux disease    • Homeless 08/25/2021    living in his car   • ICD (implantable cardioverter-defibrillator) in place 06/15/2021   • Mixed hyperlipidemia    • Moderate mitral regurgitation    • Moderate tricuspid regurgitation    • NICM (nonischemic cardiomyopathy) 06/10/2021   • Paroxysmal atrial fibrillation    • Pleural effusion 8/9/2021   • Pulmonary hypertension    • Sprain of rotator cuff capsule 3/14/2014   • Substance abuse    • Valvular heart disease        Allergies   Allergen Reactions   • Aspirin Swelling   • Penicillins Swelling   • Pork Allergy Unknown - High Severity       Past Surgical History:   Procedure Laterality Date   • CARDIAC DEFIBRILLATOR PLACEMENT     • CARDIAC ELECTROPHYSIOLOGY PROCEDURE Left 6/15/2021    Procedure: Device Implant;  Surgeon: Michael Rollins MD;  Location:  INVASIVE LOCATION;  Service:  "Cardiovascular;  Laterality: Left;   • HERNIA REPAIR         Family History   Problem Relation Age of Onset   • Cancer Mother        Social History     Socioeconomic History   • Marital status: Single   Tobacco Use   • Smoking status: Some Days     Types: Cigarettes   • Smokeless tobacco: Never   • Tobacco comments:     complete smoking cessation educated    Vaping Use   • Vaping Use: Every day   Substance and Sexual Activity   • Alcohol use: Yes     Alcohol/week: 7.0 standard drinks     Types: 7 Cans of beer per week     Comment: 1 beer a day   • Drug use: Not Currently     Types: Marijuana, \"Crack\" cocaine, Methamphetamines     Comment: 6/15/21 report cocaine in 2018, marijuana 6/14/2021 @0050- snorts meth occasionally.   • Sexual activity: Defer           Objective   Physical Exam  Constitutional:  No acute distress.  Head:  Atraumatic.  Normocephalic.   Eyes:  No scleral icterus. Normal conjunctivae  ENT:  Moist mucosa.  No nasal discharge present.  Cardiovascular:  Well perfused.  Equal pulses.  Regular rate.  Normal capillary refill.    Pulmonary/Chest:  No respiratory distress.  Airway patent.  No tachypnea.  No accessory muscle usage.    Abdominal:  Nondistended. Nontender.   Extremities: Bilateral lower extremity edema.  No Deformity  Skin:  Warm, dry  Neurological:  Alert, awake, and appropriate.  Normal speech.      Procedures           ED Course      /97   Pulse 82   Temp 98.1 °F (36.7 °C) (Oral)   Resp 24   Ht 190.5 cm (75\")   Wt 91.8 kg (202 lb 6.1 oz)   SpO2 93%   BMI 25.30 kg/m²   Labs Reviewed   COMPREHENSIVE METABOLIC PANEL - Abnormal; Notable for the following components:       Result Value    Glucose 107 (*)     All other components within normal limits    Narrative:     GFR Normal >60  Chronic Kidney Disease <60  Kidney Failure <15     APTT - Abnormal; Notable for the following components:    PTT 29.0 (*)     All other components within normal limits   SINGLE HSTROPONIN T - " Abnormal; Notable for the following components:    HS Troponin T 17 (*)     All other components within normal limits    Narrative:     High Sensitive Troponin T Reference Range:  <10.0 ng/L- Negative Female for AMI  <15.0 ng/L- Negative Male for AMI  >=10 - Abnormal Female indicating possible myocardial injury.  >=15 - Abnormal Male indicating possible myocardial injury.   Clinicians would have to utilize clinical acumen, EKG, Troponin, and serial changes to determine if it is an Acute Myocardial Infarction or myocardial injury due to an underlying chronic condition.        BNP (IN-HOUSE) - Abnormal; Notable for the following components:    proBNP 1,396.0 (*)     All other components within normal limits    Narrative:     Among patients with dyspnea, NT-proBNP is highly sensitive for the detection of acute congestive heart failure. In addition NT-proBNP of <300 pg/ml effectively rules out acute congestive heart failure with 99% negative predictive value.    Results may be falsely decreased if patient taking Biotin.     PROTIME-INR - Normal   CBC WITH AUTO DIFFERENTIAL - Normal   SINGLE HSTROPONIN T   CBC AND DIFFERENTIAL    Narrative:     The following orders were created for panel order CBC & Differential.  Procedure                               Abnormality         Status                     ---------                               -----------         ------                     CBC Auto Differential[[id number]]        Normal              Final result                 Please view results for these tests on the individual orders.     Medications   sodium chloride 0.9 % flush 10 mL (has no administration in time range)   furosemide (LASIX) injection 80 mg (has no administration in time range)     XR Chest 1 View    Result Date: 4/2/2023  Impression: 1. Stable cardiac enlargement with postoperative changes of left-sided transvenous pacemaker placement. 2. Mild elevation of the left hemidiaphragm. 3. The chest appears  otherwise clear. Electronically Signed: Nando Wesley  4/2/2023 7:42 AM EDT  Workstation ID: [id number]                                         [facility]  EKG # 1  Signed and interpreted by the EP.  Time Interpreted: 5:40 AM  Rate: 86  Rhythm: Normal sinus rhythm  Axis: Within normal limits  Intervals: Within normal limits  ST Segments: T wave inversions in leads aVL, V5 and V6 are unchanged from November 17, 2021 EKG.     Patient with 10 pound weight gain when compared to October 2022 weight documented on Dr. Rollins's note.  EKG unchanged.  BNP improved compared to prior.  Other labs reassuring.  Patient not hypoxic here.  Will trial walk test.    Reviewed echocardiogram from June 5, 2021 interpreted by Dr. Roni Brewer.  Patient with ejection fraction of 25%.  Patient with dilated left ventricular cavity.    Patient desatting 81-82% on room air when ambulated.  Given this with 10 pound weight gain suspect patient with volume overload.  We will give dose of Lasix here and admit for further work-up and evaluation.    Accepted by Alison Shaw on behalf of Dr. Lopez hospitalist services for further diuresis and treatment of hypoxia.    Final diagnoses:   Shortness of breath   Hypervolemia, unspecified hypervolemia type       ED Disposition  ED Disposition     ED Disposition   Decision to Admit    Condition   --    Comment   Level of Care: Med/Surg [1]   Admitting Physician: KASIA AYALA [[id number]]   Attending Physician: KASIA AYALA [[id number]]               PATIENT CONNECTION - [facility] [id number]  [phone]  In 3 days      Michael Rollins MD  [address] [place] [id number]  [phone]    In 3 days           Medication List      No changes were made to your prescriptions during this visit.          Domingo Lewis MD  04/02/23 0802

## 2023-04-02 NOTE — PROGRESS NOTES
Exercise Oximetry    Patient Name:Thony Dumont   MRN: [id number]   Date: 04/02/23             ROOM AIR BASELINE   SpO2% 99%   Heart Rate 75   Blood Pressure      EXERCISE ON ROOM AIR SpO2% EXERCISE ON O2 @ 2 LPM SpO2%   1 MINUTE 86% 1 MINUTE    2 MINUTES 92% 2 MINUTES    3 MINUTES 93% 3 MINUTES    4 MINUTES  4 MINUTES 95%   5 MINUTES  5 MINUTES 96%   6 MINUTES  6 MINUTES 96%              Distance Walked   Distance Walked 6 Minutes   Dyspnea (Miri Scale)   Dyspnea (Miri Scale)   Fatigue (Miri Scale)   Fatigue (Miri Scale)   SpO2% Post Exercise   SpO2% Post Exercise 98%   HR Post Exercise   HR Post Exercise 89   Time to Recovery   Time to Recovery <1 Minute     Comments: Patient requires 2 LPM Home Oxygen Khalif Pool, CRT

## 2023-04-02 NOTE — PLAN OF CARE
Goal Outcome Evaluation:  Plan of Care Reviewed With: patient        Progress: improving  Outcome Evaluation: Pt arrived to unit around 1030, pt pleasant and cooperative. Pt denies any pain and or shortness of air. Pt does not have a permanent home and brought all personal belongings with him. Pt belongings placed in pt room closet. Pt was also noted to have a box of food with some items being perishable like chicken, ground beef, cheese etc. This nurse with help from CNA we filled out basins with ice and covered them, pt said he could probably call someone to come get it as this was food that was given from the food pantry. This nurse told pt that after a certain amount of time we would have to dispose of the cold foods due to not being safe for consumption and pt understood. Pt brought all of his medications, medication box was placed in pharmacy bag and is properly locked away in pharmacy. Pt also mentioned carrying a knive with him, security was called and retrieved the knive per  and this nurse. Pt aware that he will get his medications and knive back when he discharges. Pt also mentioned he is in the process of getting placement, he just needs to wait until it is provided. Cardiology following up, MD's aware of abnormal Troponin and NNO. Upon arrival to unit, pt was noted to be on room air but sats were low to mid 90's. Pt has been on room air since being brought to unit. Will continue to monitor and document as needed.

## 2023-04-02 NOTE — DISCHARGE INSTRUCTIONS
Patient was advised to follow-up with his her primary care physician who will review his current medications.    Patient was advised to follow-up with his cardiologist to reassess his cardiac function.    Patient was advised to follow-up with his nephrologist who will reassess his to have renal function.    Patient was advised to return to the emergency department if he experiences any recurrence of symptoms.

## 2023-04-02 NOTE — H&P
[facility] Medicine Services  History & Physical    Patient Name: Thony Dumont  : 1960  MRN: [id number]  Primary Care Physician:  Provider, No Known  Date of admission: 2023  Date and Time of Service: 2023 at 0815    Subjective      Chief Complaint: Shortness of breath    History of Present Illness: Thony Dumont is a 62 y.o. male who presented to [facility] on 2023 complaining of shortness of breath.  Patient with a history of congestive heart failure.  Last echo in 2021 with an ejection fraction of 25%.  Patient currently is homeless.  He does report that he is taking his medication as directed.  He sees Dr. Rollins routinely for cardiac care.  The patient reports for about the last 3 days he is had increasing shortness of breath. He has had a 10 # wt gain compared to oct 2022 weight. Pt's oxygen saturation dropped to 81% with ambulating.   At the time of my history and physical the patient complains of pain all over.      Emergency room found a proBNP of 1396.  Initial at bedtime troponin 17 with a repeat of 16.  Metabolic panel with a glucose of 107 otherwise normal.  CBC within normal limit.  INR 1.09 PT 29.0.  Chest x-ray with stable cardiac enlargement with left sided pacemaker present.  EKG sinus rhythm rate of 86 possible abnormal T waves with left atrial enlargement ventricular hypertrophy.  Patient's been given 80 mg of IV Lasix in the emergency room.  He will be admitted for CHF exacerbation cardiology will be consulted.      Review of Systems   Constitutional: Positive for malaise/fatigue and weight gain. Negative for decreased appetite.   HENT: Negative for sore throat.    Eyes: Negative for visual disturbance.   Cardiovascular: Positive for chest pain, dyspnea on exertion, leg swelling and orthopnea. Negative for irregular heartbeat, palpitations and syncope.   Respiratory: Positive for shortness of breath. Negative for cough.    Skin:  Negative for rash.   Musculoskeletal: Negative for falls.   Gastrointestinal: Negative for constipation, diarrhea and vomiting.   Genitourinary: Negative for dysuria.   Neurological: Negative for headaches.   Psychiatric/Behavioral: The patient is nervous/anxious.         Tearful- states he cannot walk up steps due to soa. He is tearful.         Personal History     Past Medical History:   Diagnosis Date   • Acute on chronic congestive heart failure 06/07/2021   • Afib    • Chronic pain    • CKD (chronic kidney disease) stage 2, GFR 60-89 ml/min 8/9/2021   • Combined systolic and diastolic congestive heart failure    • Degenerative joint disease    • Erectile dysfunction    • Essential hypertension    • Former smoker    • Gastroesophageal reflux disease    • Homeless 08/25/2021    living in his car   • ICD (implantable cardioverter-defibrillator) in place 06/15/2021   • Mixed hyperlipidemia    • Moderate mitral regurgitation    • Moderate tricuspid regurgitation    • NICM (nonischemic cardiomyopathy) 06/10/2021   • Paroxysmal atrial fibrillation    • Pleural effusion 8/9/2021   • Pulmonary hypertension    • Sprain of rotator cuff capsule 3/14/2014   • Substance abuse    • Valvular heart disease        Past Surgical History:   Procedure Laterality Date   • CARDIAC DEFIBRILLATOR PLACEMENT     • CARDIAC ELECTROPHYSIOLOGY PROCEDURE Left 6/15/2021    Procedure: Device Implant;  Surgeon: Michael Rollins MD;  Location: [facility] INVASIVE LOCATION;  Service: Cardiovascular;  Laterality: Left;   • HERNIA REPAIR         Family History: family history includes Cancer in his mother. Otherwise pertinent FHx was reviewed and not pertinent to current issue.    Social History:  reports that he has been smoking cigarettes. He has never used smokeless tobacco. He reports current alcohol use of about 7.0 standard drinks per week. He reports that he does not currently use drugs after having used the following drugs: Marijuana,  "\"Crack\" cocaine, and Methamphetamines.    Home Medications:  Prior to Admission Medications     Prescriptions Last Dose Informant Patient Reported? Taking?    acetaminophen (TYLENOL) 500 MG tablet   No No    Take 2 tablets by mouth Every 6 (Six) Hours As Needed for Mild Pain  or Moderate Pain .    atorvastatin (LIPITOR) 10 MG tablet   No No    TAKE 1 TABLET BY MOUTH EVERY DAY    carvedilol (COREG) 6.25 MG tablet   No No    Take 1 tablet by mouth 2 (Two) Times a Day With Meals.    furosemide (LASIX) 40 MG tablet   No No    1 tablet daily    losartan (COZAAR) 50 MG tablet   No No    1 tablet daily    rivaroxaban (Xarelto) 20 MG tablet   No No    Take 1 tablet by mouth Daily.    spironolactone (ALDACTONE) 25 MG tablet   No No    Take 1 tablet by mouth Daily.            Allergies:  Allergies   Allergen Reactions   • Aspirin Swelling   • Penicillins Swelling   • Pork Allergy Unknown - High Severity       Objective      Vitals:   Temp:  [98.1 °F (36.7 °C)] 98.1 °F (36.7 °C)  Heart Rate:  [71-98] 71  Resp:  [24] 24  BP: (117-133)/(88-99) 129/99    Physical Exam  Constitutional:       Appearance: Normal appearance.   HENT:      Head: Normocephalic.      Right Ear: External ear normal.      Left Ear: External ear normal.      Nose: Nose normal.   Eyes:      Conjunctiva/sclera: Conjunctivae normal.   Cardiovascular:      Rate and Rhythm: Normal rate and regular rhythm.      Pulses: Normal pulses.   Pulmonary:      Effort: Pulmonary effort is normal.      Breath sounds: Rales (bibasilar ) present.   Abdominal:      General: Abdomen is flat.      Palpations: Abdomen is soft.      Tenderness: There is no guarding or rebound.   Musculoskeletal:         General: Normal range of motion.      Cervical back: Normal range of motion.      Right lower leg: Edema (1-2 plus) present.      Left lower leg: Edema (1-2 plus) present.   Skin:     General: Skin is warm.      Capillary Refill: Capillary refill takes less than 2 seconds. "   Neurological:      Mental Status: He is alert and oriented to person, place, and time.   Psychiatric:      Comments: tearful         Result Review    Result Review:  I have personally reviewed the results from the time of this admission to 4/2/2023 08:55 EDT and agree with these findings:  [x]  Laboratory  []  Microbiology  [x]  Radiology  [x]  EKG/Telemetry   [x]  Cardiology/Vascular   []  Pathology  [x]  Old records  []  Other:  Most notable findings include:   Ef in 2021 25%  HS troponin elevated from previous  BNP- improved from previous      Assessment & Plan        Active Hospital Problems:  Active Hospital Problems    Diagnosis    • Shortness of breath    • ICD (implantable cardioverter-defibrillator) in place    • Pulmonary hypertension (HCC)    • Acute on chronic combined systolic (congestive) and diastolic (congestive) heart failure    • NICM (nonischemic cardiomyopathy) (HCC)    • Essential hypertension    • Mixed hyperlipidemia    • Paroxysmal atrial fibrillation (HCC)      Plan:     Acute on Chronic combined systolic and diastolic CHF- 80 mg lasix IV in er- consulting Cardiology . Last echo 6/21 EF 25%- repeat echo- continue aldactone. Appreciate  Cardiology to manage further lasix.    NICM- cardio will determine need for  Further ischemic work up.    Pulmonary HTN- repeat echo    PAF - continue xarelto  And coreg    ICD in place- per cardiology     HTN- losartan    HLD- lipids in am  Continue statin       DVT prophylaxis:  No DVT prophylaxis order currently exists.    CODE STATUS:    Code Status (Patient has no pulse and is not breathing): CPR (Attempt to Resuscitate)  Medical Interventions (Patient has pulse or is breathing): Full Support    Admission Status:  I believe this patient meets inpatient  status.    I discussed the patient's findings and my recommendations with patient.    This patient has been examined wearing appropriate Personal Protective Equipment. 04/02/23      Signature:  Electronically signed by CORTEZ Serrnao, 04/02/23, 08:55 EDT.  [facility] [place] [facility]

## 2023-04-03 ENCOUNTER — APPOINTMENT (OUTPATIENT)
Dept: CARDIOLOGY | Facility: HOSPITAL | Age: 63
End: 2023-04-03
Payer: MEDICAID

## 2023-04-03 LAB
ALBUMIN SERPL-MCNC: 4.1 G/DL (ref 3.5–5.2)
ALBUMIN/GLOB SERPL: 1.4 G/DL
ALP SERPL-CCNC: 115 U/L (ref 39–117)
ALT SERPL W P-5'-P-CCNC: 20 U/L (ref 1–41)
ANION GAP SERPL CALCULATED.3IONS-SCNC: 12 MMOL/L (ref 5–15)
AST SERPL-CCNC: 24 U/L (ref 1–40)
BASOPHILS # BLD AUTO: 0 10*3/MM3 (ref 0–0.2)
BASOPHILS NFR BLD AUTO: 0.7 % (ref 0–1.5)
BILIRUB SERPL-MCNC: 1.3 MG/DL (ref 0–1.2)
BUN SERPL-MCNC: 21 MG/DL (ref 8–23)
BUN/CREAT SERPL: 14.9 (ref 7–25)
CALCIUM SPEC-SCNC: 9.5 MG/DL (ref 8.6–10.5)
CHLORIDE SERPL-SCNC: 102 MMOL/L (ref 98–107)
CHOLEST SERPL-MCNC: 196 MG/DL (ref 0–200)
CO2 SERPL-SCNC: 27 MMOL/L (ref 22–29)
CREAT SERPL-MCNC: 1.41 MG/DL (ref 0.76–1.27)
DEPRECATED RDW RBC AUTO: 49.9 FL (ref 37–54)
EGFRCR SERPLBLD CKD-EPI 2021: 57.1 ML/MIN/1.73
EOSINOPHIL # BLD AUTO: 0.2 10*3/MM3 (ref 0–0.4)
EOSINOPHIL NFR BLD AUTO: 4.8 % (ref 0.3–6.2)
ERYTHROCYTE [DISTWIDTH] IN BLOOD BY AUTOMATED COUNT: 14.7 % (ref 12.3–15.4)
GLOBULIN UR ELPH-MCNC: 3 GM/DL
GLUCOSE SERPL-MCNC: 99 MG/DL (ref 65–99)
HCT VFR BLD AUTO: 50.9 % (ref 37.5–51)
HDLC SERPL-MCNC: 96 MG/DL (ref 40–60)
HGB BLD-MCNC: 17 G/DL (ref 13–17.7)
LDLC SERPL CALC-MCNC: 83 MG/DL (ref 0–100)
LDLC/HDLC SERPL: 0.84 {RATIO}
LYMPHOCYTES # BLD AUTO: 1 10*3/MM3 (ref 0.7–3.1)
LYMPHOCYTES NFR BLD AUTO: 26.6 % (ref 19.6–45.3)
MAGNESIUM SERPL-MCNC: 2 MG/DL (ref 1.6–2.4)
MCH RBC QN AUTO: 30.9 PG (ref 26.6–33)
MCHC RBC AUTO-ENTMCNC: 33.3 G/DL (ref 31.5–35.7)
MCV RBC AUTO: 92.6 FL (ref 79–97)
MONOCYTES # BLD AUTO: 0.5 10*3/MM3 (ref 0.1–0.9)
MONOCYTES NFR BLD AUTO: 12.5 % (ref 5–12)
NEUTROPHILS NFR BLD AUTO: 2.1 10*3/MM3 (ref 1.7–7)
NEUTROPHILS NFR BLD AUTO: 55.4 % (ref 42.7–76)
NRBC BLD AUTO-RTO: 0.2 /100 WBC (ref 0–0.2)
PLATELET # BLD AUTO: 199 10*3/MM3 (ref 140–450)
PMV BLD AUTO: 8.3 FL (ref 6–12)
POTASSIUM SERPL-SCNC: 4 MMOL/L (ref 3.5–5.2)
PROT SERPL-MCNC: 7.1 G/DL (ref 6–8.5)
QT INTERVAL: 408 MS
RBC # BLD AUTO: 5.5 10*6/MM3 (ref 4.14–5.8)
SODIUM SERPL-SCNC: 141 MMOL/L (ref 136–145)
TRIGL SERPL-MCNC: 98 MG/DL (ref 0–150)
VLDLC SERPL-MCNC: 17 MG/DL (ref 5–40)
WBC NRBC COR # BLD: 3.8 10*3/MM3 (ref 3.4–10.8)

## 2023-04-03 PROCEDURE — 36415 COLL VENOUS BLD VENIPUNCTURE: CPT | Performed by: NURSE PRACTITIONER

## 2023-04-03 PROCEDURE — 99232 SBSQ HOSP IP/OBS MODERATE 35: CPT | Performed by: INTERNAL MEDICINE

## 2023-04-03 PROCEDURE — G0378 HOSPITAL OBSERVATION PER HR: HCPCS

## 2023-04-03 PROCEDURE — 83735 ASSAY OF MAGNESIUM: CPT | Performed by: INTERNAL MEDICINE

## 2023-04-03 PROCEDURE — 93306 TTE W/DOPPLER COMPLETE: CPT

## 2023-04-03 PROCEDURE — 80053 COMPREHEN METABOLIC PANEL: CPT | Performed by: NURSE PRACTITIONER

## 2023-04-03 PROCEDURE — 80061 LIPID PANEL: CPT | Performed by: NURSE PRACTITIONER

## 2023-04-03 PROCEDURE — 85025 COMPLETE CBC W/AUTO DIFF WBC: CPT | Performed by: NURSE PRACTITIONER

## 2023-04-03 RX ADMIN — CARVEDILOL 6.25 MG: 6.25 TABLET, FILM COATED ORAL at 08:49

## 2023-04-03 RX ADMIN — RIVAROXABAN 20 MG: 20 TABLET, FILM COATED ORAL at 08:49

## 2023-04-03 RX ADMIN — SPIRONOLACTONE 25 MG: 25 TABLET ORAL at 08:48

## 2023-04-03 RX ADMIN — CARVEDILOL 6.25 MG: 6.25 TABLET, FILM COATED ORAL at 17:26

## 2023-04-03 RX ADMIN — Medication 10 ML: at 08:49

## 2023-04-03 RX ADMIN — Medication 10 ML: at 21:28

## 2023-04-03 RX ADMIN — ATORVASTATIN CALCIUM 10 MG: 10 TABLET, FILM COATED ORAL at 08:48

## 2023-04-03 RX ADMIN — CYCLOBENZAPRINE 10 MG: 10 TABLET, FILM COATED ORAL at 06:35

## 2023-04-03 RX ADMIN — LOSARTAN POTASSIUM 50 MG: 50 TABLET, FILM COATED ORAL at 08:49

## 2023-04-03 RX ADMIN — FUROSEMIDE 40 MG: 40 TABLET ORAL at 08:48

## 2023-04-03 NOTE — PROGRESS NOTES
Hospitalist Progress Note   Thony Dumont : 1960 MRN:[id number] LOS:1     Principal Problem: Acute on chronic combined systolic (congestive) and diastolic (congestive) heart failure     Reason for follow up: All the medical problems listed below    Summary     A 62 y.o. male with PMH of CHF, homelessness, A-fib, hypertension, pulmonary hypertension presented to the hospital for worsening shortness of breath and was admitted with a principal diagnosis of Acute on chronic combined systolic (congestive) and diastolic (congestive) heart failure.  Treated with IV Lasix and subsequently switched to oral Lasix.  Cardiology was consulted.    Assessment / Plan     Acute on chronic Systolic heart failure with AICD / Pulmonary hypertension / Nonischemic cardiomyopathy:   • Currently decompensated.   • Last ECHO showed an EF of 20%.   • On Coreg and Cozaar.  Likely can add Jardiance due to benefit in severe systolic heart failure.  Defer to cardiology.  • Monitor Input/Output very closely. Follow daily weights.   • Net IO Since Admission: -1,740 mL [23 1343]    Essential Hypertension: well controlled.   • Continue Coreg and losartan.   • Titrate medications as needed.    Paroxysmal atrial fibrillation :   • Rate controlled well with carvedilol.   • CHADS-VASc score of 2. Currently on anticoagulation with Xarelto.      Dyslipidemia: Chronic and stable. Continue statins.  Homelessness    Code status:   Code Status (Patient has no pulse and is not breathing): CPR (Attempt to Resuscitate)  Medical Interventions (Patient has pulse or is breathing): Full Support       Nutrition: Diet: Cardiac Diets; Healthy Heart (2-3 Na+); Texture: Regular Texture (IDDSI 7); Fluid Consistency: Thin (IDDSI 0)   Patient isn't on Tube Feeding    DVT prophylaxis:   Mechanical Order History:      Ordered        23 1350  Place Sequential Compression Device  Once            23 1350  Maintain Sequential Compression Device   Continuous                    Pharmalogical Order History:      Ordered     Dose Route Frequency Stop    04/02/23 1350  rivaroxaban (XARELTO) tablet 20 mg         20 mg PO Daily --                 PT Assessment (last 12 hours)     PT Evaluation and Treatment    No documentation.               OT ASSESSMENT FLOWSHEET (last 12 hours)     OT Evaluation and Treatment    No documentation.                  Disposition: Awaiting PT/OT evaluation.    Subjective / Review of systems     Review of Systems   Feels better, breathing has improved.  Did admit to 10 pound weight gain in the recent past.  Denies any noncompliance with medications.  Denies any excess water intake.  Feels very weak.  Objective / Physical Exam   Vital signs:  Temp: 98 °F (36.7 °C)  BP: 116/78  Heart Rate: 75  Resp: 12  SpO2: 91 %  Weight: 96.2 kg (212 lb)    Admission Weight: Weight: 91.8 kg (202 lb 6.1 oz)  Current Weight: Weight: 96.2 kg (212 lb)    Input/Output in last 24 hours:    Intake/Output Summary (Last 24 hours) at 4/3/2023 1342  Last data filed at 4/3/2023 1100  Gross per 24 hour   Intake 480 ml   Output 1500 ml   Net -1020 ml      Physical Exam  Vitals and nursing note reviewed.   Constitutional:       General: He is not in acute distress.     Appearance: Normal appearance.   HENT:      Mouth/Throat:      Mouth: Mucous membranes are moist.      Pharynx: Oropharynx is clear.   Eyes:      General: No scleral icterus.     Extraocular Movements: Extraocular movements intact.      Conjunctiva/sclera: Conjunctivae normal.   Cardiovascular:      Rate and Rhythm: Normal rate.      Heart sounds: No murmur heard.    No gallop.   Pulmonary:      Breath sounds: Normal breath sounds. No wheezing or rales.   Abdominal:      General: Bowel sounds are normal.      Palpations: Abdomen is soft.      Tenderness: There is no abdominal tenderness.   Musculoskeletal:         General: No tenderness.      Right lower leg: No edema.      Left lower leg: No edema.    Neurological:      General: No focal deficit present.      Mental Status: He is alert and oriented to person, place, and time.        Radiology and Labs     Results from last 7 days   Lab Units 04/03/23  0027 04/02/23  0559   WBC 10*3/mm3 3.80 4.30   HEMATOCRIT % 50.9 48.5   PLATELETS 10*3/mm3 199 202      Results from last 7 days   Lab Units 04/03/23  0027 04/02/23  0559   SODIUM mmol/L 141 139   POTASSIUM mmol/L 4.0 4.7   CHLORIDE mmol/L 102 105   CO2 mmol/L 27.0 24.0   BUN mg/dL 21 18   CREATININE mg/dL 1.41* 1.09      Current medications   Scheduled Meds: atorvastatin, 10 mg, Oral, Daily  carvedilol, 6.25 mg, Oral, BID With Meals  furosemide, 40 mg, Oral, Daily  losartan, 50 mg, Oral, Q24H  rivaroxaban, 20 mg, Oral, Daily  sodium chloride, 10 mL, Intravenous, Q12H  spironolactone, 25 mg, Oral, Daily      Continuous Infusions:      Plan discussed with RN. Reviewed all other data in the last 24 hours, including but not limited to vitals, labs, microbiology, imaging and pertinent notes from other providers.     Delmar Garner MD   [facility]  04/03/23   13:42 EDT

## 2023-04-03 NOTE — CASE MANAGEMENT/SOCIAL WORK
Social Work Assessment  [facility]     Patient Name: Thony Dumont  MRN: [id number]  Today's Date: 4/3/2023    Admit Date: 4/2/2023     Discharge Needs Assessment     Row Name 04/03/23 1158       Discharge Needs Assessment    Concerns to be Addressed other (see comments)    Concerns Comments Pt reported that he is currently homeless.  Pt is working on getting housing with the [facility].  Pt reported he has no trasnportation to get him to appointments.  SW shared inforation about transportation for medical appointments through Medicaid.  Discussed plans at [place] of going to a shelter if needed.  SW will continue to follow for housing, transportation, and any other basic needs that might arise.              04/03/23 1142   Living Situation   Current Living Arrangements homeless   Potentially Unsafe Housing Conditions unable to assess   Food Insecurity   Within the past 12 months, you worried that your food would run out before you got the money to buy more. Sometimes   Within the past 12 months, the food you bought just didn't last and you didn't have money to get more. Sometimes   Transportation Needs   In the past 12 months, has lack of transportation kept you from medical appointments or from getting medications? yes   In the past 12 months, has lack of transportation kept you from meetings, work, or from getting things needed for daily living? Yes   Utilities   In the past 12 months has the electric, gas, oil, or water company threatened to shut off services in your home? No   Abuse Screen (yes response referral indicated)   Feels Unsafe at Home or Work/School no   Feels Threatened by Someone no   Does Anyone Try to Keep You From Having Contact with Others or Doing Things Outside Your Home? no   Physical Signs of Abuse Present no   Financial Resource Strain   How hard is it for you to pay for the very basics like food, housing, medical care, and heating? Hard   Employment   Do you want help finding or keeping work or a  job? I do not need or want help  (Pt stated he is on disability.)   Family and Community Support   If for any reason you need help with day-to-day activities such as bathing, preparing meals, shopping, managing finances, etc., do you get the help you need? I could use a little more help   How often do you feel lonely or isolated from those around you? Never   Education   Preferred Language English   Do you want help with school or training? For example, starting or completing job training or getting a high school diploma, GED or equivalent No   Physical Activity   On average, how many days per week do you engage in moderate to strenuous exercise (like a brisk walk)? 7 days   On average, how many minutes do you engage in exercise at this level? 30 min   Number of minutes of exercise per week 210   Alcohol Use   Q1: How often do you have a drink containing alcohol? Never   Q2: How many drinks containing alcohol do you have on a typical day when you are drinking? None   Q3: How often do you have six or more drinks on one occasion? Never   Mental Health   Little Interest or Pleasure in Doing Things 2-->more than half the days   Feeling Down, Depressed or Hopeless 2-->more than half the days   Disabilities   Concentrating, Remembering or Making Decisions Difficulty no   Doing Errands Independently Difficulty (such as shopping) yes     Met with patient in room wearing PPE: mask.  Maintained distance greater than six feet and spent less than 15 minutes in the room.    Caty Tabares LCSW    Office: [phone]  Fax: [phone]  [email or web address]

## 2023-04-03 NOTE — PROGRESS NOTES
Cardiology Progress Note-EP      Patient Care Team:  Provider, No Known as PCP - General    PATIENT IDENTIFICATION  Name: Thony Dumont  Age: 62 y.o.  Sex: male  :  1960  MRN: [id number]             REASON FOR FOLLOW-UP  Acute on chronic heart failure with reduced ejection fraction      INTERVAL HISTORY  Patient seen and examined, chart and labs reviewed.  Patient supine in bed undergoing echocardiography.  He reports not feeling well today, but no specific complaints of chest discomfort or shortness of breath.  Feels weak and tired.      SUBJECTIVE    Denies CP, SOA, nausea/vomiting      REVIEW OF SYSTEMS:  Pertinent items are noted in HPI, all other systems reviewed and negative  Review of Systems   Constitutional: Positive for malaise/fatigue.   Eyes: Negative for blurred vision and double vision.   Cardiovascular: Negative for chest pain, dyspnea on exertion, irregular heartbeat, leg swelling, orthopnea, palpitations, paroxysmal nocturnal dyspnea and syncope.   Respiratory: Negative for cough and shortness of breath.    Gastrointestinal: Negative for nausea and vomiting.   Neurological: Negative for dizziness, light-headedness, numbness and paresthesias.   All other systems reviewed and are negative.    OBJECTIVE   Creatinine 1.41 (1.09)    ASSESSMENT  Acute on chronic combined systolic and diastolic  Primary hypertension  Paroxysmal atrial fibrillation with elevated chads vascular score  Nonischemic cardiomyopathy  Implantable cardioverter-defibrillator in situ  Pulmonary hypertension  Shortness of breath  Dyslipidemia mixed  Essential hypertension  CHADS-VASc Risk Assessment            2 Total Score    1 CHF    1 Hypertension        Criteria that do not apply:    Age >/= 75    DM    PRIOR STROKE/TIA/THROMBO    Vascular Disease    Age 65-74    Sex: Female            RECOMMENDATIONS  Patient diuresing well  Continue Coreg, losartan, Aldactone  LDU-[id number]-xt read  Maintaining normal sinus  "rhythm  Continue statin  Patient on Xarelto for elevated stroke risk  Hemodynamics are stable  Continue to diurese as renal function allows        Vital Signs  Visit Vitals  /79 (BP Location: Right arm, Patient Position: Lying)   Pulse 67   Temp 98.1 °F (36.7 °C) (Axillary)   Resp 12   Ht 190.5 cm (75\")   Wt 96.2 kg (212 lb)   SpO2 97%   BMI 26.50 kg/m²     Oxygen Therapy  SpO2: 97 %  Pulse Oximetry Type: Continuous  Device (Oxygen Therapy): room air  Flowsheet Rows    Flowsheet Row First Filed Value   Admission Height 190.5 cm (75\") Documented at 04/02/2023 0528   Admission Weight 91.8 kg (202 lb 6.1 oz) Documented at 04/02/2023 0528        Intake & Output (last 3 days)       03/31 0701  04/01 0700 04/01 0701  04/02 0700 04/02 0701  04/03 0700 04/03 0701  04/04 0700    P.O.   480     Total Intake(mL/kg)   480 (5)     Urine (mL/kg/hr)   2700 (1.2)     Total Output   2700     Net   -2220                 Lines, Drains & Airways     Active LDAs     Name Placement date Placement time Site Days    Peripheral IV 04/02/23 0650 Anterior;Left;Proximal Forearm 04/02/23  0650  Forearm  1                       /79 (BP Location: Right arm, Patient Position: Lying)   Pulse 67   Temp 98.1 °F (36.7 °C) (Axillary)   Resp 12   Ht 190.5 cm (75\")   Wt 96.2 kg (212 lb)   SpO2 97%   BMI 26.50 kg/m²   Intake/Output last 3 shifts:  I/O last 3 completed shifts:  In: 480 [P.O.:480]  Out: 2700 [Urine:2700]  Intake/Output this shift:  No intake/output data recorded.    PHYSICAL EXAM:    General: Well-developed, well-nourished 62-year-old male who is alert, cooperative, no distress, appears stated age  Head:  Normocephalic, atraumatic, mucous membranes moist  Eyes:  Conjunctivae/corneas clear, EOM's intact     Neck:  Supple,  no adenopathy;      Lungs: Crackles in bases, no wheezes  Chest wall: No tenderness  Heart::  Regular rate and rhythm, S1 and S2 normal, no murmur, rub or gallop  Abdomen: Soft, nontender, nondistended, " bowel sounds active  Extremities: No cyanosis, clubbing, or edema, groin soft, no hematoma.  Pulses: 2+ and symmetric all extremities  Skin:  No rashes or lesions  Neuro/psych: A&O x3. CN II through XII are grossly intact with appropriate affect      Scheduled Meds:      atorvastatin, 10 mg, Oral, Daily  carvedilol, 6.25 mg, Oral, BID With Meals  furosemide, 40 mg, Oral, Daily  losartan, 50 mg, Oral, Q24H  rivaroxaban, 20 mg, Oral, Daily  sodium chloride, 10 mL, Intravenous, Q12H  spironolactone, 25 mg, Oral, Daily        Continuous Infusions:         PRN Meds:    •  acetaminophen **OR** acetaminophen **OR** acetaminophen  •  cyclobenzaprine  •  magnesium sulfate **OR** magnesium sulfate **OR** magnesium sulfate  •  ondansetron **OR** ondansetron  •  [COMPLETED] Insert Peripheral IV **AND** sodium chloride  •  sodium chloride  •  sodium chloride        Results Review:     I reviewed the patient's new clinical results.    CBC    Results from last 7 days   Lab Units 04/03/23  0027 04/02/23  0559   WBC 10*3/mm3 3.80 4.30   HEMOGLOBIN g/dL 17.0 15.7   PLATELETS 10*3/mm3 199 202     Cr Clearance Estimated Creatinine Clearance: 75.8 mL/min (A) (by C-G formula based on SCr of 1.41 mg/dL (H)).  Coag   Results from last 7 days   Lab Units 04/02/23  0559   INR  1.09   APTT seconds 29.0*     HbA1C No results found for: HGBA1C  Blood Glucose No results found for: POCGLU  Infection     CMP   Results from last 7 days   Lab Units 04/03/23  0027 04/02/23  0559   SODIUM mmol/L 141 139   POTASSIUM mmol/L 4.0 4.7   CHLORIDE mmol/L 102 105   CO2 mmol/L 27.0 24.0   BUN mg/dL 21 18   CREATININE mg/dL 1.41* 1.09   GLUCOSE mg/dL 99 107*   ALBUMIN g/dL 4.1 4.4   BILIRUBIN mg/dL 1.3* 1.2   ALK PHOS U/L 115 106   AST (SGOT) U/L 24 27   ALT (SGPT) U/L 20 19     ABG      UA      EDD  No results found for: POCMETH, POCAMPHET, POCBARBITUR, POCBENZO, POCCOCAINE, POCOPIATES, POCOXYCODO, POCPHENCYC, POCPROPOXY, POCTHC, POCTRICYC  Lysis Labs    Results from last 7 days   Lab Units 04/03/23  0027 04/02/23  0559   INR   --  1.09   APTT seconds  --  29.0*   HEMOGLOBIN g/dL 17.0 15.7   PLATELETS 10*3/mm3 199 202   CREATININE mg/dL 1.41* 1.09     Radiology(recent) XR Chest 1 View    Result Date: 4/2/2023  Impression: 1. Stable cardiac enlargement with postoperative changes of left-sided transvenous pacemaker placement. 2. Mild elevation of the left hemidiaphragm. 3. The chest appears otherwise clear. Electronically Signed: Nando Wesley  4/2/2023 7:42 AM EDT  Workstation ID: [id number]        Results from last 7 days   Lab Units 04/02/23  0738   HSTROP T ng/L 16*       X-rays, labs reviewed personally by physician.    ECG/EMG Results (most recent)     Procedure Component Value Units Date/Time    ECG 12 Lead Dyspnea [[id number]] Collected: 04/02/23 0540     Updated: 04/03/23 0842     QT Interval 408 ms     Narrative:      HEART RATE= 86  bpm  RR Interval= 696  ms  TN Interval= 157  ms  P Horizontal Axis= 2  deg  P Front Axis= 72  deg  QRSD Interval= 96  ms  QT Interval= 408  ms  QRS Axis= 11  deg  T Wave Axis= 107  deg  - ABNORMAL ECG -  Sinus rhythm  Probable left atrial enlargement  Probable left ventricular hypertrophy  Abnormal T, consider ischemia, lateral leads  When compared with ECG of 17-Nov-2021 7:38:27,  Significant axis, voltage or hypertrophy change  Electronically Signed By: Domingo Lewis (MANOJ) 03-Apr-2023 08:41:51  Date and Time of Study: 2023-04-02 05:40:20    Adult Transthoracic Echo Complete w/ Color, Spectral and Contrast if Necessary Per Protocol [[id number]] Resulted: 04/03/23 0853     Updated: 04/03/23 0921     Target HR (85%) 136 bpm      Max. Pred. HR (100%) 160 bpm             Medication Review:   I have reviewed the patient's current medication list  Scheduled Meds:atorvastatin, 10 mg, Oral, Daily  carvedilol, 6.25 mg, Oral, BID With Meals  furosemide, 40 mg, Oral, Daily  losartan, 50 mg, Oral, Q24H  rivaroxaban, 20 mg, Oral,  "Daily  sodium chloride, 10 mL, Intravenous, Q12H  spironolactone, 25 mg, Oral, Daily      Continuous Infusions:   PRN Meds:.•  acetaminophen **OR** acetaminophen **OR** acetaminophen  •  cyclobenzaprine  •  magnesium sulfate **OR** magnesium sulfate **OR** magnesium sulfate  •  ondansetron **OR** ondansetron  •  [COMPLETED] Insert Peripheral IV **AND** sodium chloride  •  sodium chloride  •  sodium chloride    Imaging:  Imaging Results (Last 72 Hours)     Procedure Component Value Units Date/Time    XR Chest 1 View [[id number]] Collected: 04/02/23 0741     Updated: 04/02/23 0744    Narrative:      XR CHEST 1 VW    Date of Exam: 4/2/2023 6:14 AM EDT    Indication: sob.    Comparison: 11/28/2021    Findings:  The heart size is enlarged. There are postoperative changes of left-sided transvenous pacemaker placement is mild elevation of left hemidiaphragm. The pulmonary vascular markings are normal and the lungs are clear      Impression:      Impression:    1. Stable cardiac enlargement with postoperative changes of left-sided transvenous pacemaker placement.  2. Mild elevation of the left hemidiaphragm.  3. The chest appears otherwise clear.    Electronically Signed: Nando Wesley    4/2/2023 7:42 AM EDT    Workstation ID: [id number]            CORTEZ Rodriguez  04/03/23  09:39 EDT      EMR Dragon/Transcription:   \"Dictated utilizing Dragon dictation\".        Electronically signed by CORTEZ Rodriguez, 04/03/23, 9:39 AM EDT.      Patient seen and examined  Complains of significant leg cramps  Denies any claudication  Patient is a non-smoker    Shortness of breath and leg edema has improved since yesterday  Feels somewhat better      Physical Exam    General:      well developed, well nourished, in no acute distress.    Head:      normocephalic and atraumatic.    Eyes:      PERRL/EOM intact, conjunctivae and sclerae clear without nystagmus.    Neck:      no  thyromegaly, trachea central with normal respiratory " effort  Lungs:      clear bilaterally to auscultation.    Heart:       regular rate and rhythm, S1, S2 without murmurs, rubs, or gallops  Skin:      intact without lesions or rashes.    Psych:      alert and cooperative; normal mood and affect; normal attention span and concentration.        Bilateral significant varicosities noted  Peripheral pulses 2+    Patient has of symptomatic varicose veins  We will have vascular surgery evaluate for severe symptomatic varicose veins    Continue monitoring renal panel and potential discharge once social issues are evaluated.      Electronically signed by Micahel Rollins MD, 04/03/23, 4:23 PM EDT.

## 2023-04-03 NOTE — PROGRESS NOTES
"Heart Failure Program  Nurse Navigator  Discharge Planning    Patient Name:Thony Dumont  :1960  Cardiologist:Ayo  Current Admission Date: 2023   Previous Admission:    Admission frequency: 1 admissions in 6 months    Heart Failure history per record:    Symptoms on admission:c/o SOA worse in last few days. Pt advises he is having increase GOMEZ. Pt is homeless, recently being asked to leave his last residence. Pt is working to establish housing with [facility]. Pt states medication adherence, he was getting his meds mailed to him at his previous address and recently received a month supply prior to moving. Pt is unsure of his new address or how he will get next month meds.       Admission Weight:  Flowsheet Rows    Flowsheet Row First Filed Value   Admission Height 190.5 cm (75\") Documented at 2023 0528   Admission Weight 91.8 kg (202 lb 6.1 oz) Documented at 2023 0528            Current Home Medications:  Prior to Admission medications    Medication Sig Start Date End Date Taking? Authorizing Provider   atorvastatin (LIPITOR) 10 MG tablet TAKE 1 TABLET BY MOUTH EVERY DAY 22  Yes Michael Rollins MD   carvedilol (COREG) 6.25 MG tablet Take 1 tablet by mouth 2 (Two) Times a Day With Meals. 22  Yes Michael Rollins MD   furosemide (LASIX) 40 MG tablet 1 tablet daily 22  Yes Michael Rollins MD   losartan (COZAAR) 50 MG tablet 1 tablet daily 22  Yes Michael Rollins MD   rivaroxaban (Xarelto) 20 MG tablet Take 1 tablet by mouth Daily. 22  Yes Michael Rollins MD   spironolactone (ALDACTONE) 25 MG tablet Take 1 tablet by mouth Daily. 22  Yes Michael Rollins MD       Social history:   Pt is homeless, working with [facility] to establish plan. No transportation, SW and CM working with patient. Pt request Meds 2 Bed at [place]. Pt advises he does not have a PCP due to being asked to leave his last one.    Smoking status:     Diagnostics Testing:  proBNP level: " 1396    Echocardiogram:Results for orders placed during the hospital encounter of 06/04/21    Adult Transthoracic Echo Complete W/ Cont if Necessary Per Protocol    Interpretation Summary  · Left atrial volume is severely increased.  · The left ventricular cavity is moderately dilated.  · Left ventricular wall thickness is consistent with mild to moderate concentric hypertrophy.  · Mild dilation of the aortic root is present. Mild dilation of the sinuses of Valsalva is present.  · Moderate tricuspid valve regurgitation is present.  · Estimated right ventricular systolic pressure from tricuspid regurgitation is mildly elevated (35-45 mmHg).  · The right atrial cavity is severely dilated.  · The right ventricular cavity is moderately dilated.  · Moderately reduced right ventricular systolic function noted.  · Left ventricular diastolic function is consistent with (grade III w/high LAP) reversible restrictive pattern.  · Estimated left ventricular EF = 25% Estimated left ventricular EF was in disagreement with the calculated left ventricular EF. Left ventricular ejection fraction appears to be 21 - 25%. Left ventricular systolic function is moderately decreased.  · Abnormal mitral valve structure consistent with dilated annulus.  · Moderate mitral valve regurgitation is present with a centrally-directed jet noted.  · Mild to moderate pulmonary hypertension is present.        Patient Assessment:   Pt sitting up in bed, no O2 now, denies SOA, no SOA with conversation, no edema     Current O2: none  Home O2: none    Education provided to patient:  yes- Heart Failure disease education  yes -Symptom identification/management  yes -Daily Weights  yes- Diet education  na- Fluid restriction (if ordered)  yes- Activity education  yes- Medication education  na- Smoking cessation  yes- Follow-up Appointments  yes-Provided information on how to access [facility] My HF Guide/Heart Failure Interactive workbook    Acceptance of learning:  acceptance, cooperative    Heart Failure education interactive teaching session time: 30 minutes    GWTG: EF 21-25% last echo    Identified needs/barriers:   Pending new echo, no PCP, no future cardiology appointment, GDMT - losartan, coreg, aldactone. I&O, daily weights. RT completed 6 min walk advise pt qualify for O2.     Intervention:   ISABELLA

## 2023-04-03 NOTE — CASE MANAGEMENT/SOCIAL WORK
Continued Stay Note  HELEN Romero     Patient Name: Thony Dumont  MRN: [id number]  Today's Date: 4/3/2023    Admit Date: 4/2/2023    Plan: DC Plan: Shelter vs housing with [facility].  Medicaid transport ([phone]) Pending PT consult.   Discharge Plan     Row Name 04/03/23 1702       Plan    Plan DC Plan: Shelter vs housing with [facility].  Medicaid transport ([phone]) Pending PT consult.    Plan Comments Barriers to discharge: Echo today.  Cardio following.  New heart failure nurse consult.  Pending PT consult.              Expected Discharge Date and Time     Expected Discharge Date Expected Discharge Time    Apr 4, 2023           Phone communication or documentation only - no physical contact with patient or family.  Bethany Borges RN     Office Phone [phone]  Office Cell [phone]

## 2023-04-03 NOTE — PLAN OF CARE
Goal Outcome Evaluation:   Pt a/o. Room air. Cardiology following. PO lasix. Echo pending. Up ad consuelo. No complaints. Call light in reach.

## 2023-04-03 NOTE — PLAN OF CARE
Goal Outcome Evaluation:  Plan of Care Reviewed With: patient           Outcome Evaluation: Pt rested well through the night with no complaints. Pt to have echo today and bilateral lower extremity dopplers. Cardiology following. Will continue to monitor at this time

## 2023-04-04 LAB
ALBUMIN SERPL-MCNC: 3.8 G/DL (ref 3.5–5.2)
ALBUMIN/GLOB SERPL: 1.4 G/DL
ALP SERPL-CCNC: 126 U/L (ref 39–117)
ALT SERPL W P-5'-P-CCNC: 19 U/L (ref 1–41)
ANION GAP SERPL CALCULATED.3IONS-SCNC: 9 MMOL/L (ref 5–15)
AST SERPL-CCNC: 20 U/L (ref 1–40)
BASOPHILS # BLD AUTO: 0 10*3/MM3 (ref 0–0.2)
BASOPHILS NFR BLD AUTO: 0.7 % (ref 0–1.5)
BILIRUB SERPL-MCNC: 1 MG/DL (ref 0–1.2)
BUN SERPL-MCNC: 27 MG/DL (ref 8–23)
BUN/CREAT SERPL: 16 (ref 7–25)
CALCIUM SPEC-SCNC: 9.2 MG/DL (ref 8.6–10.5)
CHLORIDE SERPL-SCNC: 101 MMOL/L (ref 98–107)
CO2 SERPL-SCNC: 27 MMOL/L (ref 22–29)
CREAT SERPL-MCNC: 1.69 MG/DL (ref 0.76–1.27)
DEPRECATED RDW RBC AUTO: 47.3 FL (ref 37–54)
EGFRCR SERPLBLD CKD-EPI 2021: 45.9 ML/MIN/1.73
EOSINOPHIL # BLD AUTO: 0.2 10*3/MM3 (ref 0–0.4)
EOSINOPHIL NFR BLD AUTO: 4.8 % (ref 0.3–6.2)
ERYTHROCYTE [DISTWIDTH] IN BLOOD BY AUTOMATED COUNT: 14.6 % (ref 12.3–15.4)
GLOBULIN UR ELPH-MCNC: 2.7 GM/DL
GLUCOSE SERPL-MCNC: 88 MG/DL (ref 65–99)
HCT VFR BLD AUTO: 51.3 % (ref 37.5–51)
HGB BLD-MCNC: 16.6 G/DL (ref 13–17.7)
LYMPHOCYTES # BLD AUTO: 1.5 10*3/MM3 (ref 0.7–3.1)
LYMPHOCYTES NFR BLD AUTO: 31.2 % (ref 19.6–45.3)
MCH RBC QN AUTO: 30.2 PG (ref 26.6–33)
MCHC RBC AUTO-ENTMCNC: 32.3 G/DL (ref 31.5–35.7)
MCV RBC AUTO: 93.7 FL (ref 79–97)
MONOCYTES # BLD AUTO: 0.6 10*3/MM3 (ref 0.1–0.9)
MONOCYTES NFR BLD AUTO: 11.5 % (ref 5–12)
NEUTROPHILS NFR BLD AUTO: 2.5 10*3/MM3 (ref 1.7–7)
NEUTROPHILS NFR BLD AUTO: 51.8 % (ref 42.7–76)
NRBC BLD AUTO-RTO: 0.1 /100 WBC (ref 0–0.2)
PLATELET # BLD AUTO: 197 10*3/MM3 (ref 140–450)
PMV BLD AUTO: 8.6 FL (ref 6–12)
POTASSIUM SERPL-SCNC: 4.5 MMOL/L (ref 3.5–5.2)
PROT SERPL-MCNC: 6.5 G/DL (ref 6–8.5)
RBC # BLD AUTO: 5.47 10*6/MM3 (ref 4.14–5.8)
SODIUM SERPL-SCNC: 137 MMOL/L (ref 136–145)
WBC NRBC COR # BLD: 4.9 10*3/MM3 (ref 3.4–10.8)

## 2023-04-04 PROCEDURE — 80053 COMPREHEN METABOLIC PANEL: CPT | Performed by: NURSE PRACTITIONER

## 2023-04-04 PROCEDURE — 85025 COMPLETE CBC W/AUTO DIFF WBC: CPT | Performed by: NURSE PRACTITIONER

## 2023-04-04 PROCEDURE — G0378 HOSPITAL OBSERVATION PER HR: HCPCS

## 2023-04-04 PROCEDURE — 97161 PT EVAL LOW COMPLEX 20 MIN: CPT | Performed by: PHYSICAL THERAPIST

## 2023-04-04 RX ADMIN — RIVAROXABAN 20 MG: 20 TABLET, FILM COATED ORAL at 09:03

## 2023-04-04 RX ADMIN — ATORVASTATIN CALCIUM 10 MG: 10 TABLET, FILM COATED ORAL at 09:03

## 2023-04-04 RX ADMIN — Medication 10 ML: at 09:04

## 2023-04-04 RX ADMIN — CARVEDILOL 6.25 MG: 6.25 TABLET, FILM COATED ORAL at 09:03

## 2023-04-04 RX ADMIN — CYCLOBENZAPRINE 10 MG: 10 TABLET, FILM COATED ORAL at 09:08

## 2023-04-04 RX ADMIN — Medication 10 ML: at 20:29

## 2023-04-04 RX ADMIN — SPIRONOLACTONE 25 MG: 25 TABLET ORAL at 09:03

## 2023-04-04 RX ADMIN — ACETAMINOPHEN 650 MG: 325 TABLET, FILM COATED ORAL at 09:08

## 2023-04-04 RX ADMIN — CARVEDILOL 6.25 MG: 6.25 TABLET, FILM COATED ORAL at 17:36

## 2023-04-04 RX ADMIN — FUROSEMIDE 40 MG: 40 TABLET ORAL at 09:03

## 2023-04-04 RX ADMIN — LOSARTAN POTASSIUM 50 MG: 50 TABLET, FILM COATED ORAL at 09:03

## 2023-04-04 NOTE — CASE MANAGEMENT/SOCIAL WORK
Social Work Assessment   Nick     Patient Name: Thony Dumont  MRN: [id number]  Today's Date: 4/4/2023    Admit Date: 4/2/2023     Discharge Plan     Row Name 04/04/23 1619       Plan    Plan [place] plan: [place] accepted, pending precert/bed availability. Precert started 4/4, pending. PASRR per facility. Medicaid trasnport ([phone]) at [place].              Continued Care and Services - Admitted Since 4/2/2023     Destination     Service Provider Request Status Selected Services Address Phone Fax Patient Preferred    [facility] Pending - Request Sent N/A [address] [facility] [place] [address] [phone] --    [facility] [place] Pending - Request Sent N[place] [address] [place] [address] [phone] --           Phone communication or documentation only - no physical contact with patient or family.    Caty Tabares LCSW    Office: [phone]  Fax: [phone]  [email or web address]

## 2023-04-04 NOTE — PLAN OF CARE
Goal Outcome Evaluation:  Plan of Care Reviewed With: patient           Outcome Evaluation: Patient is a 61 y/o M who was admitted 4/2/23 with c/o SOB, BLE edema, and BLE leg pain.  Pt has a PMHx of CHF, A-fib, CKD, GERD, Pulmonary HTN, and h/o Substance abuse. Pt was recently kicked out of where he was living, and reports he was living in his van.  He no longer has his van and is homeless.  Pt has applied for housing, but has not yet heard back.  At baseline pt is completely independent, and is able to perform all ADLs and ambulation without assist or AD.  Pt has continued to decline with longer distance ambulation, and has increased BLE pain.  Pt reports worsening balance.  During today's evaluation he was mod I with bed mobility, supverision with sit to stand, and supervision/SBA with gait overall.  He had once instance of slight LOB requiring CGA to correct.  Pt also demonstrated reduced gait speed, reduced arm swing, and slight balance deficts affecting his mobility.  At this time PT recommends SNF for pt due to strength and balance deficits.  PT will continue to follow pt in acute setting.

## 2023-04-04 NOTE — DISCHARGE PLACEMENT REQUEST
"Thony Gong (62 y.o. Male)     Date of Birth   1960    Social Security Number       Address   [address] [place] [address] [place] [address]    Home Phone   [phone]    MRN   [id number]       Restoration   None    Marital Status   Single                            Admission Date   4/2/23    Admission Type   Emergency    Admitting Provider   Diane Giles DO    Attending Provider   Roosevelt Faustin MD    Department, Room/Bed   [facility] 2C MEDICAL INPATIENT, 248/1       Discharge Date       Discharge Disposition   Home or Self Care    Discharge Destination                               Attending Provider: Roosevelt Faustin MD    Allergies: Aspirin, Penicillins, Pork Allergy    Isolation: None   Infection: None   Code Status: CPR    Ht: 190.5 cm (75\")   Wt: 96.2 kg (212 lb)    Admission Cmt: None   Principal Problem: Acute on chronic combined systolic (congestive) and diastolic (congestive) heart failure [I50.43]                 Active Insurance as of 4/2/2023     Primary Coverage     Payor Plan Insurance Group Employer/Plan Group    [employer] [id number]     Payor Plan Address Payor Plan Phone Number Payor Plan Fax Number Effective Dates    MAIL STOP:   9/15/2022 - None Entered    [address] [address]       [facility] [address]       Subscriber Name Subscriber Birth Date Member ID       THONY GONG 1960 [id number]                 Emergency Contacts      (Rel.) Home Phone Work Phone Mobile Phone    LJ TA (Friend) [phone] -- [phone]               History & Physical      Alison Shaw APRN at 04/02/23 0842     Attestation signed by Diane Giles DO at 04/02/23 1234    I have reviewed this documentation and agree.    I have reviewed the notes, assessments, and/or procedures performed by Alison Shaw NP, I concur with her/his documentation of Thony Gong.    Patient still feeling dyspneic at this time.  Pretty somnolent " as well.  Some trace edema in lower extremities to mid shins.  Also having a depressed mood.                    [facility]  History & Physical    Patient Name: Thony Dmuont  : 1960  MRN: [id number]  Primary Care Physician:  Provider, No Known  Date of admission: 2023  Date and Time of Service: 2023 at 0815    Subjective       Chief Complaint: Shortness of breath    History of Present Illness: Thony Dumont is a 62 y.o. male who presented to [facility] on 2023 complaining of shortness of breath.  Patient with a history of congestive heart failure.  Last echo in 2021 with an ejection fraction of 25%.  Patient currently is homeless.  He does report that he is taking his medication as directed.  He sees Dr. Rollins routinely for cardiac care.  The patient reports for about the last 3 days he is had increasing shortness of breath. He has had a 10 # wt gain compared to oct 2022 weight. Pt's oxygen saturation dropped to 81% with ambulating.   At the time of my history and physical the patient complains of pain all over.      Emergency room found a proBNP of 1396.  Initial at bedtime troponin 17 with a repeat of 16.  Metabolic panel with a glucose of 107 otherwise normal.  CBC within normal limit.  INR 1.09 PT 29.0.  Chest x-ray with stable cardiac enlargement with left sided pacemaker present.  EKG sinus rhythm rate of 86 possible abnormal T waves with left atrial enlargement ventricular hypertrophy.  Patient's been given 80 mg of IV Lasix in the emergency room.  He will be admitted for CHF exacerbation cardiology will be consulted.      Review of Systems   Constitutional: Positive for malaise/fatigue and weight gain. Negative for decreased appetite.   HENT: Negative for sore throat.    Eyes: Negative for visual disturbance.   Cardiovascular: Positive for chest pain, dyspnea on exertion, leg swelling and orthopnea. Negative for irregular heartbeat,  palpitations and syncope.   Respiratory: Positive for shortness of breath. Negative for cough.    Skin: Negative for rash.   Musculoskeletal: Negative for falls.   Gastrointestinal: Negative for constipation, diarrhea and vomiting.   Genitourinary: Negative for dysuria.   Neurological: Negative for headaches.   Psychiatric/Behavioral: The patient is nervous/anxious.         Tearful- states he cannot walk up steps due to soa. He is tearful.         Personal History     Past Medical History:   Diagnosis Date   • Acute on chronic congestive heart failure 06/07/2021   • Afib    • Chronic pain    • CKD (chronic kidney disease) stage 2, GFR 60-89 ml/min 8/9/2021   • Combined systolic and diastolic congestive heart failure    • Degenerative joint disease    • Erectile dysfunction    • Essential hypertension    • Former smoker    • Gastroesophageal reflux disease    • Homeless 08/25/2021    living in his car   • ICD (implantable cardioverter-defibrillator) in place 06/15/2021   • Mixed hyperlipidemia    • Moderate mitral regurgitation    • Moderate tricuspid regurgitation    • NICM (nonischemic cardiomyopathy) 06/10/2021   • Paroxysmal atrial fibrillation    • Pleural effusion 8/9/2021   • Pulmonary hypertension    • Sprain of rotator cuff capsule 3/14/2014   • Substance abuse    • Valvular heart disease        Past Surgical History:   Procedure Laterality Date   • CARDIAC DEFIBRILLATOR PLACEMENT     • CARDIAC ELECTROPHYSIOLOGY PROCEDURE Left 6/15/2021    Procedure: Device Implant;  Surgeon: Michael Rollins MD;  Location: [facility] INVASIVE LOCATION;  Service: Cardiovascular;  Laterality: Left;   • HERNIA REPAIR         Family History: family history includes Cancer in his mother. Otherwise pertinent FHx was reviewed and not pertinent to current issue.    Social History:  reports that he has been smoking cigarettes. He has never used smokeless tobacco. He reports current alcohol use of about 7.0 standard drinks per  "week. He reports that he does not currently use drugs after having used the following drugs: Marijuana, \"Crack\" cocaine, and Methamphetamines.    Home Medications:  Prior to Admission Medications     Prescriptions Last Dose Informant Patient Reported? Taking?    acetaminophen (TYLENOL) 500 MG tablet   No No    Take 2 tablets by mouth Every 6 (Six) Hours As Needed for Mild Pain  or Moderate Pain .    atorvastatin (LIPITOR) 10 MG tablet   No No    TAKE 1 TABLET BY MOUTH EVERY DAY    carvedilol (COREG) 6.25 MG tablet   No No    Take 1 tablet by mouth 2 (Two) Times a Day With Meals.    furosemide (LASIX) 40 MG tablet   No No    1 tablet daily    losartan (COZAAR) 50 MG tablet   No No    1 tablet daily    rivaroxaban (Xarelto) 20 MG tablet   No No    Take 1 tablet by mouth Daily.    spironolactone (ALDACTONE) 25 MG tablet   No No    Take 1 tablet by mouth Daily.            Allergies:  Allergies   Allergen Reactions   • Aspirin Swelling   • Penicillins Swelling   • Pork Allergy Unknown - High Severity       Objective       Vitals:   Temp:  [98.1 °F (36.7 °C)] 98.1 °F (36.7 °C)  Heart Rate:  [71-98] 71  Resp:  [24] 24  BP: (117-133)/(88-99) 129/99    Physical Exam  Constitutional:       Appearance: Normal appearance.   HENT:      Head: Normocephalic.      Right Ear: External ear normal.      Left Ear: External ear normal.      Nose: Nose normal.   Eyes:      Conjunctiva/sclera: Conjunctivae normal.   Cardiovascular:      Rate and Rhythm: Normal rate and regular rhythm.      Pulses: Normal pulses.   Pulmonary:      Effort: Pulmonary effort is normal.      Breath sounds: Rales (bibasilar ) present.   Abdominal:      General: Abdomen is flat.      Palpations: Abdomen is soft.      Tenderness: There is no guarding or rebound.   Musculoskeletal:         General: Normal range of motion.      Cervical back: Normal range of motion.      Right lower leg: Edema (1-2 plus) present.      Left lower leg: Edema (1-2 plus) present. "   Skin:     General: Skin is warm.      Capillary Refill: Capillary refill takes less than 2 seconds.   Neurological:      Mental Status: He is alert and oriented to person, place, and time.   Psychiatric:      Comments: tearful         Result Review    Result Review:  I have personally reviewed the results from the time of this admission to 4/2/2023 08:55 EDT and agree with these findings:  [x]  Laboratory  []  Microbiology  [x]  Radiology  [x]  EKG/Telemetry   [x]  Cardiology/Vascular   []  Pathology  [x]  Old records  []  Other:  Most notable findings include:   Ef in 2021 25%  HS troponin elevated from previous  BNP- improved from previous      Assessment & Plan        Active Hospital Problems:  Active Hospital Problems    Diagnosis    • Shortness of breath    • ICD (implantable cardioverter-defibrillator) in place    • Pulmonary hypertension (HCC)    • Acute on chronic combined systolic (congestive) and diastolic (congestive) heart failure    • NICM (nonischemic cardiomyopathy) (HCC)    • Essential hypertension    • Mixed hyperlipidemia    • Paroxysmal atrial fibrillation (HCC)      Plan:     Acute on Chronic combined systolic and diastolic CHF- 80 mg lasix IV in er- consulting Cardiology . Last echo 6/21 EF 25%- repeat echo- continue aldactone. Appreciate  Cardiology to manage further lasix.    NICM- cardio will determine need for  Further ischemic work up.    Pulmonary HTN- repeat echo    PAF - continue xarelto  And coreg    ICD in place- per cardiology     HTN- losartan    HLD- lipids in am  Continue statin       DVT prophylaxis:  No DVT prophylaxis order currently exists.    CODE STATUS:    Code Status (Patient has no pulse and is not breathing): CPR (Attempt to Resuscitate)  Medical Interventions (Patient has pulse or is breathing): Full Support    Admission Status:  I believe this patient meets inpatient  status.    I discussed the patient's findings and my recommendations with patient.    This patient  has been examined wearing appropriate Personal Protective Equipment. 23      Signature: Electronically signed by CORTEZ Serrano, 23, 08:55 EDT.  [facility] [place] Hospitalist Team    Electronically signed by Diane Giles DO at 23 1234          Physician Progress Notes (last 24 hours)      Jasmin Sampson APRN at 23 1004                      Cardiology Progress Note-EP      Patient Care Team:  Provider, No Known as PCP - General    PATIENT IDENTIFICATION  Name: Thony Dumont  Age: 62 y.o.  Sex: male  :  1960  MRN: [id number]             REASON FOR FOLLOW-UP  Acute on chronic heart failure with reduced ejection fraction      INTERVAL HISTORY  Patient seen and examined, chart and labs reviewed.  Patient in bed asleep upon entry.  He continues to report feeling very weak and tired, but no specific complaints of chest discomfort or shortness of breath.        SUBJECTIVE    Denies CP, SOA, nausea/vomiting      REVIEW OF SYSTEMS:  Pertinent items are noted in HPI, all other systems reviewed and negative  Review of Systems   Constitutional: Positive for malaise/fatigue.   Eyes: Negative for blurred vision and double vision.   Cardiovascular: Negative for chest pain, dyspnea on exertion, irregular heartbeat, leg swelling, orthopnea, palpitations, paroxysmal nocturnal dyspnea and syncope.   Respiratory: Negative for cough and shortness of breath.    Gastrointestinal: Negative for nausea and vomiting.   Neurological: Negative for dizziness, light-headedness, numbness and paresthesias.   All other systems reviewed and are negative.    OBJECTIVE   Creatinine 1.69 (1.41, 1.09)    ASSESSMENT  Acute on chronic combined systolic and diastolic  Primary hypertension  Paroxysmal atrial fibrillation with elevated chads vascular score  Nonischemic cardiomyopathy  Implantable cardioverter-defibrillator in situ  Pulmonary hypertension  Shortness of breath  Dyslipidemia mixed  Essential  "hypertension  CHADS-VASc Risk Assessment            2 Total Score    1 CHF    1 Hypertension        Criteria that do not apply:    Age >/= 75    DM    PRIOR STROKE/TIA/THROMBO    Vascular Disease    Age 65-74    Sex: Female            RECOMMENDATIONS  Patient has diuresed well  Continue Coreg, losartan, Aldactone  [id number]-[id number]-hr read  Maintaining normal sinus rhythm  Continue statin  Patient on Xarelto for elevated stroke risk  Add SGLT2 per GDMT   working with patient due to his homelessness        Vital Signs  Visit Vitals  /85   Pulse 71   Temp 98.2 °F (36.8 °C) (Oral)   Resp 16   Ht 190.5 cm (75\")   Wt 96.2 kg (212 lb)   SpO2 99%   BMI 26.50 kg/m²     Oxygen Therapy  SpO2: 99 %  Pulse Oximetry Type: Continuous  Device (Oxygen Therapy): room air  Flowsheet Rows    Flowsheet Row First Filed Value   Admission Height 190.5 cm (75\") Documented at 04/02/2023 0528   Admission Weight 91.8 kg (202 lb 6.1 oz) Documented at 04/02/2023 0528        Intake & Output (last 3 days)       04/01 0701  04/02 0700 04/02 0701  04/03 0700 04/03 0701  04/04 0700 04/04 0701  04/05 0700    P.O.  480 960     Total Intake(mL/kg)  480 (5) 960 (10)     Urine (mL/kg/hr)  2700 (1.2) 450 (0.2)     Total Output  2700 450     Net  -2220 +510                 Lines, Drains & Airways     Active LDAs     Name Placement date Placement time Site Days    Peripheral IV 04/02/23 0650 Anterior;Left;Proximal Forearm 04/02/23  0650  Forearm  1                       /85   Pulse 71   Temp 98.2 °F (36.8 °C) (Oral)   Resp 16   Ht 190.5 cm (75\")   Wt 96.2 kg (212 lb)   SpO2 99%   BMI 26.50 kg/m²   Intake/Output last 3 shifts:  I/O last 3 completed shifts:  In: 960 [P.O.:960]  Out: 1150 [Urine:1150]  Intake/Output this shift:  No intake/output data recorded.    PHYSICAL EXAM:    General: Well-developed, well-nourished 62-year-old male who is alert, cooperative, no distress, appears stated age  Head:  Normocephalic, atraumatic, " mucous membranes moist  Eyes:  Conjunctivae/corneas clear, EOM's intact     Neck:  Supple,  no adenopathy;      Lungs: Crackles in bases, no wheezes  Chest wall: No tenderness  Heart::  Regular rate and rhythm, S1 and S2 normal, no murmur, rub or gallop  Abdomen: Soft, nontender, nondistended, bowel sounds active  Extremities: No cyanosis, clubbing, or edema, groin soft, no hematoma.  Pulses: 2+ and symmetric all extremities  Skin:  No rashes or lesions  Neuro/psych: A&O x3. CN II through XII are grossly intact with appropriate affect      Scheduled Meds:      atorvastatin, 10 mg, Oral, Daily  carvedilol, 6.25 mg, Oral, BID With Meals  furosemide, 40 mg, Oral, Daily  losartan, 50 mg, Oral, Q24H  rivaroxaban, 20 mg, Oral, Daily  sodium chloride, 10 mL, Intravenous, Q12H  spironolactone, 25 mg, Oral, Daily        Continuous Infusions:         PRN Meds:    •  acetaminophen **OR** acetaminophen **OR** acetaminophen  •  cyclobenzaprine  •  magnesium sulfate **OR** magnesium sulfate **OR** magnesium sulfate  •  ondansetron **OR** ondansetron  •  [COMPLETED] Insert Peripheral IV **AND** sodium chloride  •  sodium chloride  •  sodium chloride        Results Review:     I reviewed the patient's new clinical results.    CBC    Results from last 7 days   Lab Units 04/04/23 0616 04/03/23 0027 04/02/23  0559   WBC 10*3/mm3 4.90 3.80 4.30   HEMOGLOBIN g/dL 16.6 17.0 15.7   PLATELETS 10*3/mm3 197 199 202     Cr Clearance Estimated Creatinine Clearance: 63.2 mL/min (A) (by C-G formula based on SCr of 1.69 mg/dL (H)).  Coag   Results from last 7 days   Lab Units 04/02/23  0559   INR  1.09   APTT seconds 29.0*     HbA1C No results found for: HGBA1C  Blood Glucose No results found for: POCGLU  Infection     CMP   Results from last 7 days   Lab Units 04/04/23  0616 04/03/23 0027 04/02/23  0559   SODIUM mmol/L 137 141 139   POTASSIUM mmol/L 4.5 4.0 4.7   CHLORIDE mmol/L 101 102 105   CO2 mmol/L 27.0 27.0 24.0   BUN mg/dL 27* 21 18    CREATININE mg/dL 1.69* 1.41* 1.09   GLUCOSE mg/dL 88 99 107*   ALBUMIN g/dL 3.8 4.1 4.4   BILIRUBIN mg/dL 1.0 1.3* 1.2   ALK PHOS U/L 126* 115 106   AST (SGOT) U/L 20 24 27   ALT (SGPT) U/L 19 20 19     ABG      UA      EDD  No results found for: POCMETH, POCAMPHET, POCBARBITUR, POCBENZO, POCCOCAINE, POCOPIATES, POCOXYCODO, POCPHENCYC, POCPROPOXY, POCTHC, POCTRICYC  Lysis Labs   Results from last 7 days   Lab Units 04/04/23  0616 04/03/23  0027 04/02/23  0559   INR   --   --  1.09   APTT seconds  --   --  29.0*   HEMOGLOBIN g/dL 16.6 17.0 15.7   PLATELETS 10*3/mm3 197 199 202   CREATININE mg/dL 1.69* 1.41* 1.09     Radiology(recent) No radiology results for the last day      Results from last 7 days   Lab Units 04/02/23  0738   HSTROP T ng/L 16*       X-rays, labs reviewed personally by physician.    ECG/EMG Results (most recent)     Procedure Component Value Units Date/Time    ECG 12 Lead Dyspnea [[id number]] Collected: 04/02/23 0540     Updated: 04/03/23 0842     QT Interval 408 ms     Narrative:      HEART RATE= 86  bpm  RR Interval= 696  ms  SD Interval= 157  ms  P Horizontal Axis= 2  deg  P Front Axis= 72  deg  QRSD Interval= 96  ms  QT Interval= 408  ms  QRS Axis= 11  deg  T Wave Axis= 107  deg  - ABNORMAL ECG -  Sinus rhythm  Probable left atrial enlargement  Probable left ventricular hypertrophy  Abnormal T, consider ischemia, lateral leads  When compared with ECG of 17-Nov-2021 7:38:27,  Significant axis, voltage or hypertrophy change  Electronically Signed By: Domingo Lewis (MANOJ) 03-Apr-2023 08:41:51  Date and Time of Study: 2023-04-02 05:40:20    Adult Transthoracic Echo Complete w/ Color, Spectral and Contrast if Necessary Per Protocol [[id number]] Resulted: 04/03/23 0953     Updated: 04/03/23 0954     Target HR (85%) 136 bpm      Max. Pred. HR (100%) 160 bpm      EF(MOD-bp) 22.0 %      EF_3D-VOL 26.0 %      LVIDd 6.0 cm      LVIDs 5.8 cm      IVSd 1.20 cm      LVPWd 1.10 cm      FS 3.3 %      IVS/LVPW  1.09 cm      LV Sys Vol (BSA corrected) 78.7 cm2      EDV(cubed) 216.0 ml      LV Saldivar Vol (BSA corrected) 103.6 cm2      LV mass(C)d 296.6 grams      EDV(MOD-sp2) 212.0 ml      EDV(MOD-sp4) 233.0 ml      ESV(MOD-sp2) 168.0 ml      ESV(MOD-sp4) 177.0 ml      SV(MOD-sp2) 44.0 ml      SV(MOD-sp4) 56.0 ml      SI(MOD-sp2) 19.6 ml/m2      SI(MOD-sp4) 24.9 ml/m2      EF(MOD-sp2) 20.8 %      EF(MOD-sp4) 24.0 %      MV E max north 36.7 cm/sec      MV A max north 43.1 cm/sec      MV dec time 0.16 msec      MV E/A 0.85     Pulm A Revs Dur 0.14 sec      MV A dur 0.14 sec      LA ESV Index (BP) 54.2 ml/m2      Med Peak E' North 6.7 cm/sec      Lat Peak E' North 7.2 cm/sec      Avg E/e' ratio 5.28     SV(RVOT) 79.9 ml      RV Base 4.7 cm      RV Mid 3.0 cm      RV Length 9.1 cm      TAPSE (>1.6) 1.36 cm      RV S' 5.2 cm/sec      LA dimension (2D)  3.9 cm      Pulm Sys North 34.1 cm/sec      Pulm Saldivar North 32.0 cm/sec      Pulm S/D 1.07     Pulm A Revs North 14.8 cm/sec      LV V1 max 67.9 cm/sec      LV V1 max PG 1.84 mmHg      LV V1 mean PG 1.00 mmHg      LV V1 VTI 13.3 cm      Ao pk north 101.0 cm/sec      Ao max PG 4.1 mmHg      Ao mean PG 3.0 mmHg      Ao V2 VTI 20.9 cm      AI P1/2t 1,342 msec      MV max PG 1.42 mmHg      MV mean PG 1.00 mmHg      MV V2 VTI 19.9 cm      MV P1/2t 50.5 msec      MVA(P1/2t) 4.4 cm2      MV dec slope 373.0 cm/sec2      MR max north 407.0 cm/sec      MR max PG 66.3 mmHg      TR max north 214.0 cm/sec      TR max PG 18.3 mmHg      RVOT diam 2.9 cm      RV V1 max PG 1.21 mmHg      RV V1 max 55.1 cm/sec      RV V1 VTI 12.1 cm      PA V2 max 70.2 cm/sec      Ao root diam 3.8 cm      ACS 2.00 cm      LVOT diam 2.0 cm      RVSP(TR) 21 mmHg      RAP systole 3 mmHg     Narrative:      •  Estimated right ventricular systolic pressure from tricuspid   regurgitation is normal (<35 mmHg).      Impression:          SCANNED - TELEMETRY   [[id number]] Resulted: 04/02/23     Updated: 04/03/23 1253    SCANNED - TELEMETRY    "[[id number]] Resulted: 04/02/23     Updated: 04/03/23 1424    SCANNED - TELEMETRY   [[id number]] Resulted: 04/02/23     Updated: 04/03/23 1538    SCANNED - TELEMETRY   [[id number]] Resulted: 04/02/23     Updated: 04/04/23 0508    SCANNED - TELEMETRY   [[id number]] Resulted: 04/02/23     Updated: 04/04/23 0637            Medication Review:   I have reviewed the patient's current medication list  Scheduled Meds:atorvastatin, 10 mg, Oral, Daily  carvedilol, 6.25 mg, Oral, BID With Meals  furosemide, 40 mg, Oral, Daily  losartan, 50 mg, Oral, Q24H  rivaroxaban, 20 mg, Oral, Daily  sodium chloride, 10 mL, Intravenous, Q12H  spironolactone, 25 mg, Oral, Daily      Continuous Infusions:   PRN Meds:.•  acetaminophen **OR** acetaminophen **OR** acetaminophen  •  cyclobenzaprine  •  magnesium sulfate **OR** magnesium sulfate **OR** magnesium sulfate  •  ondansetron **OR** ondansetron  •  [COMPLETED] Insert Peripheral IV **AND** sodium chloride  •  sodium chloride  •  sodium chloride    Imaging:  Imaging Results (Last 72 Hours)     Procedure Component Value Units Date/Time    XR Chest 1 View [[id number]] Collected: 04/02/23 0741     Updated: 04/02/23 0744    Narrative:      XR CHEST 1 VW    Date of Exam: 4/2/2023 6:14 AM EDT    Indication: sob.    Comparison: 11/28/2021    Findings:  The heart size is enlarged. There are postoperative changes of left-sided transvenous pacemaker placement is mild elevation of left hemidiaphragm. The pulmonary vascular markings are normal and the lungs are clear      Impression:      Impression:    1. Stable cardiac enlargement with postoperative changes of left-sided transvenous pacemaker placement.  2. Mild elevation of the left hemidiaphragm.  3. The chest appears otherwise clear.    Electronically Signed: Nando Wesley    4/2/2023 7:42 AM EDT    Workstation ID: [id number]            Jasmin CORTEZ Sampson  04/04/23  10:50 EDT      EMR Dragon/Transcription:   \"Dictated utilizing Dragon dictation\".  "       Electronically signed by CORTEZ Rodriguez, 23, 9:39 AM EDT.      Patient seen and examined  Complains of significant leg cramps  Denies any claudication  Patient is a non-smoker    Shortness of breath and leg edema has improved since yesterday  Feels somewhat better      Physical Exam    General:      well developed, well nourished, in no acute distress.    Head:      normocephalic and atraumatic.    Eyes:      PERRL/EOM intact, conjunctivae and sclerae clear without nystagmus.    Neck:      no  thyromegaly, trachea central with normal respiratory effort  Lungs:      clear bilaterally to auscultation.    Heart:       regular rate and rhythm, S1, S2 without murmurs, rubs, or gallops  Skin:      intact without lesions or rashes.    Psych:      alert and cooperative; normal mood and affect; normal attention span and concentration.        Bilateral significant varicosities noted  Peripheral pulses 2+    Patient has of symptomatic varicose veins  We will have vascular surgery evaluate for severe symptomatic varicose veins    Continue monitoring renal panel and potential discharge once social issues are evaluated.      Electronically signed by Michael Rollins MD, 23, 4:23 PM EDT.      Electronically signed by Jasmin Sampson APRN at 23 1051     Delmar Garner MD at 23 1342          Hospitalist Progress Note   Thony Dumont : 1960 MRN:[id number] LOS:1     Principal Problem: Acute on chronic combined systolic (congestive) and diastolic (congestive) heart failure     Reason for follow up: All the medical problems listed below    Summary     A 62 y.o. male with PMH of CHF, homelessness, A-fib, hypertension, pulmonary hypertension presented to the hospital for worsening shortness of breath and was admitted with a principal diagnosis of Acute on chronic combined systolic (congestive) and diastolic (congestive) heart failure.  Treated with IV Lasix and subsequently  switched to oral Lasix.  Cardiology was consulted.    Assessment / Plan     Acute on chronic Systolic heart failure with AICD / Pulmonary hypertension / Nonischemic cardiomyopathy:   • Currently decompensated.   • Last ECHO showed an EF of 20%.   • On Coreg and Cozaar.  Likely can add Jardiance due to benefit in severe systolic heart failure.  Defer to cardiology.  • Monitor Input/Output very closely. Follow daily weights.   • Net IO Since Admission: -1,740 mL [04/03/23 1343]    Essential Hypertension: well controlled.   • Continue Coreg and losartan.   • Titrate medications as needed.    Paroxysmal atrial fibrillation :   • Rate controlled well with carvedilol.   • CHADS-VASc score of 2. Currently on anticoagulation with Xarelto.      Dyslipidemia: Chronic and stable. Continue statins.  Homelessness    Code status:   Code Status (Patient has no pulse and is not breathing): CPR (Attempt to Resuscitate)  Medical Interventions (Patient has pulse or is breathing): Full Support       Nutrition: Diet: Cardiac Diets; Healthy Heart (2-3 Na+); Texture: Regular Texture (IDDSI 7); Fluid Consistency: Thin (IDDSI 0)   Patient isn't on Tube Feeding    DVT prophylaxis:   Mechanical Order History:      Ordered        04/02/23 1350  Place Sequential Compression Device  Once            04/02/23 1350  Maintain Sequential Compression Device  Continuous                    Pharmalogical Order History:      Ordered     Dose Route Frequency Stop    04/02/23 1350  rivaroxaban (XARELTO) tablet 20 mg         20 mg PO Daily --                 PT Assessment (last 12 hours)     PT Evaluation and Treatment    No documentation.               OT ASSESSMENT FLOWSHEET (last 12 hours)     OT Evaluation and Treatment    No documentation.                  Disposition: Awaiting PT/OT evaluation.    Subjective / Review of systems     Review of Systems   Feels better, breathing has improved.  Did admit to 10 pound weight gain in the recent past.  Denies  any noncompliance with medications.  Denies any excess water intake.  Feels very weak.  Objective / Physical Exam   Vital signs:  Temp: 98 °F (36.7 °C)  BP: 116/78  Heart Rate: 75  Resp: 12  SpO2: 91 %  Weight: 96.2 kg (212 lb)    Admission Weight: Weight: 91.8 kg (202 lb 6.1 oz)  Current Weight: Weight: 96.2 kg (212 lb)    Input/Output in last 24 hours:    Intake/Output Summary (Last 24 hours) at 4/3/2023 1342  Last data filed at 4/3/2023 1100  Gross per 24 hour   Intake 480 ml   Output 1500 ml   Net -1020 ml      Physical Exam  Vitals and nursing note reviewed.   Constitutional:       General: He is not in acute distress.     Appearance: Normal appearance.   HENT:      Mouth/Throat:      Mouth: Mucous membranes are moist.      Pharynx: Oropharynx is clear.   Eyes:      General: No scleral icterus.     Extraocular Movements: Extraocular movements intact.      Conjunctiva/sclera: Conjunctivae normal.   Cardiovascular:      Rate and Rhythm: Normal rate.      Heart sounds: No murmur heard.    No gallop.   Pulmonary:      Breath sounds: Normal breath sounds. No wheezing or rales.   Abdominal:      General: Bowel sounds are normal.      Palpations: Abdomen is soft.      Tenderness: There is no abdominal tenderness.   Musculoskeletal:         General: No tenderness.      Right lower leg: No edema.      Left lower leg: No edema.   Neurological:      General: No focal deficit present.      Mental Status: He is alert and oriented to person, place, and time.        Radiology and Labs     Results from last 7 days   Lab Units 04/03/23  0027 04/02/23  0559   WBC 10*3/mm3 3.80 4.30   HEMATOCRIT % 50.9 48.5   PLATELETS 10*3/mm3 199 202      Results from last 7 days   Lab Units 04/03/23  0027 04/02/23  0559   SODIUM mmol/L 141 139   POTASSIUM mmol/L 4.0 4.7   CHLORIDE mmol/L 102 105   CO2 mmol/L 27.0 24.0   BUN mg/dL 21 18   CREATININE mg/dL 1.41* 1.09      Current medications   Scheduled Meds: atorvastatin, 10 mg, Oral,  Daily  carvedilol, 6.25 mg, Oral, BID With Meals  furosemide, 40 mg, Oral, Daily  losartan, 50 mg, Oral, Q24H  rivaroxaban, 20 mg, Oral, Daily  sodium chloride, 10 mL, Intravenous, Q12H  spironolactone, 25 mg, Oral, Daily      Continuous Infusions:      Plan discussed with RN. Reviewed all other data in the last 24 hours, including but not limited to vitals, labs, microbiology, imaging and pertinent notes from other providers.     Delmar Garner MD   [facility]  04/03/23   13:42 EDT       Electronically signed by Delmar Garner MD at 04/03/23 1347       Physical Therapy Notes (last 24 hours)  Notes from 04/03/23 1155 through 04/04/23 1155   No notes exist for this encounter.

## 2023-04-04 NOTE — PROGRESS NOTES
Hospitalist Progress Note   Thony Dumont : 1960 MRN:[id number] LOS:1     Principal Problem: Acute on chronic combined systolic (congestive) and diastolic (congestive) heart failure     Reason for follow up: All the medical problems listed below    Summary     A 62 y.o. male with PMH of CHF, homelessness, A-fib, hypertension, pulmonary hypertension presented to the hospital for worsening shortness of breath and was admitted with a principal diagnosis of Acute on chronic combined systolic (congestive) and diastolic (congestive) heart failure.  Treated with IV Lasix and subsequently switched to oral Lasix.  Cardiology was consulted.    Also found to have varicose veins on both lower extremities and vascular surgery was consulted by cardiology.  Recommended compression stockings.    Currently awaiting rehab placement.    Assessment / Plan     Acute on chronic Systolic heart failure with AICD / Pulmonary hypertension / Nonischemic cardiomyopathy:   • Currently well compensated.   • Last ECHO showed an EF of 20%.  On his home dose of Lasix.  • On Coreg and Cozaar.  Likely can add Jardiance due to benefit in severe systolic heart failure.  Defer to cardiology.  • Monitor Input/Output very closely. Follow daily weights.   Net IO Since Admission: -1,710 mL [23 1249]    Essential Hypertension: well controlled.   • Continue Coreg and losartan.   • Titrate medications as needed.    Paroxysmal atrial fibrillation :   • Rate controlled well with carvedilol.   • CHADS-VASc score of 2. Currently on anticoagulation with Xarelto.      Dyslipidemia: Chronic and stable. Continue statins.  Bilateral varicose veins: Vascular surgery consulted, recommended compression stockings.  Homelessness    Code status:   Code Status (Patient has no pulse and is not breathing): CPR (Attempt to Resuscitate)  Medical Interventions (Patient has pulse or is breathing): Full Support       Nutrition: Diet: Cardiac Diets; Healthy Heart (2-3  Na+); Texture: Regular Texture (IDDSI 7); Fluid Consistency: Thin (IDDSI 0)   Patient isn't on Tube Feeding    DVT prophylaxis:   Mechanical Order History:      Ordered        04/02/23 1350  Place Sequential Compression Device  Once            04/02/23 1350  Maintain Sequential Compression Device  Continuous                    Pharmalogical Order History:      Ordered     Dose Route Frequency Stop    04/02/23 1350  rivaroxaban (XARELTO) tablet 20 mg         20 mg PO Daily --                 PT Assessment (last 12 hours)     PT Evaluation and Treatment    No documentation.               OT ASSESSMENT FLOWSHEET (last 12 hours)     OT Evaluation and Treatment    No documentation.                  Disposition: PT/OT recommended rehab placement.    Subjective / Review of systems     Review of Systems   Feels okay, breathing has improved.  Complains of generalized weakness.  Agreeable for compression stockings.  Objective / Physical Exam   Vital signs:  Temp: 98.2 °F (36.8 °C)  BP: 118/85  Heart Rate: 71  Resp: 16  SpO2: 99 %  Weight: 96.2 kg (212 lb)    Admission Weight: Weight: 91.8 kg (202 lb 6.1 oz)  Current Weight: Weight: 96.2 kg (212 lb)    Input/Output in last 24 hours:    Intake/Output Summary (Last 24 hours) at 4/4/2023 1249  Last data filed at 4/3/2023 2032  Gross per 24 hour   Intake 480 ml   Output 450 ml   Net 30 ml      Physical Exam  Vitals and nursing note reviewed.   Constitutional:       General: He is not in acute distress.     Appearance: Normal appearance.   HENT:      Mouth/Throat:      Mouth: Mucous membranes are moist.      Pharynx: Oropharynx is clear.   Eyes:      General: No scleral icterus.     Extraocular Movements: Extraocular movements intact.      Conjunctiva/sclera: Conjunctivae normal.   Cardiovascular:      Rate and Rhythm: Normal rate.      Heart sounds: No murmur heard.    No gallop.   Pulmonary:      Breath sounds: Normal breath sounds. No wheezing or rales.   Abdominal:       General: Bowel sounds are normal.      Palpations: Abdomen is soft.      Tenderness: There is no abdominal tenderness.   Musculoskeletal:         General: No tenderness.      Right lower leg: No edema.      Left lower leg: No edema.   Skin:     Comments: Bilateral lower extremity varicose veins below the knee   Neurological:      General: No focal deficit present.      Mental Status: He is alert and oriented to person, place, and time.        Radiology and Labs     Results from last 7 days   Lab Units 04/04/23  0616 04/03/23  0027 04/02/23  0559   WBC 10*3/mm3 4.90 3.80 4.30   HEMATOCRIT % 51.3* 50.9 48.5   PLATELETS 10*3/mm3 197 199 202      Results from last 7 days   Lab Units 04/04/23  0616 04/03/23  0027 04/02/23  0559   SODIUM mmol/L 137 141 139   POTASSIUM mmol/L 4.5 4.0 4.7   CHLORIDE mmol/L 101 102 105   CO2 mmol/L 27.0 27.0 24.0   BUN mg/dL 27* 21 18   CREATININE mg/dL 1.69* 1.41* 1.09      Current medications   Scheduled Meds: atorvastatin, 10 mg, Oral, Daily  carvedilol, 6.25 mg, Oral, BID With Meals  furosemide, 40 mg, Oral, Daily  losartan, 50 mg, Oral, Q24H  rivaroxaban, 20 mg, Oral, Daily  sodium chloride, 10 mL, Intravenous, Q12H  spironolactone, 25 mg, Oral, Daily      Continuous Infusions:      Plan discussed with RN. Reviewed all other data in the last 24 hours, including but not limited to vitals, labs, microbiology, imaging and pertinent notes from other providers.     Delmar Garner MD   [facility]  04/04/23   12:49 EDT

## 2023-04-04 NOTE — PROGRESS NOTES
Heart Failure Program  Nurse Navigator  Discharge Planning: Follow-up Note    Patient Name:Thony Dumont  :1960  Current Admission Date: 2023       Last 3 Weights:  Wt Readings from Last 3 Encounters:   23 96.2 kg (212 lb)   10/28/22 87.5 kg (193 lb)   21 113 kg (250 lb)       Intake and Output totals: I/O last 3 completed shifts:  In: 960 [P.O.:960]  Out: 1150 [Urine:1150]  No intake/output data recorded.          Patient Assessment:   pt sitting up in bed, resp even and unlabored, no swelling at this time. Pt advises he is feeling well, less SOA.       Patient Education:   review HF s/s. Discussion on possible addition of SGLT2 - discuss purpose/benefits for HF, discussion on follow-up.     Review HF Education provided to patient:  yes-Symptoms worsening  yes-Prescribed medications  yes-HF self-care  yes-Follow-up Appointments       Acceptance of learning: acceptance, cooperative.     Heart Failure education interactive teaching session time: 20 minutes      Identified needs/barriers:   Pending PT eval possible rehab vs shelter. GDMT - losartan, coreg, aldactone - possible SGLT2 addition at dc. Follow-up appointments    Intervention follow-up:  Cardiology follow-up appointment scheduled.

## 2023-04-04 NOTE — PROGRESS NOTES
Cardiology Progress Note-EP      Patient Care Team:  Provider, No Known as PCP - General    PATIENT IDENTIFICATION  Name: Thony Dumont  Age: 62 y.o.  Sex: male  :  1960  MRN: [id number]             REASON FOR FOLLOW-UP  Acute on chronic heart failure with reduced ejection fraction      INTERVAL HISTORY  Patient seen and examined, chart and labs reviewed.  Patient in bed asleep upon entry.  He continues to report feeling very weak and tired, but no specific complaints of chest discomfort or shortness of breath.        SUBJECTIVE    Denies CP, SOA, nausea/vomiting      REVIEW OF SYSTEMS:  Pertinent items are noted in HPI, all other systems reviewed and negative  Review of Systems   Constitutional: Positive for malaise/fatigue.   Eyes: Negative for blurred vision and double vision.   Cardiovascular: Negative for chest pain, dyspnea on exertion, irregular heartbeat, leg swelling, orthopnea, palpitations, paroxysmal nocturnal dyspnea and syncope.   Respiratory: Negative for cough and shortness of breath.    Gastrointestinal: Negative for nausea and vomiting.   Neurological: Negative for dizziness, light-headedness, numbness and paresthesias.   All other systems reviewed and are negative.    OBJECTIVE   Creatinine 1.69 (1.41, 1.09)    ASSESSMENT  Acute on chronic combined systolic and diastolic  Primary hypertension  Paroxysmal atrial fibrillation with elevated chads vascular score  Nonischemic cardiomyopathy  Implantable cardioverter-defibrillator in situ  Pulmonary hypertension  Shortness of breath  Dyslipidemia mixed  Essential hypertension  CHADS-VASc Risk Assessment            2 Total Score    1 CHF    1 Hypertension        Criteria that do not apply:    Age >/= 75    DM    PRIOR STROKE/TIA/THROMBO    Vascular Disease    Age 65-74    Sex: Female            RECOMMENDATIONS  Patient has diuresed well  Continue Coreg, losartan, Aldactone  MKT-[id number]-my read  Maintaining normal sinus  "rhythm  Continue statin  Patient on Xarelto for elevated stroke risk  Add SGLT2 per GDMT   working with patient due to his homelessness        Vital Signs  Visit Vitals  /85   Pulse 71   Temp 98.2 °F (36.8 °C) (Oral)   Resp 16   Ht 190.5 cm (75\")   Wt 96.2 kg (212 lb)   SpO2 99%   BMI 26.50 kg/m²     Oxygen Therapy  SpO2: 99 %  Pulse Oximetry Type: Continuous  Device (Oxygen Therapy): room air  Flowsheet Rows    Flowsheet Row First Filed Value   Admission Height 190.5 cm (75\") Documented at 04/02/2023 0528   Admission Weight 91.8 kg (202 lb 6.1 oz) Documented at 04/02/2023 0528        Intake & Output (last 3 days)       04/01 0701  04/02 0700 04/02 0701 04/03 0700 04/03 0701 04/04 0700 04/04 0701  04/05 0700    P.O.  480 960     Total Intake(mL/kg)  480 (5) 960 (10)     Urine (mL/kg/hr)  2700 (1.2) 450 (0.2)     Total Output  2700 450     Net  -2220 +510                 Lines, Drains & Airways     Active LDAs     Name Placement date Placement time Site Days    Peripheral IV 04/02/23 0650 Anterior;Left;Proximal Forearm 04/02/23  0650  Forearm  1                       /85   Pulse 71   Temp 98.2 °F (36.8 °C) (Oral)   Resp 16   Ht 190.5 cm (75\")   Wt 96.2 kg (212 lb)   SpO2 99%   BMI 26.50 kg/m²   Intake/Output last 3 shifts:  I/O last 3 completed shifts:  In: 960 [P.O.:960]  Out: 1150 [Urine:1150]  Intake/Output this shift:  No intake/output data recorded.    PHYSICAL EXAM:    General: Well-developed, well-nourished 62-year-old male who is alert, cooperative, no distress, appears stated age  Head:  Normocephalic, atraumatic, mucous membranes moist  Eyes:  Conjunctivae/corneas clear, EOM's intact     Neck:  Supple,  no adenopathy;      Lungs: Crackles in bases, no wheezes  Chest wall: No tenderness  Heart::  Regular rate and rhythm, S1 and S2 normal, no murmur, rub or gallop  Abdomen: Soft, nontender, nondistended, bowel sounds active  Extremities: No cyanosis, clubbing, or edema, " groin soft, no hematoma.  Pulses: 2+ and symmetric all extremities  Skin:  No rashes or lesions  Neuro/psych: A&O x3. CN II through XII are grossly intact with appropriate affect      Scheduled Meds:      atorvastatin, 10 mg, Oral, Daily  carvedilol, 6.25 mg, Oral, BID With Meals  furosemide, 40 mg, Oral, Daily  losartan, 50 mg, Oral, Q24H  rivaroxaban, 20 mg, Oral, Daily  sodium chloride, 10 mL, Intravenous, Q12H  spironolactone, 25 mg, Oral, Daily        Continuous Infusions:         PRN Meds:    •  acetaminophen **OR** acetaminophen **OR** acetaminophen  •  cyclobenzaprine  •  magnesium sulfate **OR** magnesium sulfate **OR** magnesium sulfate  •  ondansetron **OR** ondansetron  •  [COMPLETED] Insert Peripheral IV **AND** sodium chloride  •  sodium chloride  •  sodium chloride        Results Review:     I reviewed the patient's new clinical results.    CBC    Results from last 7 days   Lab Units 04/04/23  0616 04/03/23 0027 04/02/23  0559   WBC 10*3/mm3 4.90 3.80 4.30   HEMOGLOBIN g/dL 16.6 17.0 15.7   PLATELETS 10*3/mm3 197 199 202     Cr Clearance Estimated Creatinine Clearance: 63.2 mL/min (A) (by C-G formula based on SCr of 1.69 mg/dL (H)).  Coag   Results from last 7 days   Lab Units 04/02/23  0559   INR  1.09   APTT seconds 29.0*     HbA1C No results found for: HGBA1C  Blood Glucose No results found for: POCGLU  Infection     CMP   Results from last 7 days   Lab Units 04/04/23  0616 04/03/23  0027 04/02/23  0559   SODIUM mmol/L 137 141 139   POTASSIUM mmol/L 4.5 4.0 4.7   CHLORIDE mmol/L 101 102 105   CO2 mmol/L 27.0 27.0 24.0   BUN mg/dL 27* 21 18   CREATININE mg/dL 1.69* 1.41* 1.09   GLUCOSE mg/dL 88 99 107*   ALBUMIN g/dL 3.8 4.1 4.4   BILIRUBIN mg/dL 1.0 1.3* 1.2   ALK PHOS U/L 126* 115 106   AST (SGOT) U/L 20 24 27   ALT (SGPT) U/L 19 20 19     ABG      UA      EDD  No results found for: POCMETH, POCAMPHET, POCBARBITUR, POCBENZO, POCCOCAINE, POCOPIATES, POCOXYCODO, POCPHENCYC, POCPROPOXY, POCTHC,  POCTRICYC  Lysis Labs   Results from last 7 days   Lab Units 04/04/23  0616 04/03/23  0027 04/02/23  0559   INR   --   --  1.09   APTT seconds  --   --  29.0*   HEMOGLOBIN g/dL 16.6 17.0 15.7   PLATELETS 10*3/mm3 197 199 202   CREATININE mg/dL 1.69* 1.41* 1.09     Radiology(recent) No radiology results for the last day      Results from last 7 days   Lab Units 04/02/23  0738   HSTROP T ng/L 16*       X-rays, labs reviewed personally by physician.    ECG/EMG Results (most recent)     Procedure Component Value Units Date/Time    ECG 12 Lead Dyspnea [[id number]] Collected: 04/02/23 0540     Updated: 04/03/23 0842     QT Interval 408 ms     Narrative:      HEART RATE= 86  bpm  RR Interval= 696  ms  VA Interval= 157  ms  P Horizontal Axis= 2  deg  P Front Axis= 72  deg  QRSD Interval= 96  ms  QT Interval= 408  ms  QRS Axis= 11  deg  T Wave Axis= 107  deg  - ABNORMAL ECG -  Sinus rhythm  Probable left atrial enlargement  Probable left ventricular hypertrophy  Abnormal T, consider ischemia, lateral leads  When compared with ECG of 17-Nov-2021 7:38:27,  Significant axis, voltage or hypertrophy change  Electronically Signed By: Domingo Lewis (MANOJ) 03-Apr-2023 08:41:51  Date and Time of Study: 2023-04-02 05:40:20    Adult Transthoracic Echo Complete w/ Color, Spectral and Contrast if Necessary Per Protocol [[id number]] Resulted: 04/03/23 0953     Updated: 04/03/23 0954     Target HR (85%) 136 bpm      Max. Pred. HR (100%) 160 bpm      EF(MOD-bp) 22.0 %      EF_3D-VOL 26.0 %      LVIDd 6.0 cm      LVIDs 5.8 cm      IVSd 1.20 cm      LVPWd 1.10 cm      FS 3.3 %      IVS/LVPW 1.09 cm      LV Sys Vol (BSA corrected) 78.7 cm2      EDV(cubed) 216.0 ml      LV Saldivar Vol (BSA corrected) 103.6 cm2      LV mass(C)d 296.6 grams      EDV(MOD-sp2) 212.0 ml      EDV(MOD-sp4) 233.0 ml      ESV(MOD-sp2) 168.0 ml      ESV(MOD-sp4) 177.0 ml      SV(MOD-sp2) 44.0 ml      SV(MOD-sp4) 56.0 ml      SI(MOD-sp2) 19.6 ml/m2      SI(MOD-sp4) 24.9 ml/m2       EF(MOD-sp2) 20.8 %      EF(MOD-sp4) 24.0 %      MV E max north 36.7 cm/sec      MV A max north 43.1 cm/sec      MV dec time 0.16 msec      MV E/A 0.85     Pulm A Revs Dur 0.14 sec      MV A dur 0.14 sec      LA ESV Index (BP) 54.2 ml/m2      Med Peak E' North 6.7 cm/sec      Lat Peak E' North 7.2 cm/sec      Avg E/e' ratio 5.28     SV(RVOT) 79.9 ml      RV Base 4.7 cm      RV Mid 3.0 cm      RV Length 9.1 cm      TAPSE (>1.6) 1.36 cm      RV S' 5.2 cm/sec      LA dimension (2D)  3.9 cm      Pulm Sys North 34.1 cm/sec      Pulm Saldivar North 32.0 cm/sec      Pulm S/D 1.07     Pulm A Revs North 14.8 cm/sec      LV V1 max 67.9 cm/sec      LV V1 max PG 1.84 mmHg      LV V1 mean PG 1.00 mmHg      LV V1 VTI 13.3 cm      Ao pk north 101.0 cm/sec      Ao max PG 4.1 mmHg      Ao mean PG 3.0 mmHg      Ao V2 VTI 20.9 cm      AI P1/2t 1,342 msec      MV max PG 1.42 mmHg      MV mean PG 1.00 mmHg      MV V2 VTI 19.9 cm      MV P1/2t 50.5 msec      MVA(P1/2t) 4.4 cm2      MV dec slope 373.0 cm/sec2      MR max north 407.0 cm/sec      MR max PG 66.3 mmHg      TR max north 214.0 cm/sec      TR max PG 18.3 mmHg      RVOT diam 2.9 cm      RV V1 max PG 1.21 mmHg      RV V1 max 55.1 cm/sec      RV V1 VTI 12.1 cm      PA V2 max 70.2 cm/sec      Ao root diam 3.8 cm      ACS 2.00 cm      LVOT diam 2.0 cm      RVSP(TR) 21 mmHg      RAP systole 3 mmHg     Narrative:      •  Estimated right ventricular systolic pressure from tricuspid   regurgitation is normal (<35 mmHg).      Impression:          SCANNED - TELEMETRY   [[id number]] Resulted: 04/02/23     Updated: 04/03/23 1253    SCANNED - TELEMETRY   [[id number]] Resulted: 04/02/23     Updated: 04/03/23 1424    SCANNED - TELEMETRY   [[id number]] Resulted: 04/02/23     Updated: 04/03/23 1538    SCANNED - TELEMETRY   [[id number]] Resulted: 04/02/23     Updated: 04/04/23 0508    SCANNED - TELEMETRY   [[id number]] Resulted: 04/02/23     Updated: 04/04/23 0637            Medication Review:   I have reviewed the  "patient's current medication list  Scheduled Meds:atorvastatin, 10 mg, Oral, Daily  carvedilol, 6.25 mg, Oral, BID With Meals  furosemide, 40 mg, Oral, Daily  losartan, 50 mg, Oral, Q24H  rivaroxaban, 20 mg, Oral, Daily  sodium chloride, 10 mL, Intravenous, Q12H  spironolactone, 25 mg, Oral, Daily      Continuous Infusions:   PRN Meds:.•  acetaminophen **OR** acetaminophen **OR** acetaminophen  •  cyclobenzaprine  •  magnesium sulfate **OR** magnesium sulfate **OR** magnesium sulfate  •  ondansetron **OR** ondansetron  •  [COMPLETED] Insert Peripheral IV **AND** sodium chloride  •  sodium chloride  •  sodium chloride    Imaging:  Imaging Results (Last 72 Hours)     Procedure Component Value Units Date/Time    XR Chest 1 View [[id number]] Collected: 04/02/23 0741     Updated: 04/02/23 0744    Narrative:      XR CHEST 1 VW    Date of Exam: 4/2/2023 6:14 AM EDT    Indication: sob.    Comparison: 11/28/2021    Findings:  The heart size is enlarged. There are postoperative changes of left-sided transvenous pacemaker placement is mild elevation of left hemidiaphragm. The pulmonary vascular markings are normal and the lungs are clear      Impression:      Impression:    1. Stable cardiac enlargement with postoperative changes of left-sided transvenous pacemaker placement.  2. Mild elevation of the left hemidiaphragm.  3. The chest appears otherwise clear.    Electronically Signed: Nando Wesley    4/2/2023 7:42 AM EDT    Workstation ID: [id number]            CORTEZ Rodriguez  04/04/23  10:50 EDT      EMR Dragon/Transcription:   \"Dictated utilizing Dragon dictation\".        Electronically signed by CORTEZ Rodriguez, 04/03/23, 9:39 AM EDT.      Patient seen and examined  Complains of significant leg cramps  Denies any claudication  Patient is a non-smoker    Shortness of breath and leg edema has improved since yesterday  Feels somewhat better      Physical Exam    General:      well developed, well nourished, in no " acute distress.    Head:      normocephalic and atraumatic.    Eyes:      PERRL/EOM intact, conjunctivae and sclerae clear without nystagmus.    Neck:      no  thyromegaly, trachea central with normal respiratory effort  Lungs:      clear bilaterally to auscultation.    Heart:       regular rate and rhythm, S1, S2 without murmurs, rubs, or gallops  Skin:      intact without lesions or rashes.    Psych:      alert and cooperative; normal mood and affect; normal attention span and concentration.        Bilateral significant varicosities noted  Peripheral pulses 2+    Patient has of symptomatic varicose veins  We will have vascular surgery evaluate for severe symptomatic varicose veins    Continue monitoring renal panel and potential discharge once social issues are evaluated.      Electronically signed by Michael Rollins MD, 04/03/23, 4:23 PM EDT.

## 2023-04-04 NOTE — PLAN OF CARE
Problem: Adult Inpatient Plan of Care  Goal: Absence of Hospital-Acquired Illness or Injury  Intervention: Identify and Manage Fall Risk  Recent Flowsheet Documentation  Taken 4/4/2023 0226 by Bibi Guillen RN  Safety Promotion/Fall Prevention:   assistive device/personal items within reach   clutter free environment maintained   fall prevention program maintained   lighting adjusted   nonskid shoes/slippers when out of bed   room organization consistent   safety round/check completed  Taken 4/4/2023 0035 by Bibi Guillen RN  Safety Promotion/Fall Prevention:   assistive device/personal items within reach   clutter free environment maintained   fall prevention program maintained   lighting adjusted   nonskid shoes/slippers when out of bed   room organization consistent   safety round/check completed  Taken 4/3/2023 2215 by Bibi Guillen RN  Safety Promotion/Fall Prevention:   assistive device/personal items within reach   clutter free environment maintained   fall prevention program maintained   lighting adjusted   nonskid shoes/slippers when out of bed   room organization consistent   safety round/check completed  Intervention: Prevent Skin Injury  Recent Flowsheet Documentation  Taken 4/4/2023 0226 by Bibi Guileln RN  Body Position: position changed independently  Taken 4/4/2023 0035 by Bibi Guillen RN  Body Position: position changed independently  Taken 4/3/2023 2215 by Bibi Guillen RN  Body Position: position changed independently  Intervention: Prevent and Manage VTE (Venous Thromboembolism) Risk  Recent Flowsheet Documentation  Taken 4/4/2023 0226 by Bibi Guillen RN  Activity Management:   activity adjusted per tolerance   activity encouraged  Taken 4/4/2023 0035 by Bibi Guillen RN  Activity Management:   activity adjusted per tolerance   activity encouraged  Taken 4/3/2023 2215 by Bibi Guillen RN  Activity Management: up ad consuelo  Intervention: Prevent Infection  Recent Flowsheet Documentation  Taken  4/4/2023 0226 by Bibi Guillen RN  Infection Prevention:   hand hygiene promoted   personal protective equipment utilized  Taken 4/4/2023 0035 by Bibi Guillen RN  Infection Prevention:   hand hygiene promoted   personal protective equipment utilized  Taken 4/3/2023 2215 by Bibi Guillen RN  Infection Prevention:   hand hygiene promoted   personal protective equipment utilized     Problem: Fall Injury Risk  Goal: Absence of Fall and Fall-Related Injury  Intervention: Identify and Manage Contributors  Recent Flowsheet Documentation  Taken 4/4/2023 0226 by Bibi Guillen RN  Medication Review/Management: medications reviewed  Taken 4/4/2023 0035 by Bibi Guillen RN  Medication Review/Management: medications reviewed  Taken 4/3/2023 2215 by Bibi Guillen RN  Medication Review/Management: medications reviewed  Intervention: Promote Injury-Free Environment  Recent Flowsheet Documentation  Taken 4/4/2023 0226 by Bibi Guillen RN  Safety Promotion/Fall Prevention:   assistive device/personal items within reach   clutter free environment maintained   fall prevention program maintained   lighting adjusted   nonskid shoes/slippers when out of bed   room organization consistent   safety round/check completed  Taken 4/4/2023 0035 by Bibi Guillen RN  Safety Promotion/Fall Prevention:   assistive device/personal items within reach   clutter free environment maintained   fall prevention program maintained   lighting adjusted   nonskid shoes/slippers when out of bed   room organization consistent   safety round/check completed  Taken 4/3/2023 2215 by Bibi Guillen RN  Safety Promotion/Fall Prevention:   assistive device/personal items within reach   clutter free environment maintained   fall prevention program maintained   lighting adjusted   nonskid shoes/slippers when out of bed   room organization consistent   safety round/check completed     Problem: Heart Failure Comorbidity  Goal: Maintenance of Heart Failure Symptom  Control  Intervention: Maintain Heart Failure-Management  Recent Flowsheet Documentation  Taken 4/4/2023 0226 by Bibi Guillen RN  Medication Review/Management: medications reviewed  Taken 4/4/2023 0035 by Bibi Guillen RN  Medication Review/Management: medications reviewed  Taken 4/3/2023 2215 by Bibi Guillen RN  Medication Review/Management: medications reviewed     Problem: Hypertension Comorbidity  Goal: Blood Pressure in Desired Range  Intervention: Maintain Blood Pressure Management  Recent Flowsheet Documentation  Taken 4/4/2023 0226 by Bibi Guillen RN  Medication Review/Management: medications reviewed  Taken 4/4/2023 0035 by Bibi Guillen RN  Medication Review/Management: medications reviewed  Taken 4/3/2023 2215 by Bibi Guillen RN  Medication Review/Management: medications reviewed   Goal Outcome Evaluation:      Patient slept through the night with no complaints. Awaiting PT/OT evaluation and placement plans. Will continue to monitor.

## 2023-04-04 NOTE — CONSULTS
"Name: Thony Dumont ADMIT: 2023   : 1960  PCP: Provider, No Known    MRN: [id number] LOS: 1 days   AGE/SEX: 62 y.o. male  ROOM: [facility]/[address] [place] [address]      Patient Care Team:  Provider, No Known as PCP - General  Chief Complaint   Patient presents with   • Shortness of Breath     CC: shortness of breath    Subjective     Inpatient Vascular Surgery Consult  Consult performed by: Rose Alfaro APRN  Consult ordered by: Michael Rollins MD  Reason for consult: bilateral symptomatic varicose veins        History of Present Illness   Patient is a 62-year-old male with history of congestive heart failure, A. Fib on Xarelto, CKD stage II, hypertension, hyperlipidemia, pulmonary hypertension, and ICD placement who presented to the hospital on  with complaints of shortness of breath and is being treated for acute on chronic systolic heart failure. Cardiology is following and he is being diuresed.  Repeat echocardiogram pending but last echo in  showed EF 21%.   We were asked to see this patient for evaluation of bilateral symptomatic varicose veins.  Patient reports that he has had significant varicosities for many years and has tried compression stockings without much relief.  He has never had any procedures on his veins in the past. He reports that his legs are \"sore all the time\" extending from his thighs down into his feet.  He does not have any lower extremity wounds and has  palpable femoral, popliteal, and pedal pulses.  He reports that he is not able to walk long distances secondary to his heart failure.      Review of Systems   Constitutional: Negative for chills and fever.   Respiratory: Positive for shortness of breath. Negative for cough.    Cardiovascular: Negative for chest pain.   Musculoskeletal: Positive for gait problem and myalgias.   Skin: Negative for wound.   Neurological: Negative for dizziness, facial asymmetry, weakness, light-headedness and numbness. " "  Psychiatric/Behavioral: Negative for agitation and confusion.     Past Medical History:   Diagnosis Date   • Acute on chronic congestive heart failure 06/07/2021   • Afib    • Chronic pain    • CKD (chronic kidney disease) stage 2, GFR 60-89 ml/min 8/9/2021   • Combined systolic and diastolic congestive heart failure    • Degenerative joint disease    • Erectile dysfunction    • Essential hypertension    • Former smoker    • Gastroesophageal reflux disease    • Homeless 08/25/2021    living in his car   • ICD (implantable cardioverter-defibrillator) in place 06/15/2021   • Mixed hyperlipidemia    • Moderate mitral regurgitation    • Moderate tricuspid regurgitation    • NICM (nonischemic cardiomyopathy) 06/10/2021   • Paroxysmal atrial fibrillation    • Pleural effusion 8/9/2021   • Pulmonary hypertension    • Sprain of rotator cuff capsule 3/14/2014   • Substance abuse    • Valvular heart disease      Past Surgical History:   Procedure Laterality Date   • CARDIAC DEFIBRILLATOR PLACEMENT     • CARDIAC ELECTROPHYSIOLOGY PROCEDURE Left 6/15/2021    Procedure: Device Implant;  Surgeon: Michael Rollins MD;  Location: [facility] CATH INVASIVE LOCATION;  Service: Cardiovascular;  Laterality: Left;   • HERNIA REPAIR       Family History   Problem Relation Age of Onset   • Cancer Mother      Social History     Tobacco Use   • Smoking status: Some Days     Types: Cigarettes   • Smokeless tobacco: Never   • Tobacco comments:     complete smoking cessation educated    Vaping Use   • Vaping Use: Every day   • Substances: Nicotine   Substance Use Topics   • Alcohol use: Yes     Alcohol/week: 7.0 standard drinks     Types: 7 Cans of beer per week     Comment: 1 beer a day   • Drug use: Not Currently     Types: Marijuana, \"Crack\" cocaine, Methamphetamines     Comment: 6/15/21 report cocaine in 2018, marijuana 6/14/2021 @0050- snorts meth occasionally.     Medications Prior to Admission   Medication Sig Dispense Refill Last Dose "   • atorvastatin (LIPITOR) 10 MG tablet TAKE 1 TABLET BY MOUTH EVERY DAY 30 tablet 10    • carvedilol (COREG) 6.25 MG tablet Take 1 tablet by mouth 2 (Two) Times a Day With Meals. 180 tablet 1    • furosemide (LASIX) 40 MG tablet 1 tablet daily 90 tablet 1    • losartan (COZAAR) 50 MG tablet 1 tablet daily 90 tablet 1    • rivaroxaban (Xarelto) 20 MG tablet Take 1 tablet by mouth Daily. 90 tablet 1    • spironolactone (ALDACTONE) 25 MG tablet Take 1 tablet by mouth Daily. 90 tablet 1      atorvastatin, 10 mg, Oral, Daily  carvedilol, 6.25 mg, Oral, BID With Meals  furosemide, 40 mg, Oral, Daily  losartan, 50 mg, Oral, Q24H  rivaroxaban, 20 mg, Oral, Daily  sodium chloride, 10 mL, Intravenous, Q12H  spironolactone, 25 mg, Oral, Daily         •  acetaminophen **OR** acetaminophen **OR** acetaminophen  •  cyclobenzaprine  •  magnesium sulfate **OR** magnesium sulfate **OR** magnesium sulfate  •  ondansetron **OR** ondansetron  •  [COMPLETED] Insert Peripheral IV **AND** sodium chloride  •  sodium chloride  •  sodium chloride  Aspirin, Penicillins, and Pork allergy    Objective  resting in bed, no acute distress    Physical Exam:  Physical Exam  Vitals and nursing note reviewed.   Constitutional:       Appearance: Normal appearance.   HENT:      Head: Normocephalic.   Eyes:      Extraocular Movements: Extraocular movements intact.   Cardiovascular:      Rate and Rhythm: Normal rate.      Pulses:           Radial pulses are 2+ on the right side and 2+ on the left side.        Femoral pulses are 2+ on the right side and 2+ on the left side.       Popliteal pulses are 1+ on the right side and 1+ on the left side.        Dorsalis pedis pulses are 1+ on the right side and 1+ on the left side.        Posterior tibial pulses are 1+ on the right side and 1+ on the left side.      Comments: Significant BLE varicose veins present in thighs and calves  Pulmonary:      Effort: Pulmonary effort is normal. No respiratory distress.  "  Abdominal:      Palpations: Abdomen is soft.   Musculoskeletal:         General: Normal range of motion.   Skin:     General: Skin is warm and dry.      Capillary Refill: Capillary refill takes less than 2 seconds.   Neurological:      General: No focal deficit present.      Mental Status: He is alert and oriented to person, place, and time. Mental status is at baseline.   Psychiatric:         Mood and Affect: Mood normal.         Behavior: Behavior normal.        Vital Signs and Labs:  Vital Signs Patient Vitals for the past 24 hrs:   BP Temp Temp src Pulse Resp SpO2 Height Weight   04/04/23 0458 115/79 98.2 °F (36.8 °C) Oral 80 16 99 % -- --   04/04/23 0028 96/60 98.4 °F (36.9 °C) Oral 81 20 96 % -- --   04/03/23 2032 99/63 98.3 °F (36.8 °C) Oral 77 15 97 % -- --   04/03/23 1726 -- -- -- 86 -- -- -- --   04/03/23 1701 101/72 98.3 °F (36.8 °C) Oral 77 16 99 % -- --   04/03/23 1202 116/78 98 °F (36.7 °C) Oral 75 12 91 % -- --   04/03/23 0952 124/79 -- -- 65 -- -- 190.5 cm (75\") 96.2 kg (212 lb)     I/O:  I/O last 3 completed shifts:  In: 960 [P.O.:960]  Out: 1150 [Urine:1150]    CBC    Results from last 7 days   Lab Units 04/04/23  0616 04/03/23  0027 04/02/23  0559   WBC 10*3/mm3 4.90 3.80 4.30   HEMOGLOBIN g/dL 16.6 17.0 15.7   PLATELETS 10*3/mm3 197 199 202     BMP   Results from last 7 days   Lab Units 04/04/23  0616 04/03/23  0027 04/02/23  0559   SODIUM mmol/L 137 141 139   POTASSIUM mmol/L 4.5 4.0 4.7   CHLORIDE mmol/L 101 102 105   CO2 mmol/L 27.0 27.0 24.0   BUN mg/dL 27* 21 18   CREATININE mg/dL 1.69* 1.41* 1.09   GLUCOSE mg/dL 88 99 107*   MAGNESIUM mg/dL  --  2.0  --      Coag   Results from last 7 days   Lab Units 04/02/23  0559   INR  1.09   APTT seconds 29.0*     Radiology(recent) No radiology results for the last day    Active Hospital Problems    Diagnosis  POA   • **Acute on chronic combined systolic (congestive) and diastolic (congestive) heart failure [I50.43]  Yes     Priority: Low   • " "Shortness of breath [R06.02]  Yes     Priority: Low   • ICD (implantable cardioverter-defibrillator) in place [Z95.810]  Yes     Priority: Low   • Pulmonary hypertension (HCC) [I27.20]  Yes     Priority: Low   • NICM (nonischemic cardiomyopathy) (HCC) [I42.8]  Yes     Priority: Low   • Essential hypertension [I10]  Yes     Priority: Low   • Mixed hyperlipidemia [E78.2]  Yes     Priority: Low   • Paroxysmal atrial fibrillation (HCC) [I48.0]  Yes     Priority: Low      Resolved Hospital Problems   No resolved problems to display.     [unfilled]  [id number]    Assessment & Plan       Acute on chronic combined systolic (congestive) and diastolic (congestive) heart failure    Essential hypertension    Mixed hyperlipidemia    Paroxysmal atrial fibrillation (HCC)    Shortness of breath    NICM (nonischemic cardiomyopathy) (HCC)    ICD (implantable cardioverter-defibrillator) in place    Pulmonary hypertension (HCC)      62 y.o. male with history of BHF,  A. Fib on Xarelto, CKD stage II, HTN, HLD, pulmonary hypertension, and ICD placement who presented to the hospital on 4/2 with complaints of shortness of breath and is being treated for acute on chronic systolic heart failure. Cardiology is following and he is being diuresed and currently taking PO lasix 40mg daily.  Repeat echocardiogram pending but last echo in 2021 showed EF 21%.   We were asked to see this patient for evaluation of bilateral symptomatic varicose veins which patient reports have been present for many years. He has never had any procedures on his veins in the past. He reports that his legs are \"sore all the time\" extending from his thighs down into his feet.  He does not have any lower extremity wounds and has palpable femoral, popliteal, and pedal pulses.  He is not able to walk long distances secondary to his heart failure.      -No plans for any venous procedures as an inpatient. We will have him follow up as an outpatient at [facility] to " further discuss treatment options, but we would recommend his cardiac function be optimized as his heart failure is likely contributing to his varicose veins. Will add ALFONZO hose. We will be available as needed as an inpatient.      I discussed the patients findings and my recommendations with patient.    CORTEZ Hou  04/04/23  08:24 EDT    Please call my office with any question: [phone]

## 2023-04-04 NOTE — PLAN OF CARE
Goal Outcome Evaluation:      Pt. Resting with no complaints. Awaiting placement for discharge.

## 2023-04-04 NOTE — DISCHARGE SUMMARY
Discharge Note   Thony Dumont 1960 [id number] 1     Date of Admission:4/2/2023     Date of Discharge: 04/04/23     Admission Diagnosis: Shortness of breath [R06.02]  Hypervolemia, unspecified hypervolemia type [E87.70]     Discharge Diagnosis:     Acute on chronic combined systolic (congestive) and diastolic (congestive) heart failure    Essential hypertension    Mixed hyperlipidemia    Paroxysmal atrial fibrillation (HCC)    Shortness of breath    NICM (nonischemic cardiomyopathy) (HCC)    ICD (implantable cardioverter-defibrillator) in place    Pulmonary hypertension (HCC)       Consults: cardiology    Hospital Course: A 62 y.o. male with PMH of CHF, homelessness, A-fib, hypertension, pulmonary hypertension presented to the hospital for worsening shortness of breath and was admitted with a principal diagnosis of Acute on chronic combined systolic (congestive) and diastolic (congestive) heart failure.  Treated with IV Lasix and subsequently switched to oral Lasix.  Cardiology was consulted.  Defer adding Jardiance to cardiology.    Patient also had varicose veins and vascular surgery was consulted by cardiology.  Recommended compression stockings.    Vitals:    04/04/23 0903   BP: 118/85   Pulse: 71   Resp:    Temp:    SpO2:         Physical Exam  Vitals and nursing note reviewed.   Constitutional:       General: He is not in acute distress.     Appearance: Normal appearance.   HENT:      Mouth/Throat:      Mouth: Mucous membranes are moist.      Pharynx: Oropharynx is clear.   Eyes:      General: No scleral icterus.     Extraocular Movements: Extraocular movements intact.      Conjunctiva/sclera: Conjunctivae normal.   Cardiovascular:      Rate and Rhythm: Normal rate.      Heart sounds: No murmur heard.    No gallop.   Pulmonary:      Breath sounds: Normal breath sounds. No wheezing or rales.   Abdominal:      General: Bowel sounds are normal.      Palpations: Abdomen is soft.      Tenderness: There is no  abdominal tenderness.   Musculoskeletal:         General: No tenderness.      Right lower leg: No edema.      Left lower leg: No edema.      Comments: Bilateral varicose veins present on lower extremities   Neurological:      General: No focal deficit present.      Mental Status: He is alert and oriented to person, place, and time.          Disposition: Home    Discharged Condition: fair    Activity: activity as tolerated    Diet:  Diet Order   Procedures   • Diet: Cardiac Diets; Healthy Heart (2-3 Na+); Texture: Regular Texture (IDDSI 7); Fluid Consistency: Thin (IDDSI 0)        Labs:    Results from last 7 days   Lab Units 04/04/23  0616 04/03/23  0027 04/02/23  0559   WBC 10*3/mm3 4.90 3.80 4.30   HEMOGLOBIN g/dL 16.6 17.0 15.7   HEMATOCRIT % 51.3* 50.9 48.5   PLATELETS 10*3/mm3 197 199 202       Results from last 7 days   Lab Units 04/04/23  0616 04/03/23  0027 04/02/23  0559   SODIUM mmol/L 137 141 139   POTASSIUM mmol/L 4.5 4.0 4.7   CHLORIDE mmol/L 101 102 105   CO2 mmol/L 27.0 27.0 24.0   BUN mg/dL 27* 21 18   CREATININE mg/dL 1.69* 1.41* 1.09                  Discharge Medications      Continue These Medications      Instructions Start Date   atorvastatin 10 MG tablet  Commonly known as: LIPITOR   TAKE 1 TABLET BY MOUTH EVERY DAY      carvedilol 6.25 MG tablet  Commonly known as: COREG   6.25 mg, Oral, 2 Times Daily With Meals      furosemide 40 MG tablet  Commonly known as: LASIX   1 tablet daily      losartan 50 MG tablet  Commonly known as: COZAAR   1 tablet daily      rivaroxaban 20 MG tablet  Commonly known as: Xarelto   20 mg, Oral, Daily      spironolactone 25 MG tablet  Commonly known as: ALDACTONE   25 mg, Oral, Daily              Spent at least 12 minutes in the management of patient's care including but not limited to physical exam, review of vital signs, labs, cultures and imaging studies, discussing the hospital stay along with plan of care at home, preparation and coordinating of discharge,  arranging follow up care and referrals as indicated. Plan also discussed with RN.    Delmar Garner MD  Critical Care  04/04/23   12:00 EDT

## 2023-04-04 NOTE — THERAPY EVALUATION
Patient Name: Thony Dumont  : 1960    MRN: [id number]                              Today's Date: 2023       Admit Date: 2023    Visit Dx:     ICD-10-CM ICD-9-CM   1. Shortness of breath  R06.02 786.05   2. Hypervolemia, unspecified hypervolemia type  E87.70 276.69   3. Varicose veins of both lower extremities with pain  I83.813 454.8     Patient Active Problem List   Diagnosis   • Essential hypertension   • Mixed hyperlipidemia   • Paroxysmal atrial fibrillation (HCC)   • Chronic pain   • Tobacco abuse   • Shortness of breath   • NICM (nonischemic cardiomyopathy) (HCC)   • Acute on chronic combined systolic (congestive) and diastolic (congestive) heart failure   • CKD (chronic kidney disease) stage 2, GFR 60-89 ml/min   • ICD (implantable cardioverter-defibrillator) in place   • Degenerative joint disease   • Erectile dysfunction   • Gastroesophageal reflux disease   • Sprain of rotator cuff capsule   • Moderate mitral regurgitation   • Moderate tricuspid regurgitation   • Pulmonary hypertension (HCC)   • Serum total bilirubin elevated   • Substance use   • Chest pain   • Acute respiratory failure with hypoxemia   • Methamphetamine abuse   • Patient's noncompliance with other medical treatment and regimen     Past Medical History:   Diagnosis Date   • Acute on chronic congestive heart failure 2021   • Afib    • Chronic pain    • CKD (chronic kidney disease) stage 2, GFR 60-89 ml/min 2021   • Combined systolic and diastolic congestive heart failure    • Degenerative joint disease    • Erectile dysfunction    • Essential hypertension    • Former smoker    • Gastroesophageal reflux disease    • Homeless 2021    living in his car   • ICD (implantable cardioverter-defibrillator) in place 06/15/2021   • Mixed hyperlipidemia    • Moderate mitral regurgitation    • Moderate tricuspid regurgitation    • NICM (nonischemic cardiomyopathy) 06/10/2021   • Paroxysmal atrial fibrillation    •  Pleural effusion 8/9/2021   • Pulmonary hypertension    • Sprain of rotator cuff capsule 3/14/2014   • Substance abuse    • Valvular heart disease      Past Surgical History:   Procedure Laterality Date   • CARDIAC DEFIBRILLATOR PLACEMENT     • CARDIAC ELECTROPHYSIOLOGY PROCEDURE Left 6/15/2021    Procedure: Device Implant;  Surgeon: Michael Rollins MD;  Location: [facility] CATH INVASIVE LOCATION;  Service: Cardiovascular;  Laterality: Left;   • HERNIA REPAIR        General Information     Row Name 04/04/23 1149          Physical Therapy Time and Intention    Document Type evaluation  -EJ     Mode of Treatment physical therapy  -     Row Name 04/04/23 1149          General Information    Patient Profile Reviewed yes  -EJ     Prior Level of Function independent:  -EJ     Existing Precautions/Restrictions other (see comments)  weakness, balance deficits  -EJ     Barriers to Rehab environmental barriers;family issues  -     Row Name 04/04/23 1149          Living Environment    People in Home alone  Pt currently homeless.  Was recently kicked out of where he was living, and had been staying in his van which he now no longer has.  -     Row Name 04/04/23 1149          Cognition    Orientation Status (Cognition) oriented x 4  -     Row Name 04/04/23 1149          Safety Issues, Functional Mobility    Impairments Affecting Function (Mobility) balance;endurance/activity tolerance;pain;strength  -EJ           User Key  (r) = Recorded By, (t) = Taken By, (c) = Cosigned By    Initials Name Provider Type    EJ Lillian Gurrola, PT Physical Therapist               Mobility     Row Name 04/04/23 1152          Bed Mobility    Bed Mobility bed mobility (all) activities  -     All Activities, [place] (Bed Mobility) modified independence  -     Row Name 04/04/23 1152          Sit-Stand Transfer    Sit-Stand [place] (Transfers) supervision  -     Row Name 04/04/23 1152          Gait/Stairs (Locomotion)     [place] Level (Gait) supervision;standby assist;contact guard  Pt had once instance of slight LOB requiring CGA to correct.  -EJ     Distance in Feet (Gait) 20 feet  -EJ     Deviations/Abnormal Patterns (Gait) gait speed decreased;stride length decreased  -EJ     Bilateral Gait Deviations forward flexed posture;heel strike decreased  -EJ           User Key  (r) = Recorded By, (t) = Taken By, (c) = Cosigned By    Initials Name Provider Type    EJ Lillian Gurrola, PT Physical Therapist               Obj/Interventions     Row Name 04/04/23 1154          Range of Motion Comprehensive    Comment, General Range of Motion BLE AROM grossly WFL with exception of mild limitaitons with hip rotation (IR/ER).  -     Row Name 04/04/23 1150          Strength Comprehensive (MMT)    Comment, General Manual Muscle Testing (MMT) Assessment BLE strength grossly 4-/5.  Limited due to pain also.  -     Row Name 04/04/23 1157          Balance    Balance Assessment sitting static balance;sitting dynamic balance;sit to stand dynamic balance;standing static balance;standing dynamic balance;stepping strategy assessment  -EJ     Static Sitting Balance independent  -EJ     Dynamic Sitting Balance independent  -EJ     Position, Sitting Balance unsupported;sitting edge of bed  -EJ     Sit to Stand Dynamic Balance supervision  -EJ     Static Standing Balance modified independence  -EJ     Dynamic Standing Balance supervision;standby assist;contact guard  1 instance of CGA required due to mild LOB.  -EJ     Position/Device Used, Standing Balance unsupported  -EJ     Balance Interventions sitting;standing;sit to stand;supported;static;dynamic;minimal challenge  -     Row Name 04/04/23 3317          Stepping Strategy (Balance)    Stepping Strategy Assessment (Balance) Pt with balance deficit requiring SBA to CGA.  Pt able to use step back stepping strategy with CGA to correct.  -EJ     Stepping Strategy Promotion (Balance) tandem stepping   -EJ           User Key  (r) = Recorded By, (t) = Taken By, (c) = Cosigned By    Initials Name Provider Type    Lillian Hope PT Physical Therapist               Goals/Plan     Row Name 04/04/23 1254          Transfer Goal 1 (PT)    Activity/Assistive Device (Transfer Goal 1, PT) transfers, all  -EJ     [place] Level/Cues Needed (Transfer Goal 1, PT) independent  -EJ     Time Frame (Transfer Goal 1, PT) long term goal (LTG);2 weeks  -EJ     Row Name 04/04/23 1254          Gait Training Goal 1 (PT)    Activity/Assistive Device (Gait Training Goal 1, PT) gait (walking locomotion)  -EJ     [place] Level (Gait Training Goal 1, PT) independent  -EJ     Distance (Gait Training Goal 1, PT) 400 feet  -EJ     Time Frame (Gait Training Goal 1, PT) long term goal (LTG);2 weeks  -EJ     Row Name 04/04/23 1254          Problem Specific Goal 1 (PT)    Problem Specific Goal 1 (PT) Pt to improve his dynamic standing balance to Fair+ in order to reduce any risk for falls and increase control when ambulating on unlevel surfaces.  -EJ     Time Frame (Problem Specific Goal 1, PT) long-term goal (LTG);2 weeks  -EJ           User Key  (r) = Recorded By, (t) = Taken By, (c) = Cosigned By    Initials Name Provider Type    Lillian Hope PT Physical Therapist               Clinical Impression     Row Name 04/04/23 1154          Pain    Pretreatment Pain Rating 5/10  -EJ     Posttreatment Pain Rating 5/10  -EJ     Pain Location lower  -EJ     Pain Location - other (see comments)  legs  -EJ     Pain Intervention(s) Therapeutic presence;Repositioned  -EJ     Row Name 04/04/23 3736          Plan of Care Review    Plan of Care Reviewed With patient  -EJ     Outcome Evaluation Patient is a 63 y/o M who was admitted 4/2/23 with c/o SOB, BLE edema, and BLE leg pain.  Pt has a PMHx of CHF, A-fib, CKD, GERD, Pulmonary HTN, and h/o Substance abuse. Pt was recently kicked out of where he was living, and reports he was living in his  van.  He no longer has his van and is homeless.  Pt has applied for housing, but has not yet heard back.  At baseline pt is completely independent, and is able to perform all ADLs and ambulation without assist or AD.  Pt has continued to decline with longer distance ambulation, and has increased BLE pain.  Pt reports worsening balance.  During today's evaluation he was mod I with bed mobility, supverision with sit to stand, and supervision/SBA with gait overall.  He had once instance of slight LOB requiring CGA to correct.  Pt also demonstrated reduced gait speed, reduced arm swing, and slight balance deficts affecting his mobility.  At this time PT recommends SNF for pt due to strength and balance deficits.  PT will continue to follow pt in acute setting.  -EJ     Row Name 04/04/23 1157          Therapy Assessment/Plan (PT)    Criteria for Skilled Interventions Met (PT) yes;skilled treatment is necessary  -EJ     Therapy Frequency (PT) 3 times/wk  -EJ     Predicted Duration of Therapy Intervention (PT) until discharge  -EJ     Row Name 04/04/23 1157          Positioning and Restraints    Pre-Treatment Position in bed  -EJ     Post Treatment Position bed  -EJ     In Bed call light within reach;encouraged to call for assist  -EJ           User Key  (r) = Recorded By, (t) = Taken By, (c) = Cosigned By    Initials Name Provider Type    Lillian Hope, PT Physical Therapist               Outcome Measures     Row Name 04/04/23 1255 04/04/23 0800       How much help from another person do you currently need...    Turning from your back to your side while in flat bed without using bedrails? 4  -EJ 4  -MG    Moving from lying on back to sitting on the side of a flat bed without bedrails? 4  -EJ 4  -MG    Moving to and from a bed to a chair (including a wheelchair)? 4  -EJ 4  -MG    Standing up from a chair using your arms (e.g., wheelchair, bedside chair)? 4  -EJ 4  -MG    Climbing 3-5 steps with a railing? 3  -EJ 4   -MG    To walk in hospital room? 3  -EJ 4  -MG    AM-PAC 6 Clicks Score (PT) 22  -EJ 24  -MG    Highest level of mobility 7 --> Walked 25 feet or more  -EJ 8 --> Walked 250 feet or more  -MG          User Key  (r) = Recorded By, (t) = Taken By, (c) = Cosigned By    Initials Name Provider Type    EJ Lillian Gurrola, PT Physical Therapist    Karen Saldivar RN Registered Nurse                             Physical Therapy Education     Title: PT OT SLP Therapies (Done)     Topic: Physical Therapy (Done)     Point: Mobility training (Done)     Learning Progress Summary           Patient Acceptance, E,TB, VU by  at 4/4/2023 1255                   Point: Home exercise program (Done)     Learning Progress Summary           Patient Acceptance, E,TB, VU by  at 4/4/2023 1255                   Point: Body mechanics (Done)     Learning Progress Summary           Patient Acceptance, E,TB, VU by  at 4/4/2023 1255                   Point: Precautions (Done)     Learning Progress Summary           Patient Acceptance, E,TB, VU by  at 4/4/2023 1255                               User Key     Initials Effective Dates Name Provider Type [facility] 06/16/21 -  Lillian Gurrola, PT Physical Therapist PT              PT Recommendation and Plan     Plan of Care Reviewed With: patient  Outcome Evaluation: Patient is a 63 y/o M who was admitted 4/2/23 with c/o SOB, BLE edema, and BLE leg pain.  Pt has a PMHx of CHF, A-fib, CKD, GERD, Pulmonary HTN, and h/o Substance abuse. Pt was recently kicked out of where he was living, and reports he was living in his van.  He no longer has his van and is homeless.  Pt has applied for housing, but has not yet heard back.  At baseline pt is completely independent, and is able to perform all ADLs and ambulation without assist or AD.  Pt has continued to decline with longer distance ambulation, and has increased BLE pain.  Pt reports worsening balance.  During today's evaluation he was  mod I with bed mobility, supverision with sit to stand, and supervision/SBA with gait overall.  He had once instance of slight LOB requiring CGA to correct.  Pt also demonstrated reduced gait speed, reduced arm swing, and slight balance deficts affecting his mobility.  At this time PT recommends SNF for pt due to strength and balance deficits.  PT will continue to follow pt in acute setting.     Time Calculation:    PT Charges     Row Name 04/04/23 1256             Time Calculation    Start Time 1015  -EJ      Stop Time 1050  -EJ      Time Calculation (min) 35 min  -EJ      PT Received On 04/04/23  -EJ      PT - Next Appointment 04/05/23  -EJ      PT Goal Re-Cert Due Date 04/18/23  -EJ         Time Calculation- PT    Total Timed Code Minutes- PT 0 minute(s)  -EJ            User Key  (r) = Recorded By, (t) = Taken By, (c) = Cosigned By    Initials Name Provider Type     Lillian Gurrola, PT Physical Therapist              Therapy Charges for Today     Code Description Service Date Service Provider Modifiers Qty    [id number] HC PT EVAL LOW COMPLEXITY 4 4/4/2023 Lillian Gurrola, PT GP 1          PT G-Codes  AM-PAC 6 Clicks Score (PT): 22  PT Discharge Summary  Anticipated Discharge Disposition (PT): skilled nursing facility    Lillian Gurrola, PT  4/4/2023

## 2023-04-05 LAB
ALBUMIN SERPL-MCNC: 3.7 G/DL (ref 3.5–5.2)
ALBUMIN/GLOB SERPL: 1.5 G/DL
ALP SERPL-CCNC: 113 U/L (ref 39–117)
ALT SERPL W P-5'-P-CCNC: 15 U/L (ref 1–41)
ANION GAP SERPL CALCULATED.3IONS-SCNC: 7 MMOL/L (ref 5–15)
AST SERPL-CCNC: 16 U/L (ref 1–40)
BASOPHILS # BLD AUTO: 0 10*3/MM3 (ref 0–0.2)
BASOPHILS NFR BLD AUTO: 0.8 % (ref 0–1.5)
BILIRUB SERPL-MCNC: 1 MG/DL (ref 0–1.2)
BUN SERPL-MCNC: 31 MG/DL (ref 8–23)
BUN/CREAT SERPL: 19.4 (ref 7–25)
CALCIUM SPEC-SCNC: 9 MG/DL (ref 8.6–10.5)
CHLORIDE SERPL-SCNC: 101 MMOL/L (ref 98–107)
CO2 SERPL-SCNC: 30 MMOL/L (ref 22–29)
CREAT SERPL-MCNC: 1.6 MG/DL (ref 0.76–1.27)
DEPRECATED RDW RBC AUTO: 48.6 FL (ref 37–54)
EGFRCR SERPLBLD CKD-EPI 2021: 49 ML/MIN/1.73
EOSINOPHIL # BLD AUTO: 0.2 10*3/MM3 (ref 0–0.4)
EOSINOPHIL NFR BLD AUTO: 3.8 % (ref 0.3–6.2)
ERYTHROCYTE [DISTWIDTH] IN BLOOD BY AUTOMATED COUNT: 14.7 % (ref 12.3–15.4)
GLOBULIN UR ELPH-MCNC: 2.5 GM/DL
GLUCOSE SERPL-MCNC: 95 MG/DL (ref 65–99)
HCT VFR BLD AUTO: 50.2 % (ref 37.5–51)
HGB BLD-MCNC: 16.2 G/DL (ref 13–17.7)
LYMPHOCYTES # BLD AUTO: 1.9 10*3/MM3 (ref 0.7–3.1)
LYMPHOCYTES NFR BLD AUTO: 35.3 % (ref 19.6–45.3)
MCH RBC QN AUTO: 30.5 PG (ref 26.6–33)
MCHC RBC AUTO-ENTMCNC: 32.3 G/DL (ref 31.5–35.7)
MCV RBC AUTO: 94.5 FL (ref 79–97)
MONOCYTES # BLD AUTO: 0.7 10*3/MM3 (ref 0.1–0.9)
MONOCYTES NFR BLD AUTO: 12.9 % (ref 5–12)
NEUTROPHILS NFR BLD AUTO: 2.5 10*3/MM3 (ref 1.7–7)
NEUTROPHILS NFR BLD AUTO: 47.2 % (ref 42.7–76)
NRBC BLD AUTO-RTO: 0.2 /100 WBC (ref 0–0.2)
PLATELET # BLD AUTO: 183 10*3/MM3 (ref 140–450)
PMV BLD AUTO: 8.5 FL (ref 6–12)
POTASSIUM SERPL-SCNC: 4.6 MMOL/L (ref 3.5–5.2)
PROT SERPL-MCNC: 6.2 G/DL (ref 6–8.5)
RBC # BLD AUTO: 5.31 10*6/MM3 (ref 4.14–5.8)
SODIUM SERPL-SCNC: 138 MMOL/L (ref 136–145)
WBC NRBC COR # BLD: 5.4 10*3/MM3 (ref 3.4–10.8)

## 2023-04-05 PROCEDURE — 80053 COMPREHEN METABOLIC PANEL: CPT | Performed by: NURSE PRACTITIONER

## 2023-04-05 PROCEDURE — G0378 HOSPITAL OBSERVATION PER HR: HCPCS

## 2023-04-05 PROCEDURE — 97116 GAIT TRAINING THERAPY: CPT

## 2023-04-05 PROCEDURE — 85025 COMPLETE CBC W/AUTO DIFF WBC: CPT | Performed by: NURSE PRACTITIONER

## 2023-04-05 PROCEDURE — 97530 THERAPEUTIC ACTIVITIES: CPT

## 2023-04-05 RX ADMIN — FUROSEMIDE 40 MG: 40 TABLET ORAL at 09:42

## 2023-04-05 RX ADMIN — Medication 10 ML: at 10:02

## 2023-04-05 RX ADMIN — CARVEDILOL 6.25 MG: 6.25 TABLET, FILM COATED ORAL at 09:42

## 2023-04-05 RX ADMIN — Medication 10 ML: at 21:58

## 2023-04-05 RX ADMIN — CYCLOBENZAPRINE 5 MG: 10 TABLET, FILM COATED ORAL at 10:02

## 2023-04-05 RX ADMIN — LOSARTAN POTASSIUM 50 MG: 50 TABLET, FILM COATED ORAL at 09:42

## 2023-04-05 RX ADMIN — ATORVASTATIN CALCIUM 10 MG: 10 TABLET, FILM COATED ORAL at 09:42

## 2023-04-05 RX ADMIN — CARVEDILOL 6.25 MG: 6.25 TABLET, FILM COATED ORAL at 17:19

## 2023-04-05 RX ADMIN — SPIRONOLACTONE 25 MG: 25 TABLET ORAL at 09:42

## 2023-04-05 RX ADMIN — RIVAROXABAN 20 MG: 20 TABLET, FILM COATED ORAL at 09:42

## 2023-04-05 NOTE — PROGRESS NOTES
Cardiology Progress Note-EP      Patient Care Team:  Provider, No Known as PCP - General    PATIENT IDENTIFICATION  Name: Thony Dumont  Age: 62 y.o.  Sex: male  :  1960  MRN: [id number]             REASON FOR FOLLOW-UP  Acute on chronic heart failure with reduced ejection fraction      INTERVAL HISTORY  Patient seen and examined, chart and labs reviewed.  Patient sitting on side of bed and appears in no acute distress.  Rhythm sinus at 82, blood pressure 118/80.    SUBJECTIVE    Denies CP, SOA, nausea/vomiting      REVIEW OF SYSTEMS:  Pertinent items are noted in HPI, all other systems reviewed and negative  Review of Systems   Constitutional: Positive for malaise/fatigue.   Eyes: Negative for blurred vision and double vision.   Cardiovascular: Negative for chest pain, dyspnea on exertion, irregular heartbeat, leg swelling, orthopnea, palpitations, paroxysmal nocturnal dyspnea and syncope.   Respiratory: Negative for cough and shortness of breath.    Gastrointestinal: Negative for nausea and vomiting.   Neurological: Negative for dizziness, light-headedness, numbness and paresthesias.   All other systems reviewed and are negative.    OBJECTIVE   Creatinine 1.6 (1.69, 1.41, 1.09)    ASSESSMENT  Acute on chronic combined systolic and diastolic  Primary hypertension  Paroxysmal atrial fibrillation with elevated chads vascular score  Nonischemic cardiomyopathy  Implantable cardioverter-defibrillator in situ  Pulmonary hypertension  Shortness of breath  Dyslipidemia mixed  Essential hypertension  CHADS-VASc Risk Assessment            2 Total Score    1 CHF    1 Hypertension        Criteria that do not apply:    Age >/= 75    DM    PRIOR STROKE/TIA/THROMBO    Vascular Disease    Age 65-74    Sex: Female            RECOMMENDATIONS  Patient has diuresed well  Continue Coreg, losartan, Aldactone  NYE-dfwntl-ve read  Maintaining normal sinus rhythm  Continue statin  Patient on Xarelto for elevated stroke  "risk  Add SGLT2 per GDMT   working with patient due to his homelessness        Vital Signs  Visit Vitals  /80 (BP Location: Right arm, Patient Position: Lying)   Pulse 67   Temp 98 °F (36.7 °C) (Oral)   Resp 20   Ht 190.5 cm (75\")   Wt 96.2 kg (212 lb)   SpO2 96%   BMI 26.50 kg/m²     Oxygen Therapy  SpO2: 96 %  Pulse Oximetry Type: Continuous  Device (Oxygen Therapy): room air  Flowsheet Rows    Flowsheet Row First Filed Value   Admission Height 190.5 cm (75\") Documented at 04/02/2023 0528   Admission Weight 91.8 kg (202 lb 6.1 oz) Documented at 04/02/2023 0528        Intake & Output (last 3 days)       04/02 0701 04/03 0700 04/03 0701 04/04 0700 04/04 0701 04/05 0700 04/05 0701 04/06 0700    P.O. 480 960 720     Total Intake(mL/kg) 480 (5) 960 (10) 720 (7.5)     Urine (mL/kg/hr) 2700 (1.2) 450 (0.2) 400 (0.2)     Stool   0     Total Output 2700 450 400     Net -2220 +510 +320             Stool Unmeasured Occurrence   1 x         Lines, Drains & Airways     Active LDAs     Name Placement date Placement time Site Days    Peripheral IV 04/02/23 0650 Anterior;Left;Proximal Forearm 04/02/23  0650  Forearm  1                       /80 (BP Location: Right arm, Patient Position: Lying)   Pulse 67   Temp 98 °F (36.7 °C) (Oral)   Resp 20   Ht 190.5 cm (75\")   Wt 96.2 kg (212 lb)   SpO2 96%   BMI 26.50 kg/m²   Intake/Output last 3 shifts:  I/O last 3 completed shifts:  In: 960 [P.O.:960]  Out: 400 [Urine:400]  Intake/Output this shift:  No intake/output data recorded.    PHYSICAL EXAM:    General: Well-developed, well-nourished 62-year-old male who is alert, cooperative, no distress, appears stated age  Head:  Normocephalic, atraumatic, mucous membranes moist  Eyes:  Conjunctivae/corneas clear, EOM's intact     Neck:  Supple,  no adenopathy;      Lungs: Crackles in bases, no wheezes  Chest wall: No tenderness  Heart::  Regular rate and rhythm, S1 and S2 normal, no murmur, rub or " gallop  Abdomen: Soft, nontender, nondistended, bowel sounds active  Extremities: No cyanosis, clubbing, or edema, groin soft, no hematoma.  Pulses: 2+ and symmetric all extremities  Skin:  No rashes or lesions  Neuro/psych: A&O x3. CN II through XII are grossly intact with appropriate affect      Scheduled Meds:      atorvastatin, 10 mg, Oral, Daily  carvedilol, 6.25 mg, Oral, BID With Meals  furosemide, 40 mg, Oral, Daily  losartan, 50 mg, Oral, Q24H  rivaroxaban, 20 mg, Oral, Daily  sodium chloride, 10 mL, Intravenous, Q12H  spironolactone, 25 mg, Oral, Daily        Continuous Infusions:         PRN Meds:    •  acetaminophen **OR** acetaminophen **OR** acetaminophen  •  cyclobenzaprine  •  magnesium sulfate **OR** magnesium sulfate **OR** magnesium sulfate  •  ondansetron **OR** ondansetron  •  [COMPLETED] Insert Peripheral IV **AND** sodium chloride  •  sodium chloride  •  sodium chloride        Results Review:     I reviewed the patient's new clinical results.    CBC    Results from last 7 days   Lab Units 04/05/23 0336 04/04/23  0616 04/03/23 0027 04/02/23  0559   WBC 10*3/mm3 5.40 4.90 3.80 4.30   HEMOGLOBIN g/dL 16.2 16.6 17.0 15.7   PLATELETS 10*3/mm3 183 197 199 202     Cr Clearance Estimated Creatinine Clearance: 66.8 mL/min (A) (by C-G formula based on SCr of 1.6 mg/dL (H)).  Coag   Results from last 7 days   Lab Units 04/02/23  0559   INR  1.09   APTT seconds 29.0*     HbA1C No results found for: HGBA1C  Blood Glucose No results found for: POCGLU  Infection     CMP   Results from last 7 days   Lab Units 04/05/23 0336 04/04/23  0616 04/03/23 0027 04/02/23  0559   SODIUM mmol/L 138 137 141 139   POTASSIUM mmol/L 4.6 4.5 4.0 4.7   CHLORIDE mmol/L 101 101 102 105   CO2 mmol/L 30.0* 27.0 27.0 24.0   BUN mg/dL 31* 27* 21 18   CREATININE mg/dL 1.60* 1.69* 1.41* 1.09   GLUCOSE mg/dL 95 88 99 107*   ALBUMIN g/dL 3.7 3.8 4.1 4.4   BILIRUBIN mg/dL 1.0 1.0 1.3* 1.2   ALK PHOS U/L 113 126* 115 106   AST (SGOT)  U/L 16 20 24 27   ALT (SGPT) U/L 15 19 20 19     ABG      UA      EDD  No results found for: POCMETH, POCAMPHET, POCBARBITUR, POCBENZO, POCCOCAINE, POCOPIATES, POCOXYCODO, POCPHENCYC, POCPROPOXY, POCTHC, POCTRICYC  Lysis Labs   Results from last 7 days   Lab Units 04/05/23  0336 04/04/23  0616 04/03/23  0027 04/02/23  0559   INR   --   --   --  1.09   APTT seconds  --   --   --  29.0*   HEMOGLOBIN g/dL 16.2 16.6 17.0 15.7   PLATELETS 10*3/mm3 183 197 199 202   CREATININE mg/dL 1.60* 1.69* 1.41* 1.09     Radiology(recent) No radiology results for the last day      Results from last 7 days   Lab Units 04/02/23  0738   HSTROP T ng/L 16*       X-rays, labs reviewed personally by physician.    ECG/EMG Results (most recent)     Procedure Component Value Units Date/Time    ECG 12 Lead Dyspnea [[id number]] Collected: 04/02/23 0540     Updated: 04/03/23 0842     QT Interval 408 ms     Narrative:      HEART RATE= 86  bpm  RR Interval= 696  ms  MT Interval= 157  ms  P Horizontal Axis= 2  deg  P Front Axis= 72  deg  QRSD Interval= 96  ms  QT Interval= 408  ms  QRS Axis= 11  deg  T Wave Axis= 107  deg  - ABNORMAL ECG -  Sinus rhythm  Probable left atrial enlargement  Probable left ventricular hypertrophy  Abnormal T, consider ischemia, lateral leads  When compared with ECG of 17-Nov-2021 7:38:27,  Significant axis, voltage or hypertrophy change  Electronically Signed By: Domingo Lewis (MANOJ) 03-Apr-2023 08:41:51  Date and Time of Study: 2023-04-02 05:40:20    Adult Transthoracic Echo Complete w/ Color, Spectral and Contrast if Necessary Per Protocol [[id number]] Resulted: 04/03/23 0953     Updated: 04/03/23 0954     Target HR (85%) 136 bpm      Max. Pred. HR (100%) 160 bpm      EF(MOD-bp) 22.0 %      EF_3D-VOL 26.0 %      LVIDd 6.0 cm      LVIDs 5.8 cm      IVSd 1.20 cm      LVPWd 1.10 cm      FS 3.3 %      IVS/LVPW 1.09 cm      LV Sys Vol (BSA corrected) 78.7 cm2      EDV(cubed) 216.0 ml      LV [place] Vol (BSA corrected) 103.6  cm2      LV mass(C)d 296.6 grams      EDV(MOD-sp2) 212.0 ml      EDV(MOD-sp4) 233.0 ml      ESV(MOD-sp2) 168.0 ml      ESV(MOD-sp4) 177.0 ml      SV(MOD-sp2) 44.0 ml      SV(MOD-sp4) 56.0 ml      SI(MOD-sp2) 19.6 ml/m2      SI(MOD-sp4) 24.9 ml/m2      EF(MOD-sp2) 20.8 %      EF(MOD-sp4) 24.0 %      MV E max north 36.7 cm/sec      MV A max north 43.1 cm/sec      MV dec time 0.16 msec      MV E/A 0.85     Pulm A Revs Dur 0.14 sec      MV A dur 0.14 sec      LA ESV Index (BP) 54.2 ml/m2      Med Peak E' North 6.7 cm/sec      Lat Peak E' North 7.2 cm/sec      Avg E/e' ratio 5.28     SV(RVOT) 79.9 ml      RV Base 4.7 cm      RV Mid 3.0 cm      RV Length 9.1 cm      TAPSE (>1.6) 1.36 cm      RV S' 5.2 cm/sec      LA dimension (2D)  3.9 cm      Pulm Sys North 34.1 cm/sec      Pulm [place] North 32.0 cm/sec      Pulm S/D 1.07     Pulm A Revs North 14.8 cm/sec      LV V1 max 67.9 cm/sec      LV V1 max PG 1.84 mmHg      LV V1 mean PG 1.00 mmHg      LV V1 VTI 13.3 cm      Ao pk north 101.0 cm/sec      Ao max PG 4.1 mmHg      Ao mean PG 3.0 mmHg      Ao V2 VTI 20.9 cm      AI P1/2t 1,342 msec      MV max PG 1.42 mmHg      MV mean PG 1.00 mmHg      MV V2 VTI 19.9 cm      MV P1/2t 50.5 msec      MVA(P1/2t) 4.4 cm2      MV dec slope 373.0 cm/sec2      MR max north 407.0 cm/sec      MR max PG 66.3 mmHg      TR max north 214.0 cm/sec      TR max PG 18.3 mmHg      RVOT diam 2.9 cm      RV V1 max PG 1.21 mmHg      RV V1 max 55.1 cm/sec      RV V1 VTI 12.1 cm      PA V2 max 70.2 cm/sec      Ao root diam 3.8 cm      ACS 2.00 cm      LVOT diam 2.0 cm      RVSP(TR) 21 mmHg      RAP systole 3 mmHg     Narrative:      •  Estimated right ventricular systolic pressure from tricuspid   regurgitation is normal (<35 mmHg).      Impression:          SCANNED - TELEMETRY   [[id number]] Resulted: 04/02/23     Updated: 04/03/23 1253    SCANNED - TELEMETRY   [[id number]] Resulted: 04/02/23     Updated: 04/03/23 1424    SCANNED - TELEMETRY   [[id number]] Resulted: 04/02/23      "Updated: 04/03/23 1538    SCANNED - TELEMETRY   [[id number]] Resulted: 04/02/23     Updated: 04/04/23 0508    SCANNED - TELEMETRY   [[id number]] Resulted: 04/02/23     Updated: 04/04/23 0637    SCANNED - TELEMETRY   [[id number]] Resulted: 04/02/23     Updated: 04/04/23 1141    SCANNED - TELEMETRY   [[id number]] Resulted: 04/02/23     Updated: 04/04/23 2233    SCANNED - TELEMETRY   [[id number]] Resulted: 04/02/23     Updated: 04/05/23 0633            Medication Review:   I have reviewed the patient's current medication list  Scheduled Meds:atorvastatin, 10 mg, Oral, Daily  carvedilol, 6.25 mg, Oral, BID With Meals  furosemide, 40 mg, Oral, Daily  losartan, 50 mg, Oral, Q24H  rivaroxaban, 20 mg, Oral, Daily  sodium chloride, 10 mL, Intravenous, Q12H  spironolactone, 25 mg, Oral, Daily      Continuous Infusions:   PRN Meds:.•  acetaminophen **OR** acetaminophen **OR** acetaminophen  •  cyclobenzaprine  •  magnesium sulfate **OR** magnesium sulfate **OR** magnesium sulfate  •  ondansetron **OR** ondansetron  •  [COMPLETED] Insert Peripheral IV **AND** sodium chloride  •  sodium chloride  •  sodium chloride    Imaging:  Imaging Results (Last 72 Hours)     ** No results found for the last 72 hours. **            CORTEZ Rodriguez  04/05/23  10:58 EDT      EMR Dragon/Transcription:   \"Dictated utilizing Dragon dictation\".        Electronically signed by CORTEZ Rodriguez, 04/03/23, 9:39 AM EDT.      Patient seen and examined  Complains of significant leg cramps  Denies any claudication  Patient is a non-smoker    Shortness of breath and leg edema has improved since yesterday  Feels somewhat better      Physical Exam    General:      well developed, well nourished, in no acute distress.    Head:      normocephalic and atraumatic.    Eyes:      PERRL/EOM intact, conjunctivae and sclerae clear without nystagmus.    Neck:      no  thyromegaly, trachea central with normal respiratory effort  Lungs:      clear bilaterally " to auscultation.    Heart:       regular rate and rhythm, S1, S2 without murmurs, rubs, or gallops  Skin:      intact without lesions or rashes.    Psych:      alert and cooperative; normal mood and affect; normal attention span and concentration.        Bilateral significant varicosities noted  Peripheral pulses 2+    Patient has of symptomatic varicose veins  We will have vascular surgery evaluate for severe symptomatic varicose veins    Continue monitoring renal panel and potential discharge once social issues are evaluated.      Electronically signed by CORTEZ Rodriguez, 04/05/23, 10:58 AM EDT.

## 2023-04-05 NOTE — PLAN OF CARE
"Assessment: Thony Dumont presents with functional mobility impairments which indicate the need for skilled intervention. Pt performs bed mobility with use of HOB elevated and bed rails for supine to EOB. Pt performs STS from EOB with CGA/close SBA. Pt amb within room and bolanos without AD and with CGA/close SBA for safety. Pt utilizing handrail/wall at times for steadying support. Pt reports fatigue and SOB following amb. Noted desat 84-85% requiring several minutes to recover. Pt reports, \"I just get winded and tired with any exertion\". Pt educated on energy conservation and breathing strategies.   Tolerating session today without incident. Will continue to follow and progress as tolerated.     Plan/Recommendations:   Moderate Intensity Therapy recommended post-acute care. This is recommended as therapy feels the patient would require 3-4 days per week and wouldn't tolerate \"3 hour daily\" rehab intensity. SNF would be the preferred choice. If the patient does not agree to SNF, arrange HH or OP depending on home bound status. If patient is medically complex, consider LTACH.. Pt requires no DME at discharge.     Pt desires Skilled Rehab placement at discharge. Pt cooperative; agreeable to therapeutic recommendations and plan of care.   "

## 2023-04-05 NOTE — CASE MANAGEMENT/SOCIAL WORK
Continued Stay Note   Nick     Patient Name: Thony Dumont  MRN: [id number]  Today's Date: 4/5/2023    Admit Date: 4/2/2023    Plan: DC plan: [facility] accepted, pending precert/bed availability. Precert started 4/4, pending. PASRR per facility. Medicaid trasnport ([phone]) at [place].   Discharge Plan     Row Name 04/05/23 1655       Plan    Plan DC plan: [facility] accepted, pending precert/bed availability. Precert started 4/4, pending. PASRR per facility. Medicaid trasnport ([phone]) at [facility].    Plan Comments Pending precert.  Avoidable day added              Expected Discharge Date and Time     Expected Discharge Date Expected Discharge Time    Apr 6, 2023           Phone communication or documentation only - no physical contact with patient or family.  Bethany Borges RN     Office Phone [phone]  Office Cell [phone]

## 2023-04-05 NOTE — PLAN OF CARE
Problem: Adult Inpatient Plan of Care  Goal: Absence of Hospital-Acquired Illness or Injury  Intervention: Identify and Manage Fall Risk  Recent Flowsheet Documentation  Taken 4/5/2023 1620 by Bibi Guillen RN  Safety Promotion/Fall Prevention:   assistive device/personal items within reach   clutter free environment maintained   fall prevention program maintained   lighting adjusted   nonskid shoes/slippers when out of bed   room organization consistent   safety round/check completed  Taken 4/5/2023 1407 by Bibi Guillen RN  Safety Promotion/Fall Prevention:   assistive device/personal items within reach   clutter free environment maintained   fall prevention program maintained   lighting adjusted   nonskid shoes/slippers when out of bed   room organization consistent   safety round/check completed  Taken 4/5/2023 1225 by Bibi Guillen RN  Safety Promotion/Fall Prevention:   assistive device/personal items within reach   clutter free environment maintained   fall prevention program maintained   lighting adjusted   nonskid shoes/slippers when out of bed   room organization consistent   safety round/check completed  Taken 4/5/2023 1035 by Bibi Guillen RN  Safety Promotion/Fall Prevention:   assistive device/personal items within reach   clutter free environment maintained   fall prevention program maintained   lighting adjusted   nonskid shoes/slippers when out of bed   room organization consistent   safety round/check completed  Taken 4/5/2023 0830 by Bibi Guillen RN  Safety Promotion/Fall Prevention:   assistive device/personal items within reach   clutter free environment maintained   fall prevention program maintained   lighting adjusted   nonskid shoes/slippers when out of bed   room organization consistent   safety round/check completed  Intervention: Prevent Skin Injury  Recent Flowsheet Documentation  Taken 4/5/2023 1620 by Bibi Guillen, RN  Body Position: position changed independently  Taken 4/5/2023  1407 by Bibi Guillen RN  Body Position: position changed independently  Taken 4/5/2023 1225 by Bibi Guillen RN  Body Position: position changed independently  Taken 4/5/2023 1035 by Bibi Guillen RN  Body Position: position changed independently  Taken 4/5/2023 0830 by Bibi Guillen RN  Body Position: position changed independently  Intervention: Prevent and Manage VTE (Venous Thromboembolism) Risk  Recent Flowsheet Documentation  Taken 4/5/2023 1620 by Bibi Guillen RN  Activity Management: up ad consuelo  Taken 4/5/2023 1407 by Bibi Guillen RN  Activity Management: up ad consuelo  Taken 4/5/2023 1225 by Bibi Guillen RN  Activity Management:   ambulated outside room   ambulated in room  Taken 4/5/2023 1035 by Bibi Guillen RN  Activity Management:   ambulated outside room   ambulated in room  Taken 4/5/2023 0830 by Bibi Guillen RN  Activity Management:   ambulated outside room   ambulated in room  Range of Motion: active ROM (range of motion) encouraged  Intervention: Prevent Infection  Recent Flowsheet Documentation  Taken 4/5/2023 1620 by Bibi Guillen RN  Infection Prevention:   hand hygiene promoted   personal protective equipment utilized  Taken 4/5/2023 1407 by Bibi Guillen RN  Infection Prevention:   hand hygiene promoted   personal protective equipment utilized  Taken 4/5/2023 1225 by Bibi Guillen RN  Infection Prevention:   hand hygiene promoted   personal protective equipment utilized  Taken 4/5/2023 1035 by Bibi Guillen RN  Infection Prevention:   hand hygiene promoted   personal protective equipment utilized  Taken 4/5/2023 0830 by Bibi Guillen RN  Infection Prevention:   hand hygiene promoted   personal protective equipment utilized  Goal: Optimal Comfort and Wellbeing  Intervention: Provide Person-Centered Care  Recent Flowsheet Documentation  Taken 4/5/2023 0830 by Bibi Guillen RN  Trust Relationship/Rapport: care explained     Problem: Fall Injury Risk  Goal: Absence of Fall and Fall-Related  Injury  Intervention: Identify and Manage Contributors  Recent Flowsheet Documentation  Taken 4/5/2023 1620 by Bibi Guillen RN  Medication Review/Management: medications reviewed  Taken 4/5/2023 1407 by Bibi Guillen RN  Medication Review/Management: medications reviewed  Taken 4/5/2023 1225 by Bibi Guillen RN  Medication Review/Management: medications reviewed  Taken 4/5/2023 1035 by Bibi Guillen RN  Medication Review/Management: medications reviewed  Taken 4/5/2023 0830 by Bibi Guillen RN  Medication Review/Management: medications reviewed  Intervention: Promote Injury-Free Environment  Recent Flowsheet Documentation  Taken 4/5/2023 1620 by Bibi Guillen RN  Safety Promotion/Fall Prevention:   assistive device/personal items within reach   clutter free environment maintained   fall prevention program maintained   lighting adjusted   nonskid shoes/slippers when out of bed   room organization consistent   safety round/check completed  Taken 4/5/2023 1407 by Bibi Guillen RN  Safety Promotion/Fall Prevention:   assistive device/personal items within reach   clutter free environment maintained   fall prevention program maintained   lighting adjusted   nonskid shoes/slippers when out of bed   room organization consistent   safety round/check completed  Taken 4/5/2023 1225 by Bibi Guillen RN  Safety Promotion/Fall Prevention:   assistive device/personal items within reach   clutter free environment maintained   fall prevention program maintained   lighting adjusted   nonskid shoes/slippers when out of bed   room organization consistent   safety round/check completed  Taken 4/5/2023 1035 by Bibi Guillen RN  Safety Promotion/Fall Prevention:   assistive device/personal items within reach   clutter free environment maintained   fall prevention program maintained   lighting adjusted   nonskid shoes/slippers when out of bed   room organization consistent   safety round/check completed  Taken 4/5/2023 0830 by Wilmer  CONSUELO Mtz  Safety Promotion/Fall Prevention:   assistive device/personal items within reach   clutter free environment maintained   fall prevention program maintained   lighting adjusted   nonskid shoes/slippers when out of bed   room organization consistent   safety round/check completed     Problem: Heart Failure Comorbidity  Goal: Maintenance of Heart Failure Symptom Control  Intervention: Maintain Heart Failure-Management  Recent Flowsheet Documentation  Taken 4/5/2023 1620 by Bibi Guillen RN  Medication Review/Management: medications reviewed  Taken 4/5/2023 1407 by Bibi Guillen RN  Medication Review/Management: medications reviewed  Taken 4/5/2023 1225 by Bibi Guillen RN  Medication Review/Management: medications reviewed  Taken 4/5/2023 1035 by Bibi Guillen RN  Medication Review/Management: medications reviewed  Taken 4/5/2023 0830 by Bibi Guillen RN  Medication Review/Management: medications reviewed     Problem: Hypertension Comorbidity  Goal: Blood Pressure in Desired Range  Intervention: Maintain Blood Pressure Management  Recent Flowsheet Documentation  Taken 4/5/2023 1620 by Bibi Guillen RN  Medication Review/Management: medications reviewed  Taken 4/5/2023 1407 by Bibi Guillen RN  Medication Review/Management: medications reviewed  Taken 4/5/2023 1225 by Bibi Guillen RN  Medication Review/Management: medications reviewed  Taken 4/5/2023 1035 by Bibi Guillen RN  Medication Review/Management: medications reviewed  Taken 4/5/2023 0830 by Bibi Guillen RN  Medication Review/Management: medications reviewed   Goal Outcome Evaluation:         Patient with no complaints this shift. Will need walking oximetry for DC. Awaiting precert for [place]. Patient resting in bed at this time, No complaints noted. Will continue to monitor.

## 2023-04-05 NOTE — THERAPY TREATMENT NOTE
"Subjective: Pt agreeable to therapeutic plan of care.    Objective:     Bed mobility - Modified-Independent  Transfers - SBA and CGA  Ambulation - 100 feet CGA      Vitals: Desaturates    Pain: 0 VAS   Location: n/a  Intervention for pain: N/A    Education: Energy conservation strategies    Assessment: Thony Dumont presents with functional mobility impairments which indicate the need for skilled intervention. Pt performs bed mobility with use of HOB elevated and bed rails for supine to EOB. Pt performs STS from EOB with CGA/close SBA. Pt amb within room and bolanos without AD and with CGA/close SBA for safety. Pt utilizing handrail/wall at times for steadying support. Pt reports fatigue and SOB following amb. Noted desat 84-85% requiring several minutes to recover. Pt reports, \"I just get winded and tired with any exertion\". Pt educated on energy conservation and breathing strategies.   Tolerating session today without incident. Will continue to follow and progress as tolerated.     Plan/Recommendations:   Moderate Intensity Therapy recommended post-acute care. This is recommended as therapy feels the patient would require 3-4 days per week and wouldn't tolerate \"3 hour daily\" rehab intensity. SNF would be the preferred choice. If the patient does not agree to SNF, arrange HH or OP depending on home bound status. If patient is medically complex, consider LTACH.. Pt requires no DME at discharge.     Pt desires Skilled Rehab placement at discharge. Pt cooperative; agreeable to therapeutic recommendations and plan of care.         Basic Mobility 6-click:  Rollin = Total, A lot = 2, A little = 3; 4 = None  Supine>Sit:   1 = Total, A lot = 2, A little = 3; 4 = None   Sit>Stand with arms:  1 = Total, A lot = 2, A little = 3; 4 = None  Bed>Chair:   1 = Total, A lot = 2, A little = 3; 4 = None  Ambulate in room:  1 = Total, A lot = 2, A little = 3; 4 = None  3-5 Steps with railin = Total, A lot = 2, A " little = 3; 4 = None  Score: 20    Modified [place]: N/A = No pre-op stroke/TIA    Post-Tx Position: Supine with HOB Elevated, Alarms activated and Call light and personal items within reach  PPE: gloves and surgical mask

## 2023-04-06 PROBLEM — F15.11 HISTORY OF METHAMPHETAMINE ABUSE: Status: ACTIVE | Noted: 2023-04-06

## 2023-04-06 PROCEDURE — 94618 PULMONARY STRESS TESTING: CPT

## 2023-04-06 PROCEDURE — G0378 HOSPITAL OBSERVATION PER HR: HCPCS

## 2023-04-06 RX ADMIN — LOSARTAN POTASSIUM 50 MG: 50 TABLET, FILM COATED ORAL at 09:04

## 2023-04-06 RX ADMIN — CARVEDILOL 6.25 MG: 6.25 TABLET, FILM COATED ORAL at 09:05

## 2023-04-06 RX ADMIN — CYCLOBENZAPRINE 5 MG: 10 TABLET, FILM COATED ORAL at 09:15

## 2023-04-06 RX ADMIN — RIVAROXABAN 20 MG: 20 TABLET, FILM COATED ORAL at 09:04

## 2023-04-06 RX ADMIN — Medication 10 ML: at 21:17

## 2023-04-06 RX ADMIN — CARVEDILOL 6.25 MG: 6.25 TABLET, FILM COATED ORAL at 17:25

## 2023-04-06 RX ADMIN — SPIRONOLACTONE 25 MG: 25 TABLET ORAL at 09:04

## 2023-04-06 RX ADMIN — Medication 10 ML: at 09:05

## 2023-04-06 RX ADMIN — FUROSEMIDE 40 MG: 40 TABLET ORAL at 09:04

## 2023-04-06 RX ADMIN — ATORVASTATIN CALCIUM 10 MG: 10 TABLET, FILM COATED ORAL at 09:04

## 2023-04-06 NOTE — PLAN OF CARE
Problem: Adult Inpatient Plan of Care  Goal: Absence of Hospital-Acquired Illness or Injury  Intervention: Identify and Manage Fall Risk  Recent Flowsheet Documentation  Taken 4/6/2023 1610 by Bibi Guillen RN  Safety Promotion/Fall Prevention:   activity supervised   assistive device/personal items within reach  Taken 4/6/2023 1415 by Bibi Guillen RN  Safety Promotion/Fall Prevention:   assistive device/personal items within reach   clutter free environment maintained   fall prevention program maintained   lighting adjusted   nonskid shoes/slippers when out of bed   room organization consistent   safety round/check completed  Taken 4/6/2023 1210 by Bibi Guillen RN  Safety Promotion/Fall Prevention:   assistive device/personal items within reach   clutter free environment maintained   fall prevention program maintained   lighting adjusted   nonskid shoes/slippers when out of bed   room organization consistent   safety round/check completed  Taken 4/6/2023 1008 by Bibi Guillen RN  Safety Promotion/Fall Prevention:   assistive device/personal items within reach   clutter free environment maintained   fall prevention program maintained   lighting adjusted   nonskid shoes/slippers when out of bed   room organization consistent   safety round/check completed  Taken 4/6/2023 0845 by Bibi Guillen RN  Safety Promotion/Fall Prevention:   assistive device/personal items within reach   clutter free environment maintained   fall prevention program maintained   lighting adjusted   nonskid shoes/slippers when out of bed   room organization consistent   safety round/check completed  Intervention: Prevent Skin Injury  Recent Flowsheet Documentation  Taken 4/6/2023 1610 by Bibi Guillen RN  Body Position: position changed independently  Taken 4/6/2023 1415 by Bibi Guillen RN  Body Position: position changed independently  Taken 4/6/2023 1210 by Bibi Guillen RN  Body Position: position changed independently  Taken 4/6/2023  1008 by Bibi Guillen RN  Body Position: position changed independently  Taken 4/6/2023 0845 by Bibi Guillen RN  Body Position: position changed independently  Intervention: Prevent and Manage VTE (Venous Thromboembolism) Risk  Recent Flowsheet Documentation  Taken 4/6/2023 1610 by Bibi Guillen RN  Activity Management: up in chair  Taken 4/6/2023 1415 by Bibi Guillen RN  Activity Management: up in chair  Taken 4/6/2023 1210 by Bibi Guillen RN  Activity Management: up in chair  Taken 4/6/2023 1008 by Bibi Guillen RN  Activity Management: up ad consuelo  Taken 4/6/2023 0845 by Bibi Guillen RN  Activity Management: up ad consuelo  Range of Motion: active ROM (range of motion) encouraged  Intervention: Prevent Infection  Recent Flowsheet Documentation  Taken 4/6/2023 1610 by Bibi Guillen RN  Infection Prevention:   hand hygiene promoted   personal protective equipment utilized  Taken 4/6/2023 1415 by Bibi Guillen RN  Infection Prevention:   hand hygiene promoted   personal protective equipment utilized  Taken 4/6/2023 1210 by Bibi Guillen RN  Infection Prevention:   hand hygiene promoted   personal protective equipment utilized  Taken 4/6/2023 1008 by Bibi Guillen RN  Infection Prevention:   hand hygiene promoted   personal protective equipment utilized  Taken 4/6/2023 0845 by Bibi Guillen RN  Infection Prevention:   hand hygiene promoted   personal protective equipment utilized  Goal: Optimal Comfort and Wellbeing  Intervention: Provide Person-Centered Care  Recent Flowsheet Documentation  Taken 4/6/2023 0845 by Bibi Guillen RN  Trust Relationship/Rapport: care explained     Problem: Fall Injury Risk  Goal: Absence of Fall and Fall-Related Injury  Intervention: Identify and Manage Contributors  Recent Flowsheet Documentation  Taken 4/6/2023 1610 by Bibi Guillen RN  Medication Review/Management: medications reviewed  Taken 4/6/2023 1415 by Bibi Guillen RN  Medication Review/Management: medications reviewed  Taken  4/6/2023 1210 by Bibi Guillen RN  Medication Review/Management: medications reviewed  Taken 4/6/2023 1008 by Bibi Guillen RN  Medication Review/Management: medications reviewed  Taken 4/6/2023 0845 by Bibi Guillen RN  Medication Review/Management: medications reviewed  Intervention: Promote Injury-Free Environment  Recent Flowsheet Documentation  Taken 4/6/2023 1610 by Bibi Guillen RN  Safety Promotion/Fall Prevention:   activity supervised   assistive device/personal items within reach  Taken 4/6/2023 1415 by Bibi Guillen RN  Safety Promotion/Fall Prevention:   assistive device/personal items within reach   clutter free environment maintained   fall prevention program maintained   lighting adjusted   nonskid shoes/slippers when out of bed   room organization consistent   safety round/check completed  Taken 4/6/2023 1210 by Bibi Guillen RN  Safety Promotion/Fall Prevention:   assistive device/personal items within reach   clutter free environment maintained   fall prevention program maintained   lighting adjusted   nonskid shoes/slippers when out of bed   room organization consistent   safety round/check completed  Taken 4/6/2023 1008 by Bibi Guillen RN  Safety Promotion/Fall Prevention:   assistive device/personal items within reach   clutter free environment maintained   fall prevention program maintained   lighting adjusted   nonskid shoes/slippers when out of bed   room organization consistent   safety round/check completed  Taken 4/6/2023 0845 by Bibi Guillen RN  Safety Promotion/Fall Prevention:   assistive device/personal items within reach   clutter free environment maintained   fall prevention program maintained   lighting adjusted   nonskid shoes/slippers when out of bed   room organization consistent   safety round/check completed     Problem: Heart Failure Comorbidity  Goal: Maintenance of Heart Failure Symptom Control  Intervention: Maintain Heart Failure-Management  Recent Flowsheet  Documentation  Taken 4/6/2023 1610 by Bibi Guillen RN  Medication Review/Management: medications reviewed  Taken 4/6/2023 1415 by Bibi Guillen RN  Medication Review/Management: medications reviewed  Taken 4/6/2023 1210 by Bibi Guillen RN  Medication Review/Management: medications reviewed  Taken 4/6/2023 1008 by Bibi Guillen RN  Medication Review/Management: medications reviewed  Taken 4/6/2023 0845 by Bibi Guillen RN  Medication Review/Management: medications reviewed     Problem: Hypertension Comorbidity  Goal: Blood Pressure in Desired Range  Intervention: Maintain Blood Pressure Management  Recent Flowsheet Documentation  Taken 4/6/2023 1610 by Bibi Guillen RN  Medication Review/Management: medications reviewed  Taken 4/6/2023 1415 by Bibi Guillen RN  Medication Review/Management: medications reviewed  Taken 4/6/2023 1210 by Bibi Guillen RN  Medication Review/Management: medications reviewed  Taken 4/6/2023 1008 by Bibi Guillen RN  Medication Review/Management: medications reviewed  Taken 4/6/2023 0845 by Bibi Guillen RN  Medication Review/Management: medications reviewed   Goal Outcome Evaluation:         Patient with no complaints this shift. Will not need O2 to DC to facility. Awaiting precert. Will continue to monitor.

## 2023-04-06 NOTE — PROGRESS NOTES
Exercise Oximetry    Patient Name:Thony Dumont   MRN: [id number]   Date: 04/06/23             ROOM AIR BASELINE   SpO2% 100   Heart Rate 75   Blood Pressure      EXERCISE ON ROOM AIR SpO2% EXERCISE ON O2 @  LPM SpO2%   1 MINUTE  100 1 MINUTE    2 MINUTES  97 2 MINUTES    3 MINUTES  98 3 MINUTES    4 MINUTES  93 4 MINUTES    5 MINUTES  97 5 MINUTES    6 MINUTES  95 6 MINUTES               Distance Walked   Distance Walked   Dyspnea (Miri Scale)   Dyspnea (Miri Scale)   Fatigue (Miri Scale)   Fatigue (Miri Scale)   SpO2% Post Exercise  99 SpO2% Post Exercise   HR Post Exercise  77 77   Time to Recovery  15 minutes Time to Recovery     Comments: PT sat 100% on room air and at rest, pt walked for 6 minutes while on room air, sat maintained above 93%.    PT does not qualified for home oxygen.

## 2023-04-06 NOTE — PROGRESS NOTES
[facility] Services Daily Progress Note    Patient Name: Thony Dumont  : 1960  MRN: [id number]  Primary Care Physician:  Provider, No Known  Date of admission: 2023      Subjective      Chief Complaint: Shortness of breath.      Patient Reports:      2023.  Patient was seen and examined.  Patient reported moderate improvement in his symptoms.  This wonderful patient is pending placement in a rehab facility.    Review of Systems   Constitutional: Positive for malaise/fatigue.   HENT: Negative.    Eyes: Negative.    Cardiovascular: Negative.    Respiratory: Negative.    Endocrine: Negative.    Hematologic/Lymphatic: Negative.    Skin: Negative.    Musculoskeletal: Negative.    Gastrointestinal: Negative.    Genitourinary: Negative.    Neurological: Negative.    Psychiatric/Behavioral: Negative.    Allergic/Immunologic: Negative.             Objective      Vitals:   Temp:  [97.3 °F (36.3 °C)-98.4 °F (36.9 °C)] 98.4 °F (36.9 °C)  Heart Rate:  [72-81] 72  Resp:  [14-21] 14  BP: ()/(60-82) 104/68    Physical Exam  Vitals reviewed.   Constitutional:       General: He is not in acute distress.     Appearance: Normal appearance.      Comments: Patient is somnolent but easily aroused.   HENT:      Head: Normocephalic.      Nose: Nose normal.      Mouth/Throat:      Mouth: Mucous membranes are dry.      Pharynx: Oropharynx is clear.   Eyes:      Extraocular Movements: Extraocular movements intact.      Conjunctiva/sclera: Conjunctivae normal.      Pupils: Pupils are equal, round, and reactive to light.   Cardiovascular:      Pulses: Normal pulses.      Heart sounds: No murmur heard.    No friction rub. No gallop.      Comments: S1 and S2 present.  No tachycardia.  Pulmonary:      Effort: Pulmonary effort is normal.      Breath sounds: No stridor. No wheezing or rales.   Chest:      Chest wall: No tenderness.   Abdominal:      General: Bowel sounds are normal. There is no  distension.      Palpations: Abdomen is soft.      Tenderness: There is no abdominal tenderness. There is no right CVA tenderness or guarding.   Musculoskeletal:         General: No swelling, tenderness, deformity or signs of injury.      Cervical back: Normal range of motion. No rigidity.      Right lower leg: No edema.      Left lower leg: No edema.   Skin:     General: Skin is warm and dry.      Capillary Refill: Capillary refill takes less than 2 seconds.      Coloration: Skin is not jaundiced.      Findings: No bruising, erythema, lesion or rash.   Neurological:      Mental Status: He is alert.      Comments: No facial asymmetry noted.  Gait and station not tested.   Psychiatric:      Comments: No agitation.               Result Review    Result Review:  I have personally reviewed the results from the time of this admission to 4/6/2023 18:09 EDT and agree with these findings:  []  Laboratory  []  Microbiology  []  Radiology  []  EKG/Telemetry   []  Cardiology/Vascular   []  Pathology  []  Old records  []  Other:  Most notable findings include:           Assessment & Plan    From previous notes and with minor updates.    Brief Patient Summary:      Patient is a 62 y.o. male with past medical history  of valvular heart disease, substance abuse, pulmonary hypertension, paroxysmal atrial fibrillation, nonischemic cardiomyopathy, hyperlipidemia, implantable cardioverter defibrillator, GERD, home seeking, hypertension, degenerative joint disease, chronic pain syndrome, combined systolic and diastolic congestive heart failure and CKD 2, who presented to [facility] on 4/2/2023 complaining of shortness of breath.  Patient with a history of congestive heart failure.  Last echo in June 2021 with an ejection fraction of 25%.  Patient currently is homeless.  He does report that he is taking his medication as directed.  He sees Dr. Rollins routinely for cardiac care.  The patient reports for about the last 3 days  he is had increasing shortness of breath. He has had a 10 # wt gain compared to oct 2022 weight. Pt's oxygen saturation dropped to 81% with ambulating.   At the time of my history and physical the patient complains of pain all over.    Patient was seen in the emergency room and in emergency room found a proBNP of 1396.  Initial at bedtime troponin 17 with a repeat of 16.  Metabolic panel with a glucose of 107 otherwise normal.  CBC within normal limit.  INR 1.09 PT 29.0.  Chest x-ray with stable cardiac enlargement with left sided pacemaker present.  EKG sinus rhythm rate of 86 possible abnormal T waves with left atrial enlargement ventricular hypertrophy.  Patient's been given 80 mg of IV Lasix in the emergency room.  He will be admitted for CHF exacerbation cardiology will be consulted.           atorvastatin, 10 mg, Oral, Daily  carvedilol, 6.25 mg, Oral, BID With Meals  furosemide, 40 mg, Oral, Daily  losartan, 50 mg, Oral, Q24H  rivaroxaban, 20 mg, Oral, Daily  sodium chloride, 10 mL, Intravenous, Q12H  spironolactone, 25 mg, Oral, Daily             Active Hospital Problems:  Active Hospital Problems    Diagnosis    • **Acute on chronic combined systolic (congestive) and diastolic (congestive) heart failure    • Shortness of breath    • History of methamphetamine abuse    • ICD (implantable cardioverter-defibrillator) in place    • Gastroesophageal reflux disease    • Degenerative joint disease    • Pulmonary hypertension (HCC)    • NICM (nonischemic cardiomyopathy) (HCC)    • Tobacco use disorder    • Primary hypertension    • Mixed hyperlipidemia    • Paroxysmal atrial fibrillation (HCC)    • Chronic pain syndrome          Plan:        -Continue appropriate patient's home medications for other chronic medical conditions.  -Continue the present level of care.  -Patient and family agreed with the plan of care.  -Follow cardiology recommendations because of acute on chronic combined systolic and diastolic  CHF.  -Treated shortness of breath with oxygen therapy as needed.  -Complete narcotic cessation counseling.  -Completed tobacco cessation counseling.      DVT prophylaxis:  Medical and mechanical DVT prophylaxis orders are present.    CODE STATUS:    Code Status (Patient has no pulse and is not breathing): CPR (Attempt to Resuscitate)  Medical Interventions (Patient has pulse or is breathing): Full Support      Disposition: This wonderful patient can discharge in the next 24 to 48 hours.    This patient has been examined wearing appropriate Personal Protective Equipment and discussed with hospital infection control department, [facility], infectious disease specialist and pulmonologist. 04/06/23      Electronically signed by Domingo Hassan MD, FACP, 04/06/23, 18:09 EDT.      [facility] Hospitalist Team

## 2023-04-06 NOTE — CASE MANAGEMENT/SOCIAL WORK
Social Work Assessment  [facility]     Patient Name: Thony Dumont  MRN: [id number]  Today's Date: 4/6/2023    Admit Date: 4/2/2023     Discharge Plan     Row Name 04/06/23 1544       Plan    Plan CT plan: Unable to accept at [facility] until 4/9 due to Covid in the facility.  [facility] pending precert transfer.  Medicaid transport ([phone]) at [place].              Continued Care and Services - Admitted Since 4/2/2023     Destination     Service Provider Request Status Selected Services Address Phone Fax Patient Preferred    [facility] Accepted N/A [address] [place] [id number] [phone] --    [facility] Pending - Request Sent N/A [address] [place] [address] [phone] --    [facility] [place] Pending - Request Sent N/A [address] [place] [address] [phone] --           Phone communication or documentation only - no physical contact with patient or family.    Caty Tabares LCSW    Office: [phone]  Fax: [phone]  [email or web address]

## 2023-04-06 NOTE — PLAN OF CARE
Goal Outcome Evaluation:  Plan of Care Reviewed With: patient        Progress: no change  Outcome Evaluation: Pt rested well throughout the night. Complained about muscle pain on his left shoulder, educate the position change and relaxation techniques. Pt didn't have any other concern at this time. All vitals within normal limits. Will continue to monitor

## 2023-04-07 VITALS
TEMPERATURE: 97.8 F | HEART RATE: 74 BPM | SYSTOLIC BLOOD PRESSURE: 110 MMHG | BODY MASS INDEX: 24.53 KG/M2 | WEIGHT: 197.31 LBS | RESPIRATION RATE: 16 BRPM | HEIGHT: 75 IN | OXYGEN SATURATION: 98 % | DIASTOLIC BLOOD PRESSURE: 78 MMHG

## 2023-04-07 PROCEDURE — G0378 HOSPITAL OBSERVATION PER HR: HCPCS

## 2023-04-07 RX ORDER — CYCLOBENZAPRINE HCL 10 MG
10 TABLET ORAL 3 TIMES DAILY PRN
Qty: 30 TABLET | Refills: 0 | Status: SHIPPED | OUTPATIENT
Start: 2023-04-07 | End: 2023-04-17

## 2023-04-07 RX ORDER — DAPAGLIFLOZIN 10 MG/1
10 TABLET, FILM COATED ORAL DAILY
Qty: 30 TABLET | Refills: 0 | Status: SHIPPED | OUTPATIENT
Start: 2023-04-07

## 2023-04-07 RX ADMIN — FUROSEMIDE 40 MG: 40 TABLET ORAL at 08:00

## 2023-04-07 RX ADMIN — SPIRONOLACTONE 25 MG: 25 TABLET ORAL at 08:00

## 2023-04-07 RX ADMIN — ATORVASTATIN CALCIUM 10 MG: 10 TABLET, FILM COATED ORAL at 08:00

## 2023-04-07 RX ADMIN — RIVAROXABAN 20 MG: 20 TABLET, FILM COATED ORAL at 08:00

## 2023-04-07 RX ADMIN — LOSARTAN POTASSIUM 50 MG: 50 TABLET, FILM COATED ORAL at 08:00

## 2023-04-07 RX ADMIN — Medication 10 ML: at 08:00

## 2023-04-07 RX ADMIN — CARVEDILOL 6.25 MG: 6.25 TABLET, FILM COATED ORAL at 08:00

## 2023-04-07 NOTE — DISCHARGE SUMMARY
[facility] Medicine Services  DISCHARGE SUMMARY    Patient Name: Thony Dumont  : 1960  MRN: [id number]    Date of Admission: 2023  Discharge Diagnosis: Acute on chronic combined systolic and diastolic heart failure.  Date of Discharge: 2023.  Primary Care Physician: Provider, No Known      Presenting Problem:   Shortness of breath [R06.02]  Hypervolemia, unspecified hypervolemia type [E87.70]    Active and Resolved Hospital Problems:  Active Hospital Problems    Diagnosis POA   • **Acute on chronic combined systolic (congestive) and diastolic (congestive) heart failure [I50.43] Yes     Priority: High   • Shortness of breath [R06.02] Yes     Priority: Low   • History of methamphetamine abuse [F15.11] Yes   • ICD (implantable cardioverter-defibrillator) in place [Z95.810] Yes   • Gastroesophageal reflux disease [K21.9] Yes   • Degenerative joint disease [M19.90] Yes   • Pulmonary hypertension (HCC) [I27.20] Yes   • NICM (nonischemic cardiomyopathy) (HCC) [I42.8] Yes   • Tobacco use disorder [F17.200] Yes   • Primary hypertension [I10] Yes   • Mixed hyperlipidemia [E78.2] Yes   • Paroxysmal atrial fibrillation (HCC) [I48.0] Yes   • Chronic pain syndrome [G89.4] Yes      Resolved Hospital Problems   No resolved problems to display.         Hospital Course   From previous notes and with minor updates.    Hospital Course:    Patient is a 62 y.o. male with past medical history  of valvular heart disease, substance abuse, pulmonary hypertension, paroxysmal atrial fibrillation, nonischemic cardiomyopathy, hyperlipidemia, implantable cardioverter defibrillator, GERD, home seeking, hypertension, degenerative joint disease, chronic pain syndrome, combined systolic and diastolic congestive heart failure and CKD 2, who presented to [facility] on 2023 complaining of shortness of breath.  Patient with a history of congestive heart failure.  Last echo in 2021 with  an ejection fraction of 25%.  Patient currently is homeless.  He does report that he is taking his medication as directed.  He sees Dr. Rollins routinely for cardiac care.  The patient reports for about the last 3 days he is had increasing shortness of breath. He has had a 10 # wt gain compared to oct 2022 weight. Pt's oxygen saturation dropped to 81% with ambulating.   At the time of my history and physical the patient complains of pain all over.    Patient was seen in the emergency room and in emergency room found a proBNP of 1396.  Initial at bedtime troponin 17 with a repeat of 16.  Metabolic panel with a glucose of 107 otherwise normal.  CBC within normal limit.  INR 1.09 PT 29.0.  Chest x-ray with stable cardiac enlargement with left sided pacemaker   present.  EKG sinus rhythm rate of 86 possible abnormal T waves with left atrial enlargement ventricular hypertrophy.  Patient's been given 80 mg of IV Lasix in the emergency room.  He will be admitted for CHF exacerbation cardiology will be consulted.  Cardiology consult was completed.  Acute on chronic heart failure with reduced ejection fraction was treated with the Lasix.  CKD 2 was monitored because of decreasing renal function.  Hyperlipidemia was treated with Lipitor.  Hypertension was treated with losartan.  Atrial fibrillation was treated with Xarelto and rate control strategy.  Appropriate patient's home medications were resumed in the hospital for other chronic medical conditions.  Patient reported significant improvement in his symptoms after several days in the hospital and requested to be discharged to rehab facility.  Patient was advised to take his medications as prescribed.  Discharge medications are as per medication reconciliation list.  Patient was advised to follow-up with his primary care physician within 3 to 5 days of discharge.  Patient was advised to follow-up with his cardiologist within 14 days of discharge.  Patient was advised to  follow-up with his nephrologist within 14 days of discharge.  Patient was advised to return to the emergency department if he experiences any recurrence of his symptoms.  Patient and family agreed with the plan and patient was discharged in a stable condition.              DISCHARGE Follow Up Recommendations for labs and diagnostics:     Patient was advised to follow-up with his primary care physician who will review his current medications.    Patient was advised to follow-up with his cardiologist to reassess his cardiac function.    Patient was advised to follow-up with his a nephrologist who will reassess his renal function.        Reasons For Change In Medications and Indications for New Medications:      Day of Discharge     Vital Signs:  Temp:  [97.8 °F (36.6 °C)-98.4 °F (36.9 °C)] 97.8 °F (36.6 °C)  Heart Rate:  [69-79] 74  Resp:  [14-16] 16  BP: (104-113)/(68-78) 110/78    Physical Exam:  Physical Exam  Vitals reviewed.   Constitutional:       General: He is not in acute distress.     Appearance: Normal appearance.   HENT:      Head: Normocephalic.      Nose: Nose normal.      Mouth/Throat:      Mouth: Mucous membranes are dry.      Pharynx: Oropharynx is clear.   Eyes:      Extraocular Movements: Extraocular movements intact.      Conjunctiva/sclera: Conjunctivae normal.      Pupils: Pupils are equal, round, and reactive to light.   Cardiovascular:      Rate and Rhythm: Normal rate and regular rhythm.      Pulses: Normal pulses.      Heart sounds: No murmur heard.    No friction rub. No gallop.   Pulmonary:      Breath sounds: No stridor. No wheezing or rales.   Chest:      Chest wall: No tenderness.   Abdominal:      General: Bowel sounds are normal. There is no distension.      Palpations: Abdomen is soft.      Tenderness: There is no abdominal tenderness. There is no right CVA tenderness or guarding.   Musculoskeletal:         General: No swelling, tenderness, deformity or signs of injury.      Cervical  back: Normal range of motion. No rigidity.      Right lower leg: No edema.      Left lower leg: No edema.   Skin:     General: Skin is warm and dry.      Capillary Refill: Capillary refill takes less than 2 seconds.      Coloration: Skin is not jaundiced.      Findings: No bruising, erythema, lesion or rash.   Neurological:      Mental Status: He is alert.      Comments: No facial asymmetry noted.  Gait and station not tested.   Psychiatric:      Comments: No agitation.              Pertinent  and/or Most Recent Results     LAB RESULTS:      Lab 04/05/23 0336 04/04/23 0616 04/03/23 0027 04/02/23 0559   WBC 5.40 4.90 3.80 4.30   HEMOGLOBIN 16.2 16.6 17.0 15.7   HEMATOCRIT 50.2 51.3* 50.9 48.5   PLATELETS 183 197 199 202   NEUTROS ABS 2.50 2.50 2.10 2.80   LYMPHS ABS 1.90 1.50 1.00 0.90   MONOS ABS 0.70 0.60 0.50 0.50   EOS ABS 0.20 0.20 0.20 0.10   MCV 94.5 93.7 92.6 93.0   PROTIME  --   --   --  11.2   APTT  --   --   --  29.0*         Lab 04/05/23 0336 04/04/23 0616 04/03/23 0027 04/02/23  0738 04/02/23  0559   SODIUM 138 137 141  --  139   POTASSIUM 4.6 4.5 4.0  --  4.7   CHLORIDE 101 101 102  --  105   CO2 30.0* 27.0 27.0  --  24.0   ANION GAP 7.0 9.0 12.0  --  10.0   BUN 31* 27* 21  --  18   CREATININE 1.60* 1.69* 1.41*  --  1.09   EGFR 49.0* 45.9* 57.1*  --  77.7   GLUCOSE 95 88 99  --  107*   CALCIUM 9.0 9.2 9.5  --  9.4   MAGNESIUM  --   --  2.0  --   --    TSH  --   --   --  1.290  --          Lab 04/05/23 0336 04/04/23 0616 04/03/23 0027 04/02/23  0559   TOTAL PROTEIN 6.2 6.5 7.1 7.1   ALBUMIN 3.7 3.8 4.1 4.4   GLOBULIN 2.5 2.7 3.0 2.7   ALT (SGPT) 15 19 20 19   AST (SGOT) 16 20 24 27   BILIRUBIN 1.0 1.0 1.3* 1.2   ALK PHOS 113 126* 115 106         Lab 04/02/23  0738 04/02/23  0559   PROBNP  --  1,396.0*   HSTROP T 16* 17*   PROTIME  --  11.2   INR  --  1.09         Lab 04/03/23  0027   CHOLESTEROL 196   LDL CHOL 83   HDL CHOL 96*   TRIGLYCERIDES 98             Brief Urine Lab Results     None         Microbiology Results (last 10 days)     ** No results found for the last 240 hours. **          XR Chest 1 View    Result Date: 4/2/2023  Impression: Impression: 1. Stable cardiac enlargement with postoperative changes of left-sided transvenous pacemaker placement. 2. Mild elevation of the left hemidiaphragm. 3. The chest appears otherwise clear. Electronically Signed: Nando Wesley  4/2/2023 7:42 AM EDT  Workstation ID: [id number]      Results for orders placed during the hospital encounter of 08/09/21    Duplex Venous Lower Extremity - Bilateral CAR    Interpretation Summary  · Normal bilateral lower extremity venous duplex scan.      Results for orders placed during the hospital encounter of 08/09/21    Duplex Venous Lower Extremity - Bilateral CAR    Interpretation Summary  · Normal bilateral lower extremity venous duplex scan.      Results for orders placed during the hospital encounter of 06/04/21    Adult Transthoracic Echo Complete W/ Cont if Necessary Per Protocol    Interpretation Summary  · Left atrial volume is severely increased.  · The left ventricular cavity is moderately dilated.  · Left ventricular wall thickness is consistent with mild to moderate concentric hypertrophy.  · Mild dilation of the aortic root is present. Mild dilation of the sinuses of Valsalva is present.  · Moderate tricuspid valve regurgitation is present.  · Estimated right ventricular systolic pressure from tricuspid regurgitation is mildly elevated (35-45 mmHg).  · The right atrial cavity is severely dilated.  · The right ventricular cavity is moderately dilated.  · Moderately reduced right ventricular systolic function noted.  · Left ventricular diastolic function is consistent with (grade III w/high LAP) reversible restrictive pattern.  · Estimated left ventricular EF = 25% Estimated left ventricular EF was in disagreement with the calculated left ventricular EF. Left ventricular ejection fraction appears to be 21 - 25%. Left  ventricular systolic function is moderately decreased.  · Abnormal mitral valve structure consistent with dilated annulus.  · Moderate mitral valve regurgitation is present with a centrally-directed jet noted.  · Mild to moderate pulmonary hypertension is present.      Labs Pending at Discharge:      Procedures Performed           Consults:   Consults     Date and Time Order Name Status Description    4/3/2023  4:22 PM Inpatient Vascular Surgery Consult Completed     4/2/2023  8:35 AM Inpatient Cardiology Consult              Discharge Details        Discharge Medications      New Medications      Instructions Start Date   cyclobenzaprine 10 MG tablet  Commonly known as: FLEXERIL   10 mg, Oral, 3 Times Daily PRN      Farxiga 10 MG tablet  Generic drug: dapagliflozin Propanediol   10 mg, Oral, Daily         Continue These Medications      Instructions Start Date   atorvastatin 10 MG tablet  Commonly known as: LIPITOR   TAKE 1 TABLET BY MOUTH EVERY DAY      carvedilol 6.25 MG tablet  Commonly known as: COREG   6.25 mg, Oral, 2 Times Daily With Meals      furosemide 40 MG tablet  Commonly known as: LASIX   1 tablet daily      losartan 50 MG tablet  Commonly known as: COZAAR   1 tablet daily      rivaroxaban 20 MG tablet  Commonly known as: Xarelto   20 mg, Oral, Daily      spironolactone 25 MG tablet  Commonly known as: ALDACTONE   25 mg, Oral, Daily             Allergies   Allergen Reactions   • Aspirin Swelling   • Penicillins Swelling   • Pork Allergy Unknown - High Severity         Discharge Disposition: Stable.  Skilled Nursing Facility (DC - External)    Diet:  Hospital:  Diet Order   Procedures   • Diet: Cardiac Diets; Healthy Heart (2-3 Na+); Texture: Regular Texture (IDDSI 7); Fluid Consistency: Thin (IDDSI 0)         Discharge Activity: As tolerated.        CODE STATUS:  Code Status and Medical Interventions:   Ordered at: 04/02/23 0842     Code Status (Patient has no pulse and is not breathing):    CPR  (Attempt to Resuscitate)     Medical Interventions (Patient has pulse or is breathing):    Full Support         Future Appointments   Date Time Provider Department Center   4/11/2023 11:00 AM Jasmin Sampson APRN [facility]           Time spent on Discharge including face to face service: 55 minutes    This patient has been examined wearing appropriate Personal Protective Equipment and discussed with hospital infection control department, [facility], infectious disease specialist and pulmonologist. 04/07/23      Signature:Electronically signed by Domingo Hassan MD, FACP, 04/07/23, 9:51 AM EDT.

## 2023-04-07 NOTE — CASE MANAGEMENT/SOCIAL WORK
Social Work Assessment   Nick     Patient Name: Thony Dumont  MRN: [id number]  Today's Date: 4/7/2023    Admit Date: 4/2/2023     Discharge Needs Assessment    No documentation.                Discharge Plan     Row Name 04/07/23 1054       Plan    Plan DC suzette: [facility] has accepted.  Precert is complete 4/7.  Medicaid transport was scheduled.  Approx. wait time 4 hours.  Can call hourly to get an update ([phone]).  Will call 2C nurse's station when they arrive.  Trip  ID [id number]              Continued Care and Services - Admitted Since 4/2/2023     Destination     Service Provider Request Status Selected Services Address Phone Fax Patient Preferred    [facility] Accepted N/A [address] [place] [address] [phone] --    [facility] Pending - Request Sent N/A [address] [place] [address] [phone] --    [facility] [place] Pending - Request Sent N/A [address] [place] [address] [phone] --              Phone communication or documentation only - no physical contact with patient or family.    Caty Tabares LCSW    Office: [phone]  Fax: [phone]  [email or web address]

## 2023-04-07 NOTE — PLAN OF CARE
Goal Outcome Evaluation:  Plan of Care Reviewed With: patient        Progress: no change  Outcome Evaluation: Pt rested well throughout the night. No complains at all. waiting for [facility]. Will continue to monitor

## 2023-04-07 NOTE — CASE MANAGEMENT/SOCIAL WORK
Case Management Discharge Note      Final Note: [facility]         Selected Continued Care - Discharged on 4/7/2023 Admission date: 4/2/2023 - Discharge disposition: Skilled Nursing Facility (DC - External)    Destination Coordination complete.    Service Provider Selected Services Address Phone Fax Patient Preferred    [facility] [address] [place] [address] [phone] --           Transportation Services  Other:  ([facility] transport)    Final Discharge Disposition Code: 03 - skilled nursing facility (SNF)

## 2023-04-19 RX ORDER — FUROSEMIDE 40 MG/1
TABLET ORAL
Qty: 30 TABLET | Refills: 10 | Status: SHIPPED | OUTPATIENT
Start: 2023-04-19

## 2023-04-23 LAB
BH CV ECHO MEAS - ACS: 2 CM
BH CV ECHO MEAS - AI P1/2T: 1342 MSEC
BH CV ECHO MEAS - AO MAX PG: 4.1 MMHG
BH CV ECHO MEAS - AO MEAN PG: 3 MMHG
BH CV ECHO MEAS - AO ROOT DIAM: 3.8 CM
BH CV ECHO MEAS - AO V2 MAX: 101 CM/SEC
BH CV ECHO MEAS - AO V2 VTI: 20.9 CM
BH CV ECHO MEAS - EDV(CUBED): 216 ML
BH CV ECHO MEAS - EDV(MOD-SP2): 212 ML
BH CV ECHO MEAS - EDV(MOD-SP4): 233 ML
BH CV ECHO MEAS - EF(MOD-BP): 22 %
BH CV ECHO MEAS - EF(MOD-SP2): 20.8 %
BH CV ECHO MEAS - EF(MOD-SP4): 24 %
BH CV ECHO MEAS - EF_3D-VOL: 26 %
BH CV ECHO MEAS - ESV(CUBED): 195.1 ML
BH CV ECHO MEAS - ESV(MOD-SP2): 168 ML
BH CV ECHO MEAS - ESV(MOD-SP4): 177 ML
BH CV ECHO MEAS - FS: 3.3 %
BH CV ECHO MEAS - IVS/LVPW: 1.09 CM
BH CV ECHO MEAS - IVSD: 1.2 CM
BH CV ECHO MEAS - LA DIMENSION: 3.9 CM
BH CV ECHO MEAS - LAT PEAK E' VEL: 7.2 CM/SEC
BH CV ECHO MEAS - LV DIASTOLIC VOL/BSA (35-75): 103.6 CM2
BH CV ECHO MEAS - LV MASS(C)D: 296.6 GRAMS
BH CV ECHO MEAS - LV MAX PG: 1.84 MMHG
BH CV ECHO MEAS - LV MEAN PG: 1 MMHG
BH CV ECHO MEAS - LV SYSTOLIC VOL/BSA (12-30): 78.7 CM2
BH CV ECHO MEAS - LV V1 MAX: 67.9 CM/SEC
BH CV ECHO MEAS - LV V1 VTI: 13.3 CM
BH CV ECHO MEAS - LVIDD: 6 CM
BH CV ECHO MEAS - LVIDS: 5.8 CM
BH CV ECHO MEAS - LVOT DIAM: 2 CM
BH CV ECHO MEAS - LVPWD: 1.1 CM
BH CV ECHO MEAS - MED PEAK E' VEL: 6.7 CM/SEC
BH CV ECHO MEAS - MR MAX PG: 66.3 MMHG
BH CV ECHO MEAS - MR MAX VEL: 407 CM/SEC
BH CV ECHO MEAS - MV A DUR: 0.14 SEC
BH CV ECHO MEAS - MV A MAX VEL: 43.1 CM/SEC
BH CV ECHO MEAS - MV DEC SLOPE: 373 CM/SEC2
BH CV ECHO MEAS - MV DEC TIME: 0.16 MSEC
BH CV ECHO MEAS - MV E MAX VEL: 36.7 CM/SEC
BH CV ECHO MEAS - MV E/A: 0.85
BH CV ECHO MEAS - MV MAX PG: 1.42 MMHG
BH CV ECHO MEAS - MV MEAN PG: 1 MMHG
BH CV ECHO MEAS - MV P1/2T: 50.5 MSEC
BH CV ECHO MEAS - MV V2 VTI: 19.9 CM
BH CV ECHO MEAS - MVA(P1/2T): 4.4 CM2
BH CV ECHO MEAS - PA V2 MAX: 70.2 CM/SEC
BH CV ECHO MEAS - PULM A REVS DUR: 0.14 SEC
BH CV ECHO MEAS - PULM A REVS VEL: 14.8 CM/SEC
BH CV ECHO MEAS - PULM DIAS VEL: 32 CM/SEC
BH CV ECHO MEAS - PULM S/D: 1.07
BH CV ECHO MEAS - PULM SYS VEL: 34.1 CM/SEC
BH CV ECHO MEAS - RAP SYSTOLE: 3 MMHG
BH CV ECHO MEAS - RV MAX PG: 1.21 MMHG
BH CV ECHO MEAS - RV V1 MAX: 55.1 CM/SEC
BH CV ECHO MEAS - RV V1 VTI: 12.1 CM
BH CV ECHO MEAS - RVOT DIAM: 2.9 CM
BH CV ECHO MEAS - RVSP: 21 MMHG
BH CV ECHO MEAS - SI(MOD-SP2): 19.6 ML/M2
BH CV ECHO MEAS - SI(MOD-SP4): 24.9 ML/M2
BH CV ECHO MEAS - SV(MOD-SP2): 44 ML
BH CV ECHO MEAS - SV(MOD-SP4): 56 ML
BH CV ECHO MEAS - SV(RVOT): 79.9 ML
BH CV ECHO MEAS - TAPSE (>1.6): 1.36 CM
BH CV ECHO MEAS - TR MAX PG: 18.3 MMHG
BH CV ECHO MEAS - TR MAX VEL: 214 CM/SEC
BH CV ECHO MEASUREMENTS AVERAGE E/E' RATIO: 5.28
BH CV XLRA - RV BASE: 4.7 CM
BH CV XLRA - RV LENGTH: 9.1 CM
BH CV XLRA - RV MID: 3 CM
BH CV XLRA - TDI S': 5.2 CM/SEC
LEFT ATRIUM VOLUME INDEX: 54.2 ML/M2
MAXIMAL PREDICTED HEART RATE: 160 BPM
STRESS TARGET HR: 136 BPM

## 2023-08-17 ENCOUNTER — TELEPHONE (OUTPATIENT)
Dept: CARDIOLOGY | Facility: CLINIC | Age: 63
End: 2023-08-17
Payer: MEDICAID

## 2023-08-17 RX ORDER — CARVEDILOL 6.25 MG/1
6.25 TABLET ORAL 2 TIMES DAILY WITH MEALS
Qty: 90 TABLET | Refills: 0 | Status: SHIPPED | OUTPATIENT
Start: 2023-08-17

## 2023-08-17 RX ORDER — ATORVASTATIN CALCIUM 10 MG/1
10 TABLET, FILM COATED ORAL DAILY
Qty: 30 TABLET | Refills: 0 | Status: SHIPPED | OUTPATIENT
Start: 2023-08-17

## 2023-08-17 RX ORDER — LOSARTAN POTASSIUM 50 MG/1
TABLET ORAL
Qty: 30 TABLET | Refills: 0 | Status: SHIPPED | OUTPATIENT
Start: 2023-08-17

## 2023-08-17 RX ORDER — SPIRONOLACTONE 25 MG/1
25 TABLET ORAL DAILY
Qty: 30 TABLET | Refills: 0 | Status: SHIPPED | OUTPATIENT
Start: 2023-08-17

## 2023-08-17 RX ORDER — FUROSEMIDE 40 MG/1
TABLET ORAL
Qty: 30 TABLET | Refills: 0 | Status: SHIPPED | OUTPATIENT
Start: 2023-08-17

## 2023-08-17 NOTE — TELEPHONE ENCOUNTER
[facility] [place] [facility] [address] [facility] [phone] [facility] [phone] FX    Relationship: Pharmacy      Requested Prescriptions:   Requested Prescriptions     Pending Prescriptions Disp Refills    atorvastatin (LIPITOR) 10 MG tablet 30 tablet 10     Sig: Take 1 tablet by mouth Daily.    carvedilol (COREG) 6.25 MG tablet 180 tablet 1     Sig: Take 1 tablet by mouth 2 (Two) Times a Day With Meals.    furosemide (LASIX) 40 MG tablet 30 tablet 10     Sig: TAKE 1 TABLET BY MOUTH DAILY    losartan (COZAAR) 50 MG tablet 90 tablet 1     Si tablet daily    rivaroxaban (Xarelto) 20 MG tablet 90 tablet 1     Sig: Take 1 tablet by mouth Daily.    spironolactone (ALDACTONE) 25 MG tablet 90 tablet 1     Sig: Take 1 tablet by mouth Daily.        Pharmacy where request should be sent:      Last office visit with prescribing clinician: 10/28/2022   Last telemedicine visit with prescribing clinician: Visit date not found   Next office visit with prescribing clinician: Visit date not found     Additional details provided by patient:    Does the patient have less than a 3 day supply:  [] Yes  [x] No    Would you like a call back once the refill request has been completed: [] Yes [x] No    If the office needs to give you a call back, can they leave a voicemail: [] Yes [x] No    Kasi Chiang Rep   23 15:33 EDT

## 2023-08-25 RX ORDER — ATORVASTATIN CALCIUM 10 MG/1
10 TABLET, FILM COATED ORAL DAILY
Qty: 30 TABLET | Refills: 0 | Status: SHIPPED | OUTPATIENT
Start: 2023-08-25

## 2023-08-25 RX ORDER — FUROSEMIDE 40 MG/1
TABLET ORAL
Qty: 30 TABLET | Refills: 0 | Status: SHIPPED | OUTPATIENT
Start: 2023-08-25

## 2023-08-25 RX ORDER — DAPAGLIFLOZIN 10 MG/1
10 TABLET, FILM COATED ORAL DAILY
Qty: 30 TABLET | Refills: 0 | Status: SHIPPED | OUTPATIENT
Start: 2023-08-25

## 2023-08-25 RX ORDER — SPIRONOLACTONE 25 MG/1
25 TABLET ORAL DAILY
Qty: 30 TABLET | Refills: 0 | Status: SHIPPED | OUTPATIENT
Start: 2023-08-25

## 2023-08-25 RX ORDER — LOSARTAN POTASSIUM 50 MG/1
TABLET ORAL
Qty: 30 TABLET | Refills: 0 | Status: SHIPPED | OUTPATIENT
Start: 2023-08-25

## 2023-08-25 RX ORDER — CARVEDILOL 6.25 MG/1
6.25 TABLET ORAL 2 TIMES DAILY WITH MEALS
Qty: 90 TABLET | Refills: 0 | Status: SHIPPED | OUTPATIENT
Start: 2023-08-25

## 2023-08-25 NOTE — TELEPHONE ENCOUNTER
Caller: Thony Dumont    Relationship: Self    Best call back number: [phone]    Requested Prescriptions:   Requested Prescriptions     Pending Prescriptions Disp Refills    dapagliflozin Propanediol (Farxiga) 10 MG tablet 30 tablet 0     Sig: Take 10 mg by mouth Daily.    rivaroxaban (Xarelto) 20 MG tablet 30 tablet 0     Sig: Take 1 tablet by mouth Daily.    spironolactone (ALDACTONE) 25 MG tablet 30 tablet 0     Sig: Take 1 tablet by mouth Daily.    atorvastatin (LIPITOR) 10 MG tablet 30 tablet 0     Sig: Take 1 tablet by mouth Daily.    losartan (COZAAR) 50 MG tablet 30 tablet 0     Si tablet daily    furosemide (LASIX) 40 MG tablet 30 tablet 0     Sig: TAKE 1 TABLET BY MOUTH DAILY    carvedilol (COREG) 6.25 MG tablet 90 tablet 0     Sig: Take 1 tablet by mouth 2 (Two) Times a Day With Meals.        Pharmacy where request should be sent: [id number] - [facility] [address] - [phone]  - [phone] FX     Last office visit with prescribing clinician: 10/28/2022   Last telemedicine visit with prescribing clinician: Visit date not found   Next office visit with prescribing clinician: 23        Does the patient have less than a 3 day supply:  [x] Yes  [] No    Kasi Rodriguez   23 12:06 EDT

## 2023-09-01 PROCEDURE — 93296 REM INTERROG EVL PM/IDS: CPT | Performed by: INTERNAL MEDICINE

## 2023-09-01 PROCEDURE — 93295 DEV INTERROG REMOTE 1/2/MLT: CPT | Performed by: INTERNAL MEDICINE

## 2023-09-14 RX ORDER — LOSARTAN POTASSIUM 50 MG/1
TABLET ORAL
Qty: 30 TABLET | Refills: 0 | Status: SHIPPED | OUTPATIENT
Start: 2023-09-14

## 2023-09-14 RX ORDER — ATORVASTATIN CALCIUM 10 MG/1
TABLET, FILM COATED ORAL
Qty: 30 TABLET | Refills: 0 | Status: SHIPPED | OUTPATIENT
Start: 2023-09-14

## 2023-10-31 RX ORDER — FUROSEMIDE 40 MG/1
TABLET ORAL
Qty: 30 TABLET | Refills: 0 | OUTPATIENT
Start: 2023-10-31

## 2023-10-31 RX ORDER — LOSARTAN POTASSIUM 50 MG/1
TABLET ORAL
Qty: 30 TABLET | Refills: 0 | OUTPATIENT
Start: 2023-10-31

## 2023-10-31 RX ORDER — SPIRONOLACTONE 25 MG/1
25 TABLET ORAL DAILY
Qty: 30 TABLET | Refills: 0 | OUTPATIENT
Start: 2023-10-31

## 2023-10-31 RX ORDER — DAPAGLIFLOZIN 10 MG/1
1 TABLET, FILM COATED ORAL DAILY
Qty: 30 TABLET | Refills: 0 | OUTPATIENT
Start: 2023-10-31

## 2023-10-31 RX ORDER — ATORVASTATIN CALCIUM 10 MG/1
TABLET, FILM COATED ORAL
Qty: 30 TABLET | Refills: 0 | OUTPATIENT
Start: 2023-10-31

## 2023-11-15 RX ORDER — CARVEDILOL 6.25 MG/1
6.25 TABLET ORAL 2 TIMES DAILY WITH MEALS
Qty: 60 TABLET | Refills: 0 | Status: SHIPPED | OUTPATIENT
Start: 2023-11-15

## 2023-11-20 RX ORDER — ATORVASTATIN CALCIUM 10 MG/1
TABLET, FILM COATED ORAL
Qty: 30 TABLET | Refills: 10 | OUTPATIENT
Start: 2023-11-20

## 2023-11-27 RX ORDER — LOSARTAN POTASSIUM 50 MG/1
TABLET ORAL
Qty: 30 TABLET | Refills: 0 | Status: SHIPPED | OUTPATIENT
Start: 2023-11-27

## 2023-11-27 RX ORDER — ATORVASTATIN CALCIUM 10 MG/1
TABLET, FILM COATED ORAL
Qty: 30 TABLET | Refills: 0 | Status: SHIPPED | OUTPATIENT
Start: 2023-11-27

## 2023-12-18 RX ORDER — ATORVASTATIN CALCIUM 10 MG/1
TABLET, FILM COATED ORAL
Qty: 30 TABLET | Refills: 10 | OUTPATIENT
Start: 2023-12-18

## 2023-12-19 RX ORDER — ATORVASTATIN CALCIUM 10 MG/1
TABLET, FILM COATED ORAL
Qty: 30 TABLET | Refills: 10 | Status: SHIPPED | OUTPATIENT
Start: 2023-12-19

## 2024-03-15 RX ORDER — ATORVASTATIN CALCIUM 10 MG/1
TABLET, FILM COATED ORAL
Qty: 30 TABLET | Refills: 10 | Status: SHIPPED | OUTPATIENT
Start: 2024-03-15

## 2024-03-15 RX ORDER — CARVEDILOL 6.25 MG/1
6.25 TABLET ORAL 2 TIMES DAILY WITH MEALS
Qty: 60 TABLET | Refills: 0 | Status: SHIPPED | OUTPATIENT
Start: 2024-03-15

## 2024-03-15 RX ORDER — LOSARTAN POTASSIUM 50 MG/1
TABLET ORAL
Qty: 30 TABLET | Refills: 0 | Status: SHIPPED | OUTPATIENT
Start: 2024-03-15

## 2024-04-03 RX ORDER — FUROSEMIDE 40 MG/1
TABLET ORAL
Qty: 30 TABLET | Refills: 0 | OUTPATIENT
Start: 2024-04-03

## 2024-04-03 RX ORDER — ATORVASTATIN CALCIUM 10 MG/1
10 TABLET, FILM COATED ORAL DAILY
Qty: 30 TABLET | Refills: 10 | OUTPATIENT
Start: 2024-04-03

## 2024-04-03 RX ORDER — SPIRONOLACTONE 25 MG/1
25 TABLET ORAL DAILY
Qty: 30 TABLET | Refills: 0 | OUTPATIENT
Start: 2024-04-03

## 2024-04-03 RX ORDER — CARVEDILOL 6.25 MG/1
6.25 TABLET ORAL 2 TIMES DAILY WITH MEALS
Qty: 60 TABLET | Refills: 0 | OUTPATIENT
Start: 2024-04-03

## 2024-04-03 RX ORDER — DAPAGLIFLOZIN 10 MG/1
10 TABLET, FILM COATED ORAL DAILY
Qty: 30 TABLET | Refills: 0 | OUTPATIENT
Start: 2024-04-03

## 2024-04-03 RX ORDER — LOSARTAN POTASSIUM 50 MG/1
TABLET ORAL
Qty: 30 TABLET | Refills: 0 | OUTPATIENT
Start: 2024-04-03

## 2024-04-03 NOTE — TELEPHONE ENCOUNTER
Caller: Thony Dumont    Relationship: Self    Best call back number: [phone]    Requested Prescriptions:   Requested Prescriptions     Pending Prescriptions Disp Refills    atorvastatin (LIPITOR) 10 MG tablet 30 tablet 10     Sig: Take 1 tablet by mouth Daily.    carvedilol (COREG) 6.25 MG tablet 60 tablet 0     Sig: Take 1 tablet by mouth 2 (Two) Times a Day With Meals. Must be seen for refills    dapagliflozin Propanediol (Farxiga) 10 MG tablet 30 tablet 0     Sig: Take 10 mg by mouth Daily.    furosemide (LASIX) 40 MG tablet 30 tablet 0     Sig: TAKE 1 TABLET BY MOUTH DAILY    losartan (COZAAR) 50 MG tablet 30 tablet 0     Sig: TAKE 1 TABLET BY MOUTH EVERY DAY    rivaroxaban (Xarelto) 20 MG tablet 30 tablet 0     Sig: Take 1 tablet by mouth Daily.    spironolactone (ALDACTONE) 25 MG tablet 30 tablet 0     Sig: Take 1 tablet by mouth Daily.        Pharmacy where request should be sent: [facility] #[id number] - [facility] [address] - [phone]  - [phone] FX     Last office visit with prescribing clinician: 10/28/2022   Last telemedicine visit with prescribing clinician: Visit date not found   Next office visit with prescribing clinician: Visit date not found     Additional details provided by patient:   Does the patient have less than a 3 day supply:  [x] Yes  [] No    Would you like a call back once the refill request has been completed: [] Yes [] No    If the office needs to give you a call back, can they leave a voicemail: [] Yes [] No    Kasi Dowling   04/03/24 11:52 EDT

## 2024-04-08 RX ORDER — FUROSEMIDE 40 MG/1
TABLET ORAL
Qty: 30 TABLET | Refills: 0 | OUTPATIENT
Start: 2024-04-08

## 2024-04-11 RX ORDER — SPIRONOLACTONE 25 MG/1
25 TABLET ORAL DAILY
Qty: 30 TABLET | Refills: 0 | Status: SHIPPED | OUTPATIENT
Start: 2024-04-11

## 2024-04-11 RX ORDER — DAPAGLIFLOZIN 10 MG/1
10 TABLET, FILM COATED ORAL DAILY
Qty: 30 TABLET | Refills: 0 | Status: SHIPPED | OUTPATIENT
Start: 2024-04-11

## 2024-04-11 RX ORDER — CARVEDILOL 6.25 MG/1
6.25 TABLET ORAL 2 TIMES DAILY WITH MEALS
Qty: 60 TABLET | Refills: 0 | Status: SHIPPED | OUTPATIENT
Start: 2024-04-11

## 2024-04-11 RX ORDER — ATORVASTATIN CALCIUM 10 MG/1
10 TABLET, FILM COATED ORAL DAILY
Qty: 30 TABLET | Refills: 10 | Status: SHIPPED | OUTPATIENT
Start: 2024-04-11

## 2024-04-11 RX ORDER — LOSARTAN POTASSIUM 50 MG/1
TABLET ORAL
Qty: 30 TABLET | Refills: 0 | Status: SHIPPED | OUTPATIENT
Start: 2024-04-11

## 2024-04-11 RX ORDER — FUROSEMIDE 40 MG/1
TABLET ORAL
Qty: 30 TABLET | Refills: 0 | Status: SHIPPED | OUTPATIENT
Start: 2024-04-11

## 2024-04-11 NOTE — TELEPHONE ENCOUNTER
Spoke with pt he is aware he must keep appt with Dr Rollins for any further refills. Pt will see new PCP [place] and she will take over refills until he sees Dr Rollins.

## 2024-05-03 RX ORDER — CARVEDILOL 6.25 MG/1
6.25 TABLET ORAL 2 TIMES DAILY WITH MEALS
Qty: 60 TABLET | Refills: 0 | Status: SHIPPED | OUTPATIENT
Start: 2024-05-03

## 2024-05-03 RX ORDER — LOSARTAN POTASSIUM 50 MG/1
TABLET ORAL
Qty: 30 TABLET | Refills: 0 | Status: SHIPPED | OUTPATIENT
Start: 2024-05-03

## 2024-05-13 RX ORDER — DAPAGLIFLOZIN 10 MG/1
1 TABLET, FILM COATED ORAL DAILY
Qty: 30 TABLET | Refills: 0 | Status: SHIPPED | OUTPATIENT
Start: 2024-05-13 | End: 2024-05-16 | Stop reason: SDUPTHER

## 2024-05-13 RX ORDER — SPIRONOLACTONE 25 MG/1
25 TABLET ORAL DAILY
Qty: 30 TABLET | Refills: 0 | Status: SHIPPED | OUTPATIENT
Start: 2024-05-13 | End: 2024-05-16 | Stop reason: SDUPTHER

## 2024-05-16 RX ORDER — LOSARTAN POTASSIUM 50 MG/1
TABLET ORAL
Qty: 30 TABLET | Refills: 0 | Status: SHIPPED | OUTPATIENT
Start: 2024-05-16

## 2024-05-16 RX ORDER — ATORVASTATIN CALCIUM 10 MG/1
10 TABLET, FILM COATED ORAL DAILY
Qty: 30 TABLET | Refills: 0 | Status: SHIPPED | OUTPATIENT
Start: 2024-05-16

## 2024-05-16 RX ORDER — FUROSEMIDE 40 MG/1
TABLET ORAL
Qty: 30 TABLET | Refills: 0 | Status: SHIPPED | OUTPATIENT
Start: 2024-05-16

## 2024-05-16 RX ORDER — CARVEDILOL 6.25 MG/1
6.25 TABLET ORAL 2 TIMES DAILY WITH MEALS
Qty: 60 TABLET | Refills: 0 | Status: SHIPPED | OUTPATIENT
Start: 2024-05-16

## 2024-05-16 RX ORDER — DAPAGLIFLOZIN 10 MG/1
1 TABLET, FILM COATED ORAL DAILY
Qty: 30 TABLET | Refills: 0 | Status: SHIPPED | OUTPATIENT
Start: 2024-05-16

## 2024-05-16 RX ORDER — SPIRONOLACTONE 25 MG/1
25 TABLET ORAL DAILY
Qty: 30 TABLET | Refills: 0 | Status: SHIPPED | OUTPATIENT
Start: 2024-05-16

## 2024-06-10 ENCOUNTER — APPOINTMENT (OUTPATIENT)
Dept: GENERAL RADIOLOGY | Facility: HOSPITAL | Age: 64
End: 2024-06-10
Payer: MEDICARE

## 2024-06-10 ENCOUNTER — HOSPITAL ENCOUNTER (OUTPATIENT)
Facility: HOSPITAL | Age: 64
Setting detail: OBSERVATION
Discharge: HOME | End: 2024-06-12
Attending: EMERGENCY MEDICINE | Admitting: EMERGENCY MEDICINE
Payer: MEDICARE

## 2024-06-10 DIAGNOSIS — R53.1 WEAKNESS: ICD-10-CM

## 2024-06-10 DIAGNOSIS — R07.9 CHEST PAIN, UNSPECIFIED TYPE: Primary | ICD-10-CM

## 2024-06-10 LAB
ALBUMIN SERPL-MCNC: 4.2 G/DL (ref 3.5–5.2)
ALBUMIN/GLOB SERPL: 1.5 G/DL
ALP SERPL-CCNC: 102 U/L (ref 39–117)
ALT SERPL W P-5'-P-CCNC: 15 U/L (ref 1–41)
ANION GAP SERPL CALCULATED.3IONS-SCNC: 9.3 MMOL/L (ref 5–15)
AST SERPL-CCNC: 21 U/L (ref 1–40)
B PARAPERT DNA SPEC QL NAA+PROBE: NOT DETECTED
B PERT DNA SPEC QL NAA+PROBE: NOT DETECTED
BACTERIA UR QL AUTO: NORMAL /HPF
BASOPHILS # BLD AUTO: 0.03 10*3/MM3 (ref 0–0.2)
BASOPHILS NFR BLD AUTO: 0.7 % (ref 0–1.5)
BILIRUB SERPL-MCNC: 0.9 MG/DL (ref 0–1.2)
BILIRUB UR QL STRIP: NEGATIVE
BUN SERPL-MCNC: 9 MG/DL (ref 8–23)
BUN/CREAT SERPL: 8.6 (ref 7–25)
C PNEUM DNA NPH QL NAA+NON-PROBE: NOT DETECTED
CALCIUM SPEC-SCNC: 9.6 MG/DL (ref 8.6–10.5)
CHLORIDE SERPL-SCNC: 107 MMOL/L (ref 98–107)
CLARITY UR: CLEAR
CO2 SERPL-SCNC: 23.7 MMOL/L (ref 22–29)
COLOR UR: ABNORMAL
CREAT SERPL-MCNC: 1.05 MG/DL (ref 0.76–1.27)
DEPRECATED RDW RBC AUTO: 45.6 FL (ref 37–54)
EGFRCR SERPLBLD CKD-EPI 2021: 80.3 ML/MIN/1.73
EOSINOPHIL # BLD AUTO: 0.09 10*3/MM3 (ref 0–0.4)
EOSINOPHIL NFR BLD AUTO: 2 % (ref 0.3–6.2)
ERYTHROCYTE [DISTWIDTH] IN BLOOD BY AUTOMATED COUNT: 13.3 % (ref 12.3–15.4)
FLUAV SUBTYP SPEC NAA+PROBE: NOT DETECTED
FLUBV RNA ISLT QL NAA+PROBE: NOT DETECTED
GEN 5 2HR TROPONIN T REFLEX: 7 NG/L
GLOBULIN UR ELPH-MCNC: 2.8 GM/DL
GLUCOSE SERPL-MCNC: 92 MG/DL (ref 65–99)
GLUCOSE UR STRIP-MCNC: NEGATIVE MG/DL
HADV DNA SPEC NAA+PROBE: NOT DETECTED
HCOV 229E RNA SPEC QL NAA+PROBE: NOT DETECTED
HCOV HKU1 RNA SPEC QL NAA+PROBE: NOT DETECTED
HCOV NL63 RNA SPEC QL NAA+PROBE: NOT DETECTED
HCOV OC43 RNA SPEC QL NAA+PROBE: NOT DETECTED
HCT VFR BLD AUTO: 50 % (ref 37.5–51)
HGB BLD-MCNC: 16.2 G/DL (ref 13–17.7)
HGB UR QL STRIP.AUTO: NEGATIVE
HMPV RNA NPH QL NAA+NON-PROBE: NOT DETECTED
HPIV1 RNA ISLT QL NAA+PROBE: NOT DETECTED
HPIV2 RNA SPEC QL NAA+PROBE: NOT DETECTED
HPIV3 RNA NPH QL NAA+PROBE: NOT DETECTED
HPIV4 P GENE NPH QL NAA+PROBE: NOT DETECTED
HYALINE CASTS UR QL AUTO: NORMAL /LPF
IMM GRANULOCYTES # BLD AUTO: 0.02 10*3/MM3 (ref 0–0.05)
IMM GRANULOCYTES NFR BLD AUTO: 0.5 % (ref 0–0.5)
KETONES UR QL STRIP: ABNORMAL
LEUKOCYTE ESTERASE UR QL STRIP.AUTO: ABNORMAL
LIPASE SERPL-CCNC: 30 U/L (ref 13–60)
LYMPHOCYTES # BLD AUTO: 1.14 10*3/MM3 (ref 0.7–3.1)
LYMPHOCYTES NFR BLD AUTO: 25.9 % (ref 19.6–45.3)
M PNEUMO IGG SER IA-ACNC: NOT DETECTED
MCH RBC QN AUTO: 29.8 PG (ref 26.6–33)
MCHC RBC AUTO-ENTMCNC: 32.4 G/DL (ref 31.5–35.7)
MCV RBC AUTO: 92.1 FL (ref 79–97)
MONOCYTES # BLD AUTO: 0.36 10*3/MM3 (ref 0.1–0.9)
MONOCYTES NFR BLD AUTO: 8.2 % (ref 5–12)
NEUTROPHILS NFR BLD AUTO: 2.77 10*3/MM3 (ref 1.7–7)
NEUTROPHILS NFR BLD AUTO: 62.7 % (ref 42.7–76)
NITRITE UR QL STRIP: NEGATIVE
NRBC BLD AUTO-RTO: 0 /100 WBC (ref 0–0.2)
NT-PROBNP SERPL-MCNC: 176 PG/ML (ref 0–900)
PH UR STRIP.AUTO: 5.5 [PH] (ref 5–8)
PLATELET # BLD AUTO: 187 10*3/MM3 (ref 140–450)
PMV BLD AUTO: 10.4 FL (ref 6–12)
POTASSIUM SERPL-SCNC: 4.4 MMOL/L (ref 3.5–5.2)
PROT SERPL-MCNC: 7 G/DL (ref 6–8.5)
PROT UR QL STRIP: NEGATIVE
RBC # BLD AUTO: 5.43 10*6/MM3 (ref 4.14–5.8)
RBC # UR STRIP: NORMAL /HPF
REF LAB TEST METHOD: NORMAL
RHINOVIRUS RNA SPEC NAA+PROBE: NOT DETECTED
RSV RNA NPH QL NAA+NON-PROBE: NOT DETECTED
SARS-COV-2 RNA NPH QL NAA+NON-PROBE: NOT DETECTED
SODIUM SERPL-SCNC: 140 MMOL/L (ref 136–145)
SP GR UR STRIP: 1.02 (ref 1–1.03)
SQUAMOUS #/AREA URNS HPF: NORMAL /HPF
TROPONIN T DELTA: -2 NG/L
TROPONIN T SERPL HS-MCNC: 9 NG/L
UROBILINOGEN UR QL STRIP: ABNORMAL
WBC # UR STRIP: NORMAL /HPF
WBC NRBC COR # BLD AUTO: 4.41 10*3/MM3 (ref 3.4–10.8)

## 2024-06-10 PROCEDURE — 99285 EMERGENCY DEPT VISIT HI MDM: CPT

## 2024-06-10 PROCEDURE — G0378 HOSPITAL OBSERVATION PER HR: HCPCS

## 2024-06-10 PROCEDURE — 83690 ASSAY OF LIPASE: CPT | Performed by: PHYSICIAN ASSISTANT

## 2024-06-10 PROCEDURE — 93005 ELECTROCARDIOGRAM TRACING: CPT | Performed by: EMERGENCY MEDICINE

## 2024-06-10 PROCEDURE — 84484 ASSAY OF TROPONIN QUANT: CPT | Performed by: PHYSICIAN ASSISTANT

## 2024-06-10 PROCEDURE — 93005 ELECTROCARDIOGRAM TRACING: CPT

## 2024-06-10 PROCEDURE — 80053 COMPREHEN METABOLIC PANEL: CPT | Performed by: PHYSICIAN ASSISTANT

## 2024-06-10 PROCEDURE — 85025 COMPLETE CBC W/AUTO DIFF WBC: CPT | Performed by: PHYSICIAN ASSISTANT

## 2024-06-10 PROCEDURE — 71045 X-RAY EXAM CHEST 1 VIEW: CPT

## 2024-06-10 PROCEDURE — 81001 URINALYSIS AUTO W/SCOPE: CPT | Performed by: PHYSICIAN ASSISTANT

## 2024-06-10 PROCEDURE — 94761 N-INVAS EAR/PLS OXIMETRY MLT: CPT

## 2024-06-10 PROCEDURE — 0202U NFCT DS 22 TRGT SARS-COV-2: CPT | Performed by: PHYSICIAN ASSISTANT

## 2024-06-10 PROCEDURE — 36415 COLL VENOUS BLD VENIPUNCTURE: CPT

## 2024-06-10 PROCEDURE — 83880 ASSAY OF NATRIURETIC PEPTIDE: CPT | Performed by: PHYSICIAN ASSISTANT

## 2024-06-10 RX ORDER — NITROGLYCERIN 0.4 MG/1
0.4 TABLET SUBLINGUAL
Status: DISCONTINUED | OUTPATIENT
Start: 2024-06-10 | End: 2024-06-12 | Stop reason: HOSPADM

## 2024-06-10 RX ORDER — ALUMINA, MAGNESIA, AND SIMETHICONE 2400; 2400; 240 MG/30ML; MG/30ML; MG/30ML
15 SUSPENSION ORAL EVERY 6 HOURS PRN
Status: DISCONTINUED | OUTPATIENT
Start: 2024-06-10 | End: 2024-06-12 | Stop reason: HOSPADM

## 2024-06-10 RX ORDER — SODIUM CHLORIDE 0.9 % (FLUSH) 0.9 %
10 SYRINGE (ML) INJECTION AS NEEDED
Status: DISCONTINUED | OUTPATIENT
Start: 2024-06-10 | End: 2024-06-12 | Stop reason: HOSPADM

## 2024-06-10 RX ORDER — AMOXICILLIN 250 MG
2 CAPSULE ORAL 2 TIMES DAILY PRN
Status: DISCONTINUED | OUTPATIENT
Start: 2024-06-10 | End: 2024-06-12 | Stop reason: HOSPADM

## 2024-06-10 RX ORDER — ACETAMINOPHEN 160 MG/5ML
650 SOLUTION ORAL EVERY 4 HOURS PRN
Status: DISCONTINUED | OUTPATIENT
Start: 2024-06-10 | End: 2024-06-12 | Stop reason: HOSPADM

## 2024-06-10 RX ORDER — MORPHINE SULFATE 2 MG/ML
2 INJECTION, SOLUTION INTRAMUSCULAR; INTRAVENOUS EVERY 4 HOURS PRN
Status: DISCONTINUED | OUTPATIENT
Start: 2024-06-10 | End: 2024-06-12 | Stop reason: HOSPADM

## 2024-06-10 RX ORDER — BISACODYL 5 MG/1
5 TABLET, DELAYED RELEASE ORAL DAILY PRN
Status: DISCONTINUED | OUTPATIENT
Start: 2024-06-10 | End: 2024-06-12 | Stop reason: HOSPADM

## 2024-06-10 RX ORDER — AZITHROMYCIN 250 MG/1
250 TABLET, FILM COATED ORAL DAILY
COMMUNITY
Start: 2024-06-06 | End: 2024-06-12 | Stop reason: HOSPADM

## 2024-06-10 RX ORDER — POLYETHYLENE GLYCOL 3350 17 G/17G
17 POWDER, FOR SOLUTION ORAL DAILY PRN
Status: DISCONTINUED | OUTPATIENT
Start: 2024-06-10 | End: 2024-06-12 | Stop reason: HOSPADM

## 2024-06-10 RX ORDER — ACETAMINOPHEN 325 MG/1
650 TABLET ORAL EVERY 4 HOURS PRN
Status: DISCONTINUED | OUTPATIENT
Start: 2024-06-10 | End: 2024-06-12 | Stop reason: HOSPADM

## 2024-06-10 RX ORDER — ONDANSETRON 4 MG/1
4 TABLET, ORALLY DISINTEGRATING ORAL EVERY 6 HOURS PRN
Status: DISCONTINUED | OUTPATIENT
Start: 2024-06-10 | End: 2024-06-12 | Stop reason: HOSPADM

## 2024-06-10 RX ORDER — SODIUM CHLORIDE 9 MG/ML
40 INJECTION, SOLUTION INTRAVENOUS AS NEEDED
Status: DISCONTINUED | OUTPATIENT
Start: 2024-06-10 | End: 2024-06-12 | Stop reason: HOSPADM

## 2024-06-10 RX ORDER — ONDANSETRON 2 MG/ML
4 INJECTION INTRAMUSCULAR; INTRAVENOUS EVERY 6 HOURS PRN
Status: DISCONTINUED | OUTPATIENT
Start: 2024-06-10 | End: 2024-06-12 | Stop reason: HOSPADM

## 2024-06-10 RX ORDER — BISACODYL 10 MG
10 SUPPOSITORY, RECTAL RECTAL DAILY PRN
Status: DISCONTINUED | OUTPATIENT
Start: 2024-06-10 | End: 2024-06-12 | Stop reason: HOSPADM

## 2024-06-10 RX ORDER — SODIUM CHLORIDE 0.9 % (FLUSH) 0.9 %
10 SYRINGE (ML) INJECTION EVERY 12 HOURS SCHEDULED
Status: DISCONTINUED | OUTPATIENT
Start: 2024-06-10 | End: 2024-06-12 | Stop reason: HOSPADM

## 2024-06-10 RX ORDER — ACETAMINOPHEN 650 MG/1
650 SUPPOSITORY RECTAL EVERY 4 HOURS PRN
Status: DISCONTINUED | OUTPATIENT
Start: 2024-06-10 | End: 2024-06-12 | Stop reason: HOSPADM

## 2024-06-10 RX ADMIN — Medication 10 ML: at 14:39

## 2024-06-10 NOTE — ED NOTES
Nursing report ED to floor  Thony Dumont  64 y.o.  male    HPI:   Chief Complaint   Patient presents with    Chest Pain     Chest pain and soa, pt was at [facility] and signed out AMA. Pt states they told him he was septic and he needed to get to another hospital.  Pt left there on 6/7 and has been stranded in [place] since then.         Admitting doctor:   Thaddeus Mills MD    Admitting diagnosis:   The encounter diagnosis was Chest pain, unspecified type.    Code status:   Current Code Status       Date Active Code Status Order ID Comments User Context       6/10/2024 1430 CPR (Attempt to Resuscitate) [id number]  Shayla Goldstein APRN ED        Question Answer    Code Status (Patient has no pulse and is not breathing) CPR (Attempt to Resuscitate)    Medical Interventions (Patient has pulse or is breathing) Full Support    Level Of Support Discussed With Patient                    Allergies:   Aspirin, Penicillins, and Pork allergy    Isolation:  No active isolations     Fall Risk:  Fall Risk Assessment was completed, and patient is at low risk for falls.   Predictive Model Details         4 (Low) Factor Value    Calculated 6/10/2024 15:09 Age 64    Risk of Fall Model Active Peripheral IV Present     Imaging order in this encounter Present     Magnesium not on file     Respiratory Rate 11     Tobacco Use Current     Terrell Scale not on file     Total Bilirubin 0.9 mg/dL     Clinically Relevant Sex Not Female     Number of Distinct Medication Classes administered 1     Cardiac Assessment X     Chloride 107 mmol/L     Diastolic BP 98     Albumin 4.2 g/dL     Potassium 4.4 mmol/L     Days after Admission 0.121     Calcium 9.6 mg/dL     Creatinine 1.05 mg/dL     ALT 15 U/L         Weight:       06/10/24  1204   Weight: 90.9 kg (200 lb 6.4 oz)       Intake and Output  No intake or output data in the 24 hours ending 06/10/24 1509    Diet:   Dietary Orders (From admission, onward)       Start     Ordered     Please route to Sasha YANES @ [facility]    THE FOLLOWING INSTRUCTIONS WERE REVIEWED UPON DISCHARGE: Pt has been refusing unna boots, states compression hosiery coming from primary APRN office.  No current skill for continuing Home Health. Goals achieved at this point.    Keep your appointment with JOAQUÍN Nelson @ [facility] on May 24th.    Call your doctor if you develop a new or worsening symptom, especially if you develop increasing shortness of air, chest pain, temperature greater than 100.3.    Continue to take the medications prescribed by your doctor. A medication list is attached. Be sure to keep them informed of all medications you take including over the counter herbal, vitamins/minerals and the ones you take only when needed.    Follow the Regular Diet as ordered by your doctor.     Continue with home program as instructed by therapist (handouts given).    Continue wound care as ordered. Get compression hose per [facility]    Community resource referrals (list provided). In folder    Other notes/instructions: Keep legs elevated, do not dangle. Try to limit salt intake. Stopping smoking resources provided however patient not interested in them at this time. Oxygen safety precautions reinforced, i.e. not smoking around oxygen.    Instructions given to Patient    Instructions provided per Visit "06/11/24 0001  NPO Diet NPO Type: Strict NPO  Diet Effective Midnight        Question:  NPO Type  Answer:  Strict NPO    06/10/24 1431    06/10/24 1432  Diet: Cardiac; Healthy Heart (2-3 Na+); Fluid Consistency: Thin (IDDSI 0)  Diet Effective Now        References:    Diet Order Crosswalk   Question Answer Comment   Diets: Cardiac    Cardiac Diet: Healthy Heart (2-3 Na+)    Fluid Consistency: Thin (IDDSI 0)        06/10/24 1431                     Most recent vitals:   Vitals:    06/10/24 1204 06/10/24 1308 06/10/24 1412 06/10/24 1508   BP: 124/83 130/96 (!) 131/104 151/98   BP Location: Left arm Right arm  Right arm   Patient Position: Sitting Lying  Lying   Pulse: 93 84 65 63   Resp: 16 18 16 11   Temp: 98.1 °F (36.7 °C)      TempSrc: Oral      SpO2: 98% 96% 97% 98%   Weight: 90.9 kg (200 lb 6.4 oz)      Height: 190.5 cm (75\")          Active LDAs/IV Access:   Lines, Drains & Airways       Active LDAs       Name Placement date Placement time Site Days    Peripheral IV 06/10/24 1238 Anterior;Left Forearm 06/10/24  1238  Forearm  less than 1                    Skin Condition:   Skin Assessments (last day)       Date/Time Skin WDL    06/10/24 12:41:45 WDL             Labs (abnormal labs have a star):   Labs Reviewed   URINALYSIS W/ MICROSCOPIC IF INDICATED (NO CULTURE) - Abnormal; Notable for the following components:       Result Value    Color, UA Dark Yellow (*)     Ketones, UA Trace (*)     Leuk Esterase, UA Trace (*)     All other components within normal limits   RESPIRATORY PANEL PCR W/ COVID-19 (SARS-COV-2), NP SWAB IN UTM/VTP, 2 HR TAT - Normal    Narrative:     In the setting of a positive respiratory panel with a viral infection PLUS a negative procalcitonin without other underlying concern for bacterial infection, consider observing off antibiotics or discontinuation of antibiotics and continue supportive care. If the respiratory panel is positive for atypical bacterial infection (Bordetella pertussis, " Chlamydophila pneumoniae, or Mycoplasma pneumoniae), consider antibiotic de-escalation to target atypical bacterial infection.   LIPASE - Normal   BNP (IN-HOUSE) - Normal    Narrative:     This assay is used as an aid in the diagnosis of individuals suspected of having heart failure. It can be used as an aid in the diagnosis of acute decompensated heart failure (ADHF) in patients presenting with signs and symptoms of ADHF to the emergency department (ED). In addition, NT-proBNP of <300 pg/mL indicates ADHF is not likely.    Age Range Result Interpretation  NT-proBNP Concentration (pg/mL:      <50             Positive            >450                   Gray                 300-450                    Negative             <300    50-75           Positive            >900                  Gray                300-900                  Negative            <300      >75             Positive            >1800                  Gray                300-1800                  Negative            <300   TROPONIN - Normal    Narrative:     High Sensitive Troponin T Reference Range:  <14.0 ng/L- Negative Female for AMI  <22.0 ng/L- Negative Male for AMI  >=14 - Abnormal Female indicating possible myocardial injury.  >=22 - Abnormal Male indicating possible myocardial injury.   Clinicians would have to utilize clinical acumen, EKG, Troponin, and serial changes to determine if it is an Acute Myocardial Infarction or myocardial injury due to an underlying chronic condition.        CBC WITH AUTO DIFFERENTIAL - Normal   HIGH SENSITIVITIY TROPONIN T 2HR - Normal    Narrative:     High Sensitive Troponin T Reference Range:  <14.0 ng/L- Negative Female for AMI  <22.0 ng/L- Negative Male for AMI  >=14 - Abnormal Female indicating possible myocardial injury.  >=22 - Abnormal Male indicating possible myocardial injury.   Clinicians would have to utilize clinical acumen, EKG, Troponin, and serial changes to determine if it is an Acute Myocardial  Infarction or myocardial injury due to an underlying chronic condition.        COMPREHENSIVE METABOLIC PANEL    Narrative:     GFR Normal >60  Chronic Kidney Disease <60  Kidney Failure <15     URINALYSIS, MICROSCOPIC ONLY   CBC AND DIFFERENTIAL    Narrative:     The following orders were created for panel order CBC & Differential.  Procedure                               Abnormality         Status                     ---------                               -----------         ------                     CBC Auto Differential[[id number]]        Normal              Final result                 Please view results for these tests on the individual orders.       LOC: Person, Place, Time, and Situation    Telemetry:  Observation Unit    Cardiac Monitoring Ordered: yes    EKG:   ECG 12 Lead Chest Pain   Preliminary Result   HEART RATE= 97  bpm   RR Interval= 616  ms   DE Interval= 142  ms   P Horizontal Axis= -4  deg   P Front Axis= 72  deg   QRSD Interval= 90  ms   QT Interval= 373  ms   QTcB= 475  ms   QRS Axis= 31  deg   T Wave Axis= 66  deg   - BORDERLINE ECG -   Sinus rhythm   Probable left atrial enlargement   Probable left ventricular hypertrophy   Electronically Signed By:    Date and Time of Study: 2024-06-10 12:12:47          Medications Given in the ED:   Medications   sodium chloride 0.9 % flush 10 mL (has no administration in time range)   sodium chloride 0.9 % flush 10 mL (10 mL Intravenous Given 6/10/24 7819)   sodium chloride 0.9 % flush 10 mL (has no administration in time range)   sodium chloride 0.9 % infusion 40 mL (has no administration in time range)   nitroglycerin (NITROSTAT) SL tablet 0.4 mg (has no administration in time range)   acetaminophen (TYLENOL) tablet 650 mg (has no administration in time range)     Or   acetaminophen (TYLENOL) 160 MG/5ML oral solution 650 mg (has no administration in time range)     Or   acetaminophen (TYLENOL) suppository 650 mg (has no administration in time range)  "  sennosides-docusate (PERICOLACE) 8.6-50 MG per tablet 2 tablet (has no administration in time range)     And   polyethylene glycol (MIRALAX) packet 17 g (has no administration in time range)     And   bisacodyl (DULCOLAX) EC tablet 5 mg (has no administration in time range)     And   bisacodyl (DULCOLAX) suppository 10 mg (has no administration in time range)   ondansetron ODT (ZOFRAN-ODT) disintegrating tablet 4 mg (has no administration in time range)     Or   ondansetron (ZOFRAN) injection 4 mg (has no administration in time range)   aluminum-magnesium hydroxide-simethicone (MAALOX MAX) 400-400-40 MG/5ML suspension 15 mL (has no administration in time range)   morphine injection 2 mg (has no administration in time range)       Imaging results:  XR Chest 1 View    Result Date: 6/10/2024  Impression: No active cardiopulmonary disease Electronically Signed: Rogerio Son  6/10/2024 1:37 PM EDT  Workstation ID: [id number]     Social issues:   Social History     Socioeconomic History    Marital status: Single   Tobacco Use    Smoking status: Some Days     Types: Cigarettes    Smokeless tobacco: Never    Tobacco comments:     complete smoking cessation educated    Vaping Use    Vaping status: Every Day    Substances: Nicotine   Substance and Sexual Activity    Alcohol use: Yes     Alcohol/week: 7.0 standard drinks of alcohol     Types: 7 Cans of beer per week     Comment: 1 beer a day    Drug use: Not Currently     Types: Marijuana, \"Crack\" cocaine, Methamphetamines     Comment: 6/15/21 report cocaine in 2018, marijuana 6/14/2021 @0050- snorts meth occasionally.    Sexual activity: Defer       NIH Stroke Scale:  Interval: (not recorded)  1a. Level of Consciousness: (not recorded)  1b. LOC Questions: (not recorded)  1c. LOC Commands: (not recorded)  2. Best Gaze: (not recorded)  3. Visual: (not recorded)  4. Facial Palsy: (not recorded)  5a. Motor Arm, Left: (not recorded)  5b. Motor Arm, Right: (not recorded)  6a. " Motor Leg, Left: (not recorded)  6b. Motor Leg, Right: (not recorded)  7. Limb Ataxia: (not recorded)  8. Sensory: (not recorded)  9. Best Language: (not recorded)  10. Dysarthria: (not recorded)  11. Extinction and Inattention (formerly Neglect): (not recorded)    Total (NIH Stroke Scale): (not recorded)     Additional notable assessment information:     Nursing report ED to floor:  Maegan Buckley RN   06/10/24 15:09 EDT

## 2024-06-10 NOTE — ED PROVIDER NOTES
Subjective   History of Present Illness  Patient is a 64-year-old male past medical history significant for CHF who presents with 2 weeks of chest pain.  He reports that he was seen 5 to 6 days ago at [facility].  They were concerned that he was septic secondary to pneumonia and he was hypoxic there.  He reports he refused to be admitted there and left AMA.  He still having intermittent chest pain.  No longer having shortness of breath.        Review of Systems   Constitutional:  Negative for chills, diaphoresis, fatigue and fever.   HENT:  Negative for congestion, postnasal drip, rhinorrhea, sinus pressure and voice change.    Respiratory:  Positive for chest tightness and shortness of breath. Negative for cough, choking, wheezing and stridor.    Cardiovascular:  Positive for chest pain. Negative for palpitations.   Gastrointestinal:  Negative for abdominal distention, abdominal pain, nausea and vomiting.   Musculoskeletal: Negative.    Neurological: Negative.    Psychiatric/Behavioral: Negative.         Past Medical History:   Diagnosis Date    Acute on chronic congestive heart failure 06/07/2021    Afib     Chronic pain     CKD (chronic kidney disease) stage 2, GFR 60-89 ml/min 8/9/2021    Combined systolic and diastolic congestive heart failure     Degenerative joint disease     Erectile dysfunction     Essential hypertension     Former smoker     Gastroesophageal reflux disease     Homeless 08/25/2021    living in his car    ICD (implantable cardioverter-defibrillator) in place 06/15/2021    Mixed hyperlipidemia     Moderate mitral regurgitation     Moderate tricuspid regurgitation     NICM (nonischemic cardiomyopathy) 06/10/2021    Paroxysmal atrial fibrillation     Pleural effusion 8/9/2021    Pulmonary hypertension     Sprain of rotator cuff capsule 3/14/2014    Substance abuse     Valvular heart disease        Allergies   Allergen Reactions    Aspirin Swelling    Penicillins Swelling    Pork  "Allergy Unknown - High Severity       Past Surgical History:   Procedure Laterality Date    CARDIAC DEFIBRILLATOR PLACEMENT      CARDIAC ELECTROPHYSIOLOGY PROCEDURE Left 6/15/2021    Procedure: Device Implant;  Surgeon: Michael Rollins MD;  Location: [facility] INVASIVE LOCATION;  Service: Cardiovascular;  Laterality: Left;    HERNIA REPAIR         Family History   Problem Relation Age of Onset    Cancer Mother        Social History     Socioeconomic History    Marital status: Single   Tobacco Use    Smoking status: Some Days     Types: Cigarettes    Smokeless tobacco: Never    Tobacco comments:     complete smoking cessation educated    Vaping Use    Vaping status: Every Day    Substances: Nicotine   Substance and Sexual Activity    Alcohol use: Yes     Alcohol/week: 7.0 standard drinks of alcohol     Types: 7 Cans of beer per week     Comment: 1 beer a day    Drug use: Not Currently     Types: Marijuana, \"Crack\" cocaine, Methamphetamines     Comment: 6/15/21 report cocaine in 2018, marijuana 6/14/2021 @0050- snorts meth occasionally.    Sexual activity: Defer           Objective   Physical Exam  Constitutional:       General: He is not in acute distress.     Appearance: Normal appearance. He is well-developed. He is not ill-appearing, toxic-appearing or diaphoretic.   HENT:      Head: Normocephalic and atraumatic.      Nose: Nose normal.   Eyes:      Conjunctiva/sclera: Conjunctivae normal.   Cardiovascular:      Rate and Rhythm: Normal rate and regular rhythm.   Pulmonary:      Effort: Pulmonary effort is normal. No respiratory distress.      Breath sounds: Normal breath sounds. No stridor. No decreased breath sounds, wheezing, rhonchi or rales.   Musculoskeletal:         General: Normal range of motion.      Cervical back: Normal range of motion.   Skin:     General: Skin is warm and dry.   Neurological:      General: No focal deficit present.      Mental Status: He is alert and oriented to person, place, " "and time. Mental status is at baseline.   Psychiatric:         Mood and Affect: Mood normal.         Behavior: Behavior normal.         Thought Content: Thought content normal.         Judgment: Judgment normal.         Procedures           ED Course  ED Course as of 06/10/24 1540   Mon Aman 10, 2024   1429 EKG interpreted by ER physician reviewed by myself.  Sinus rhythm rate of 97 probable left atrial enlargement probable LVH [MG]      ED Course User Index  [MG] Didi Avila ANGELA PURVIS                HEART Score: 2                              Medical Decision Making  Appropriate PPE worn during exam.    /98 (BP Location: Right arm, Patient Position: Lying)   Pulse 63   Temp 98.1 °F (36.7 °C) (Oral)   Resp 11   Ht 190.5 cm (75\")   Wt 90.9 kg (200 lb 6.4 oz)   SpO2 98%   BMI 25.05 kg/m²      Co-morbidities --  has a past medical history of Acute on chronic congestive heart failure (06/07/2021), Afib, Chronic pain, CKD (chronic kidney disease) stage 2, GFR 60-89 ml/min (8/9/2021), Combined systolic and diastolic congestive heart failure, Degenerative joint disease, Erectile dysfunction, Essential hypertension, Former smoker, Gastroesophageal reflux disease, Homeless (08/25/2021), ICD (implantable cardioverter-defibrillator) in place (06/15/2021), Mixed hyperlipidemia, Moderate mitral regurgitation, Moderate tricuspid regurgitation, NICM (nonischemic cardiomyopathy) (06/10/2021), Paroxysmal atrial fibrillation, Pleural effusion (8/9/2021), Pulmonary hypertension, Sprain of rotator cuff capsule (3/14/2014), Substance abuse, and Valvular heart disease.  Radiology interpretation --  X-rays reviewed by me and independently interpreted by radiologist:  XR Chest 1 View    Result Date: 6/10/2024  Impression: No active cardiopulmonary disease Electronically Signed: Rogerio Son  6/10/2024 1:37 PM EDT  Workstation ID: [id number]  No evidence of acute pulmonary disease on x-ray or imaging.  Patient was offered " admittance for chest pain rule out his EKG did not have any acute changes.  His troponin was negative BNP within normal range.  WBC within normal range.  He will be admitted to observation  I discussed the findings and recommendations with the patient who voices understanding. Stable while in the ER.     Note Disclaimer: At [facility], we believe that sharing information builds trust and better relationships. You are receiving this note because you are receiving care at [facility] or recently visited. It is possible you will see health information before a provider has talked with you about it. This kind of information can be easy to misunderstand. To help you fully understand what it means for your health, we urge you to discuss this note with your provider.        Problems Addressed:  Chest pain, unspecified type: complicated acute illness or injury    Amount and/or Complexity of Data Reviewed  Labs: ordered.  Radiology: ordered.    Risk  Prescription drug management.  Decision regarding hospitalization.        Final diagnoses:   Chest pain, unspecified type       ED Disposition  ED Disposition       ED Disposition   Decision to Admit    Condition   --    Comment   --               No follow-up provider specified.       Medication List      No changes were made to your prescriptions during this visit.            Didi Avila PA-C  06/10/24 3012

## 2024-06-11 LAB
ANION GAP SERPL CALCULATED.3IONS-SCNC: 7.4 MMOL/L (ref 5–15)
BASOPHILS # BLD AUTO: 0.06 10*3/MM3 (ref 0–0.2)
BASOPHILS NFR BLD AUTO: 1.1 % (ref 0–1.5)
BUN SERPL-MCNC: 11 MG/DL (ref 8–23)
BUN/CREAT SERPL: 10.2 (ref 7–25)
CALCIUM SPEC-SCNC: 9.2 MG/DL (ref 8.6–10.5)
CHLORIDE SERPL-SCNC: 104 MMOL/L (ref 98–107)
CO2 SERPL-SCNC: 24.6 MMOL/L (ref 22–29)
CREAT SERPL-MCNC: 1.08 MG/DL (ref 0.76–1.27)
DEPRECATED RDW RBC AUTO: 45.9 FL (ref 37–54)
EGFRCR SERPLBLD CKD-EPI 2021: 77.6 ML/MIN/1.73
EOSINOPHIL # BLD AUTO: 0.26 10*3/MM3 (ref 0–0.4)
EOSINOPHIL NFR BLD AUTO: 4.9 % (ref 0.3–6.2)
ERYTHROCYTE [DISTWIDTH] IN BLOOD BY AUTOMATED COUNT: 13.5 % (ref 12.3–15.4)
GLUCOSE BLDC GLUCOMTR-MCNC: 116 MG/DL (ref 70–105)
GLUCOSE SERPL-MCNC: 89 MG/DL (ref 65–99)
HCT VFR BLD AUTO: 48.6 % (ref 37.5–51)
HGB BLD-MCNC: 15.7 G/DL (ref 13–17.7)
IMM GRANULOCYTES # BLD AUTO: 0.02 10*3/MM3 (ref 0–0.05)
IMM GRANULOCYTES NFR BLD AUTO: 0.4 % (ref 0–0.5)
LYMPHOCYTES # BLD AUTO: 1.83 10*3/MM3 (ref 0.7–3.1)
LYMPHOCYTES NFR BLD AUTO: 34.1 % (ref 19.6–45.3)
MCH RBC QN AUTO: 29.8 PG (ref 26.6–33)
MCHC RBC AUTO-ENTMCNC: 32.3 G/DL (ref 31.5–35.7)
MCV RBC AUTO: 92.4 FL (ref 79–97)
MONOCYTES # BLD AUTO: 0.6 10*3/MM3 (ref 0.1–0.9)
MONOCYTES NFR BLD AUTO: 11.2 % (ref 5–12)
NEUTROPHILS NFR BLD AUTO: 2.59 10*3/MM3 (ref 1.7–7)
NEUTROPHILS NFR BLD AUTO: 48.3 % (ref 42.7–76)
NRBC BLD AUTO-RTO: 0 /100 WBC (ref 0–0.2)
PLATELET # BLD AUTO: 162 10*3/MM3 (ref 140–450)
PMV BLD AUTO: 10.5 FL (ref 6–12)
POTASSIUM SERPL-SCNC: 4.1 MMOL/L (ref 3.5–5.2)
QT INTERVAL: 373 MS
QTC INTERVAL: 475 MS
RBC # BLD AUTO: 5.26 10*6/MM3 (ref 4.14–5.8)
SODIUM SERPL-SCNC: 136 MMOL/L (ref 136–145)
TROPONIN T SERPL HS-MCNC: 7 NG/L
WBC NRBC COR # BLD AUTO: 5.36 10*3/MM3 (ref 3.4–10.8)

## 2024-06-11 PROCEDURE — 99214 OFFICE O/P EST MOD 30 MIN: CPT | Performed by: INTERNAL MEDICINE

## 2024-06-11 PROCEDURE — 94618 PULMONARY STRESS TESTING: CPT

## 2024-06-11 PROCEDURE — 80048 BASIC METABOLIC PNL TOTAL CA: CPT | Performed by: NURSE PRACTITIONER

## 2024-06-11 PROCEDURE — 97161 PT EVAL LOW COMPLEX 20 MIN: CPT

## 2024-06-11 PROCEDURE — G0378 HOSPITAL OBSERVATION PER HR: HCPCS

## 2024-06-11 PROCEDURE — 84484 ASSAY OF TROPONIN QUANT: CPT | Performed by: NURSE PRACTITIONER

## 2024-06-11 PROCEDURE — 82948 REAGENT STRIP/BLOOD GLUCOSE: CPT

## 2024-06-11 PROCEDURE — 85025 COMPLETE CBC W/AUTO DIFF WBC: CPT | Performed by: NURSE PRACTITIONER

## 2024-06-11 RX ORDER — FUROSEMIDE 40 MG/1
40 TABLET ORAL DAILY
Status: DISCONTINUED | OUTPATIENT
Start: 2024-06-11 | End: 2024-06-12 | Stop reason: HOSPADM

## 2024-06-11 RX ORDER — SPIRONOLACTONE 25 MG/1
25 TABLET ORAL DAILY
Status: DISCONTINUED | OUTPATIENT
Start: 2024-06-11 | End: 2024-06-12 | Stop reason: HOSPADM

## 2024-06-11 RX ORDER — LOSARTAN POTASSIUM 50 MG/1
50 TABLET ORAL
Status: DISCONTINUED | OUTPATIENT
Start: 2024-06-11 | End: 2024-06-12 | Stop reason: HOSPADM

## 2024-06-11 RX ORDER — ATORVASTATIN CALCIUM 10 MG/1
10 TABLET, FILM COATED ORAL NIGHTLY
Status: DISCONTINUED | OUTPATIENT
Start: 2024-06-11 | End: 2024-06-12 | Stop reason: HOSPADM

## 2024-06-11 RX ORDER — CARVEDILOL 6.25 MG/1
6.25 TABLET ORAL 2 TIMES DAILY WITH MEALS
Status: DISCONTINUED | OUTPATIENT
Start: 2024-06-11 | End: 2024-06-12 | Stop reason: HOSPADM

## 2024-06-11 RX ADMIN — Medication 10 ML: at 09:03

## 2024-06-11 RX ADMIN — SPIRONOLACTONE 25 MG: 25 TABLET ORAL at 15:35

## 2024-06-11 RX ADMIN — EMPAGLIFLOZIN 25 MG: 25 TABLET, FILM COATED ORAL at 15:47

## 2024-06-11 RX ADMIN — RIVAROXABAN 20 MG: 20 TABLET, FILM COATED ORAL at 15:35

## 2024-06-11 RX ADMIN — CARVEDILOL 6.25 MG: 6.25 TABLET, FILM COATED ORAL at 18:07

## 2024-06-11 RX ADMIN — ATORVASTATIN CALCIUM 10 MG: 10 TABLET, FILM COATED ORAL at 20:37

## 2024-06-11 RX ADMIN — LOSARTAN POTASSIUM 50 MG: 50 TABLET, FILM COATED ORAL at 15:35

## 2024-06-11 RX ADMIN — FUROSEMIDE 40 MG: 40 TABLET ORAL at 15:35

## 2024-06-11 RX ADMIN — Medication 10 ML: at 20:38

## 2024-06-11 NOTE — CASE MANAGEMENT/SOCIAL WORK
Discharge Planning Assessment   Nick     Patient Name: Thony Dumont  MRN: [id number]  Today's Date: 6/11/2024    Admit Date: 6/10/2024    Plan: From Homeless. Pending PT and OT to determine needs. To SNF or shelter   Discharge Needs Assessment       Row Name 06/11/24 1344       Living Environment    Provides Primary Care For no one, unable/limited ability to care for self    Family Caregiver if Needed none    Able to Return to Prior Arrangements no      Row Name 06/11/24 1309       Living Environment    People in Home alone;other (see comments)    Unique Family Situation Homeless but recently staying with friends. No car    Current Living Arrangements homeless    Potentially Unsafe Housing Conditions other (see comments)  HOMELESS    Primary Care Provided by self    Provides Primary Care For no one, unable/limited ability to care for self    Family Caregiver if Needed none    Able to Return to Prior Arrangements no       Resource/Environmental Concerns    Resource/Environmental Concerns environmental;home accessibility;reliable transportation;financial    Environment Concerns other (see comments)  HOMELESS    Financial Concerns rent or mortgage, unable to afford    Home Accessibility Concerns none    Transportation Concerns no car       Transportation Needs    In the past 12 months, has lack of transportation kept you from medical appointments or from getting medications? yes    In the past 12 months, has lack of transportation kept you from meetings, work, or from getting things needed for daily living? Yes       Food Insecurity    Within the past 12 months, you worried that your food would run out before you got the money to buy more. Sometimes    Within the past 12 months, the food you bought just didn't last and you didn't have money to get more. Sometimes       Transition Planning    Patient/Family Anticipates Transition to shelter    Patient/Family Anticipated Services at Transition case  manager;community agency    Transportation Anticipated health plan transportation       Discharge Needs Assessment    Readmission Within the Last 30 Days no previous admission in last 30 days    Equipment Currently Used at Home walker, rolling  recently lost walker    Concerns to be Addressed discharge planning;homelessness    Anticipated Changes Related to Illness inability to care for self    Equipment Needed After Discharge walker, rolling                   Discharge Plan       Row Name 06/11/24 7223       Plan    Plan From Homeless. Pending PT and OT to determine needs. To SNF or shelter    Patient/Family in Agreement with Plan yes    Plan Comments Barriers: Pending PT and OT eval. CM met with Mr. Dumont who said he is homeless but was staying with someone in [place]. He does not have a car and is from [place] but sees Cardiology in this area. He said he has never been to [facility] in [place] but is willing to go if appropriate. His last two admissions at [facility] in 2021 and 2023  he discharged to a SNF. He normally uses a rolling walker which he lost and CM referred to [facility] for new rolling walker which they referred to [facility] due to insurance.  He is working with a  housing SW in [place] but does not know her name. His PCP is in [place] but he doesn't know her name. CM will follow up after PT and OT evals.  If SNF recommended will need precert and PASRR                  Continued Care and Services - Admitted Since 6/10/2024       Durable Medical Equipment       Service Provider Request Status Selected Services Address Phone Fax Patient Preferred    [facility] Pending - Request Sent N/A [id number], [place] [address] [phone] --                  Expected Discharge Date and Time       Expected Discharge Date Expected Discharge Time    Jun 12, 2024            Demographic Summary       Row Name 06/11/24 9962       General Information     Admission Type observation    Arrived From emergency department    Required Notices Provided Observation Status Notice    Referral Source admission list    Reason for Consult discharge planning    Preferred Language English       Contact Information    Permission Granted to Share Info With                    Functional Status       Row Name 06/11/24 1308       Functional Status    Usual Activity Tolerance good    Current Activity Tolerance moderate       Functional Status, IADL    Medications independent    Meal Preparation independent    Housekeeping independent    Laundry independent    Shopping independent    IADL Comments independent       Mental Status    General Appearance WDL WDL       Mental Status Summary    Recent Changes in Mental Status/Cognitive Functioning no changes                             Didi BARRERA,RN Case Manager  [facility]  Phone: desk- [phone] cell- [phone]

## 2024-06-11 NOTE — PLAN OF CARE
Goal Outcome Evaluation:        Problem: Adult Inpatient Plan of Care  Goal: Plan of Care Review  Outcome: Ongoing, Progressing  Goal: Patient-Specific Goal (Individualized)  Outcome: Ongoing, Progressing  Goal: Absence of Hospital-Acquired Illness or Injury  Outcome: Ongoing, Progressing  Intervention: Identify and Manage Fall Risk  Recent Flowsheet Documentation  Taken 6/11/2024 0212 by Julia Funes RN  Safety Promotion/Fall Prevention:   nonskid shoes/slippers when out of bed   safety round/check completed   assistive device/personal items within reach   clutter free environment maintained   lighting adjusted   room organization consistent   toileting scheduled  Taken 6/11/2024 0040 by Julia Funes RN  Safety Promotion/Fall Prevention:   nonskid shoes/slippers when out of bed   safety round/check completed   assistive device/personal items within reach   clutter free environment maintained   lighting adjusted   room organization consistent   toileting scheduled  Taken 6/10/2024 2015 by Julia Funes RN  Safety Promotion/Fall Prevention:   nonskid shoes/slippers when out of bed   safety round/check completed   assistive device/personal items within reach   clutter free environment maintained   lighting adjusted   room organization consistent   toileting scheduled  Goal: Optimal Comfort and Wellbeing  Outcome: Ongoing, Progressing  Goal: Readiness for Transition of Care  Outcome: Ongoing, Progressing     Problem: COPD (Chronic Obstructive Pulmonary Disease) Comorbidity  Goal: Maintenance of COPD Symptom Control  Outcome: Ongoing, Progressing     Problem: Heart Failure Comorbidity  Goal: Maintenance of Heart Failure Symptom Control  Outcome: Ongoing, Progressing     Problem: Hypertension Comorbidity  Goal: Blood Pressure in Desired Range  Outcome: Ongoing, Progressing     Problem: Pain Chronic (Persistent) (Comorbidity Management)  Goal: Acceptable Pain Control and Functional Ability  Outcome: Ongoing,  Progressing     Problem: Skin Injury Risk Increased  Goal: Skin Health and Integrity  Outcome: Ongoing, Progressing  Intervention: Optimize Skin Protection  Recent Flowsheet Documentation  Taken 6/11/2024 0212 by Julia Funes RN  Head of Bed (HOB) Positioning: HOB elevated  Taken 6/11/2024 0040 by Julia Funes, CONSUELO  Head of Bed (HOB) Positioning: HOB elevated  Taken 6/10/2024 2015 by Julia Funes, CONSUELO  Head of Bed (HOB) Positioning: HOB elevated

## 2024-06-11 NOTE — DISCHARGE PLACEMENT REQUEST
"Thony Dumont (64 y.o. Male)       Date of Birth   1960    Social Security Number       Address   [address] [place] [address]    Home Phone   [phone]    MRN   [id number]       Mormonism   None    Marital Status   Single                            Admission Date   6/10/24    Admission Type   Emergency    Admitting Provider   Thaddeus Mills MD    Attending Provider   Thaddeus Mills MD    Department, Room/Bed   [facility] OBSERVATION, 239/1       Discharge Date       Discharge Disposition       Discharge Destination                                 Attending Provider: Thaddeus Mills MD    Allergies: Aspirin, Penicillins, Pork Allergy    Isolation: None   Infection: None   Code Status: CPR    Ht: 190.5 cm (75\")   Wt: 88.8 kg (195 lb 12.3 oz)    Admission Cmt: None   Principal Problem: Chest pain [R07.9]                   Active Insurance as of 6/10/2024       Primary Coverage       Payor Plan Insurance Group Employer/Plan Group    HUMANA MEDICARE REPLACEMENT HUMANA MED ADV SNP HMO [id number]       Payor Plan Address Payor Plan Phone Number Payor Plan Fax Number Effective Dates    [address] [address] [phone]  3/1/2024 - None Entered    [facility] [phone]         Subscriber Name Subscriber Birth Date Member ID       THONY DUMONT 1960 [id number]               Secondary Coverage       Payor Plan Insurance Group Employer/Plan Group    [place]        Payor Plan Address Payor Plan Phone Number Payor Plan Fax Number Effective Dates    [address]   10/21/2021 - None Entered    [place] [address]         Subscriber Name Subscriber Birth Date Member ID       THONY DUMONT 1960 [id number]                     Emergency Contacts        (Rel.) Home Phone Work Phone Mobile Phone    LJ TA (Friend) [phone] -- [phone]              Insurance Information                  HUMANA MEDICARE REPLACEMENT/HUMANA MED ADV SNP HMO Phone: " [phone]    Subscriber: Thony Dumont Subscriber#: [id number]    Group#: [id number] Precert#: --        [place] Phone: --    Subscriber: Thony Dumont Subscriber#: [id number]    Group#: -- Precert#: --

## 2024-06-11 NOTE — PROCEDURES
Exercise Oximetry    Patient Name:Thony Dumont   MRN: [id number]   Date: 06/11/24             ROOM AIR BASELINE   SpO2% 100   Heart Rate 79   Blood Pressure      EXERCISE ON ROOM AIR SpO2% EXERCISE ON O2 @  LPM SpO2%   1 MINUTE 100 1 MINUTE    2 MINUTES  2 MINUTES    3 MINUTES  3 MINUTES    4 MINUTES  4 MINUTES    5 MINUTES  5 MINUTES    6 MINUTES  6 MINUTES               Distance Walked   Distance Walked   Dyspnea (Miri Scale)   Dyspnea (Miri Scale)   Fatigue (Miri Scale)   Fatigue (Miri Scale)   SpO2% Post Exercise   SpO2% Post Exercise   HR Post Exercise   HR Post Exercise   Time to Recovery   Time to Recovery     Comments: Patient only able to walk for about a minute. Became very weak and had to lay back down.  Cande Hahn, CRT

## 2024-06-11 NOTE — SIGNIFICANT NOTE
06/11/24 5006   Living Situation   Current Living Arrangements homeless   Potentially Unsafe Housing Conditions none   Food Insecurity   Within the past 12 months, you worried that your food would run out before you got the money to buy more. Sometimes   Within the past 12 months, the food you bought just didn't last and you didn't have money to get more. Sometimes   Transportation Needs   In the past 12 months, has lack of transportation kept you from medical appointments or from getting medications? yes   In the past 12 months, has lack of transportation kept you from meetings, work, or from getting things needed for daily living? Yes   Utilities   In the past 12 months has the electric, gas, oil, or water company threatened to shut off services in your home? No   Abuse Screen (yes response referral indicated)   Feels Unsafe at Home or Work/School no   Feels Threatened by Someone no   Does Anyone Try to Keep You From Having Contact with Others or Doing Things Outside Your Home? no   Physical Signs of Abuse Present no   Employment   Do you want help finding or keeping work or a job? I do not need or want help   Family and Community Support   If for any reason you need help with day-to-day activities such as bathing, preparing meals, shopping, managing finances, etc., do you get the help you need? I could use a little more help   How often do you feel lonely or isolated from those around you? Never   Education   Preferred Language English   Do you want help with school or training? For example, starting or completing job training or getting a high school diploma, GED or equivalent No   Physical Activity   On average, how many days per week do you engage in moderate to strenuous exercise (like a brisk walk)? 7 days   On average, how many minutes do you engage in exercise at this level? 30 min   Number of minutes of exercise per week 210   Alcohol Use   Q1: How often do you have a drink containing alcohol? Monthly  or l   Q2: How many drinks containing alcohol do you have on a typical day when you are drinking? None   Q3: How often do you have six or more drinks on one occasion? Never   Stress   Do you feel stress - tense, restless, nervous, or anxious, or unable to sleep at night because your mind is troubled all the time - these days? To some exte   Mental Health   Little Interest or Pleasure in Doing Things 2-->more than half the days   Feeling Down, Depressed or Hopeless 2-->more than half the days   Disabilities   Concentrating, Remembering or Making Decisions Difficulty no   Doing Errands Independently Difficulty (such as shopping) no

## 2024-06-11 NOTE — H&P
[facility] Observation Unit H&P    Patient Name: Thony Dumont  : 1960  MRN: [id number]  Primary Care Physician: Provider, No Known  Date of admission: 6/10/2024     Patient Care Team:  Provider, No Known as PCP - General          Subjective   History Present Illness     Chief Complaint:   Chief Complaint   Patient presents with   • Chest Pain     Chest pain and soa, pt was at [facility] and signed out AMA. Pt states they told him he was septic and he needed to get to another hospital.  Pt left there on  and has been stranded in [place] since then.       Chest Pain     Chest Pain       ED 06/10/2024  Patient is a 64-year-old male past medical history significant for CHF who presents with 2 weeks of chest pain. He reports that he was seen 5 to 6 days ago at [facility]. They were concerned that he was septic secondary to pneumonia and he was hypoxic there. He reports he refused to be admitted there and left AMA. He still having intermittent chest pain. No longer having shortness of breath.     Observation 2024  Patient agrees with HPI noted above include intermittent chest pain worse with exertion for the past week.  He describes as pressure.  He is currently asymptomatic.  Per nursing staff would benefit from PT/OT eval as well as walk oximetry.      Review of Systems   Cardiovascular:  Positive for chest pain.      Review of Systems   Constitutional:  Negative for chills, diaphoresis, fatigue and fever.   HENT:  Negative for congestion, postnasal drip, rhinorrhea, sinus pressure and voice change.    Respiratory:  Positive for chest tightness and shortness of breath. Negative for cough, choking, wheezing and stridor.    Cardiovascular:  Positive for chest pain. Negative for palpitations.   Gastrointestinal:  Negative for abdominal distention, abdominal pain, nausea and vomiting.   Musculoskeletal: Negative.    Neurological: Negative.    Psychiatric/Behavioral: Negative.       "      Personal History     Past Medical History:   Past Medical History:   Diagnosis Date   • Acute on chronic congestive heart failure 06/07/2021   • Afib    • Chronic pain    • CKD (chronic kidney disease) stage 2, GFR 60-89 ml/min 8/9/2021   • Combined systolic and diastolic congestive heart failure    • Degenerative joint disease    • Erectile dysfunction    • Essential hypertension    • Former smoker    • Gastroesophageal reflux disease    • Homeless 08/25/2021    living in his car   • ICD (implantable cardioverter-defibrillator) in place 06/15/2021   • Mixed hyperlipidemia    • Moderate mitral regurgitation    • Moderate tricuspid regurgitation    • NICM (nonischemic cardiomyopathy) 06/10/2021   • Paroxysmal atrial fibrillation    • Pleural effusion 8/9/2021   • Pulmonary hypertension    • Sprain of rotator cuff capsule 3/14/2014   • Substance abuse    • Valvular heart disease        Surgical History:      Past Surgical History:   Procedure Laterality Date   • CARDIAC DEFIBRILLATOR PLACEMENT     • CARDIAC ELECTROPHYSIOLOGY PROCEDURE Left 6/15/2021    Procedure: Device Implant;  Surgeon: Michael Rollins MD;  Location: [facility] CATH INVASIVE LOCATION;  Service: Cardiovascular;  Laterality: Left;   • HERNIA REPAIR             Family History: family history includes Cancer in his mother. Otherwise pertinent FHx was reviewed and unremarkable.     Social History:  reports that he has been smoking cigarettes. He has never used smokeless tobacco. He reports that he does not currently use alcohol after a past usage of about 7.0 standard drinks of alcohol per week. He reports that he does not currently use drugs after having used the following drugs: Marijuana, \"Crack\" cocaine, and Methamphetamines.      Medications:  Prior to Admission medications    Medication Sig Start Date End Date Taking? Authorizing Provider   atorvastatin (LIPITOR) 10 MG tablet Take 1 tablet by mouth Daily. 5/16/24  Yes Michael Rollins MD "   carvedilol (COREG) 6.25 MG tablet Take 1 tablet by mouth 2 (Two) Times a Day With Meals. Must be seen for refills 5/16/24  Yes Michael Rollins MD   dapagliflozin Propanediol (Farxiga) 10 MG tablet Take 10 mg by mouth Daily. 5/16/24  Yes Michael Rollins MD   furosemide (LASIX) 40 MG tablet TAKE 1 TABLET BY MOUTH DAILY 5/16/24  Yes Michael Rollins MD   losartan (COZAAR) 50 MG tablet TAKE 1 TABLET BY MOUTH EVERY DAY 5/16/24  Yes Michael Rollins MD   rivaroxaban (Xarelto) 20 MG tablet Take 1 tablet by mouth Daily. 5/16/24  Yes Michael Rollins MD   spironolactone (ALDACTONE) 25 MG tablet Take 1 tablet by mouth Daily. 5/16/24  Yes Michael Rollins MD   azithromycin (ZITHROMAX) 250 MG tablet Take 1 tablet by mouth Daily. 6/6/24 6/10/24  Provider, MD Shane       Allergies:    Allergies   Allergen Reactions   • Aspirin Swelling   • Penicillins Swelling   • Pork Allergy Unknown - High Severity       Objective   Objective     Vital Signs  Temp:  [98.1 °F (36.7 °C)-99.7 °F (37.6 °C)] 98.2 °F (36.8 °C)  Heart Rate:  [59-86] 59  Resp:  [11-18] 14  BP: (131-151)/() 135/95  SpO2:  [95 %-100 %] 99 %  on   ;   Device (Oxygen Therapy): room air  Body mass index is 24.47 kg/m².    Physical Exam  Vitals and nursing note reviewed.   Constitutional:       Appearance: Normal appearance.   HENT:      Head: Normocephalic and atraumatic.      Right Ear: External ear normal.      Left Ear: External ear normal.      Nose: Nose normal.      Mouth/Throat:      Mouth: Mucous membranes are moist.   Eyes:      Extraocular Movements: Extraocular movements intact.   Cardiovascular:      Rate and Rhythm: Normal rate and regular rhythm.      Pulses: Normal pulses.      Heart sounds: Normal heart sounds.   Pulmonary:      Effort: Pulmonary effort is normal.      Breath sounds: Normal breath sounds.   Abdominal:      General: Abdomen is flat. Bowel sounds are normal.      Palpations: Abdomen is soft.   Musculoskeletal:          General: Normal range of motion.      Cervical back: Normal range of motion.   Skin:     General: Skin is warm.   Neurological:      Mental Status: He is alert and oriented to person, place, and time.   Psychiatric:         Behavior: Behavior normal.         Thought Content: Thought content normal.         Judgment: Judgment normal.         Results Review:  I have personally reviewed most recent cardiac tracings, lab results, and radiology images and interpretations and agree with findings, most notably: Troponin, CMP, CBC, UA, respiratory panel, chest x-ray.    Results from last 7 days   Lab Units 06/11/24  0523   WBC 10*3/mm3 5.36   HEMOGLOBIN g/dL 15.7   HEMATOCRIT % 48.6   PLATELETS 10*3/mm3 162     Results from last 7 days   Lab Units 06/11/24  0308 06/10/24  1430 06/10/24  1234   SODIUM mmol/L 136  --  140   POTASSIUM mmol/L 4.1  --  4.4   CHLORIDE mmol/L 104  --  107   CO2 mmol/L 24.6  --  23.7   BUN mg/dL 11  --  9   CREATININE mg/dL 1.08  --  1.05   GLUCOSE mg/dL 89  --  92   CALCIUM mg/dL 9.2  --  9.6   ALK PHOS U/L  --   --  102   ALT (SGPT) U/L  --   --  15   AST (SGOT) U/L  --   --  21   HSTROP T ng/L 7 7 9   PROBNP pg/mL  --   --  176.0     Estimated Creatinine Clearance: 89.1 mL/min (by C-G formula based on SCr of 1.08 mg/dL).  Brief Urine Lab Results  (Last result in the past 365 days)        Color   Clarity   Blood   Leuk Est   Nitrite   Protein   CREAT   Urine HCG        06/10/24 1237 Dark Yellow   Clear   Negative   Trace   Negative   Negative                   Microbiology Results (last 10 days)       Procedure Component Value - Date/Time    Respiratory Panel PCR w/COVID-19(SARS-CoV-2) BASIL/PARTHA/MANOJ/PAD/COR/IRINA In-House, NP Swab in UTM/[facility], 2 HR TAT - Swab, Nasopharynx [[id number]]  (Normal) Collected: 06/10/24 1246    Lab Status: Final result Specimen: Swab from Nasopharynx Updated: 06/10/24 1342     ADENOVIRUS, PCR Not Detected     Coronavirus 229E Not Detected     Coronavirus HKU1 Not  Detected     Coronavirus NL63 Not Detected     Coronavirus OC43 Not Detected     COVID19 Not Detected     Human Metapneumovirus Not Detected     Human Rhinovirus/Enterovirus Not Detected     Influenza A PCR Not Detected     Influenza B PCR Not Detected     Parainfluenza Virus 1 Not Detected     Parainfluenza Virus 2 Not Detected     Parainfluenza Virus 3 Not Detected     Parainfluenza Virus 4 Not Detected     RSV, PCR Not Detected     Bordetella pertussis pcr Not Detected     Bordetella parapertussis PCR Not Detected     Chlamydophila pneumoniae PCR Not Detected     Mycoplasma pneumo by PCR Not Detected    Narrative:      In the setting of a positive respiratory panel with a viral infection PLUS a negative procalcitonin without other underlying concern for bacterial infection, consider observing off antibiotics or discontinuation of antibiotics and continue supportive care. If the respiratory panel is positive for atypical bacterial infection (Bordetella pertussis, Chlamydophila pneumoniae, or Mycoplasma pneumoniae), consider antibiotic de-escalation to target atypical bacterial infection.            ECG/EMG Results (most recent)       Procedure Component Value Units Date/Time    Telemetry Scan [[id number]] Resulted: 06/10/24     Updated: 06/10/24 1926    Telemetry Scan [[id number]] Resulted: 06/10/24     Updated: 06/10/24 1957    Telemetry Scan [[id number]] Resulted: 06/10/24     Updated: 06/10/24 2322    Telemetry Scan [[id number]] Resulted: 06/10/24     Updated: 06/11/24 0255    Telemetry Scan [[id number]] Resulted: 06/10/24     Updated: 06/11/24 0616    ECG 12 Lead Chest Pain [[id number]] Collected: 06/10/24 1212     Updated: 06/11/24 0712     QT Interval 373 ms      QTC Interval 475 ms     Narrative:      HEART RATE= 97  bpm  RR Interval= 616  ms  RI Interval= 142  ms  P Horizontal Axis= -4  deg  P Front Axis= 72  deg  QRSD Interval= 90  ms  QT Interval= 373  ms  QTcB= 475  ms  QRS Axis= 31  deg  T Wave Axis= 66   deg  - BORDERLINE ECG -  Sinus rhythm  Probable left atrial enlargement  Probable left ventricular hypertrophy  When compared with ECG of 02-Apr-2023 5:40:20,  No significant change  Electronically Signed By: David Anderson ([facility]) 11-Jun-2024 07:12:03  Date and Time of Study: 2024-06-10 12:12:47    Telemetry Scan [[id number]] Resulted: 06/10/24     Updated: 06/11/24 0941    Telemetry Scan [[id number]] Resulted: 06/10/24     Updated: 06/11/24 1157            Results for orders placed during the hospital encounter of 08/09/21    Duplex Venous Lower Extremity - Bilateral CAR    Interpretation Summary  · Normal bilateral lower extremity venous duplex scan.      Results for orders placed during the hospital encounter of 04/02/23    Adult Transthoracic Echo Complete w/ Color, Spectral and Contrast if Necessary Per Protocol    Interpretation Summary  •  Left ventricular systolic function is severely decreased. Left ventricular ejection fraction appears to be 21 - 25%.  •  The left ventricular cavity is moderately dilated.  •  Left ventricular diastolic function was normal.  •  Mildly reduced right ventricular systolic function noted.  •  Estimated right ventricular systolic pressure from tricuspid regurgitation is normal (<35 mmHg).      XR Chest 1 View    Result Date: 6/10/2024  Impression: No active cardiopulmonary disease Electronically Signed: Rogerio Son  6/10/2024 1:37 PM EDT  Workstation ID: [id number]    CT Angiogram Chest Pulmonary Embolism    Result Date: 6/6/2024  No evidence of pulmonary embolism Left lower lobe atelectasis and/or infiltrate Per PQRS, all CT exams are performed using one or more of the following dose reduction techniques: Automated exposure control, Adjustment of the mA and/or kV according to patient size or use of Iterative reconstruction technique.    XR Chest AP    Result Date: 6/6/2024  Stable chest.  No acute disease. Electronically signed by: Ash Anderson MD on 06/06/2024 06:29:46 AM /Eastern        Estimated Creatinine Clearance: 89.1 mL/min (by C-G formula based on SCr of 1.08 mg/dL).    Assessment & Plan   Assessment/Plan       Active Hospital Problems    Diagnosis  POA   • **Chest pain [R07.9]  Yes      Resolved Hospital Problems   No resolved problems to display.       Chest pain  Lab Results   Component Value Date    TROPONINT 7 06/11/2024    TROPONINT 7 06/10/2024    TROPONINT 9 06/10/2024   -Troponin proBNP, CMP, CBC unremarkable  -UA showed trace of ketones and leukocytes but no hematuria or UTI  -Respiratory panel negative  -Chest x-ray showed no acute cardiopulmonary abnormality  -EKG showed sinus rhythm with heart rate of 97, MD interval 142 and   -Cardiology was consulted in the ED and will be obtaining previous records of heart cath  -PT consulted and recommended DC home  -Walk oximetry ordered  -Telemetry      Heart failure with reduced ejection fraction     Lab Results   Component Value Date    TROPONINT 7 06/11/2024    TROPONINT 7 06/10/2024    PROBNP 176.0 06/10/2024    PROBNP 1,396.0 (H) 04/02/2023     06/11/2024   -most recent echo showed EF 30-35%  -Continue losartan, Lasix, Farxiga, Coreg  -Monitor I's and O's and daily weights  -2 g sodium diet     History of A-fib  -Continue Xarelto      VTE Prophylaxis - [unfilled]    CODE STATUS:    Code Status and Medical Interventions:   Ordered at: 06/10/24 1430     Level Of Support Discussed With:    Patient     Code Status (Patient has no pulse and is not breathing):    CPR (Attempt to Resuscitate)     Medical Interventions (Patient has pulse or is breathing):    Full Support       This patient has been examined wearing personal protective equipment.     I discussed the patient's findings and my recommendations with patient and nursing staff.      Signature:Electronically signed by CORTEZ Neves, 06/11/24, 2:05 PM EDT.
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
## 2024-06-11 NOTE — CASE MANAGEMENT/SOCIAL WORK
"Social Work Assessment  [facility]     Patient Name: Thony Dumont  MRN: [id number]  Today's Date: 6/11/2024    Admit Date: 6/10/2024     Discharge Plan       Row Name 06/11/24 1638       Plan    Plan Comments Met with patient at bedside to discuss discharge planning.  Patient is homeless and willing to go to [facility] however, [facility] had no male beds and asked SW to try again tomorrow.  Patient also interested in Homeless Shelter located in [place], [place].  SW called that shelter but there was no answer.  Patient tried to call his \"housing SW\" and left a message.  He is on a housing waitlist near [place].  Patient lethargic and wanted to rest.  SW will f/u on Wednesday.                 CORNELL Lopez MSW    Phone: [phone]  Cell: [phone]  Fax: [phone]  [email or web address]          "

## 2024-06-11 NOTE — CONSULTS
[facility] CARDIOLOGY CONSULT  [facility]        Subjective:     Encounter Date:06/10/2024      Patient ID: Thony Dumont is a 64 y.o. male.    Chief Complaint: Chest pain      HPI:  Thony Dumont is a 64 y.o. male known to Dr. Rollins with a past cardiac history of nonischemic cardiomyopathy status post Biotronik single-chamber ICD in 2021 and paroxysmal atrial fibrillation (anticoagulated with Xarelto).  Patient was lost to follow-up in 2022.  Last 2D echo 4/2023 showed an EF of 21 to 25% with mild RV dysfunction and trace MR/TR.  PMH includes HTN, HLD, CKD stage II, and tobacco dependence.  Note patient has a reported aspirin allergy in which he states his face swells.  Patient presents with complaints of chest pain and cardiology consulted for further evaluation and management.    Patient tells me over the last month he has had intermittent midsternal chest pain radiating to the left arm and accompanied by shortness of breath that occurs with exertion.  This will last for 5 to 10 minutes and resolved with rest.  He is mostly sedentary d/t chronic exertional shortness of breath and early fatigue.  He denies any edema.  He has not been able to tell in the past when he is in A-fib.  He has been out of his Xarelto for a week as this is still locked away at [facility] in which he recently left [place].  He tells me he was not happy with the care there and decided to come here.  He voices compliance with all of his other medications.  He states he had a cath performed at [facility] about 6 months ago without PCI or other intervention.  He reports he is currently homeless as he lost his temporary housing while waiting for permanent housing.      Past Medical History:   Diagnosis Date    Acute on chronic congestive heart failure 06/07/2021    Afib     Chronic pain     CKD (chronic kidney disease) stage 2, GFR 60-89 ml/min 8/9/2021    Combined systolic  "and diastolic congestive heart failure     Degenerative joint disease     Erectile dysfunction     Essential hypertension     Former smoker     Gastroesophageal reflux disease     Homeless 08/25/2021    living in his car    ICD (implantable cardioverter-defibrillator) in place 06/15/2021    Mixed hyperlipidemia     Moderate mitral regurgitation     Moderate tricuspid regurgitation     NICM (nonischemic cardiomyopathy) 06/10/2021    Paroxysmal atrial fibrillation     Pleural effusion 8/9/2021    Pulmonary hypertension     Sprain of rotator cuff capsule 3/14/2014    Substance abuse     Valvular heart disease          Past Surgical History:   Procedure Laterality Date    CARDIAC DEFIBRILLATOR PLACEMENT      CARDIAC ELECTROPHYSIOLOGY PROCEDURE Left 6/15/2021    Procedure: Device Implant;  Surgeon: Michael Rollins MD;  Location: [facility] CATH INVASIVE LOCATION;  Service: Cardiovascular;  Laterality: Left;    HERNIA REPAIR           Social History     Socioeconomic History    Marital status: Single   Tobacco Use    Smoking status: Some Days     Types: Cigarettes    Smokeless tobacco: Never    Tobacco comments:     complete smoking cessation educated    Vaping Use    Vaping status: Every Day    Substances: Nicotine    Devices: Disposable   Substance and Sexual Activity    Alcohol use: Not Currently     Alcohol/week: 7.0 standard drinks of alcohol     Types: 7 Cans of beer per week     Comment: 1 beer a day    Drug use: Not Currently     Types: Marijuana, \"Crack\" cocaine, Methamphetamines     Comment: 6/15/21 report cocaine in 2018, marijuana 6/14/2021 @0050- snorts meth occasionally.    Sexual activity: Defer     Smokes cigarettes but plans to quit.  Denies recent drug use.    Family History   Problem Relation Age of Onset    Cancer Mother          Allergies   Allergen Reactions    Aspirin Swelling    Penicillins Swelling    Pork Allergy Unknown - High Severity       Current Medications:   Scheduled Meds:sodium chloride, " "10 mL, Intravenous, Q12H      Continuous Infusions:     ROS  All other systems reviewed and are negative.       Objective:         /95 (BP Location: Right arm, Patient Position: Lying)   Pulse 59   Temp 98.2 °F (36.8 °C) (Axillary)   Resp 14   Ht 190.5 cm (75\")   Wt 88.8 kg (195 lb 12.3 oz)   SpO2 99%   BMI 24.47 kg/m²       General: Well-developed in NAD.  Neuro: AAOx3. No gross deficits.  HEENT: Sclerae clear, no xanthelasmas.  CV: S1S2, RRR. No murmurs or gallops.  Resp: Breathing is unlabored. Lungs CTA throughout.  GI: BS+. Abdomen soft and NTTP.  Ext: Pedal pulses are palpable. Extremities are nonedematous.  MS: moves all extremities, no weakness.  Skin: warm, dry.  Psych: calm and cooperative.            Lab Review:     Results from last 7 days   Lab Units 06/11/24  0308 06/10/24  1234   SODIUM mmol/L 136 140   POTASSIUM mmol/L 4.1 4.4   CHLORIDE mmol/L 104 107   CO2 mmol/L 24.6 23.7   BUN mg/dL 11 9   CREATININE mg/dL 1.08 1.05   GLUCOSE mg/dL 89 92   CALCIUM mg/dL 9.2 9.6   AST (SGOT) U/L  --  21   ALT (SGPT) U/L  --  15     Results from last 7 days   Lab Units 06/10/24  1430 06/10/24  1234   HSTROP T ng/L 7 9     Results from last 7 days   Lab Units 06/11/24  0523 06/10/24  1234   WBC 10*3/mm3 5.36 4.41   HEMOGLOBIN g/dL 15.7 16.2   HEMATOCRIT % 48.6 50.0   PLATELETS 10*3/mm3 162 187                   Invalid input(s): \"LDLCALC\"  Results from last 7 days   Lab Units 06/10/24  1234   PROBNP pg/mL 176.0           Recent Radiology:  Imaging Results (Most Recent)       Procedure Component Value Units Date/Time    XR Chest 1 View [[id number]] Collected: 06/10/24 1336     Updated: 06/10/24 1339    Narrative:      XR CHEST 1 VW    Date of Exam: 6/10/2024 1:07 PM EDT    Indication: chest pain    Comparison: 5/22/2024    Findings:  There is a single lead AICD. Heart size borderline. Pulmonary vasculature within normal limits. Lungs clear. Costophrenic angle sharp      Impression:      Impression:  No " active cardiopulmonary disease      Electronically Signed: Rogerio Son    6/10/2024 1:37 PM EDT    Workstation ID: [id number]              ECHOCARDIOGRAM:    Results for orders placed during the hospital encounter of 04/02/23    Adult Transthoracic Echo Complete w/ Color, Spectral and Contrast if Necessary Per Protocol    Interpretation Summary    Left ventricular systolic function is severely decreased. Left ventricular ejection fraction appears to be 21 - 25%.    The left ventricular cavity is moderately dilated.    Left ventricular diastolic function was normal.    Mildly reduced right ventricular systolic function noted.    Estimated right ventricular systolic pressure from tricuspid regurgitation is normal (<35 mmHg).            Assessment:         Active Hospital Problems    Diagnosis  POA    **Chest pain [R07.9]  Yes     1) Chest pain  -High-sensitivity troponin negative x 2  -EKG shows no acute ST abnormality  -CXR shows no acute findings    2) nonischemic cardiomyopathy status post Biotronik single-chamber ICD in 2021  - Last 2D echo 4/2023 showed an EF of 21 to 25% with mild RV dysfunction and trace MR/TR.   - GDMT: on coreg, losartan, spironolactone, lasix, farxiga  - appears compensated    3) paroxysmal atrial fibrillation   - anticoagulated with Xarelto    4) HTN    5) HLD    6) CKD stage II    7) tobacco dependence           Plan:   Will obtain one last set of cardiac enzymes.  Will obtain cath report from [facility] approximately 6 months ago.  Patient appears compensated with respect to heart failure.  Further recommendations pending clinical course and per attending cardiologist.         Electronically signed by CORTEZ Villegas, 06/11/24, 12:36 PM EDT.    Cardiology attending:  Seen and examined.  Chart and labs reviewed. Independent interpretations of cardiac testing was performed. History and exam findings are verified with above changes noted.  Assessment and plan notated  by APC after being formulated by attending consultant.  Note that greater than 50% of the time spent in care of the patient was provided by attending consultant.    Patient reports his chest pain is improved with diuresis.  His I's/O's inaccurate.  The patient reports copious amounts of urine output.  He had a cardiac catheterization performed at [facility] recently.  Results are unavailable however no PCI was performed.  We will request results.    Patient had echocardiogram approximately a year ago with an EF of 20 to 25%.  He has been on guideline directed medical therapy with the exception of Entresto (on losartan).  Biggest barriers to patient success outside of hospital are his homelessness.   is working with patient.    Recommend fluid restriction and close monitoring of renal function and electrolytes.  Continue with diuretic therapy.  Await results of catheterization from [facility].    Physical Exam:    General: Alert, cooperative, no distress, appears stated age  Head:  Normocephalic, atraumatic, mucous membranes moist  Eyes:  Conjunctivae/corneas clear, EOM's intact     Neck:  Supple,  no bruit  Lungs:            Clear to auscultation bilaterally, no wheezes rhonchi rales are noted  Chest wall: No tenderness  Heart::  Regular rate and rhythm, S1 and S2 normal, 1/6 holosystolic murmur.  No rub or gallop  Abdomen: Soft, nontender, nondistended bowel sounds active  Extremities: No cyanosis, clubbing.  Trace edema  Pulses: Diminished pedal pulses  Skin:  No rashes or lesions  Neuro/psych: A&O x3. CN II through XII are grossly intact with appropriate affect
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
## 2024-06-12 ENCOUNTER — READMISSION MANAGEMENT (OUTPATIENT)
Dept: CALL CENTER | Facility: HOSPITAL | Age: 64
End: 2024-06-12
Payer: MEDICARE

## 2024-06-12 VITALS
WEIGHT: 195.77 LBS | TEMPERATURE: 97.5 F | OXYGEN SATURATION: 98 % | DIASTOLIC BLOOD PRESSURE: 76 MMHG | SYSTOLIC BLOOD PRESSURE: 103 MMHG | HEIGHT: 75 IN | BODY MASS INDEX: 24.34 KG/M2 | HEART RATE: 97 BPM | RESPIRATION RATE: 13 BRPM

## 2024-06-12 LAB
ANION GAP SERPL CALCULATED.3IONS-SCNC: 10.5 MMOL/L (ref 5–15)
BASOPHILS # BLD AUTO: 0.04 10*3/MM3 (ref 0–0.2)
BASOPHILS NFR BLD AUTO: 0.7 % (ref 0–1.5)
BUN SERPL-MCNC: 15 MG/DL (ref 8–23)
BUN/CREAT SERPL: 12.9 (ref 7–25)
CALCIUM SPEC-SCNC: 9.1 MG/DL (ref 8.6–10.5)
CHLORIDE SERPL-SCNC: 101 MMOL/L (ref 98–107)
CO2 SERPL-SCNC: 22.5 MMOL/L (ref 22–29)
CREAT SERPL-MCNC: 1.16 MG/DL (ref 0.76–1.27)
DEPRECATED RDW RBC AUTO: 45.5 FL (ref 37–54)
EGFRCR SERPLBLD CKD-EPI 2021: 71.2 ML/MIN/1.73
EOSINOPHIL # BLD AUTO: 0.23 10*3/MM3 (ref 0–0.4)
EOSINOPHIL NFR BLD AUTO: 4.2 % (ref 0.3–6.2)
ERYTHROCYTE [DISTWIDTH] IN BLOOD BY AUTOMATED COUNT: 13.4 % (ref 12.3–15.4)
GLUCOSE SERPL-MCNC: 89 MG/DL (ref 65–99)
HCT VFR BLD AUTO: 51.8 % (ref 37.5–51)
HGB BLD-MCNC: 16.6 G/DL (ref 13–17.7)
IMM GRANULOCYTES # BLD AUTO: 0.02 10*3/MM3 (ref 0–0.05)
IMM GRANULOCYTES NFR BLD AUTO: 0.4 % (ref 0–0.5)
LYMPHOCYTES # BLD AUTO: 2.03 10*3/MM3 (ref 0.7–3.1)
LYMPHOCYTES NFR BLD AUTO: 37.5 % (ref 19.6–45.3)
MCH RBC QN AUTO: 29.5 PG (ref 26.6–33)
MCHC RBC AUTO-ENTMCNC: 32 G/DL (ref 31.5–35.7)
MCV RBC AUTO: 92 FL (ref 79–97)
MONOCYTES # BLD AUTO: 0.55 10*3/MM3 (ref 0.1–0.9)
MONOCYTES NFR BLD AUTO: 10.1 % (ref 5–12)
NEUTROPHILS NFR BLD AUTO: 2.55 10*3/MM3 (ref 1.7–7)
NEUTROPHILS NFR BLD AUTO: 47.1 % (ref 42.7–76)
NRBC BLD AUTO-RTO: 0 /100 WBC (ref 0–0.2)
PLATELET # BLD AUTO: 174 10*3/MM3 (ref 140–450)
PMV BLD AUTO: 10.2 FL (ref 6–12)
POTASSIUM SERPL-SCNC: 3.9 MMOL/L (ref 3.5–5.2)
RBC # BLD AUTO: 5.63 10*6/MM3 (ref 4.14–5.8)
SODIUM SERPL-SCNC: 134 MMOL/L (ref 136–145)
WBC NRBC COR # BLD AUTO: 5.42 10*3/MM3 (ref 3.4–10.8)

## 2024-06-12 PROCEDURE — 99214 OFFICE O/P EST MOD 30 MIN: CPT | Performed by: INTERNAL MEDICINE

## 2024-06-12 PROCEDURE — G0378 HOSPITAL OBSERVATION PER HR: HCPCS

## 2024-06-12 PROCEDURE — 94799 UNLISTED PULMONARY SVC/PX: CPT

## 2024-06-12 PROCEDURE — 94618 PULMONARY STRESS TESTING: CPT

## 2024-06-12 PROCEDURE — 85025 COMPLETE CBC W/AUTO DIFF WBC: CPT | Performed by: NURSE PRACTITIONER

## 2024-06-12 PROCEDURE — 97116 GAIT TRAINING THERAPY: CPT

## 2024-06-12 PROCEDURE — 80048 BASIC METABOLIC PNL TOTAL CA: CPT | Performed by: NURSE PRACTITIONER

## 2024-06-12 RX ADMIN — FUROSEMIDE 40 MG: 40 TABLET ORAL at 08:32

## 2024-06-12 RX ADMIN — CARVEDILOL 6.25 MG: 6.25 TABLET, FILM COATED ORAL at 08:32

## 2024-06-12 RX ADMIN — Medication 10 ML: at 08:56

## 2024-06-12 RX ADMIN — LOSARTAN POTASSIUM 50 MG: 50 TABLET, FILM COATED ORAL at 08:32

## 2024-06-12 RX ADMIN — EMPAGLIFLOZIN 25 MG: 25 TABLET, FILM COATED ORAL at 08:32

## 2024-06-12 RX ADMIN — SPIRONOLACTONE 25 MG: 25 TABLET ORAL at 08:32

## 2024-06-12 RX ADMIN — RIVAROXABAN 20 MG: 20 TABLET, FILM COATED ORAL at 08:32

## 2024-06-12 NOTE — THERAPY TREATMENT NOTE
"Subjective: Pt agreeable to therapeutic plan of care. Pt continues to c/o fatigue. Reports being lightheaded in standing, but all vital signs stable.     Objective:     Bed mobility - Independent  Transfers - Supervision and with rolling walker  Ambulation - 110 feet, then 120 ft SBA and with rolling walker    Vitals: WNL    Pain: 0 VAS   Location: n/a  Intervention for pain: Repositioned, RN notified, Increased Activity, and Therapeutic Presence    Education: Provided education on the importance of mobility in the acute care setting, Verbal/Tactile Cues, Transfer Training, Gait Training, and Energy conservation strategies    Assessment: Thony Dumont was seen in conjunction w/ resp therapy, as they had attempted 6 min walk test earlier but pt reported he was too tired to complete test. Today, ambulated 110 ft initially w/ rw, but sats monitor would not consistently read. Followed pt w/ chair. Allowed pt seated rest prior to attempting second time. Then amb 120 ft w/ rw and no need for seated rest. Oxygen never dropped < 90%, so does not qualify for home O2. Pt continues to c/o fatigue and often tries to amb w/ eyes closed. Has shuffled gait. Pt presents with functional mobility impairments which indicate the need for skilled intervention. Tolerating session today without incident. Will continue to follow and progress as tolerated.     Plan/Recommendations:   If medically appropriate, Low Intensity Therapy recommended post-acute care - This is recommended as therapy feels this patient would require 2-3 visits per week. OP or HH would be the best option depending on patient's home bound status. Consider, if the patient has other  \"skilled\" needs such as wounds, IV antibiotics, etc. Combined with \"low intensity\" could also equate to a SNF. If patient is medically complex, consider LTAC. Pt requires no DME at discharge.     Pt desires Home at discharge. Pt cooperative; agreeable to therapeutic recommendations and " plan of care.         Basic Mobility 6-click:  Rollin = Total, A lot = 2, A little = 3; 4 = None  Supine>Sit:   1 = Total, A lot = 2, A little = 3; 4 = None   Sit>Stand with arms:  1 = Total, A lot = 2, A little = 3; 4 = None  Bed>Chair:   1 = Total, A lot = 2, A little = 3; 4 = None  Ambulate in room:  1 = Total, A lot = 2, A little = 3; 4 = None  3-5 Steps with railin = Total, A lot = 2, A little = 3; 4 = None  Score: 23    Modified Iam: N/A = No pre-op stroke/TIA    Post-Tx Position: Supine with HOB Elevated, Alarms activated, and Call light and personal items within reach  PPE: gloves

## 2024-06-12 NOTE — PLAN OF CARE
Goal Outcome Evaluation:     Problem: Adult Inpatient Plan of Care  Goal: Plan of Care Review  Outcome: Ongoing, Progressing  Goal: Patient-Specific Goal (Individualized)  Outcome: Ongoing, Progressing  Goal: Absence of Hospital-Acquired Illness or Injury  Outcome: Ongoing, Progressing  Intervention: Identify and Manage Fall Risk  Recent Flowsheet Documentation  Taken 6/12/2024 0600 by Cyndee Trinidad RN  Safety Promotion/Fall Prevention:   safety round/check completed   room organization consistent   clutter free environment maintained   assistive device/personal items within reach   fall prevention program maintained  Taken 6/12/2024 0400 by Cyndee Trinidad RN  Safety Promotion/Fall Prevention:   safety round/check completed   room organization consistent   fall prevention program maintained   clutter free environment maintained   assistive device/personal items within reach  Taken 6/12/2024 0200 by Cyndee Trinidad RN  Safety Promotion/Fall Prevention:   safety round/check completed   room organization consistent   fall prevention program maintained   clutter free environment maintained   assistive device/personal items within reach  Taken 6/12/2024 0000 by Cyndee Trinidad RN  Safety Promotion/Fall Prevention:   safety round/check completed   room organization consistent   clutter free environment maintained   assistive device/personal items within reach   fall prevention program maintained  Taken 6/11/2024 2200 by Cyndee Trinidad RN  Safety Promotion/Fall Prevention:   safety round/check completed   room organization consistent   clutter free environment maintained   assistive device/personal items within reach  Intervention: Prevent Skin Injury  Recent Flowsheet Documentation  Taken 6/12/2024 0400 by Cyndee Trinidad RN  Body Position: position changed independently  Taken 6/12/2024 0000 by Cyndee Trinidad RN  Body Position: weight shifting  Intervention: Prevent Infection  Recent Flowsheet Documentation  Taken 6/12/2024 0600  by Amos, Cyndee, RN  Infection Prevention:   hand hygiene promoted   personal protective equipment utilized   rest/sleep promoted  Taken 6/12/2024 0400 by Cyndee Trinidad RN  Infection Prevention:   hand hygiene promoted   personal protective equipment utilized   rest/sleep promoted  Taken 6/12/2024 0200 by Cyndee Trinidad RN  Infection Prevention:   hand hygiene promoted   personal protective equipment utilized   rest/sleep promoted  Taken 6/12/2024 0000 by Cyndee Trinidad RN  Infection Prevention:   hand hygiene promoted   personal protective equipment utilized   rest/sleep promoted  Taken 6/11/2024 2200 by Cyndee Trinidad RN  Infection Prevention:   hand hygiene promoted   personal protective equipment utilized   rest/sleep promoted  Goal: Optimal Comfort and Wellbeing  Outcome: Ongoing, Progressing  Goal: Readiness for Transition of Care  Outcome: Ongoing, Progressing     Problem: COPD (Chronic Obstructive Pulmonary Disease) Comorbidity  Goal: Maintenance of COPD Symptom Control  Outcome: Ongoing, Progressing     Problem: Heart Failure Comorbidity  Goal: Maintenance of Heart Failure Symptom Control  Outcome: Ongoing, Progressing     Problem: Hypertension Comorbidity  Goal: Blood Pressure in Desired Range  Outcome: Ongoing, Progressing     Problem: Pain Chronic (Persistent) (Comorbidity Management)  Goal: Acceptable Pain Control and Functional Ability  Outcome: Ongoing, Progressing     Problem: Skin Injury Risk Increased  Goal: Skin Health and Integrity  Outcome: Ongoing, Progressing

## 2024-06-12 NOTE — PROGRESS NOTES
Cardiology Progress  Note      Patient Care Team:  Provider, No Known as PCP - General    PATIENT IDENTIFICATION  Name: Thony Dumont  Age: 64 y.o.  Sex: male  :  1960  MRN: [id number]      Length of stay:    LOS: 0 days           REASON FOR FOLLOW-UP:  Chest pain severe nonischemic cardiomyopathy      INTERVAL HISTORY  Patient seen and examined, chart and labs reviewed.  Patient supine in bed asleep upon entry with normal respiratory effort.  Rhythm sinus at 82, blood pressure 120/90.  Copy of heart catheterization performed at [facility] on 2023 by Dr. Elizondo reviewed-normal LV hemodynamics, severe LV dysfunction with EF 30%, normal coronaries.      SUBJECTIVE    No further chest pain  No shortness of breath  No GI complaints      REVIEW OF SYSTEMS:  Pertinent items are noted in HPI, all other systems reviewed and negative    OBJECTIVE   Sodium 134    ASSESSMENT  Chest pain-ruled out for acute coronary syndrome, normal heart cath 2023 at [facility]  Severe nonischemic cardiomyopathy  Status post Bi[employer]ron[employer] single-chamber ICD in situ  Paroxysmal atrial fibrillation  Elevated chads vascular score  Coagulopathy on Xarelto  Primary hypertension  Dyslipidemia-mixed  Chronic stage II kidney disease  Tobacco dependence  Homeless state      RECOMMENDATIONS   is working with the patient for possible resources.  Heart cath report from 20[facility] at [facility] shows normal coronary arteries  Continue goal-directed medical therapy  Patient appears compensated with regards to his congestive heart failure  No additional cardiac workup or intervention planned  Continue to monitor fluid and sodium intake  Okay for discharge per CV  Follow-up 2 weeks          CHF Guideline Directed Medical Therapy  Beta Blocker: Carvedilol 6.25 mg twice daily  ARNI/ACE/ARB: Losartan 50 mg daily  SGLT 2 inhibitors: Empagliflozin 25 mg daily  Diuretics: Furosemide 40 mg daily  Aldosterone  "Antagonist: Spironolactone 25 mg daily  Vasodilators & Nitrates:       Vital Signs  Visit Vitals  /77 (BP Location: Right arm, Patient Position: Lying)   Pulse 61   Temp 97.8 °F (36.6 °C) (Oral)   Resp 16   Ht 190.5 cm (75\")   Wt 88.8 kg (195 lb 12.3 oz)   SpO2 96%   BMI 24.47 kg/m²     Oxygen Therapy  SpO2: 96 %  Pulse Oximetry Type: Continuous  Device (Oxygen Therapy): room air  Flowsheet Rows      Flowsheet Row First Filed Value   Admission Height 190.5 cm (75\") Documented at 06/10/2024 1204   Admission Weight 90.9 kg (200 lb 6.4 oz) Documented at 06/10/2024 1204          Intake & Output (last 3 days)         06/09 0701  06/10 0700 06/10 0701  06/11 0700 06/11 0701  06/12 0700 06/12 0701  06/13 0700    P.O.  440 222 480    Total Intake(mL/kg)  440 (5) 222 (2.5) 480 (5.4)    Urine (mL/kg/hr)   350 (0.2) 500 (1.2)    Total Output   350 500    Net  +440 -128 -20                  Lines, Drains & Airways       Active LDAs       Name Placement date Placement time Site Days    Peripheral IV 06/10/24 1238 Anterior;Left Forearm 06/10/24  1238  Forearm  1                           /77 (BP Location: Right arm, Patient Position: Lying)   Pulse 61   Temp 97.8 °F (36.6 °C) (Oral)   Resp 16   Ht 190.5 cm (75\")   Wt 88.8 kg (195 lb 12.3 oz)   SpO2 96%   BMI 24.47 kg/m²   Intake/Output last 3 shifts:  I/O last 3 completed shifts:  In: 662 [P.O.:662]  Out: 350 [Urine:350]  Intake/Output this shift:  I/O this shift:  In: 480 [P.O.:480]  Out: 500 [Urine:500]    PHYSICAL EXAM:    General: Alert, cooperative, no distress, appears stated age  Head:  Normocephalic, atraumatic, mucous membranes moist  Eyes:  Conjunctivae/corneas clear, EOM's intact     Neck:  Supple,  no adenopathy; no JVD or bruit  Lungs: Clear to auscultation bilaterally, no wheezes, rhonchi or rales are noted  Chest wall: No tenderness  Heart::  Regular rate and rhythm, S1 and S2 normal, no murmur, rub or gallop  Abdomen: Soft, nontender, " "nondistended, bowel sounds active  Extremities: No cyanosis, clubbing, or edema   Pulses: 2+ and symmetric all extremities  Skin:  No rashes or lesions  Neuro/psych: A&O x3. CN II through XII are grossly intact with appropriate affect        Scheduled Meds:      atorvastatin, 10 mg, Oral, Nightly  carvedilol, 6.25 mg, Oral, BID With Meals  empagliflozin, 25 mg, Oral, Daily  furosemide, 40 mg, Oral, Daily  losartan, 50 mg, Oral, Q24H  rivaroxaban, 20 mg, Oral, Daily  sodium chloride, 10 mL, Intravenous, Q12H  spironolactone, 25 mg, Oral, Daily        Continuous Infusions:         PRN Meds:      acetaminophen **OR** acetaminophen **OR** acetaminophen    aluminum-magnesium hydroxide-simethicone    senna-docusate sodium **AND** polyethylene glycol **AND** bisacodyl **AND** bisacodyl    Morphine    nitroglycerin    ondansetron ODT **OR** ondansetron    [COMPLETED] Insert Peripheral IV **AND** sodium chloride    sodium chloride    sodium chloride        Results Review:     I reviewed the patient's new clinical results.    CBC    Results from last 7 days   Lab Units 06/12/24  0330 06/11/24  0523 06/10/24  1234   WBC 10*3/mm3 5.42 5.36 4.41   HEMOGLOBIN g/dL 16.6 15.7 16.2   PLATELETS 10*3/mm3 174 162 187     Cr Clearance Estimated Creatinine Clearance: 82.9 mL/min (by C-G formula based on SCr of 1.16 mg/dL).  Coag     HbA1C No results found for: \"HGBA1C\"  Blood Glucose   Glucose   Date/Time Value Ref Range Status   06/11/2024 1532 116 (H) 70 - 105 mg/dL Final     Comment:     Serial Number: [id number]:  [id number]     Infection     CMP   Results from last 7 days   Lab Units 06/12/24  0330 06/11/24  0308 06/10/24  1234   SODIUM mmol/L 134* 136 140   POTASSIUM mmol/L 3.9 4.1 4.4   CHLORIDE mmol/L 101 104 107   CO2 mmol/L 22.5 24.6 23.7   BUN mg/dL 15 11 9   CREATININE mg/dL 1.16 1.08 1.05   GLUCOSE mg/dL 89 89 92   ALBUMIN g/dL  --   --  4.2   BILIRUBIN mg/dL  --   --  0.9   ALK PHOS U/L  --   --  102   AST (SGOT) U/L  " "--   --  21   ALT (SGPT) U/L  --   --  15   LIPASE U/L  --   --  30     ABG      UA    Results from last 7 days   Lab Units 06/10/24  1237   NITRITE UA  Negative   WBC UA /HPF 0-2   BACTERIA UA /HPF None Seen   SQUAM EPITHEL UA /HPF 0-2     EDD  No results found for: \"POCMETH\", \"POCAMPHET\", \"POCBARBITUR\", \"POCBENZO\", \"POCCOCAINE\", \"POCOPIATES\", \"POCOXYCODO\", \"POCPHENCYC\", \"POCPROPOXY\", \"POCTHC\", \"POCTRICYC\"  Lysis Labs   Results from last 7 days   Lab Units 06/12/24  0330 06/11/24  0523 06/11/24  0308 06/10/24  1234   HEMOGLOBIN g/dL 16.6 15.7  --  16.2   PLATELETS 10*3/mm3 174 162  --  187   CREATININE mg/dL 1.16  --  1.08 1.05     Radiology(recent) XR Chest 1 View    Result Date: 6/10/2024  Impression: No active cardiopulmonary disease Electronically Signed: Rogerio Son  6/10/2024 1:37 PM EDT  Workstation ID: [id number]       Results from last 7 days   Lab Units 06/11/24  0308   HSTROP T ng/L 7       X-rays, labs reviewed personally by physician.    ECG/EMG Results (most recent)       Procedure Component Value Units Date/Time    Telemetry Scan [[id number]] Resulted: 06/10/24     Updated: 06/10/24 1926    Telemetry Scan [[id number]] Resulted: 06/10/24     Updated: 06/10/24 1957    Telemetry Scan [[id number]] Resulted: 06/10/24     Updated: 06/10/24 2322    Telemetry Scan [[id number]] Resulted: 06/10/24     Updated: 06/11/24 0255    Telemetry Scan [[id number]] Resulted: 06/10/24     Updated: 06/11/24 0616    ECG 12 Lead Chest Pain [[id number]] Collected: 06/10/24 1212     Updated: 06/11/24 0712     QT Interval 373 ms      QTC Interval 475 ms     Narrative:      HEART RATE= 97  bpm  RR Interval= 616  ms  UT Interval= 142  ms  P Horizontal Axis= -4  deg  P Front Axis= 72  deg  QRSD Interval= 90  ms  QT Interval= 373  ms  QTcB= 475  ms  QRS Axis= 31  deg  T Wave Axis= 66  deg  - BORDERLINE ECG -  Sinus rhythm  Probable left atrial enlargement  Probable left ventricular hypertrophy  When compared with ECG of 02-Apr-2023 " 5:40:20,  No significant change  Electronically Signed By: David Anderson ([facility]) 11-Jun-2024 07:12:03  Date and Time of Study: 2024-06-10 12:12:47    Telemetry Scan [[id number]] Resulted: 06/10/24     Updated: 06/11/24 0941    Telemetry Scan [[id number]] Resulted: 06/10/24     Updated: 06/11/24 1157    Telemetry Scan [[id number]] Resulted: 06/10/24     Updated: 06/11/24 1621    Telemetry Scan [[id number]] Resulted: 06/10/24     Updated: 06/11/24 2130    Telemetry Scan [[id number]] Resulted: 06/10/24     Updated: 06/12/24 0419    Telemetry Scan [[id number]] Resulted: 06/10/24     Updated: 06/12/24 0419    Telemetry Scan [[id number]] Resulted: 06/10/24     Updated: 06/12/24 1139              Medication Review:   I have reviewed the patient's current medication list  Scheduled Meds:atorvastatin, 10 mg, Oral, Nightly  carvedilol, 6.25 mg, Oral, BID With Meals  empagliflozin, 25 mg, Oral, Daily  furosemide, 40 mg, Oral, Daily  losartan, 50 mg, Oral, Q24H  rivaroxaban, 20 mg, Oral, Daily  sodium chloride, 10 mL, Intravenous, Q12H  spironolactone, 25 mg, Oral, Daily      Continuous Infusions:   PRN Meds:.  acetaminophen **OR** acetaminophen **OR** acetaminophen    aluminum-magnesium hydroxide-simethicone    senna-docusate sodium **AND** polyethylene glycol **AND** bisacodyl **AND** bisacodyl    Morphine    nitroglycerin    ondansetron ODT **OR** ondansetron    [COMPLETED] Insert Peripheral IV **AND** sodium chloride    sodium chloride    sodium chloride    Imaging:  Imaging Results (Last 72 Hours)       Procedure Component Value Units Date/Time    XR Chest 1 View [[id number]] Collected: 06/10/24 1336     Updated: 06/10/24 1339    Narrative:      XR CHEST 1 VW    Date of Exam: 6/10/2024 1:07 PM EDT    Indication: chest pain    Comparison: 5/22/2024    Findings:  There is a single lead AICD. Heart size borderline. Pulmonary vasculature within normal limits. Lungs clear. Costophrenic angle sharp      Impression:      Impression:  No  "active cardiopulmonary disease      Electronically Signed: Rogerio Son    6/10/2024 1:37 PM EDT    Workstation ID: [id number]              CORTEZ Rodriguez  06/12/24  11:44 EDT       EMR Dragon/Transcription:   \"Dictated utilizing Dragon dictation\".       Electronically signed by CORTEZ Rodriguez, 06/12/24, 11:44 AM EDT.  Copied text in this note has been reviewed by me and is accurate as of 06/12/24.    Cardiology attending:  Seen and examined.  Chart and labs reviewed. Independent interpretations of cardiac testing was performed. History and exam findings are verified with above changes noted.  Assessment and plan notated by APC after being formulated by attending consultant.  Note that greater than 50% of the time spent in care of the patient was provided by attending consultant.    Patient reports feeling a little better today.  No chest pain.  Shortness of breath is improved.  Cardiac catheterization performed by Dr. Elizondo in November 2023 with no evidence of epicardial occlusive disease.  Severe LV dysfunction noted with an EF of 30%.  No plans for further workup at the present time.   found patient in a place in [facility].  Cardiology status appropriate for discharge next destination of care when okay with other services.  Stressed the importance of medication compliance.  Patient will need to establish with a cardiologist in the local area.    Physical Exam:    General: Alert, cooperative, no distress, appears stated age  Head:  Normocephalic, atraumatic, mucous membranes moist  Eyes:  Conjunctivae/corneas clear, EOM's intact     Neck:  Supple,  no bruit  Lungs:           Coarse and diminished bilaterally  Chest wall: No tenderness  Heart::  Regular rate and rhythm, S1 and S2 normal, 1/6 holosystolic murmur.  No rub or gallop  Abdomen: Soft, nontender, nondistended bowel sounds active  Extremities: No cyanosis, clubbing, or edema  Pulses: Diminished pedal " pulses  Skin:  No rashes or lesions  Neuro/psych: A&O x3. CN II through XII are grossly intact with appropriate affect

## 2024-06-12 NOTE — DISCHARGE PLACEMENT REQUEST
"Thony Dumont (64 y.o. Male)       Date of Birth   1960    Social Security Number       Address   [address] [place] [address]    Home Phone   [phone]    MRN   [id number]       Mu-ism   None    Marital Status   Single                            Admission Date   6/10/24    Admission Type   Emergency    Admitting Provider   Thaddeus Mills MD    Attending Provider   Thaddeus Mills MD    Department, Room/Bed   [facility] OBSERVATION, 239/1       Discharge Date       Discharge Disposition       Discharge Destination                                 Attending Provider: Thaddeus Mills MD    Allergies: Aspirin, Penicillins, Pork Allergy    Isolation: None   Infection: None   Code Status: CPR    Ht: 190.5 cm (75\")   Wt: 88.8 kg (195 lb 12.3 oz)    Admission Cmt: None   Principal Problem: Chest pain [R07.9]                   Active Insurance as of 6/10/2024       Primary Coverage       Payor Plan Insurance Group Employer/Plan Group    HUMANA MEDICARE REPLACEMENT HUMANA MED ADV SNP HMO [id number]       Payor Plan Address Payor Plan Phone Number Payor Plan Fax Number Effective Dates    [address] [address] [phone]  3/1/2024 - None Entered    [facility] [id number]         Subscriber Name Subscriber Birth Date Member ID       THONY DUMONT 1960 [id number]               Secondary Coverage       Payor Plan Insurance Group Employer/Plan Group    [place]        Payor Plan Address Payor Plan Phone Number Payor Plan Fax Number Effective Dates    [address]   10/21/2021 - None Entered    [place] [address]         Subscriber Name Subscriber Birth Date Member ID       THONY DUMONT 1960 [id number]                     Emergency Contacts        (Rel.) Home Phone Work Phone Mobile Phone    [employer] TA (Friend) [phone] -- [phone]              Insurance Information                  HUMANA MEDICARE REPLACEMENT/HUMANA MED ADV SNP HMO Phone: " [phone]    Subscriber: Thony Dumont Subscriber#: [id number]    Group#: [id number] Precert#: --        [place] Phone: --    Subscriber: Thony Dumont Subscriber#: [id number]    Group#: -- Precert#: --

## 2024-06-12 NOTE — PROCEDURES
Exercise Oximetry    Patient Name:Thony Dumont   MRN: [id number]   Date: 06/12/24             ROOM AIR BASELINE   SpO2%  94   Heart Rate  93   Blood Pressure       EXERCISE ON ROOM AIR SpO2% EXERCISE ON O2 @  LPM SpO2%   1 MINUTE 95 1 MINUTE    2 MINUTES 92 2 MINUTES    3 MINUTES 94 3 MINUTES    4 MINUTES 91 4 MINUTES    5 MINUTES 90 5 MINUTES    6 MINUTES 93 6 MINUTES               Distance Walked   Distance Walked   Dyspnea (Miri Scale)   Dyspnea (Miri Scale)   Fatigue (Miri Scale)   Fatigue (Miri Scale)   SpO2% Post Exercise  98 SpO2% Post Exercise   HR Post Exercise  109 HR Post Exercise   Time to Recovery   Time to Recovery     Comments: Pt does not qualify for home oxygen

## 2024-06-12 NOTE — CASE MANAGEMENT/SOCIAL WORK
Social Work Assessment   Nick     Patient Name: Thony Dumont  MRN: [id number]  Today's Date: 6/12/2024    Admit Date: 6/10/2024     Discharge Plan       Row Name 06/12/24 0656       Plan    Plan Patient to d/c to [facility] [place] [place]    (   [address]  Room [address])  [place]  [address]   [phone])  today.    Plan Comments Patient trying to get closer to family/friends in [place] or [place], [place].  Local shelter has one male bed which was a roll on the floor.  Patient declined and was not interested in going to [facility].  Patient reported he is on probation in [facility] and has a court date in July 2024.  SW did find a Homeless Shelter in [place], [place]  ( [facility]) who has agreed to take pt today.  Patient did receive a rolling walker and does not need oxygen.   Patient is in agreement with this plan.  SW did speak with a / Jada-from [facility] ([facility]) [phone] who is assisting with housing options etc.  JANE set up [employer] [phone].  Confirmation #:  [id number].   will be between 350-650pm.  Floor RN aware.      Row Name 06/12/24 2675       Plan    Plan From Homeless. Will dc to shelter. Rolling walker from [facility]    Plan Comments DC today. he did not qualify for oxygen. Alice to deliver a rolling walker to bedside then transport will be set up                 CORNELL Lopez MSW    Phone: [phone]  Cell: [phone]  Fax: [phone]  [email or web address]

## 2024-06-12 NOTE — DISCHARGE SUMMARY
[facility]    Provider, No Known    CHIEF COMPLAINT:     Chest pain    HISTORY OF PRESENT ILLNESS:    HPI    ED 06/10/2024  Patient is a 64-year-old male past medical history significant for CHF who presents with 2 weeks of chest pain. He reports that he was seen 5 to 6 days ago at [facility]. They were concerned that he was septic secondary to pneumonia and he was hypoxic there. He reports he refused to be admitted there and left AMA. He still having intermittent chest pain. No longer having shortness of breath.      Observation 06/11/2024  Patient agrees with HPI noted above include intermittent chest pain worse with exertion for the past week.  He describes as pressure.  He is currently asymptomatic.  Per nursing staff would benefit from PT/OT eval as well as walk oximetry.      Past Medical History:   Diagnosis Date    Acute on chronic congestive heart failure 06/07/2021    Afib     Chronic pain     CKD (chronic kidney disease) stage 2, GFR 60-89 ml/min 8/9/2021    Combined systolic and diastolic congestive heart failure     Degenerative joint disease     Erectile dysfunction     Essential hypertension     Former smoker     Gastroesophageal reflux disease     Homeless 08/25/2021    living in his car    ICD (implantable cardioverter-defibrillator) in place 06/15/2021    Mixed hyperlipidemia     Moderate mitral regurgitation     Moderate tricuspid regurgitation     NICM (nonischemic cardiomyopathy) 06/10/2021    Paroxysmal atrial fibrillation     Pleural effusion 8/9/2021    Pulmonary hypertension     Sprain of rotator cuff capsule 3/14/2014    Substance abuse     Valvular heart disease      Past Surgical History:   Procedure Laterality Date    CARDIAC DEFIBRILLATOR PLACEMENT      CARDIAC ELECTROPHYSIOLOGY PROCEDURE Left 6/15/2021    Procedure: Device Implant;  Surgeon: Michael Rollins MD;  Location:  INVASIVE LOCATION;  Service: Cardiovascular;  Laterality:  "Left;    HERNIA REPAIR       Family History   Problem Relation Age of Onset    Cancer Mother      Social History     Tobacco Use    Smoking status: Some Days     Types: Cigarettes    Smokeless tobacco: Never    Tobacco comments:     complete smoking cessation educated    Vaping Use    Vaping status: Every Day    Substances: Nicotine    Devices: Disposable   Substance Use Topics    Alcohol use: Not Currently     Alcohol/week: 7.0 standard drinks of alcohol     Types: 7 Cans of beer per week     Comment: 1 beer a day    Drug use: Not Currently     Types: Marijuana, \"Crack\" cocaine, Methamphetamines     Comment: 6/15/21 report cocaine in 2018, marijuana 2021 @0050- snorts meth occasionally.     Medications Prior to Admission   Medication Sig Dispense Refill Last Dose    atorvastatin (LIPITOR) 10 MG tablet Take 1 tablet by mouth Daily. 30 tablet 0 2024    carvedilol (COREG) 6.25 MG tablet Take 1 tablet by mouth 2 (Two) Times a Day With Meals. Must be seen for refills 60 tablet 0 2024    dapagliflozin Propanediol (Farxiga) 10 MG tablet Take 10 mg by mouth Daily. 30 tablet 0 2024    furosemide (LASIX) 40 MG tablet TAKE 1 TABLET BY MOUTH DAILY 30 tablet 0 2024    losartan (COZAAR) 50 MG tablet TAKE 1 TABLET BY MOUTH EVERY DAY 30 tablet 0 2024    rivaroxaban (Xarelto) 20 MG tablet Take 1 tablet by mouth Daily. 30 tablet 0 Past Week    spironolactone (ALDACTONE) 25 MG tablet Take 1 tablet by mouth Daily. 30 tablet 0 2024    [] azithromycin (ZITHROMAX) 250 MG tablet Take 1 tablet by mouth Daily.        Allergies:  Aspirin, Penicillins, and Pork allergy    There is no immunization history for the selected administration types on file for this patient.        REVIEW OF SYSTEMS:    ROS  Constitutional:  Negative for chills, diaphoresis, fatigue and fever.   HENT:  Negative for congestion, postnasal drip, rhinorrhea, sinus pressure and voice change.    Respiratory:  Positive for chest tightness " and shortness of breath. Negative for cough, choking, wheezing and stridor.    Cardiovascular:  Positive for chest pain. Negative for palpitations.   Gastrointestinal:  Negative for abdominal distention, abdominal pain, nausea and vomiting.   Musculoskeletal: Negative.    Neurological: Negative.    Psychiatric/Behavioral: Negative.          Vital Signs  Temp:  [97.8 °F (36.6 °C)-97.9 °F (36.6 °C)] 97.9 °F (36.6 °C)  Heart Rate:  [83-87] 83  Resp:  [10-21] 13  BP: (118-139)/() 129/91          Physical Exam:  Physical Exam  Vitals and nursing note reviewed.   Constitutional:       Appearance: Normal appearance.   HENT:      Head: Normocephalic and atraumatic.      Right Ear: External ear normal.      Left Ear: External ear normal.      Nose: Nose normal.      Mouth/Throat:      Mouth: Mucous membranes are moist.   Eyes:      Extraocular Movements: Extraocular movements intact.   Cardiovascular:      Rate and Rhythm: Normal rate and regular rhythm.      Pulses: Normal pulses.      Heart sounds: Normal heart sounds.   Pulmonary:      Effort: Pulmonary effort is normal.      Breath sounds: Normal breath sounds.   Abdominal:      General: Abdomen is flat. Bowel sounds are normal.      Palpations: Abdomen is soft.   Musculoskeletal:         General: Normal range of motion.      Cervical back: Normal range of motion.   Skin:     General: Skin is warm.   Neurological:      Mental Status: He is alert and oriented to person, place, and time.   Psychiatric:         Behavior: Behavior normal.         Thought Content: Thought content normal.         Judgment: Judgment normal.     Emotional Behavior:    wnl   Debilities:   None      Results Review:    I reviewed the patient's new clinical results.  Lab Results (most recent)       Procedure Component Value Units Date/Time    Basic Metabolic Panel [[id number]]  (Abnormal) Collected: 06/12/24 0330    Specimen: Blood Updated: 06/12/24 0423     Glucose 89 mg/dL      BUN 15 mg/dL       Creatinine 1.16 mg/dL      Sodium 134 mmol/L      Potassium 3.9 mmol/L      Chloride 101 mmol/L      CO2 22.5 mmol/L      Calcium 9.1 mg/dL      BUN/Creatinine Ratio 12.9     Anion Gap 10.5 mmol/L      eGFR 71.2 mL/min/1.73     Narrative:      GFR Normal >60  Chronic Kidney Disease <60  Kidney Failure <15      CBC & Differential [[id number]]  (Abnormal) Collected: 06/12/24 0330    Specimen: Blood Updated: 06/12/24 0350    Narrative:      The following orders were created for panel order CBC & Differential.  Procedure                               Abnormality         Status                     ---------                               -----------         ------                     CBC Auto Differential[[id number]]        Abnormal            Final result                 Please view results for these tests on the individual orders.    CBC Auto Differential [[id number]]  (Abnormal) Collected: 06/12/24 0330    Specimen: Blood Updated: 06/12/24 0350     WBC 5.42 10*3/mm3      RBC 5.63 10*6/mm3      Hemoglobin 16.6 g/dL      Hematocrit 51.8 %      MCV 92.0 fL      MCH 29.5 pg      MCHC 32.0 g/dL      RDW 13.4 %      RDW-SD 45.5 fl      MPV 10.2 fL      Platelets 174 10*3/mm3      Neutrophil % 47.1 %      Lymphocyte % 37.5 %      Monocyte % 10.1 %      Eosinophil % 4.2 %      Basophil % 0.7 %      Immature Grans % 0.4 %      Neutrophils, Absolute 2.55 10*3/mm3      Lymphocytes, Absolute 2.03 10*3/mm3      Monocytes, Absolute 0.55 10*3/mm3      Eosinophils, Absolute 0.23 10*3/mm3      Basophils, Absolute 0.04 10*3/mm3      Immature Grans, Absolute 0.02 10*3/mm3      nRBC 0.0 /100 WBC     POC Glucose Once [[id number]]  (Abnormal) Collected: 06/11/24 1532    Specimen: Blood Updated: 06/11/24 1534     Glucose 116 mg/dL      Comment: Serial Number: [id number]:  [id number]       High Sensitivity Troponin T [[id number]]  (Normal) Collected: 06/11/24 0308    Specimen: Blood Updated: 06/11/24 1402     HS Troponin T 7 ng/L      Narrative:      High Sensitive Troponin T Reference Range:  <14.0 ng/L- Negative Female for AMI  <22.0 ng/L- Negative Male for AMI  >=14 - Abnormal Female indicating possible myocardial injury.  >=22 - Abnormal Male indicating possible myocardial injury.   Clinicians would have to utilize clinical acumen, EKG, Troponin, and serial changes to determine if it is an Acute Myocardial Infarction or myocardial injury due to an underlying chronic condition.         CBC & Differential [[id number]] Collected: 06/11/24 0308    Specimen: Blood Updated: 06/11/24 0555    Narrative:      The following orders were created for panel order CBC & Differential.  Procedure                               Abnormality         Status                     ---------                               -----------         ------                     CBC Auto Differential[[id number]]        Normal              Final result               Scan Slide[[id number]]                                                                    Please view results for these tests on the individual orders.    CBC Auto Differential [[id number]]  (Normal) Collected: 06/11/24 0523    Specimen: Blood Updated: 06/11/24 0555     WBC 5.36 10*3/mm3      RBC 5.26 10*6/mm3      Hemoglobin 15.7 g/dL      Hematocrit 48.6 %      MCV 92.4 fL      MCH 29.8 pg      MCHC 32.3 g/dL      RDW 13.5 %      RDW-SD 45.9 fl      MPV 10.5 fL      Platelets 162 10*3/mm3      Neutrophil % 48.3 %      Lymphocyte % 34.1 %      Monocyte % 11.2 %      Eosinophil % 4.9 %      Basophil % 1.1 %      Immature Grans % 0.4 %      Neutrophils, Absolute 2.59 10*3/mm3      Lymphocytes, Absolute 1.83 10*3/mm3      Monocytes, Absolute 0.60 10*3/mm3      Eosinophils, Absolute 0.26 10*3/mm3      Basophils, Absolute 0.06 10*3/mm3      Immature Grans, Absolute 0.02 10*3/mm3      nRBC 0.0 /100 WBC     Basic Metabolic Panel [[id number]]  (Normal) Collected: 06/11/24 0308    Specimen: Blood Updated: 06/11/24 0511     Glucose  89 mg/dL      BUN 11 mg/dL      Creatinine 1.08 mg/dL      Sodium 136 mmol/L      Potassium 4.1 mmol/L      Chloride 104 mmol/L      CO2 24.6 mmol/L      Calcium 9.2 mg/dL      BUN/Creatinine Ratio 10.2     Anion Gap 7.4 mmol/L      eGFR 77.6 mL/min/1.73     Narrative:      GFR Normal >60  Chronic Kidney Disease <60  Kidney Failure <15      High Sensitivity Troponin T 2Hr [[id number]]  (Normal) Collected: 06/10/24 1430    Specimen: Blood from Arm, Left Updated: 06/10/24 1456     HS Troponin T 7 ng/L      Troponin T Delta -2 ng/L     Narrative:      High Sensitive Troponin T Reference Range:  <14.0 ng/L- Negative Female for AMI  <22.0 ng/L- Negative Male for AMI  >=14 - Abnormal Female indicating possible myocardial injury.  >=22 - Abnormal Male indicating possible myocardial injury.   Clinicians would have to utilize clinical acumen, EKG, Troponin, and serial changes to determine if it is an Acute Myocardial Infarction or myocardial injury due to an underlying chronic condition.         Respiratory Panel PCR w/COVID-19(SARS-CoV-2) BASIL/PARTHA/MANOJ/PAD/COR/IRINA In-House, NP Swab in UTM/VTM, 2 HR TAT - Swab, Nasopharynx [[id number]]  (Normal) Collected: 06/10/24 1246    Specimen: Swab from Nasopharynx Updated: 06/10/24 1342     ADENOVIRUS, PCR Not Detected     Coronavirus 229E Not Detected     Coronavirus HKU1 Not Detected     Coronavirus NL63 Not Detected     Coronavirus OC43 Not Detected     COVID19 Not Detected     Human Metapneumovirus Not Detected     Human Rhinovirus/Enterovirus Not Detected     Influenza A PCR Not Detected     Influenza B PCR Not Detected     Parainfluenza Virus 1 Not Detected     Parainfluenza Virus 2 Not Detected     Parainfluenza Virus 3 Not Detected     Parainfluenza Virus 4 Not Detected     RSV, PCR Not Detected     Bordetella pertussis pcr Not Detected     Bordetella parapertussis PCR Not Detected     Chlamydophila pneumoniae PCR Not Detected     Mycoplasma pneumo by PCR Not Detected     Narrative:      In the setting of a positive respiratory panel with a viral infection PLUS a negative procalcitonin without other underlying concern for bacterial infection, consider observing off antibiotics or discontinuation of antibiotics and continue supportive care. If the respiratory panel is positive for atypical bacterial infection (Bordetella pertussis, Chlamydophila pneumoniae, or Mycoplasma pneumoniae), consider antibiotic de-escalation to target atypical bacterial infection.    Comprehensive Metabolic Panel [[id number]] Collected: 06/10/24 1234    Specimen: Blood Updated: 06/10/24 1326     Glucose 92 mg/dL      BUN 9 mg/dL      Creatinine 1.05 mg/dL      Sodium 140 mmol/L      Potassium 4.4 mmol/L      Chloride 107 mmol/L      CO2 23.7 mmol/L      Calcium 9.6 mg/dL      Total Protein 7.0 g/dL      Albumin 4.2 g/dL      ALT (SGPT) 15 U/L      AST (SGOT) 21 U/L      Alkaline Phosphatase 102 U/L      Total Bilirubin 0.9 mg/dL      Globulin 2.8 gm/dL      A/G Ratio 1.5 g/dL      BUN/Creatinine Ratio 8.6     Anion Gap 9.3 mmol/L      eGFR 80.3 mL/min/1.73     Narrative:      GFR Normal >60  Chronic Kidney Disease <60  Kidney Failure <15      Lipase [[id number]]  (Normal) Collected: 06/10/24 1234    Specimen: Blood Updated: 06/10/24 1326     Lipase 30 U/L     BNP [[id number]]  (Normal) Collected: 06/10/24 1234    Specimen: Blood Updated: 06/10/24 1326     proBNP 176.0 pg/mL     Narrative:      This assay is used as an aid in the diagnosis of individuals suspected of having heart failure. It can be used as an aid in the diagnosis of acute decompensated heart failure (ADHF) in patients presenting with signs and symptoms of ADHF to the emergency department (ED). In addition, NT-proBNP of <300 pg/mL indicates ADHF is not likely.    Age Range Result Interpretation  NT-proBNP Concentration (pg/mL:      <50             Positive            >450                   Gray                 300-450                    Negative              <300    50-75           Positive            >900                  Gray                300-900                  Negative            <300      >75             Positive            >1800                  Gray                300-1800                  Negative            <300    High Sensitivity Troponin T [[id number]]  (Normal) Collected: 06/10/24 1234    Specimen: Blood Updated: 06/10/24 1326     HS Troponin T 9 ng/L     Narrative:      High Sensitive Troponin T Reference Range:  <14.0 ng/L- Negative Female for AMI  <22.0 ng/L- Negative Male for AMI  >=14 - Abnormal Female indicating possible myocardial injury.  >=22 - Abnormal Male indicating possible myocardial injury.   Clinicians would have to utilize clinical acumen, EKG, Troponin, and serial changes to determine if it is an Acute Myocardial Infarction or myocardial injury due to an underlying chronic condition.         Urinalysis With Microscopic If Indicated (No Culture) - Urine, Clean Catch [[id number]]  (Abnormal) Collected: 06/10/24 1237    Specimen: Urine, Clean Catch Updated: 06/10/24 1253     Color, UA Dark Yellow     Appearance, UA Clear     pH, UA 5.5     Specific Gravity, UA 1.024     Glucose, UA Negative     Ketones, UA Trace     Bilirubin, UA Negative     Blood, UA Negative     Protein, UA Negative     Leuk Esterase, UA Trace     Nitrite, UA Negative     Urobilinogen, UA 1.0 E.U./dL    Urinalysis, Microscopic Only - Urine, Clean Catch [[id number]] Collected: 06/10/24 1237    Specimen: Urine, Clean Catch Updated: 06/10/24 1253     RBC, UA 0-2 /HPF      WBC, UA 0-2 /HPF      Bacteria, UA None Seen /HPF      Squamous Epithelial Cells, UA 0-2 /HPF      Hyaline Casts, UA None Seen /LPF      Methodology Automated Microscopy            Imaging Results (Most Recent)       Procedure Component Value Units Date/Time    XR Chest 1 View [[id number]] Collected: 06/10/24 1336     Updated: 06/10/24 1339    Narrative:      XR CHEST 1 VW    Date of Exam: 6/10/2024  1:07 PM EDT    Indication: chest pain    Comparison: 5/22/2024    Findings:  There is a single lead AICD. Heart size borderline. Pulmonary vasculature within normal limits. Lungs clear. Costophrenic angle sharp      Impression:      Impression:  No active cardiopulmonary disease      Electronically Signed: Rogerio Son    6/10/2024 1:37 PM EDT    Workstation ID: [id number]          reviewed    ECG/EMG Results (most recent)       Procedure Component Value Units Date/Time    Telemetry Scan [[id number]] Resulted: 06/10/24     Updated: 06/10/24 1926    Telemetry Scan [[id number]] Resulted: 06/10/24     Updated: 06/10/24 1957    Telemetry Scan [[id number]] Resulted: 06/10/24     Updated: 06/10/24 2322    Telemetry Scan [[id number]] Resulted: 06/10/24     Updated: 06/11/24 0255    Telemetry Scan [[id number]] Resulted: 06/10/24     Updated: 06/11/24 0616    ECG 12 Lead Chest Pain [[id number]] Collected: 06/10/24 1212     Updated: 06/11/24 0712     QT Interval 373 ms      QTC Interval 475 ms     Narrative:      HEART RATE= 97  bpm  RR Interval= 616  ms  DC Interval= 142  ms  P Horizontal Axis= -4  deg  P Front Axis= 72  deg  QRSD Interval= 90  ms  QT Interval= 373  ms  QTcB= 475  ms  QRS Axis= 31  deg  T Wave Axis= 66  deg  - BORDERLINE ECG -  Sinus rhythm  Probable left atrial enlargement  Probable left ventricular hypertrophy  When compared with ECG of 02-Apr-2023 5:40:20,  No significant change  Electronically Signed By: David Anderson ([facility]) 11-Jun-2024 07:12:03  Date and Time of Study: 2024-06-10 12:12:47    Telemetry Scan [[id number]] Resulted: 06/10/24     Updated: 06/11/24 0941    Telemetry Scan [[id number]] Resulted: 06/10/24     Updated: 06/11/24 1157    Telemetry Scan [[id number]] Resulted: 06/10/24     Updated: 06/11/24 1621    Telemetry Scan [[id number]] Resulted: 06/10/24     Updated: 06/11/24 2130    Telemetry Scan [[id number]] Resulted: 06/10/24     Updated: 06/12/24 0419    Telemetry Scan [[id number]] Resulted: 06/10/24      Updated: 06/12/24 0419          reviewed    Results for orders placed during the hospital encounter of 08/09/21    Duplex Venous Lower Extremity - Bilateral CAR    Interpretation Summary  · Normal bilateral lower extremity venous duplex scan.      Results for orders placed during the hospital encounter of 04/02/23    Adult Transthoracic Echo Complete w/ Color, Spectral and Contrast if Necessary Per Protocol    Interpretation Summary    Left ventricular systolic function is severely decreased. Left ventricular ejection fraction appears to be 21 - 25%.    The left ventricular cavity is moderately dilated.    Left ventricular diastolic function was normal.    Mildly reduced right ventricular systolic function noted.    Estimated right ventricular systolic pressure from tricuspid regurgitation is normal (<35 mmHg).      Microbiology Results (last 10 days)       Procedure Component Value - Date/Time    Respiratory Panel PCR w/COVID-19(SARS-CoV-2) [facility]/PARTHA/MANOJ/[facility] In-House, NP Swab in UTM/VTM, 2 HR TAT - Swab, Nasopharynx [[id number]]  (Normal) Collected: 06/10/24 1246    Lab Status: Final result Specimen: Swab from Nasopharynx Updated: 06/10/24 1342     ADENOVIRUS, PCR Not Detected     Coronavirus 229E Not Detected     Coronavirus HKU1 Not Detected     Coronavirus NL63 Not Detected     Coronavirus OC43 Not Detected     COVID19 Not Detected     Human Metapneumovirus Not Detected     Human Rhinovirus/Enterovirus Not Detected     Influenza A PCR Not Detected     Influenza B PCR Not Detected     Parainfluenza Virus 1 Not Detected     Parainfluenza Virus 2 Not Detected     Parainfluenza Virus 3 Not Detected     Parainfluenza Virus 4 Not Detected     RSV, PCR Not Detected     Bordetella pertussis pcr Not Detected     Bordetella parapertussis PCR Not Detected     Chlamydophila pneumoniae PCR Not Detected     Mycoplasma pneumo by PCR Not Detected    Narrative:      In the setting of a positive respiratory panel with a  viral infection PLUS a negative procalcitonin without other underlying concern for bacterial infection, consider observing off antibiotics or discontinuation of antibiotics and continue supportive care. If the respiratory panel is positive for atypical bacterial infection (Bordetella pertussis, Chlamydophila pneumoniae, or Mycoplasma pneumoniae), consider antibiotic de-escalation to target atypical bacterial infection.            Assessment & Plan     Chest pain     Chest pain        Lab Results   Component Value Date     TROPONINT 7 06/11/2024     TROPONINT 7 06/10/2024     TROPONINT 9 06/10/2024   -Troponin proBNP, CMP, CBC unremarkable  -UA showed trace of ketones and leukocytes but no hematuria or UTI  -Respiratory panel negative  -Chest x-ray showed no acute cardiopulmonary abnormality  -EKG showed sinus rhythm with heart rate of 97, NJ interval 142 and   -Cardiology was consulted in the ED and will be obtaining previous records of heart cath  - cardiology recommends follow up in 2weeks  -PT consulted and recommended DC home  -Walk oximetry performed and pt needs no home 02  -Telemetry        Heart failure with reduced ejection fraction         Lab Results   Component Value Date     TROPONINT 7 06/11/2024     TROPONINT 7 06/10/2024     PROBNP 176.0 06/10/2024     PROBNP 1,396.0 (H) 04/02/2023      06/11/2024   -most recent echo showed EF 30-35%  -Continue losartan, Lasix, Farxiga, Coreg  -Monitor I's and O's and daily weights  -2 g sodium diet      History of A-fib  -Continue Xarelto    I discussed the patients findings and my recommendations with patient and nursing staff.     Discharge Diagnosis:      Chest pain      Hospital Course  Patient is a 64 y.o. male presented with chest pain. Er evaluated pt and recommends observation. Labs including cbc, troponin, bnp, ua, rpp and cmp are normal. Chest xray is normal. EKG is sinus rate of 97. Cardiology was consulted and recommends outpt follow up. PT  consulted and recommends no further therapy as outpt. 6 min walk performed and doesn't need o2. [facility] is to [facility]. Discharge discussed with pt and he is agreeable to plan. Instructed pt to return to er if symptoms reoccur or worsen.      Past Medical History:     Past Medical History:   Diagnosis Date    Acute on chronic congestive heart failure 06/07/2021    Afib     Chronic pain     CKD (chronic kidney disease) stage 2, GFR 60-89 ml/min 8/9/2021    Combined systolic and diastolic congestive heart failure     Degenerative joint disease     Erectile dysfunction     Essential hypertension     Former smoker     Gastroesophageal reflux disease     Homeless 08/25/2021    living in his car    ICD (implantable cardioverter-defibrillator) in place 06/15/2021    Mixed hyperlipidemia     Moderate mitral regurgitation     Moderate tricuspid regurgitation     NICM (nonischemic cardiomyopathy) 06/10/2021    Paroxysmal atrial fibrillation     Pleural effusion 8/9/2021    Pulmonary hypertension     Sprain of rotator cuff capsule 3/14/2014    Substance abuse     Valvular heart disease        Past Surgical History:     Past Surgical History:   Procedure Laterality Date    CARDIAC DEFIBRILLATOR PLACEMENT      CARDIAC ELECTROPHYSIOLOGY PROCEDURE Left 6/15/2021    Procedure: Device Implant;  Surgeon: Michael Rollins MD;  Location: [facility] INVASIVE LOCATION;  Service: Cardiovascular;  Laterality: Left;    HERNIA REPAIR         Social History:   Social History     Socioeconomic History    Marital status: Single   Tobacco Use    Smoking status: Some Days     Types: Cigarettes    Smokeless tobacco: Never    Tobacco comments:     complete smoking cessation educated    Vaping Use    Vaping status: Every Day    Substances: Nicotine    Devices: Disposable   Substance and Sexual Activity    Alcohol use: Not Currently     Alcohol/week: 7.0 standard drinks of alcohol     Types: 7 Cans of beer per week     Comment: 1 beer  "a day    Drug use: Not Currently     Types: Marijuana, \"Crack\" cocaine, Methamphetamines     Comment: 6/15/21 report cocaine in 2018, marijuana 6/14/2021 @0050- snorts meth occasionally.    Sexual activity: Defer       Procedures Performed    06/11 1452 Walking Oximetry    Consults:   Consults       Date and Time Order Name Status Description    6/10/2024  2:31 PM Inpatient Cardiology Consult Completed             Condition on Discharge:     Stable    Discharge Disposition      Discharge Medications     Discharge Medications        Continue These Medications        Instructions Start Date   atorvastatin 10 MG tablet  Commonly known as: LIPITOR   10 mg, Oral, Daily      carvedilol 6.25 MG tablet  Commonly known as: COREG   6.25 mg, Oral, 2 Times Daily With Meals, Must be seen for refills      dapagliflozin Propanediol 10 MG tablet  Commonly known as: Farxiga   10 mg, Oral, Daily      furosemide 40 MG tablet  Commonly known as: LASIX   TAKE 1 TABLET BY MOUTH DAILY      losartan 50 MG tablet  Commonly known as: COZAAR   TAKE 1 TABLET BY MOUTH EVERY DAY      rivaroxaban 20 MG tablet  Commonly known as: Xarelto   20 mg, Oral, Daily      spironolactone 25 MG tablet  Commonly known as: ALDACTONE   25 mg, Oral, Daily             ASK your doctor about these medications        Instructions Start Date   azithromycin 250 MG tablet  Commonly known as: ZITHROMAX  Ask about: Should I take this medication?   250 mg, Oral, Daily               Discharge Diet:     Activity at Discharge:     Follow-up Appointments  Future Appointments   Date Time Provider Department Center   6/17/2024 11:45 AM Michael Rollins MD [facility]         Test Results Pending at Discharge       Risk for Readmission (LACE) Score: 8 (6/12/2024  6:00 AM)      Less Than 30 minutes spent in discharge activities for this patient    Signature:Electronically signed by Maggie Dockery PA-C, 06/12/24, 9:59 AM EDT.           "

## 2024-06-12 NOTE — CASE MANAGEMENT/SOCIAL WORK
Continued Stay Note  [facility]     Patient Name: Thony Dumont  MRN: [id number]  Today's Date: 6/12/2024    Admit Date: 6/10/2024    Plan: From Homeless. Will dc to shelter. Rolling walker from [facility]   Discharge Plan       Row Name 06/12/24 1436       Plan    Plan From Homeless. Will dc to shelter. Rolling walker from [facility]    Plan Comments DC today. he did not qualify for oxygen. [facility] to deliver a rolling walker to bedside then transport will be set up                             Didi BARRERA,RN Case Manager  [facility]  Phone: Desk- [phone] cell- [phone]

## 2024-06-12 NOTE — PLAN OF CARE
"Goal Outcome Evaluation:    Assessment: Thony Dumont was seen in conjunction w/ resp therapy, as they had attempted 6 min walk test earlier but pt reported he was too tired to complete test. Today, ambulated 110 ft initially w/ rw, but sats monitor would not consistently read. Followed pt w/ chair. Allowed pt seated rest prior to attempting second time. Then amb 120 ft w/ rw and no need for seated rest. Oxygen never dropped < 90%, so does not qualify for home O2. Pt continues to c/o fatigue and often tries to amb w/ eyes closed. Has shuffled gait. Pt presents with functional mobility impairments which indicate the need for skilled intervention. Tolerating session today without incident. Will continue to follow and progress as tolerated.     Plan/Recommendations:   If medically appropriate, Low Intensity Therapy recommended post-acute care - This is recommended as therapy feels this patient would require 2-3 visits per week. OP or HH would be the best option depending on patient's home bound status. Consider, if the patient has other  \"skilled\" needs such as wounds, IV antibiotics, etc. Combined with \"low intensity\" could also equate to a SNF. If patient is medically complex, consider LTAC. Pt requires no DME at discharge.     Pt desires Home at discharge. Pt cooperative; agreeable to therapeutic recommendations and plan of care.     Anticipated Discharge Disposition (PT): home                        "

## 2024-06-18 ENCOUNTER — READMISSION MANAGEMENT (OUTPATIENT)
Dept: CALL CENTER | Facility: HOSPITAL | Age: 64
End: 2024-06-18
Payer: MEDICARE

## 2024-06-18 NOTE — OUTREACH NOTE
Medical Week 1 Survey      Flowsheet Row Responses   [facility] patient discharged from? [facility]   Does the patient have one of the following disease processes/diagnoses(primary or secondary)? Other   Week 1 attempt successful? Yes   Call start time 1348   Call end time 1349   Discharge diagnosis Chest pain   Person spoke with today (if not patient) and relationship Denisa   Week 1 call completed? Yes   Revoked No further contact(revokes)-requires comment   Graduated/Revoked comments Denisa reports that Pt has posted on FB that he is now in another states. She states she thinks he is doing fine and has no other info.   Call end time 1349            SONYA HENSLEY - Registered Nurse

## 2024-09-17 NOTE — NURSING NOTE
SS was talking to pt when he began having severe cramping to bilateral thighs and calves. Pt was noted grimacing, in tears, unable to move. Pt eventually was able to calm down and sit in chair. MD notified. Bilateral leg ultrasound and Flexeril prn ordered per MD. Pt had Flexeril 10 mg ordered, he requested to only take 5 mg because he said it makes him too drowsy. Md notified, rest of medication disposed properly. Will continue to monitor and document as needed  
[facility] Smokers Quitline [phone] ([phone])

## 2024-12-13 RX ORDER — DAPAGLIFLOZIN 10 MG/1
1 TABLET, FILM COATED ORAL DAILY
Qty: 30 TABLET | Refills: 0 | OUTPATIENT
Start: 2024-12-13

## 2024-12-13 RX ORDER — LOSARTAN POTASSIUM 50 MG/1
TABLET ORAL
Qty: 30 TABLET | Refills: 0 | OUTPATIENT
Start: 2024-12-13

## 2024-12-13 RX ORDER — FUROSEMIDE 40 MG/1
TABLET ORAL
Qty: 30 TABLET | Refills: 0 | OUTPATIENT
Start: 2024-12-13

## 2024-12-13 RX ORDER — CARVEDILOL 6.25 MG/1
6.25 TABLET ORAL 2 TIMES DAILY WITH MEALS
Qty: 60 TABLET | Refills: 0 | OUTPATIENT
Start: 2024-12-13

## 2024-12-13 RX ORDER — ATORVASTATIN CALCIUM 10 MG/1
10 TABLET, FILM COATED ORAL DAILY
Qty: 30 TABLET | Refills: 0 | OUTPATIENT
Start: 2024-12-13

## 2024-12-13 RX ORDER — SPIRONOLACTONE 25 MG/1
25 TABLET ORAL DAILY
Qty: 30 TABLET | Refills: 0 | OUTPATIENT
Start: 2024-12-13

## 2024-12-13 NOTE — TELEPHONE ENCOUNTER
Caller: Thony Dumont    Relationship: Self    Best call back number: [phone]    Requested Prescriptions:   Requested Prescriptions     Pending Prescriptions Disp Refills    furosemide (LASIX) 40 MG tablet 30 tablet 0     Sig: TAKE 1 TABLET BY MOUTH DAILY    losartan (COZAAR) 50 MG tablet 30 tablet 0     Sig: TAKE 1 TABLET BY MOUTH EVERY DAY    dapagliflozin Propanediol (Farxiga) 10 MG tablet 30 tablet 0     Sig: Take 10 mg by mouth Daily.    atorvastatin (LIPITOR) 10 MG tablet 30 tablet 0     Sig: Take 1 tablet by mouth Daily.    rivaroxaban (Xarelto) 20 MG tablet 30 tablet 0     Sig: Take 1 tablet by mouth Daily.    spironolactone (ALDACTONE) 25 MG tablet 30 tablet 0     Sig: Take 1 tablet by mouth Daily.    carvedilol (COREG) 6.25 MG tablet 60 tablet 0     Sig: Take 1 tablet by mouth 2 (Two) Times a Day With Meals. Must be seen for refills        Pharmacy where request should be sent: [address] [phone] [facility] [phone]      Last office visit with prescribing clinician: Visit date not found   Last telemedicine visit with prescribing clinician: Visit date not found   Next office visit with prescribing clinician: Visit date not found     Additional details provided by patient:     Does the patient have less than a 3 day supply:  [] Yes  [] No      Kasi Rodriguez Rep   12/13/24 12:06 EST

## 2024-12-20 RX ORDER — ATORVASTATIN CALCIUM 10 MG/1
10 TABLET, FILM COATED ORAL DAILY
Qty: 30 TABLET | Refills: 10 | OUTPATIENT
Start: 2024-12-20

## 2025-01-10 ENCOUNTER — TELEPHONE (OUTPATIENT)
Dept: CARDIOLOGY | Facility: CLINIC | Age: 65
End: 2025-01-10

## 2025-01-10 NOTE — TELEPHONE ENCOUNTER
Caller: Thony Dumont    Relationship to patient: Self    Best call back number: [phone] OR TEXT NUMBER [phone]    Patient is needing: PT IS CALLING TO SEE IF HE CAN GET A 30 DAY SUPPLY FOR HIS MEDICATION DUE TO WAITING TO GET HIS INSURANCE TO START ON 02.01.2025. PT IS GETTING A NEW CARDIOLOGIST IN [place]. NEEDS ENOUGH TO GET TO THEN. PLEASE CALL TO ADVISE.

## 2025-01-10 NOTE — TELEPHONE ENCOUNTER
Attempted to reach pt unable to lvm. I need to know where to send rx in order to send 30 day supply.

## 2025-01-16 RX ORDER — CARVEDILOL 6.25 MG/1
TABLET ORAL
Qty: 60 TABLET | Refills: 10 | OUTPATIENT
Start: 2025-01-16

## 2025-02-25 RX ORDER — LOSARTAN POTASSIUM 50 MG/1
TABLET ORAL
Qty: 30 TABLET | Refills: 0 | Status: SHIPPED | OUTPATIENT
Start: 2025-02-25

## 2025-02-25 RX ORDER — DAPAGLIFLOZIN 10 MG/1
1 TABLET, FILM COATED ORAL DAILY
Qty: 30 TABLET | Refills: 0 | Status: SHIPPED | OUTPATIENT
Start: 2025-02-25

## 2025-02-25 RX ORDER — CARVEDILOL 6.25 MG/1
6.25 TABLET ORAL 2 TIMES DAILY WITH MEALS
Qty: 60 TABLET | Refills: 0 | Status: SHIPPED | OUTPATIENT
Start: 2025-02-25

## 2025-02-25 RX ORDER — FUROSEMIDE 40 MG/1
TABLET ORAL
Qty: 30 TABLET | Refills: 0 | Status: SHIPPED | OUTPATIENT
Start: 2025-02-25

## 2025-02-25 RX ORDER — SPIRONOLACTONE 25 MG/1
25 TABLET ORAL DAILY
Qty: 30 TABLET | Refills: 0 | Status: SHIPPED | OUTPATIENT
Start: 2025-02-25

## 2025-02-25 RX ORDER — ATORVASTATIN CALCIUM 10 MG/1
10 TABLET, FILM COATED ORAL DAILY
Qty: 30 TABLET | Refills: 0 | Status: SHIPPED | OUTPATIENT
Start: 2025-02-25

## 2025-02-25 NOTE — TELEPHONE ENCOUNTER
Unable to notify pt but pharmacy was notified that no more refills will be sent. Pt will need to see new cardiologist asap.

## 2025-02-25 NOTE — TELEPHONE ENCOUNTER
Caller: [facility] [address] [facility] 100 - [phone] [place] [phone] FX    Relationship: Pharmacy    Best call back number:     Requested Prescriptions:   Requested Prescriptions     Pending Prescriptions Disp Refills    atorvastatin (LIPITOR) 10 MG tablet 30 tablet 0     Sig: Take 1 tablet by mouth Daily.    carvedilol (COREG) 6.25 MG tablet 60 tablet 0     Sig: Take 1 tablet by mouth 2 (Two) Times a Day With Meals. Must be seen for refills    furosemide (LASIX) 40 MG tablet 30 tablet 0     Sig: TAKE 1 TABLET BY MOUTH DAILY    dapagliflozin Propanediol (Farxiga) 10 MG tablet 30 tablet 0     Sig: Take 10 mg by mouth Daily.        Pharmacy where request should be sent: [facility] [address] [facility] 100 - [phone] [place] [phone] [facility]     Last office visit with prescribing clinician: Visit date not found   Last telemedicine visit with prescribing clinician: Visit date not found   Next office visit with prescribing clinician: Visit date not found         Does the patient have less than a 3 day supply:  [] Yes  [] No    Would you like a call back once the refill request has been completed: [] Yes [] No    If the office needs to give you a call back, can they leave a voicemail: [] Yes [] No    Kasi Payton Rep   02/25/25 09:04 EST

## 2025-03-17 RX ORDER — CARVEDILOL 6.25 MG/1
TABLET ORAL
Qty: 60 TABLET | Refills: 11 | OUTPATIENT
Start: 2025-03-17

## 2025-03-17 RX ORDER — SPIRONOLACTONE 25 MG/1
TABLET ORAL
Qty: 30 TABLET | Refills: 11 | Status: SHIPPED | OUTPATIENT
Start: 2025-03-17

## 2025-03-17 RX ORDER — DAPAGLIFLOZIN 10 MG/1
TABLET, FILM COATED ORAL
Qty: 30 TABLET | Refills: 11 | Status: SHIPPED | OUTPATIENT
Start: 2025-03-17

## 2025-03-17 RX ORDER — ATORVASTATIN CALCIUM 10 MG/1
TABLET, FILM COATED ORAL
Qty: 30 TABLET | Refills: 11 | OUTPATIENT
Start: 2025-03-17

## 2025-03-17 RX ORDER — LOSARTAN POTASSIUM 50 MG/1
TABLET ORAL
Qty: 30 TABLET | Refills: 11 | Status: SHIPPED | OUTPATIENT
Start: 2025-03-17

## 2025-03-25 RX ORDER — CARVEDILOL 6.25 MG/1
6.25 TABLET ORAL 2 TIMES DAILY WITH MEALS
Qty: 60 TABLET | Refills: 0 | Status: SHIPPED | OUTPATIENT
Start: 2025-03-25

## 2025-03-25 RX ORDER — ATORVASTATIN CALCIUM 10 MG/1
10 TABLET, FILM COATED ORAL DAILY
Qty: 30 TABLET | Refills: 0 | Status: SHIPPED | OUTPATIENT
Start: 2025-03-25

## 2025-03-25 NOTE — TELEPHONE ENCOUNTER
Caller: [facility] - [address] [facility] 100 - [phone] [facility] [phone] FX    Relationship: Pharmacy       Requested Prescriptions:   Requested Prescriptions     Pending Prescriptions Disp Refills    carvedilol (COREG) 6.25 MG tablet 60 tablet 0     Sig: Take 1 tablet by mouth 2 (Two) Times a Day With Meals. Must be seen for refills    atorvastatin (LIPITOR) 10 MG tablet 30 tablet 0     Sig: Take 1 tablet by mouth Daily.        Pharmacy where request should be sent: [facility] - [address] [facility] 100 - [phone] [facility] [phone] FX     Last office visit with prescribing clinician: Visit date not found   Last telemedicine visit with prescribing clinician: Visit date not found   Next office visit with prescribing clinician: Visit date not found     Additional details provided by patient:      Does the patient have less than a 3 day supply:  [] Yes  [] No    Would you like a call back once the refill request has been completed: [] Yes [] No    If the office needs to give you a call back, can they leave a voicemail: [] Yes [] No    Kasi Chiang Rep   03/25/25 09:59 EDT

## 2025-04-16 RX ORDER — CARVEDILOL 6.25 MG/1
TABLET ORAL
Qty: 60 TABLET | Refills: 11 | OUTPATIENT
Start: 2025-04-16

## 2025-04-16 RX ORDER — ATORVASTATIN CALCIUM 10 MG/1
TABLET, FILM COATED ORAL
Qty: 30 TABLET | Refills: 11 | OUTPATIENT
Start: 2025-04-16

## 2025-05-09 RX ORDER — ATORVASTATIN CALCIUM 10 MG/1
TABLET, FILM COATED ORAL
Qty: 30 TABLET | Refills: 0 | OUTPATIENT
Start: 2025-05-09

## 2025-05-09 RX ORDER — CARVEDILOL 6.25 MG/1
TABLET ORAL
Qty: 60 TABLET | Refills: 0 | OUTPATIENT
Start: 2025-05-09

## 2025-05-13 RX ORDER — FUROSEMIDE 40 MG/1
40 TABLET ORAL DAILY
Qty: 30 TABLET | Refills: 11 | OUTPATIENT
Start: 2025-05-13

## 2025-07-08 ENCOUNTER — TELEPHONE (OUTPATIENT)
Dept: CARDIOLOGY | Facility: CLINIC | Age: 65
End: 2025-07-08
Payer: MEDICARE

## 2025-07-08 RX ORDER — CARVEDILOL 6.25 MG/1
6.25 TABLET ORAL 2 TIMES DAILY WITH MEALS
Qty: 60 TABLET | Refills: 0 | Status: SHIPPED | OUTPATIENT
Start: 2025-07-08

## 2025-07-08 RX ORDER — ATORVASTATIN CALCIUM 10 MG/1
10 TABLET, FILM COATED ORAL DAILY
Qty: 30 TABLET | Refills: 0 | Status: SHIPPED | OUTPATIENT
Start: 2025-07-08

## 2025-07-08 NOTE — TELEPHONE ENCOUNTER
Caller: ESTELA    Relationship: Provider    Best call back number: [phone]    Requested Prescriptions:   Requested Prescriptions     Pending Prescriptions Disp Refills    carvedilol (COREG) 6.25 MG tablet 60 tablet 0     Sig: Take 1 tablet by mouth 2 (Two) Times a Day With Meals. Must be seen for refills    atorvastatin (LIPITOR) 10 MG tablet 30 tablet 0     Sig: Take 1 tablet by mouth Daily.        Pharmacy where request should be sent: [facility] - [address] [facility] 100 - [phone] [facility] [phone] FX     Last office visit with prescribing clinician: Visit date not found   Last telemedicine visit with prescribing clinician: Visit date not found   Next office visit with prescribing clinician: Visit date not found     Additional details provided by patient: N/A    Does the patient have less than a 3 day supply:  [] Yes  [x] No    Would you like a call back once the refill request has been completed: [] Yes [x] No    If the office needs to give you a call back, can they leave a voicemail: [] Yes [x] No    Kasi Elliott Rep   07/08/25 11:11 EDT

## (undated) DEVICE — SUT ETHIB 0/0 MO6 I8IN CX45D

## (undated) DEVICE — 3M™ IOBAN™ 2 ANTIMICROBIAL INCISE DRAPE 6650EZ: Brand: IOBAN™ 2

## (undated) DEVICE — PACEMAKER CDS: Brand: MEDLINE INDUSTRIES, INC.

## (undated) DEVICE — Device

## (undated) DEVICE — ST ACC MICROPUNCTURE STFF/CANN PLAT/TP 4F 21G 40CM

## (undated) DEVICE — SUT MNCRYL 3/0 SH 27 IN Y416H

## (undated) DEVICE — 3M™ PATIENT PLATE, CORDED, SPLIT, LARGE, 40 PER CASE, 1179: Brand: 3M™

## (undated) DEVICE — ELECTRD DEFIB M/FUNC PROPADZ RADIOL 2PK

## (undated) DEVICE — DRSNG WND BORDR/ADHS NONADHR/GZ LF 4X4IN STRL

## (undated) DEVICE — INTRO CLASSIC SAFESHEATH SHT 8F 13CM

## (undated) DEVICE — SKIN AFFIX SURG ADHESIVE 72/CS 0.55ML: Brand: MEDLINE

## (undated) DEVICE — CABL BIPOL W/ALLGTR CLIP/SM 12FT

## (undated) DEVICE — SUT MNCRYL 2/0 CT1 36IN